# Patient Record
Sex: MALE | Race: WHITE | NOT HISPANIC OR LATINO | Employment: OTHER | URBAN - NONMETROPOLITAN AREA
[De-identification: names, ages, dates, MRNs, and addresses within clinical notes are randomized per-mention and may not be internally consistent; named-entity substitution may affect disease eponyms.]

---

## 2017-01-20 ENCOUNTER — COMMUNICATION - GICH (OUTPATIENT)
Dept: FAMILY MEDICINE | Facility: OTHER | Age: 52
End: 2017-01-20

## 2017-01-20 ENCOUNTER — COMMUNICATION - GICH (OUTPATIENT)
Dept: INTERNAL MEDICINE | Facility: OTHER | Age: 52
End: 2017-01-20

## 2017-01-20 DIAGNOSIS — K21.9 GASTRO-ESOPHAGEAL REFLUX DISEASE WITHOUT ESOPHAGITIS: ICD-10-CM

## 2017-01-20 DIAGNOSIS — R73.9 HYPERGLYCEMIA: ICD-10-CM

## 2017-01-20 DIAGNOSIS — M35.3 POLYMYALGIA RHEUMATICA (H): ICD-10-CM

## 2017-03-20 ENCOUNTER — COMMUNICATION - GICH (OUTPATIENT)
Dept: INTERNAL MEDICINE | Facility: OTHER | Age: 52
End: 2017-03-20

## 2017-03-20 DIAGNOSIS — M35.3 POLYMYALGIA RHEUMATICA (H): ICD-10-CM

## 2017-04-12 ENCOUNTER — COMMUNICATION - GICH (OUTPATIENT)
Dept: INTERNAL MEDICINE | Facility: OTHER | Age: 52
End: 2017-04-12

## 2017-04-12 DIAGNOSIS — M25.50 PAIN IN JOINT: ICD-10-CM

## 2017-04-12 DIAGNOSIS — M35.3 POLYMYALGIA RHEUMATICA (H): ICD-10-CM

## 2017-04-19 ENCOUNTER — OFFICE VISIT - GICH (OUTPATIENT)
Dept: INTERNAL MEDICINE | Facility: OTHER | Age: 52
End: 2017-04-19

## 2017-04-19 ENCOUNTER — HISTORY (OUTPATIENT)
Dept: INTERNAL MEDICINE | Facility: OTHER | Age: 52
End: 2017-04-19

## 2017-04-19 DIAGNOSIS — M35.3 POLYMYALGIA RHEUMATICA (H): ICD-10-CM

## 2017-04-19 DIAGNOSIS — R73.9 HYPERGLYCEMIA: ICD-10-CM

## 2017-04-19 DIAGNOSIS — M25.50 PAIN IN JOINT: ICD-10-CM

## 2017-04-19 DIAGNOSIS — F17.200 NICOTINE DEPENDENCE, UNCOMPLICATED: ICD-10-CM

## 2017-04-19 LAB
C-REACTIVE PROTEIN - HISTORICAL: <1 MG/DL
ERYTHROCYTE [SEDIMENTATION RATE] IN BLOOD: 3 MM/HR
ESTIMATED AVERAGE GLUCOSE: 100 MG/DL
HEMOGLOBIN A1C MONITORING (POCT) - HISTORICAL: 5.1 % (ref 4–6.2)

## 2017-04-19 ASSESSMENT — PATIENT HEALTH QUESTIONNAIRE - PHQ9: SUM OF ALL RESPONSES TO PHQ QUESTIONS 1-9: 0

## 2017-05-23 ENCOUNTER — AMBULATORY - GICH (OUTPATIENT)
Dept: SCHEDULING | Facility: OTHER | Age: 52
End: 2017-05-23

## 2017-06-03 ENCOUNTER — AMBULATORY - GICH (OUTPATIENT)
Dept: SCHEDULING | Facility: OTHER | Age: 52
End: 2017-06-03

## 2017-06-14 ENCOUNTER — AMBULATORY - GICH (OUTPATIENT)
Dept: SCHEDULING | Facility: OTHER | Age: 52
End: 2017-06-14

## 2017-06-22 ENCOUNTER — AMBULATORY - GICH (OUTPATIENT)
Dept: SCHEDULING | Facility: OTHER | Age: 52
End: 2017-06-22

## 2017-06-29 ENCOUNTER — HISTORY (OUTPATIENT)
Dept: EMERGENCY MEDICINE | Facility: OTHER | Age: 52
End: 2017-06-29

## 2017-07-05 ENCOUNTER — OFFICE VISIT - GICH (OUTPATIENT)
Dept: INTERNAL MEDICINE | Facility: OTHER | Age: 52
End: 2017-07-05

## 2017-07-05 ENCOUNTER — HISTORY (OUTPATIENT)
Dept: INTERNAL MEDICINE | Facility: OTHER | Age: 52
End: 2017-07-05

## 2017-07-05 DIAGNOSIS — M25.50 PAIN IN JOINT: ICD-10-CM

## 2017-07-05 DIAGNOSIS — R60.0 LOCALIZED EDEMA: ICD-10-CM

## 2017-07-05 DIAGNOSIS — F17.200 NICOTINE DEPENDENCE, UNCOMPLICATED: ICD-10-CM

## 2017-07-05 DIAGNOSIS — M35.3 POLYMYALGIA RHEUMATICA (H): ICD-10-CM

## 2017-07-05 DIAGNOSIS — G56.03 BILATERAL CARPAL TUNNEL SYNDROME: ICD-10-CM

## 2017-08-03 ENCOUNTER — AMBULATORY - GICH (OUTPATIENT)
Dept: FAMILY MEDICINE | Facility: OTHER | Age: 52
End: 2017-08-03

## 2017-08-03 ENCOUNTER — HISTORY (OUTPATIENT)
Dept: FAMILY MEDICINE | Facility: OTHER | Age: 52
End: 2017-08-03

## 2017-08-03 DIAGNOSIS — L91.9: ICD-10-CM

## 2017-08-03 DIAGNOSIS — L90.9 ATROPHIC DISORDER OF SKIN: ICD-10-CM

## 2017-08-22 ENCOUNTER — COMMUNICATION - GICH (OUTPATIENT)
Dept: INTERNAL MEDICINE | Facility: OTHER | Age: 52
End: 2017-08-22

## 2017-08-22 DIAGNOSIS — G56.03 BILATERAL CARPAL TUNNEL SYNDROME: ICD-10-CM

## 2017-09-20 ENCOUNTER — AMBULATORY - GICH (OUTPATIENT)
Dept: ORTHOPEDICS | Facility: OTHER | Age: 52
End: 2017-09-20

## 2017-09-20 DIAGNOSIS — M25.532 PAIN IN LEFT WRIST: ICD-10-CM

## 2017-09-20 DIAGNOSIS — M25.531 PAIN IN RIGHT WRIST: ICD-10-CM

## 2017-09-21 ENCOUNTER — AMBULATORY - GICH (OUTPATIENT)
Dept: ORTHOPEDICS | Facility: OTHER | Age: 52
End: 2017-09-21

## 2017-09-21 ENCOUNTER — HISTORY (OUTPATIENT)
Dept: ORTHOPEDICS | Facility: OTHER | Age: 52
End: 2017-09-21

## 2017-09-25 ENCOUNTER — OFFICE VISIT - GICH (OUTPATIENT)
Dept: ORTHOPEDICS | Facility: OTHER | Age: 52
End: 2017-09-25

## 2017-09-25 ENCOUNTER — HISTORY (OUTPATIENT)
Dept: ORTHOPEDICS | Facility: OTHER | Age: 52
End: 2017-09-25

## 2017-09-25 ENCOUNTER — AMBULATORY - GICH (OUTPATIENT)
Dept: INTERNAL MEDICINE | Facility: OTHER | Age: 52
End: 2017-09-25

## 2017-09-25 ENCOUNTER — HOSPITAL ENCOUNTER (OUTPATIENT)
Dept: RADIOLOGY | Facility: OTHER | Age: 52
End: 2017-09-25
Attending: ORTHOPAEDIC SURGERY

## 2017-09-25 DIAGNOSIS — G56.03 BILATERAL CARPAL TUNNEL SYNDROME: ICD-10-CM

## 2017-09-25 DIAGNOSIS — M25.531 PAIN IN RIGHT WRIST: ICD-10-CM

## 2017-09-25 DIAGNOSIS — M25.532 PAIN IN LEFT WRIST: ICD-10-CM

## 2017-09-25 DIAGNOSIS — G56.22 LESION OF LEFT ULNAR NERVE: ICD-10-CM

## 2017-10-12 ENCOUNTER — HISTORY (OUTPATIENT)
Dept: INTERNAL MEDICINE | Facility: OTHER | Age: 52
End: 2017-10-12

## 2017-10-12 ENCOUNTER — OFFICE VISIT - GICH (OUTPATIENT)
Dept: INTERNAL MEDICINE | Facility: OTHER | Age: 52
End: 2017-10-12

## 2017-10-12 DIAGNOSIS — M25.50 PAIN IN JOINT: ICD-10-CM

## 2017-10-12 DIAGNOSIS — T38.0X5A ADVERSE EFFECT OF GLUCOCORTICOID OR SYNTHETIC ANALOGUE: ICD-10-CM

## 2017-10-12 DIAGNOSIS — R73.9 HYPERGLYCEMIA: ICD-10-CM

## 2017-10-12 DIAGNOSIS — M35.3 POLYMYALGIA RHEUMATICA (H): ICD-10-CM

## 2017-10-12 DIAGNOSIS — G56.22 LESION OF LEFT ULNAR NERVE: ICD-10-CM

## 2017-10-12 DIAGNOSIS — R60.0 LOCALIZED EDEMA: ICD-10-CM

## 2017-10-12 DIAGNOSIS — M33.90 DERMATOPOLYMYOSITIS (H): ICD-10-CM

## 2017-10-12 DIAGNOSIS — G56.03 BILATERAL CARPAL TUNNEL SYNDROME: ICD-10-CM

## 2017-10-12 DIAGNOSIS — K21.9 GASTRO-ESOPHAGEAL REFLUX DISEASE WITHOUT ESOPHAGITIS: ICD-10-CM

## 2017-10-12 LAB
C-REACTIVE PROTEIN - HISTORICAL: <1 MG/DL
ERYTHROCYTE [SEDIMENTATION RATE] IN BLOOD: 3 MM/HR
ESTIMATED AVERAGE GLUCOSE: 105 MG/DL
HEMOGLOBIN A1C MONITORING (POCT) - HISTORICAL: 5.3 % (ref 4–6.2)
MISCELLANEOUS SEND OUT - HISTORICAL: NORMAL
PERFORMING LAB - HISTORICAL: NORMAL
REFERRAL LAB TEST # - HISTORICAL: NORMAL
SOURCE - HISTORICAL: NORMAL
TEST NAME - HISTORICAL: NORMAL

## 2017-10-12 ASSESSMENT — PATIENT HEALTH QUESTIONNAIRE - PHQ9: SUM OF ALL RESPONSES TO PHQ QUESTIONS 1-9: 0

## 2017-11-08 ENCOUNTER — COMMUNICATION - GICH (OUTPATIENT)
Dept: INTERNAL MEDICINE | Facility: OTHER | Age: 52
End: 2017-11-08

## 2017-11-20 ENCOUNTER — HISTORY (OUTPATIENT)
Dept: INTERNAL MEDICINE | Facility: OTHER | Age: 52
End: 2017-11-20

## 2017-11-20 ENCOUNTER — AMBULATORY - GICH (OUTPATIENT)
Dept: INTERNAL MEDICINE | Facility: OTHER | Age: 52
End: 2017-11-20

## 2017-11-20 DIAGNOSIS — Z01.818 ENCOUNTER FOR OTHER PREPROCEDURAL EXAMINATION: ICD-10-CM

## 2017-11-20 DIAGNOSIS — M35.3 POLYMYALGIA RHEUMATICA (H): ICD-10-CM

## 2017-11-20 DIAGNOSIS — Z99.89 DEPENDENCE ON OTHER ENABLING MACHINES AND DEVICES: ICD-10-CM

## 2017-11-20 DIAGNOSIS — G47.33 OBSTRUCTIVE SLEEP APNEA: ICD-10-CM

## 2017-11-20 DIAGNOSIS — G56.03 BILATERAL CARPAL TUNNEL SYNDROME: ICD-10-CM

## 2017-11-20 DIAGNOSIS — F17.200 NICOTINE DEPENDENCE, UNCOMPLICATED: ICD-10-CM

## 2017-11-20 ASSESSMENT — PATIENT HEALTH QUESTIONNAIRE - PHQ9: SUM OF ALL RESPONSES TO PHQ QUESTIONS 1-9: 0

## 2017-11-22 ENCOUNTER — HISTORY (OUTPATIENT)
Dept: SURGERY | Facility: OTHER | Age: 52
End: 2017-11-22

## 2017-11-22 ENCOUNTER — HOSPITAL ENCOUNTER (OUTPATIENT)
Dept: SURGERY | Facility: OTHER | Age: 52
Discharge: HOME | End: 2017-11-22
Attending: ORTHOPAEDIC SURGERY | Admitting: ORTHOPAEDIC SURGERY

## 2017-11-22 ENCOUNTER — SURGERY (OUTPATIENT)
Dept: SURGERY | Facility: OTHER | Age: 52
End: 2017-11-22

## 2017-11-22 DIAGNOSIS — Z98.890 OTHER SPECIFIED POSTPROCEDURAL STATES: ICD-10-CM

## 2017-12-04 ENCOUNTER — OFFICE VISIT - GICH (OUTPATIENT)
Dept: ORTHOPEDICS | Facility: OTHER | Age: 52
End: 2017-12-04

## 2017-12-04 ENCOUNTER — HISTORY (OUTPATIENT)
Dept: ORTHOPEDICS | Facility: OTHER | Age: 52
End: 2017-12-04

## 2017-12-04 DIAGNOSIS — G56.03 BILATERAL CARPAL TUNNEL SYNDROME: ICD-10-CM

## 2017-12-04 DIAGNOSIS — Z98.890 OTHER SPECIFIED POSTPROCEDURAL STATES: ICD-10-CM

## 2017-12-04 DIAGNOSIS — G56.22 LESION OF LEFT ULNAR NERVE: ICD-10-CM

## 2017-12-11 ENCOUNTER — OFFICE VISIT - GICH (OUTPATIENT)
Dept: ORTHOPEDICS | Facility: OTHER | Age: 52
End: 2017-12-11

## 2017-12-11 DIAGNOSIS — Z98.890 OTHER SPECIFIED POSTPROCEDURAL STATES: ICD-10-CM

## 2017-12-14 ENCOUNTER — HISTORY (OUTPATIENT)
Dept: INTERNAL MEDICINE | Facility: OTHER | Age: 52
End: 2017-12-14

## 2017-12-14 ENCOUNTER — AMBULATORY - GICH (OUTPATIENT)
Dept: INTERNAL MEDICINE | Facility: OTHER | Age: 52
End: 2017-12-14

## 2017-12-14 DIAGNOSIS — F17.200 NICOTINE DEPENDENCE, UNCOMPLICATED: ICD-10-CM

## 2017-12-14 DIAGNOSIS — R60.0 LOCALIZED EDEMA: ICD-10-CM

## 2017-12-14 DIAGNOSIS — G56.22 LESION OF LEFT ULNAR NERVE: ICD-10-CM

## 2017-12-14 DIAGNOSIS — M25.50 PAIN IN JOINT: ICD-10-CM

## 2017-12-14 DIAGNOSIS — Z99.89 DEPENDENCE ON OTHER ENABLING MACHINES AND DEVICES: ICD-10-CM

## 2017-12-14 DIAGNOSIS — Z01.818 ENCOUNTER FOR OTHER PREPROCEDURAL EXAMINATION: ICD-10-CM

## 2017-12-14 DIAGNOSIS — K21.9 GASTRO-ESOPHAGEAL REFLUX DISEASE WITHOUT ESOPHAGITIS: ICD-10-CM

## 2017-12-14 DIAGNOSIS — G47.33 OBSTRUCTIVE SLEEP APNEA: ICD-10-CM

## 2017-12-14 DIAGNOSIS — G56.02 CARPAL TUNNEL SYNDROME OF LEFT WRIST: ICD-10-CM

## 2017-12-14 DIAGNOSIS — M35.3 POLYMYALGIA RHEUMATICA (H): ICD-10-CM

## 2017-12-21 ENCOUNTER — HISTORY (OUTPATIENT)
Dept: SURGERY | Facility: OTHER | Age: 52
End: 2017-12-21

## 2017-12-21 ENCOUNTER — HOSPITAL ENCOUNTER (OUTPATIENT)
Dept: SURGERY | Facility: OTHER | Age: 52
Discharge: HOME | End: 2017-12-21
Attending: ORTHOPAEDIC SURGERY | Admitting: ORTHOPAEDIC SURGERY

## 2017-12-21 ENCOUNTER — SURGERY (OUTPATIENT)
Dept: SURGERY | Facility: OTHER | Age: 52
End: 2017-12-21

## 2017-12-21 DIAGNOSIS — Z98.890 OTHER SPECIFIED POSTPROCEDURAL STATES: ICD-10-CM

## 2017-12-22 ENCOUNTER — OFFICE VISIT - GICH (OUTPATIENT)
Dept: ORTHOPEDICS | Facility: OTHER | Age: 52
End: 2017-12-22

## 2017-12-22 DIAGNOSIS — Z98.890 OTHER SPECIFIED POSTPROCEDURAL STATES: ICD-10-CM

## 2017-12-27 NOTE — PROGRESS NOTES
Patient Information     Patient Name MRN Sex Gregg Spencer [id number] Male 1965      Progress Notes by Artur Stewart MD at 8/3/2017  3:15 PM     Author:  Artur Stewart MD Service:  (none) Author Type:  Physician     Filed:  8/3/2017  4:27 PM Encounter Date:  8/3/2017 Status:  Signed     :  Artur Stewart MD (Physician)            SUBJECTIVE:  Gregg Aguayo is a 51 y.o. male who presents requesting removal of multiple skin tags from both armpits. We have removed several of these in the past. Many of these have recently become irritated.    No Known Allergies and   Current Outpatient Prescriptions on File Prior to Visit       Medication  Sig Dispense Refill     acetaminophen SR (TYLENOL ARTHRITIS PAIN) 650 mg Extended-Release tablet Take up to 4 tablet daily for pain as needed 100 tablet 3     furosemide (LASIX) 20 mg tablet Take 1 tablet by mouth once daily if needed. - For Leg swelling - limit use. - Salt restriction 90 tablet 0     methylPREDNISolone (MEDROL) 4 mg tablet Take 0.5-2 tablets by mouth once daily with a meal. - wean dose every 2 to 4 weeks - for  tablet 3     omeprazole (PRILOSEC) 40 mg Delayed-Release capsule TAKE 1 CAPSULE BY MOUTH DAILY. 90 capsule 2     traMADol (ULTRAM) 50 mg tablet Take 1 tablet by mouth 2 times daily if needed for Pain. 60 tablet 2     varenicline (CHANTIX) 1 mg tablet Take 1 mg by mouth 2 times daily with meals. - Quit Smoking 180 tablet 1     No current facility-administered medications on file prior to visit.        OBJECTIVE:  /78  Pulse 72  Wt 126.6 kg (279 lb)  BMI 38.91 kg/m2  EXAM:  General Appearance: Pleasant, alert, appropriate appearance for age. No acute distress  Skin: Multiple simple skin tags noted in both axillae. Regions were prepped. A total of 18 tags were identified. 1% Xylocaine used for local anesthetic. Tags were removed with an iris scissors and the bases electrocauterized.    ASSESSMENT/Plan :       ICD-10-CM    1. SKIN TAGS; IRRITATED/INFLAMMED  All lesions appeared to be typical skin tags and were not sent for pathology. Discussed postsurgical skin care.  L91.9 TX DESTROY BENIGN LESIONS 15 OR MORE     L90.9        Artur Stewart MD

## 2017-12-27 NOTE — PROGRESS NOTES
Patient Information     Patient Name MRN Gregg Vuong [id number] Male 1965      Progress Notes by Reinier Ritchie MD at 2017  8:40 AM     Author:  Reinier Ritchie MD Service:  (none) Author Type:  Physician     Filed:  2017  1:53 PM Encounter Date:  2017 Status:  Signed     :  Reinier Ritchie MD (Physician)            Nursing Notes:   Mandy Prajapati  2017  8:59 AM  Signed  Patient presents to the clinic for ER follow up from 17, DX: Polymyalgia Rheumatica.    Mandy Prajapati LPN        2017 8:33 AM    Gregg Aguayo presents to clinic today for:   Chief Complaint    Patient presents with      Medication Management     HPI: Mr. Aguayo is a 51 y.o. male who presents today for evaluation of above.     (F17.200) TOBACCO ABUSE  (primary encounter diagnosis)  (M35.3) Polymyalgia rheumatica (HC)  (M25.50) Arthralgia of multiple joints  (R60.0) Bilateral leg edema  (G56.03) Carpal tunnel syndrome on both sides     Patient is here with his wife today for follow-up of multiple issues.  Tobacco abuse, ongoing.  Has almost quit smoking and wants to continue Chantix.  He needs refills today.    Polymyalgia rheumatica - reports that he recently saw rheumatology - was down to 2 mg medrol daily. Off x3 weeks -> then could hardly move his hands starting a few days ago.  Ended up going to the emergency room where he was restarted on Medrol 16 mg daily and reports that his hand pain and stiffness has improved markedly.    Recommend that he start a fairly rapid prednisone taper down to 4 mg per day and then titrate downward by 1 or 2 mg every month.  She needs refills today.    Bilateral leg edema, blood pressures are bit high today.  Start low-dose Lasix.  Prescription sent to pharmacy.    Bilateral hand carpal tunnel syndrome, reports waking up at night intermittently with pain.  Numbness and tingling into his for second and third fingers at times.  Intermittently notes or  questions some hand weakness versus pain with squeezing objects.  We talked about some treatment options, further evaluation.  Recommended EMG/nerve conduction studies if his carpal tunnel splints do not significantly help his symptoms over the next couple months.    Due to steroid-induced hyperglycemia, restart metformin.    Mr. Landers Body mass index is 40.19 kg/(m^2). This is out of the normal range for a 51 y.o. Normal range for ages 18+ is between 18.5 and 24.9. To lose weight we reviewed risks and benefits of appropriate options such as diet, exercise, and medications. Patient's strategy will be  self-directed nutrition plan and self-directed exercise program   BP Readings from Last 1 Encounters:07/05/17 : 140/88  Mr. Landers blood pressure is out of the normal range for adults. Per JNC-8 guidelines normal adult blood pressure is < 120/80, pre-hypertensive is between 120/80 and 139/89, and hypertension is 140/90 or greater. Risks of hypertension were discussed. Patient's strategy will be weight loss, increased activity and reduced salt intake    Functional Capacity: > 4 METS.   Reports that he can climb a flight of stairs without any chest pain/heaviness or shortness of breath.   Patient reports no current symptoms of fevers, chills, nausea/vomiting.   No cough. No shortness of breath.   No change in bowel/bladder habits. No melena, hematochezia. No Hematuria.   No rashes. No palpitations.  No orthopnea/paroxysmal nocturnal dyspnea   No vision or hearing issues.   No significant mood issues   No bruising.     TRESSA:  No flowsheet data found.    PHQ9:  PHQ Depression Screening 4/19/2017 7/5/2017   Date of PHQ exam (doc flow) 4/19/2017 7/5/2017   1. Lack of interest/pleasure 0 - Not at all 0 - Not at all   2. Feeling down/depressed 0 - Not at all 0 - Not at all   PHQ-2 TOTAL SCORE 0 0   3. Trouble sleeping 0 - Not at all 0 - Not at all   4. Decreased energy 0 - Not at all 0 - Not at all   5. Appetite change 0 -  Not at all 0 - Not at all   6. Feelings of failure 0 - Not at all 0 - Not at all   7. Trouble concentrating 0 - Not at all 0 - Not at all   8. Activity level 0 - Not at all 0 - Not at all   9. Hurting yourself 0 - Not at all 0 - Not at all   PHQ-9 TOTAL SCORE 0 0   PHQ-9 Severity Level none none   Functional Impairment not difficult at all not difficult at all        I have personally reviewed the past medical history, past surgical history, medications, allergies, family and social history as listed below, on 7/5/2017.    Patient Active Problem List      Diagnosis Date Noted     Bilateral leg edema 07/05/2017     Carpal tunnel syndrome on both sides 07/05/2017     Steroid-induced hyperglycemia 11/11/2016     Polymyalgia rheumatica (HC) 11/11/2016     Arthralgia of multiple joints 10/26/2016     Pain in both lower extremities 10/07/2016     SKIN TAGS; IRRITATED/INFLAMMED 06/07/2012     DERMATITIS 09/20/2011     ERECTILE DYSFUNCTION      GERD      TOBACCO ABUSE      Past Medical History:     Diagnosis  Date     No pertinent past medical history      Past Surgical History:      Procedure  Laterality Date     APPENDECTOMY  2005    Laparoscopic for acute appendicitis       ARTHROSCOPY      knee ? left       COLONOSCOPY SCREENING  1/29/2014    Tubular adenoma of rectum - follow up 5 years       Current Outpatient Prescriptions       Medication  Sig Dispense Refill     acetaminophen SR (TYLENOL ARTHRITIS PAIN) 650 mg Extended-Release tablet Take up to 4 tablet daily for pain as needed 100 tablet 3     furosemide (LASIX) 20 mg tablet Take 1 tablet by mouth once daily if needed. - For Leg swelling - limit use. - Salt restriction 90 tablet 0     methylPREDNISolone (MEDROL) 16 mg tablet Take 1 tablet by mouth once daily with a meal. 10 tablet 0     methylPREDNISolone (MEDROL) 4 mg tablet Take 0.5-2 tablets by mouth once daily with a meal. - wean dose every 2 to 4 weeks - for  tablet 3     omeprazole (PRILOSEC) 40 mg  Delayed-Release capsule TAKE 1 CAPSULE BY MOUTH DAILY. 90 capsule 2     traMADol (ULTRAM) 50 mg tablet Take 1 tablet by mouth 2 times daily if needed for Pain. 60 tablet 2     varenicline (CHANTIX DOSEPAK) 0.5 mg (11)- 1 mg (42) tablet Take one 0.5mg tab daily for 3 days, then one 0.5mg tab twice daily for 4 days, then one 1mg tab Once to twice daily. 1 Packet 0     varenicline (CHANTIX) 1 mg tablet Take 1 mg by mouth 2 times daily with meals. - Quit Smoking 180 tablet 1     No Known Allergies  Family History       Problem   Relation Age of Onset     Genetic  Maternal Grandfather      Polymyalgia Rheumatica       Family Status     Relation  Status     Child Alive    x2      Maternal Grandfather Alive     Social History     Social History        Marital status:       Spouse name: N/A     Number of children:  N/A     Years of education:  N/A     Social History Main Topics          Smoking status:   Current Every Day Smoker      Packs/day:  0.50      Types:  Cigarettes      Smokeless tobacco:   Never Used      Alcohol use   0.0 oz/week     0 Standard drinks or equivalent per week        Comment: 1-2 drinks every other day       Drug use:   No      Sexual activity:   Not on file      Other Topics  Concern     Seat Belt Yes     Social History Narrative     Tobacco-one half pack per day.  Ethanol-occasional.      and remarried.  Has two children from his first marriage.  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                         Pertinent ROS was performed and was negative as noted in HPI above.     EXAM:   Vitals:     07/05/17 0834   BP: 140/88   Pulse: 64   Weight: 130.7 kg (288 lb 2 oz)     BP Readings from Last 3 Encounters:    07/05/17 140/88   06/29/17 148/97   04/19/17 124/70     Wt Readings from Last 3 Encounters:    07/05/17 130.7 kg (288 lb 2 oz)   06/29/17 124.7 kg (275 lb)   04/19/17 125.8 kg (277 lb 4 oz)     Estimated body mass index is 40.19 kg/(m^2) as calculated from  "the following:    Height as of 6/29/17: 1.803 m (5' 11\").    Weight as of this encounter: 130.7 kg (288 lb 2 oz).     EXAM:  Constitutional: Pleasant, alert, appropriate appearance for age. No acute distress  Lymphatic Exam: Non-palpable nodes in neck, clavicular regions  Pulmonary: Lungs are clear to auscultation bilaterally, without wheezes or crackles  Cardiovascular Exam: regular rate and rhythm, 1+ pedal edema present  Gastrointestinal Exam: Obese  Integument: No abnormal rashes, sores, or ulcerations noted  Neurologic Exam: CN 3-12 grossly intact - wrists bilateral shooting electrical shocks sent to fingertips with percussion over the carpal ligament.  Musculoskeletal Exam: Moves upper and lower extremities symmetrically, No focal weakness  Gait and station appear grossly normal  Psychiatric Exam: Awake and Alert, Affect and mood appropriate  Speech is fluent, Thought process is normal    INVESTIGATIONS:  Results for orders placed or performed during the hospital encounter of 06/29/17      SEDIMENTATION RATE      Result  Value Ref Range    SEDIMENTATION RATE        12 <21 mm/hr   C-REACTIVE PROTEIN      Result  Value Ref Range    C-REACTIVE PROTEIN 1.466 (H) <1.000 mg/dL   CBC WITH AUTO DIFFERENTIAL      Result  Value Ref Range    WHITE BLOOD COUNT         7.9 4.5 - 11.0 thou/cu mm    RED BLOOD COUNT           4.80 4.30 - 5.90 mil/cu mm    HEMOGLOBIN                16.1 13.5 - 17.5 g/dL    HEMATOCRIT                44.5 37.0 - 53.0 %    MCV                       93 80 - 100 fL    MCH                       33.5 26.0 - 34.0 pg    MCHC                      36.2 (H) 32.0 - 36.0 g/dL    RDW                       12.2 11.5 - 15.5 %    PLATELET COUNT            215 140 - 440 thou/cu mm    MPV                       10.0 6.5 - 11.0 fL    NEUTROPHILS               63.1 42.0 - 72.0 %    LYMPHOCYTES               22.7 20.0 - 44.0 %    MONOCYTES                 9.8 <12.0 %    EOSINOPHILS               2.9 <8.0 %    " BASOPHILS                 1.1 <3.0 %    IMMATURE GRANULOCYTES(METAS,MYELOS,PROS) 0.4 %    ABSOLUTE NEUTROPHILS      5.0 1.7 - 7.0 thou/cu mm    ABSOLUTE LYMPHOCYTES      1.8 0.9 - 2.9 thou/cu mm    ABSOLUTE MONOCYTES        0.8 <0.9 thou/cu mm    ABSOLUTE EOSINOPHILS      0.2 <0.5 thou/cu mm    ABSOLUTE BASOPHILS        0.1 <0.3 thou/cu mm    ABSOLUTE IMMATURE GRANULOCYTES(METAS,MYELOS,PROS) 0.0 <=0.3 thou/cu mm       ASSESSMENT AND PLAN:  Gregg was seen today for medication management.    Diagnoses and all orders for this visit:    TOBACCO ABUSE  -     varenicline (CHANTIX) 1 mg tablet; Take 1 mg by mouth 2 times daily with meals. - Quit Smoking    Polymyalgia rheumatica (HC)  -     methylPREDNISolone (MEDROL) 4 mg tablet; Take 0.5-2 tablets by mouth once daily with a meal. - wean dose every 2 to 4 weeks - for PMR    Arthralgia of multiple joints  -     methylPREDNISolone (MEDROL) 4 mg tablet; Take 0.5-2 tablets by mouth once daily with a meal. - wean dose every 2 to 4 weeks - for PMR    Bilateral leg edema  -     furosemide (LASIX) 20 mg tablet; Take 1 tablet by mouth once daily if needed. - For Leg swelling - limit use. - Salt restriction    Carpal tunnel syndrome on both sides  -     WRIST SPLINT      lab results and schedule of future lab studies reviewed with patient, reviewed diet, exercise and weight control, recommended sodium restriction, cardiovascular risk and specific lipid/LDL goals reviewed    -- Expected clinical course discussed   -- Medications and their side effects discussed    Gregg is also recommended to eat a heart-healthy diet, do regular aerobic exercises, maintain a desirable body weight, and avoid tobacco products. These recommendations are from the American Heart Association (AHA) which stresses the importance of lifestyle changes to lower cardiovascular disease risk.     Return in about 3 months (around 10/5/2017) for - follow-up chantix, carpal tunnel.    Patient Instructions    Low-salt heart healthy diet:    Eat a diet that is low in sodium (salt). Salt increases fluid retention and should be avoided.  Limit salt in your diet to 1,500 milligrams (mg) per day.      - remove the salt shaker, do not have it on the table when you are eating, or in the kitchen when you are cooking     - do not eat seasoned salts, pickles, olives, salted soups and snacks, regular cold cuts and cheeses, regular TV dinners     - learn to read food labels:          - low sodium is 140 mg or less per serving.          - beware of foods with 400 - 600 mg sodium per serving     - make food choices that are considered heart healthy  - eat more fresh fruits and vegetables:       - aim for two or more servings of fruit each day       - eat three or more servings of vegetables each day  - eat whole grains  - eat more foods you make at home  - eat more chicken, fish, and lean pork  - eat less red meat    - bake, grill, or broil meats    - limit fried foods  - eat two to three servings of low-fat or fat-free dairy foods each day  - use these sparingly, vegetable oil and spray, tub or squeeze margarine, low or non-fat salad dressing    - read labels to avoid trans-fats     Weigh yourself each morning after you urinate but before you eat breakfast.   Keep a record of your weight to show your caregiver.   A sudden weight gain can mean fluid buildup.     Call your Health Care Provider if you have any of the following: (identify yourself as a heart failure patient having the following symptoms):  - trouble breathing or shortness of breath more than usual    - new or worsening shortness of breath or occurring when lying flat  - chest discomfort (not relieved by nitroglycerin)  - swollen feet, ankles and legs more than usual  - fatigue, weakness, loss of energy  - coughing at night or chronic cough  - nausea with abdominal swelling, pain and tenderness      Some patients require extra doses of their water pill / Diuretic if:    - weight gain of  3 pounds in one day (1.4 kilograms)  - weight gain of 5 pounds in one week (2.3 kilograms)      As always, if you have questions or concerns about your health, please call your regular health care provider or your heart failure clinic.    FOR EMERGENCY DIAL 911    1. TOBACCO ABUSE  - varenicline (CHANTIX) 1 mg tablet; Take 1 mg by mouth 2 times daily with meals. - Quit Smoking  Dispense: 180 tablet; Refill: 1    2. Polymyalgia rheumatica (HC)  3. Arthralgia of multiple joints    Restart and slowly wean dose.   - methylPREDNISolone (MEDROL) 4 mg tablet; Take 0.5-2 tablets by mouth once daily with a meal. - wean dose every 2 to 4 weeks - for PMR  Dispense: 180 tablet; Refill: 3    4. Bilateral leg edema  Start:  - furosemide (LASIX) 20 mg tablet; Take 1 tablet by mouth once daily if needed. - For Leg swelling - limit use. - Salt restriction  Dispense: 90 tablet; Refill: 0    Sounds like you have carpal tunnel syndrome.  - Start nighttime cockup wrist splints.  - If the wrist splints make a substantial difference, probably do not need any further workup, if they help but do not make all of your pain and numbness go away, would recommend getting an EMG/nerve conduction study.    Return in approximately 3 month(s), or sooner as needed for follow-up with Dr. Ritchie.  - follow-up chantix, carpal tunnel    Clinic : [phone]  Appointment line: [phone]      Reinier Ritchie MD

## 2017-12-28 NOTE — PROGRESS NOTES
Patient Information     Patient Name MRN Sex Gregg Spencer [id number] Male 1965      Progress Notes by Reinier Ritchie MD at 10/12/2017  3:20 PM     Author:  Reinier Ritchie MD Service:  (none) Author Type:  Physician     Filed:  10/12/2017  7:03 PM Encounter Date:  10/12/2017 Status:  Signed     :  Reinier Ritchie MD (Physician)            Nursing Notes:   Mandy Prajapati  10/12/2017  3:53 PM  Signed  Patient presents to the clinic for discuss medications.    Mandy Prajapati LPN        10/12/2017 3:36 PM    Gregg Aguayo presents to clinic today for:   Chief Complaint    Patient presents with      Medication Management     HPI: Mr. Aguayo is a 51 y.o. male who presents today for evaluation of above.     (M35.3) Polymyalgia rheumatica (HC)  (primary encounter diagnosis)  (M33.90) Dermatomyositis (HC)  (G56.22) Cubital tunnel syndrome on left  (G56.03) Bilateral carpal tunnel syndrome  (R73.9,  T38.0X5A) Steroid-induced hyperglycemia  (R60.0) Bilateral leg edema  (K21.9) Gastroesophageal reflux disease, esophagitis presence not specified  (M25.50) Arthralgia of multiple joints     Polymyalgia rheumatica, recent question about possible diagnosis of dermatomyositis.  One of his myositis labs did come back abnormal from [facility].  He would like this rechecked.  States she's been having more rash on his fingers laterally rather than dorsally.  He does report intermittently having some purple or violaceous color under his eyes.  States that he has been having more aches and pains again.  He recently weaned himself off of methylprednisolone a couple weeks ago.    Due to the increase in aches and pains again he would like labs rechecked.    Recently diagnosed bilateral carpal tunnel syndrome as well as left cubital tunnel syndrome.  He is scheduled for potential surgery in about 1-1/2-2 months for this.    Did have steroid-induced hyperglycemia due to his regular use of Medrol.  He would like  his hemoglobin A1c rechecked.    Leg swelling, intermittently using Lasix.  He would like refills today.    Heartburn and reflux, taking Prilosec.  Needs he uses fairly regularly.  Does help his heartburn issues quite a bit.  He would like refills today.      Tramadol, currently use for multiple joint pain.  Reports he only uses a couple tablets per week on average.  More if there is change in weather or he is more active.  He would like refills today.  Proper medication use and misuse reviewed.  He has been using medications appropriately.    Mr. Landers Body mass index is 41.22 kg/(m^2). This is out of the normal range for a 51 y.o. Normal range for ages 18+ is between 18.5 and 24.9. To lose weight we reviewed risks and benefits of appropriate options such as diet, exercise, and medications. Patient's strategy will be  self-directed nutrition plan and self-directed exercise program   BP Readings from Last 1 Encounters:10/12/17 : 120/82  Mr. Landers blood pressure is out of the normal range for adults. Per JNC-8 guidelines normal adult blood pressure is < 120/80, pre-hypertensive is between 120/80 and 139/89, and hypertension is 140/90 or greater. Risks of hypertension were discussed. Patient's strategy will be weight loss, increased activity and reduced salt intake    Functional Capacity: > 4 METS.   Reports that he can climb a flight of stairs without any chest pain/heaviness or shortness of breath.   Patient reports no current symptoms of fevers, chills, nausea/vomiting.   No cough. No shortness of breath.   No change in bowel/bladder habits. No melena, hematochezia. No Hematuria.   No palpitations.  No orthopnea/paroxysmal nocturnal dyspnea   No vision or hearing issues.   No significant mood issues   No bruising.     TRESSA:  No flowsheet data found.    PHQ9:  PHQ Depression Screening 8/3/2017 10/12/2017   Date of PHQ exam (doc flow) 8/3/2017 10/12/2017   1. Lack of interest/pleasure 0 - Not at all 0 - Not at  all   2. Feeling down/depressed 0 - Not at all 0 - Not at all   PHQ-2 TOTAL SCORE 0 0   3. Trouble sleeping - 0 - Not at all   4. Decreased energy - 0 - Not at all   5. Appetite change - 0 - Not at all   6. Feelings of failure - 0 - Not at all   7. Trouble concentrating - 0 - Not at all   8. Activity level - 0 - Not at all   9. Hurting yourself - 0 - Not at all   PHQ-9 TOTAL SCORE - 0   PHQ-9 Severity Level - none   Functional Impairment - not difficult at all   Some recent data might be hidden          I have personally reviewed the past medical history, past surgical history, medications, allergies, family and social history as listed below, on 10/12/2017.    Patient Active Problem List      Diagnosis Date Noted     Dermatomyositis (HC) 10/12/2017     Cubital tunnel syndrome on left 09/25/2017     Bilateral carpal tunnel syndrome 09/21/2017     Bilateral leg edema 07/05/2017     Steroid-induced hyperglycemia 11/11/2016     Polymyalgia rheumatica (HC) 11/11/2016     Arthralgia of multiple joints 10/26/2016     SKIN TAGS; IRRITATED/INFLAMMED 06/07/2012     DERMATITIS 09/20/2011     ERECTILE DYSFUNCTION      GERD      TOBACCO ABUSE      Past Medical History:     Diagnosis  Date     Bilateral carpal tunnel syndrome 9/21/2017     Cubital tunnel syndrome on left 9/25/2017     No pertinent past medical history      Past Surgical History:      Procedure  Laterality Date     APPENDECTOMY  2005    Laparoscopic for acute appendicitis       ARTHROSCOPY      knee ? left       COLONOSCOPY SCREENING  1/29/2014    Tubular adenoma of rectum - follow up 5 years       Current Outpatient Prescriptions       Medication  Sig Dispense Refill     acetaminophen SR (TYLENOL ARTHRITIS PAIN) 650 mg Extended-Release tablet Take up to 4 tablet daily for pain as needed 100 tablet 3     furosemide (LASIX) 20 mg tablet Take 1 tablet by mouth once daily if needed. - For Leg swelling - limit use. - Salt restriction 90 tablet 3     gabapentin  (NEURONTIN) 100 mg capsule Take 1-2 capsules by mouth 4 times daily if needed (For Burning/Shooting Nerve Pain). 120 capsule 2     methylPREDNISolone (MEDROL) 4 mg tablet Take 0.5-2 tablets by mouth once daily with a meal. - wean dose every 2 to 4 weeks - for  tablet 3     omeprazole (PRILOSEC) 40 mg Delayed-Release capsule Take 1 capsule by mouth once daily. 90 capsule 3     traMADol (ULTRAM) 50 mg tablet Take 1 tablet by mouth 2 times daily if needed for Pain. 60 tablet 2     varenicline (CHANTIX) 1 mg tablet Take 1 mg by mouth 2 times daily with meals. - Quit Smoking 180 tablet 1     No Known Allergies  Family History       Problem   Relation Age of Onset     Genetic  Maternal Grandfather      Polymyalgia Rheumatica       Family Status     Relation  Status     Child Alive    x2      Maternal Grandfather Alive     Social History     Social History        Marital status:       Spouse name: N/A     Number of children:  N/A     Years of education:  N/A     Social History Main Topics          Smoking status:   Current Every Day Smoker      Packs/day:  0.75      Types:  Cigarettes      Smokeless tobacco:   Never Used      Alcohol use   0.0 oz/week     0 Standard drinks or equivalent per week        Comment: 1-2 drinks every other day       Drug use:   No      Sexual activity:   Not on file      Other Topics  Concern     Seat Belt Yes     Social History Narrative     Tobacco-one half pack per day.  Ethanol-occasional.      and remarried.  Has two children from his first marriage.  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                         Pertinent ROS was performed and was negative as noted in HPI above.     EXAM:   Vitals:     10/12/17 1537   BP: 120/82   Pulse: 88   Weight: 132.2 kg (291 lb 6 oz)     BP Readings from Last 3 Encounters:    10/12/17 120/82   09/25/17 130/88   08/03/17 126/78     Wt Readings from Last 3 Encounters:    10/12/17 132.2 kg (291 lb 6 oz)   09/25/17  "126.6 kg (279 lb)   08/03/17 126.6 kg (279 lb)     Estimated body mass index is 41.22 kg/(m^2) as calculated from the following:    Height as of 9/25/17: 1.791 m (5' 10.5\").    Weight as of this encounter: 132.2 kg (291 lb 6 oz).     EXAM:  Constitutional: well groomed / good hygiene, casual dress  Lymphatic Exam: Non-palpable nodes in neck, clavicular regions  Pulmonary: Lungs are clear to auscultation bilaterally, without wheezes or crackles  Cardiovascular Exam: regular rate and rhythm, 1+ pedal edema present  Gastrointestinal Exam: Obese  Soft, non-tender, non-distended, positive bowel sounds  Integument: dry skin with some reddened rash on hands. No violaceous coloration under eyes or knuckles.   Neurologic Exam: CN 3-12 grossly intact   Musculoskeletal Exam: Moves upper and lower extremities symmetrically, No focal weakness, Gait and station appear grossly normal  Psychiatric Exam: Awake and Alert, Affect and mood appropriate  Speech is fluent, Thought process is normal    INVESTIGATIONS:  Results for orders placed or performed in visit on 10/12/17      SEDIMENTATION RATE      Result  Value Ref Range    SEDIMENTATION RATE        3 <21 mm/hr   CRP, inflammatory      Result  Value Ref Range    C-REACTIVE PROTEIN <1.000 <1.000 mg/dL   Hgb A1c      Result  Value Ref Range    HEMOGLOBIN A1C MONITORING (POCT) 5.3 4.0 - 6.2 %    ESTIMATED AVERAGE GLUCOSE  105 mg/dL       ASSESSMENT AND PLAN:  Gregg was seen today for medication management.    Diagnoses and all orders for this visit:    Polymyalgia rheumatica (HC)  -     gabapentin (NEURONTIN) 100 mg capsule; Take 1-2 capsules by mouth 4 times daily if needed (For Burning/Shooting Nerve Pain).  -     MISCELLANEOUS SEND OUT; Future  -     SEDIMENTATION RATE; Future  -     CRP, inflammatory; Future  -     traMADol (ULTRAM) 50 mg tablet; Take 1 tablet by mouth 2 times daily if needed for Pain.  -     MISCELLANEOUS SEND OUT  -     SEDIMENTATION RATE  -     CRP, " inflammatory    Dermatomyositis (HC)  -     MISCELLANEOUS SEND OUT; Future  -     MISCELLANEOUS SEND OUT    Cubital tunnel syndrome on left  -     gabapentin (NEURONTIN) 100 mg capsule; Take 1-2 capsules by mouth 4 times daily if needed (For Burning/Shooting Nerve Pain).    Bilateral carpal tunnel syndrome  -     gabapentin (NEURONTIN) 100 mg capsule; Take 1-2 capsules by mouth 4 times daily if needed (For Burning/Shooting Nerve Pain).    Steroid-induced hyperglycemia  -     Hgb A1c; Future  -     Hgb A1c    Bilateral leg edema  -     furosemide (LASIX) 20 mg tablet; Take 1 tablet by mouth once daily if needed. - For Leg swelling - limit use. - Salt restriction    Gastroesophageal reflux disease, esophagitis presence not specified  -     omeprazole (PRILOSEC) 40 mg Delayed-Release capsule; Take 1 capsule by mouth once daily.    Arthralgia of multiple joints  -     traMADol (ULTRAM) 50 mg tablet; Take 1 tablet by mouth 2 times daily if needed for Pain.    lab results and schedule of future lab studies reviewed with patient, reviewed diet, exercise and weight control, recommended sodium restriction    -- Expected clinical course discussed   -- Medications and their side effects discussed    Gregg is also recommended to eat a heart-healthy diet, do regular aerobic exercises, maintain a desirable body weight, and avoid tobacco products. These recommendations are from the American Heart Association (AHA) which stresses the importance of lifestyle changes to lower cardiovascular disease risk.     Return if symptoms worsen or fail to improve.    Patient Instructions   Labs today.     Neurontin (Gabapentin) 100 mg capsule - take up to 4 times daily as needed for burning/shooting nerve pain.   - No driving after use initially, until you know how these affect you.   - If they cause too much sleepiness during the day - okay to use all 3 or 4 at bedtime instead.      Medications refilled.     Return as needed for follow-up  with Dr. Ritchie.    Clinic : [phone]  Appointment line: [phone]      Reinier Ritchie MD

## 2017-12-28 NOTE — OR ANESTHESIA
Patient Information     Patient Name MRN Sex Gregg Spencer [id number] Male 1965      OR Anesthesia by Karley Flores MD at 2017  9:12 AM     Author:  Karley Flores MD  Service:  (none) Author Type:  PHYS- Anesthesiologist     Filed:  2017  9:17 AM  Date of Service:  2017  9:12 AM Status:  Addendum     :  Karley Flores MD (PHYS- Anesthesiologist)        Related Notes: Original Note by Karley Flores MD (PHYS- Anesthesiologist) filed at 2017  9:12 AM                                                           ANESTHESIA ASSESSMENT    Date: 17 Time: 9:12 AM      Patient:  Gregg Aguayo    Procedure(s) (LRB):  RELEASE CARPAL TUNNEL (Right)    Past Medical History:     Diagnosis  Date     Bilateral carpal tunnel syndrome 2017     Cubital tunnel syndrome on left 2017     No pertinent past medical history      S/P right carpal tunnel release 2017       Past Surgical History:      Procedure  Laterality Date     APPENDECTOMY      Laparoscopic for acute appendicitis       ARTHROSCOPY      knee ? left       COLONOSCOPY SCREENING  2014    Tubular adenoma of rectum - follow up 5 years       S/P RIGHT CARPAL TUNNEL RELEASE Right 2017       Family History       Problem   Relation Age of Onset     Genetic  Maternal Grandfather      Polymyalgia Rheumatica         Patient Active Problem List     Diagnosis  Code     GERD K21.9     TOBACCO ABUSE F17.200     ERECTILE DYSFUNCTION F52.8     DERMATITIS L25.9     SKIN TAGS; IRRITATED/INFLAMMED L91.9, L90.9     Arthralgia of multiple joints M25.50     Polymyalgia rheumatica (HC) M35.3     Bilateral leg edema R60.0     Bilateral carpal tunnel syndrome G56.03     Cubital tunnel syndrome on left G56.22     Dermatomyositis (HC) M33.90     AILEEN on CPAP/BIPAP - uses nightly, finds very helpful G47.33, Z99.89     S/P right carpal tunnel release Z98.890       Prescriptions Prior to Admission        Medication  Sig Dispense Refill     acetaminophen SR (TYLENOL ARTHRITIS PAIN) 650 mg Extended-Release tablet Take up to 4 tablet daily for pain as needed 100 tablet 3     furosemide (LASIX) 20 mg tablet Take 1 tablet by mouth once daily if needed. - For Leg swelling - limit use. - Salt restriction 90 tablet 3     gabapentin (NEURONTIN) 100 mg capsule Take 1-2 capsules by mouth 4 times daily if needed (For Burning/Shooting Nerve Pain). 120 capsule 2     methylPREDNISolone (MEDROL) 4 mg tablet Take 0.5-2 tablets by mouth once daily with a meal. - wean dose every 2 to 4 weeks - for  tablet 3     omeprazole (PRILOSEC) 40 mg Delayed-Release capsule Take 1 capsule by mouth once daily. 90 capsule 3     traMADol (ULTRAM) 50 mg tablet Take 1 tablet by mouth 2 times daily if needed for Pain. 60 tablet 2     varenicline (CHANTIX) 1 mg tablet Take 1 mg by mouth 2 times daily with meals. - Quit Smoking 180 tablet 1       Allergies:No Known Allergies    Review of Systems:  GERD: No (Controlled on omeprazole daily.)  Chest pain: No  Shortness of breath: No (AILEEN/CPAP nightly. Smoker - did not smoke today.)  Recent fever: No  Poor exercise tolerance: No  Bleeding tendency: No  Pregnant: No  Anesthesia Complications: None  Had a flare of polymyalgia rheumatica treated with steroids in October 2017.      History     Smoking Status       Current Every Day Smoker      Packs/day: 0.75     Types: Cigarettes   Smokeless Tobacco       Never Used      Social History     Social History        Marital status:       Spouse name: N/A     Number of children:  N/A     Years of education:  N/A     Social History Main Topics          Smoking status:   Current Every Day Smoker      Packs/day:  0.75      Types:  Cigarettes      Smokeless tobacco:   Never Used      Alcohol use   0.0 oz/week     0 Standard drinks or equivalent per week        Comment: 1-2 drinks every other day       Drug use:   No      Sexual activity:   Not Asked       Other Topics  Concern     Seat Belt Yes     Social History Narrative     Tobacco-one half pack per day.  Ethanol-occasional.      and remarried.  Has two children from his first marriage.  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                           Physical Examination:  /96  Pulse 59  Temp 97.2  F (36.2  C)  Resp 18  SpO2 96% There is no height or weight on file to calculate BMI. There is no height or weight on file to calculate BSA.  Dental Condition: Good     Mallampati Score (Airway): III (Large jowls; large tongue.)  Cardiovascular: Normal  Pulmonary: Normal  Other: N/A    [facility]    No results for input(s): SODIUM, POTASSIUM, CHLORIDE, HZ2LLCRY, ANIONGAP, BUN, CREATININE, BUNCREARATIO, CALCIUM, GLUCOSE, GLUCOSEMETER, KETONES, MAGNESIUM, WBC, HGB, HCT, PLT, ABORH, RHTYPE, PREGURINE, BHCGQL, HCGBETAQUANT, INR in the last 72 hours.          Assessment/Plan:  ASA Class: II  Risk of dental injury discussed: Yes  NPO status confirmed: Yes  Anesthetic Plan:  General (LMA okay.)  Risk/Benefit/Alt discussed: Yes  Questions answered: Yes  Emergency Case?: No  Labs/ECG/Radiology Reviewed?: Yes      H&P Reviewed.  Patient Examined.      Provider Electronic Signature:  MADHAVI STARK MD

## 2017-12-28 NOTE — PROCEDURES
Patient Information     Patient Name MRN Sex Gregg Spencer [id number] Male 1965      Procedures signed by Jairo Huang DO at 2017 11:08 AM      Author:  Jairo Huang DO Service:  (none) Author Type:  Physician     Filed:  2017 11:08 AM Creation Time:  2017 10:43 AM Status:  Signed     :  Jairo Huang DO (Physician)            DATE OF SERVICE:  2017    SURGEON:  Jairo Huang DO.    ASSISTANT:  None.    PREOPERATIVE DIAGNOSES:  1.  Bilateral carpal tunnel syndrome, right greater than left.  2.  Left carpal tunnel syndrome.    POSTOPERATIVE DIAGNOSES:  1.  Bilateral carpal tunnel syndrome, right greater than left.    PROCEDURE:  1. Release of the right transverse carpal ligament (DOS 2017).  2. Left Carpal tunnel syndrome.    IMPLANTS:  None.    ANESTHESIA:  General via LMA.    ESTIMATED BLOOD LOSS:  Less than 5 mL.    FLUIDS:  See chart.    DRAINS:  None.    COMPLICATIONS:  None.    DISPOSITION:  Stable to postop.    INDICATIONS FOR PROCEDURE:  The patient is a 52-year-old male with ongoing complaints of numbness and tingling into both hands.  He had tried conservative measures with limited results.  Also, underwent an EMG which showed carpal tunnel compression.  He elect to go ahead with operative intervention.    PROCEDURE NOTE:  After informed consent was received from the patient, having listed all the risks and benefits as noted on the consent form, but not limited to the consent form, having discussed all conservative, surgical and alternatives to treatment, the patient signed a consent form with a witness present.  The patient understood there are numerous risks, all of them were not written down, but were discussed at length.  No guarantees were given.  All questions were answered prior to signing the consent form.  Patient was given antibiotics one half hour prior to skin incision.  He was taken back to the operating  room supine on his rTerral and transferred to the operating room table, secured, and all bony prominences were padded.  Patient was then given general anesthesia via LMA.  Once proper anesthesia was obtained, tourniquet was placed but not inflated.  The patient's right upper extremity was sterilely prepped and draped.    Timeout was performed per institutional guidelines.  With this done, I then marked my incision site, elevated the arm and inflated the tourniquet.  Skin incision only was made with a #15 scalpel.  Blunt dissection was performed until the transverse carpal ligament was identified.  I then made a small nick incision in the transverse carpal ligament, passed the Custer proximally and then distally, and released the proximal and distal components of the transverse carpal ligament under direct visualization.  The nerve itself when visualized was noted to have an hourglass deformity.  I released the tourniquet and noted a significant hyperemic effect into the nerve.  It was very red and had been previously, especially in the area that it had been compressed.  I irrigated the area, maintained hemostasis, and then irrigated again.  I then closed the superficial layers with #4-0 Stratafix in a running fashion.  The skin was closed with #3-0 nylon in horizontal mattress fashion with some interrupted sutures also placed.  The area was infiltrated with local.    Sterile dressings consisting of Xeroform, 4 x 4, and a well-padded cock-up wrist splint were applied.  Patient was extubated, transferred back to [facility], and taken to recovery room in satisfactory condition.  He will be discharged home per protocol.  He was given prescription for Norco 5/325, #5, no refills, and he will follow up in my office as already arranged.      DO VARINDER ACEVEDO/darby  D:11/22/2017 10:30:19  T:11/22/2017 10:43:40  VJID: [id number]  TJID: [id number]    cc:

## 2017-12-28 NOTE — OR POSTOP
Patient Information     Patient Name MRN Sex Gregg Spencer [id number] Male 1965      OR PostOp by Fina Rodriguez RN at 2017 10:57 AM     Author:  Fina Rodriguez RN Service:  (none) Author Type:  NURS- Registered Nurse     Filed:  2017 10:58 AM Date of Service:  2017 10:57 AM Status:  Signed     :  Fina Rodriguez RN (NURS- Registered Nurse)            PACU Respiratory Event Documentation     1) Episodes of Apnea greater than or equal to 10 seconds: 0      2) Bradypnea - less than 8 breaths per minute: 16    3) Pain score on 0 to 10 scale: 0    4) Pain-sedation mismatch (yes or no): no    5) Repeated 02 desaturation less than 90% (yes or no): no    Anesthesia notified? (yes or no): no    Any of the above events occuring repeatedly in separate 30 minute intervals may be considered recurrent PACU respiratory events.    PACU Transfer Note    Gregg Aguayo transferred to DSU room 731 via cart.  Equipment used for transport:  None.  Accompanied by:  Registered Nurse    Patient stable and meets phase 1 discharge criteria for transport from PACU.  Report given RN upon arrival to room. FINA RODRIGUEZ RN ....................  2017   10:58 AM

## 2017-12-28 NOTE — PROCEDURES
Patient Information     Patient Name MRN Sex Gregg Spencer [id number] Male 1965      Procedures by Jairo Huang DO at 2017 10:28 AM     Author:  Jairo Huang DO Service:  (none) Author Type:  Physician     Filed:  2017 10:30 AM Date of Service:  2017 10:28 AM Status:  Signed     :  Jairo Huang DO (Physician)            POSTOPERATIVE / POSTPROCEDURE NOTE - IMMEDIATE :    Surgeon(s)/Proceduralist(s) and Assistants (if any):  Surgeon(s):  Jairo Huang DO    Procedure(s):  Right CTR    Procedure(s) findings:   See op note    Specimen(s) removed: no    (EBL) Estimated blood loss (ml): 5    Postoperative/Postprocedure Diagnosis:   See op note    Jairo Huang D.O.  Orthopaedic Surgeon    [facility]  [address] [place] [address]  Phone [phone] (KNEE)  Fax [phone]    10:28 AM 2017

## 2017-12-28 NOTE — TELEPHONE ENCOUNTER
Patient Information     Patient Name MRN Gregg Vuong [id number] Male 1965      Telephone Encounter by Brittany Myles at 2017  2:07 PM     Author:  Brittany Myles Service:  (none) Author Type:  (none)     Filed:  2017  2:07 PM Encounter Date:  2017 Status:  Signed     :  Brittany Myles            Patient was notified.  Brittany Myles LPN ....................  2017   2:07 PM

## 2017-12-28 NOTE — OR ANESTHESIA
Patient Information     Patient Name MRN Sex Gregg Spencer [id number] Male 1965      OR Anesthesia by Madhavi Flores MD at 2017 11:52 AM     Author:  Madhavi Flores MD Service:  (none) Author Type:  PHYS- Anesthesiologist     Filed:  2017 11:53 AM Date of Service:  2017 11:52 AM Status:  Signed     :  Madhavi Flores MD (PHYS- Anesthesiologist)            Anesthesia Post Operative Care Note    Name: Gregg Aguayo  MRN:   [id number]  :    1965       Procedure Done:  See Surgeon Note   Case Cancelled for Anesthetic Reason:  No   Post Op Considerations:  AILEEN    Anesthesia Technique    Anesthetic Type:  General     Airway Management:  LMA     Oral Trauma:  No    Intraoperative Course   Hemodynamics:  Stable    Ventilation Normal:  Yes Lung Sounds:  Normal      PACU Course      Nondepolarizer Used: No    Reintubation:  No   Hemodynamics:  Stable      Hydration: Euvolemic   Temperature:  36.1 - 38.3      Mental Status:  Awake, alert, follows commands   Pain Management:  Adequate   Regional Block:  No   Anesthesia Complications:  None      Vital Signs:  Temp: 97.3  F (36.3  C)  Pulse: 66  BP: 91/59  Resp: 18  SpO2: 94 %    O2 Device: Room Air         Level of Nausea: None        Active Lines:  Patient Lines/Drains/Airways Status    Active Line     Name: Placement date: Placement time: Site: Days:    PERIPHERAL VAD Left Forearm 20 17   0900   Forearm   less than 1                Intake & Output:       Labs:  No results for input(s): FP5BZKWXNZM, PKP7BPJBCNGS, PHARTERIAL, OUK6WYHVPPQW, F6RAZXWOGVAT in the last 24 hours.    No results for input(s): MAGNESIUM in the last 24 hours.    No results for input(s): GLUCOSEMETER in the last 720 hours.        MADHAVI FLORES MD ....................  2017   11:52 AM

## 2017-12-28 NOTE — INTERVAL H&P NOTE
Patient Information     Patient Name MRN Sex Gregg Spencer [id number] Male 1965      Interval H&P Note by Jairo Huang DO at 2017  8:40 AM     Author:  Jairo Huang DO Service:  (none) Author Type:  Physician     Filed:  2017  8:40 AM Date of Service:  2017  8:40 AM Status:  Signed     :  Jairo Huang DO (Physician)            History and Physical Update    The history and physical has been reviewed and the patient's right wrist has been examined.  There are no interim changes to the patient's history or physical condition.  He is ready to proceed with planned procedure.  He understands the risks and benefits and once again these where outlined.    Jairo Huang DO  Orthopedic Surgeon        Source Note     Author:  Reinier Ritchie MD Service:  (none) Author Type:  Physician    Filed:  2017  3:20 PM Date of Service:  2017  9:20 AM Status:  Signed    :  Reinier Ritchie MD (Physician)              Nursing Notes:   Mandy Prajapati  2017  9:58 AM  Signed  This patient presents today for a Preoperative exam for this procedure: Right Carpal Tunnel Release  Date of Surgery: 17  Surgeon:  Dr. Huang  Facility:  [facility]  Fax: n/a    Mandy Prajapati LPN        2017 9:39 AM        Gregg Aguayo presents to clinic today for:   Chief Complaint    Patient presents with      Preoperative Exam     Referring Provider: Dr. Huang   HPI: Mr. Aguayo is a 52 y.o. male who presents today for Consultative evaluation requested by Dr. Huang for preoperative cardiopulmonary clearance.     (Z01.818) Pre-op examination - 2017 - right carpel tunnel release - Dr. Huang  (primary encounter diagnosis)  (G56.03) Bilateral carpal tunnel syndrome  (F17.200) TOBACCO ABUSE  (M35.3) Polymyalgia rheumatica (HC)  (G47.33,  Z99.89) AILEEN on CPAP/BIPAP - uses nightly, finds very helpful     patient has bilateral carpal tunnel syndrome,  currently scheduled for right carpal tunnel release.  At some point in the future he is looking at having his left elbow and left carpal tunnel surgery.  Right hand is more symptomatic at this time.    Tobacco abuse, ongoing but he has been cutting down.  He would like to try quitting again.  She still has Chantix at home.  He used Chantix recently in neck she was able to almost get to the point of completely quitting.  He was taking 1 tablet daily next tolerated this quite well.  Higher dosing and caused him some problems.  Advised to quit smoking, restart Chantix.  -- Check EKG.    Polymyalgia, intermittently a problem but has not had to use methylprednisolone for the past month.  Had some severe swelling and pain previously.  He tapered himself off steroids.    Sleep apnea, controlled, using CPAP/BiPAP regularly.  Advised to bring with him to surgery.    Mr. Landers Body mass index is 42.72 kg/(m^2). This is out of the normal range for a 52 y.o. Normal range for ages 18+ is between 18.5 and 24.9. To lose weight we reviewed risks and benefits of appropriate options such as diet, exercise, and medications. Patient's strategy will be  self-directed nutrition plan, formal nutrition program and self-directed exercise program   BP Readings from Last 1 Encounters:11/20/17 : 138/78  Mr. Landers blood pressure is out of the normal range for adults. Per JNC-8 guidelines normal adult blood pressure is < 120/80, pre-hypertensive is between 120/80 and 139/89, and hypertension is 140/90 or greater. Risks of hypertension were discussed. Patient's strategy will be weight loss, increased activity and reduced salt intake    Functional Capacity: > 4 METS.   Reports that he can climb a flight of stairs without any chest pain/heaviness or shortness of breath.   Patient reports no current symptoms of fevers, chills, nausea/vomiting.   No cough. No shortness of breath.   No change in bowel/bladder habits. No melena, hematochezia. No  Hematuria.   No rashes. No palpitations.  No orthopnea/paroxysmal nocturnal dyspnea   No vision or hearing issues.   No significant mood issues   No bruising.     I have personally reviewed the past medical history, past surgical history, medications, allergies, family and social history as listed below, on 11/20/2017.    Patient Active Problem List      Diagnosis Date Noted     AILEEN on CPAP/BIPAP - uses nightly, finds very helpful 11/20/2017     Dermatomyositis (HC) 10/12/2017     Cubital tunnel syndrome on left 09/25/2017     Bilateral carpal tunnel syndrome 09/21/2017     Bilateral leg edema 07/05/2017     Polymyalgia rheumatica (HC) 11/11/2016     Arthralgia of multiple joints 10/26/2016     SKIN TAGS; IRRITATED/INFLAMMED 06/07/2012     DERMATITIS 09/20/2011     ERECTILE DYSFUNCTION      GERD      TOBACCO ABUSE      Past Medical History:     Diagnosis  Date     Bilateral carpal tunnel syndrome 9/21/2017     Cubital tunnel syndrome on left 9/25/2017     No pertinent past medical history      Past Surgical History:      Procedure  Laterality Date     APPENDECTOMY  2005    Laparoscopic for acute appendicitis       ARTHROSCOPY      knee ? left       COLONOSCOPY SCREENING  1/29/2014    Tubular adenoma of rectum - follow up 5 years       Current Outpatient Prescriptions       Medication  Sig Dispense Refill     acetaminophen SR (TYLENOL ARTHRITIS PAIN) 650 mg Extended-Release tablet Take up to 4 tablet daily for pain as needed 100 tablet 3     furosemide (LASIX) 20 mg tablet Take 1 tablet by mouth once daily if needed. - For Leg swelling - limit use. - Salt restriction 90 tablet 3     gabapentin (NEURONTIN) 100 mg capsule Take 1-2 capsules by mouth 4 times daily if needed (For Burning/Shooting Nerve Pain). 120 capsule 2     methylPREDNISolone (MEDROL) 4 mg tablet Take 0.5-2 tablets by mouth once daily with a meal. - wean dose every 2 to 4 weeks - for  tablet 3     omeprazole (PRILOSEC) 40 mg Delayed-Release  capsule Take 1 capsule by mouth once daily. 90 capsule 3     traMADol (ULTRAM) 50 mg tablet Take 1 tablet by mouth 2 times daily if needed for Pain. 60 tablet 2     varenicline (CHANTIX) 1 mg tablet Take 1 mg by mouth 2 times daily with meals. - Quit Smoking 180 tablet 1     Recent use of: no recent use of aspirin (ASA), NSAIDS or steroids     Fever/Chills or other infectious symptoms in past month: no  >10lb weight loss in past two months: no    1. Clinic Code Status:Full Code  2. Date Discussed: 11/20/2017   3. Documentation:Discussed with patient who is competent to make decision    Hx of blood transfusions:   (NO)   Tetanus status:    Immunization History     Administered  Date(s) Administered     Tdap 01/15/2010      History of VRE/MRSA:  (NO) Date: n/a  Latex allergy  no   No Known Allergies   Family History       Problem   Relation Age of Onset     Genetic  Maternal Grandfather      Polymyalgia Rheumatica       Family Status     Relation  Status     Child Alive    x2      Maternal Grandfather Alive     Denies family hx of bleeding tendencies, anesthesia complications, or other problems with surgery.     Social History     Social History        Marital status:       Spouse name: N/A     Number of children:  N/A     Years of education:  N/A     Social History Main Topics          Smoking status:   Current Every Day Smoker      Packs/day:  0.75      Types:  Cigarettes      Smokeless tobacco:   Never Used      Alcohol use   0.0 oz/week     0 Standard drinks or equivalent per week        Comment: 1-2 drinks every other day       Drug use:   No      Sexual activity:   Not on file      Other Topics  Concern     Seat Belt Yes     Social History Narrative     Tobacco-one half pack per day.  Ethanol-occasional.      and remarried.  Has two children from his first marriage.  Works construction for [employer]                           Complete ROS was performed and was negative unless  "otherwise noted in HPI above.    Surgical:  Patient denies previous complications from prior surgeries including but not limited to prolonged bleeding, anesthesia complications, dysrhythmias, surgical wound infections, or prolonged hospital stay.     EXAM:   Vitals:     11/20/17 0940   BP: 138/78   Pulse: 64   SpO2: 98%   Weight: (!) 137 kg (302 lb)     BP Readings from Last 3 Encounters:    11/20/17 138/78   10/12/17 120/82   09/25/17 130/88     Wt Readings from Last 3 Encounters:    11/20/17 (!) 137 kg (302 lb)   10/12/17 132.2 kg (291 lb 6 oz)   09/25/17 126.6 kg (279 lb)     Estimated body mass index is 42.72 kg/(m^2) as calculated from the following:    Height as of 9/25/17: 1.791 m (5' 10.5\").    Weight as of this encounter: 137 kg (302 lb).     Mallampati Airway Classification:  Class 4: Only hard palate visible  EXAM:  Constitutional: Pleasant, alert, appropriate appearance for age. No acute distress, Healthy, alert, cooperative, no apparent distress  ENT: Normocephalic, Atraumatic, Thyroid without nodules or tenderness   Nose/Mouth: Oral pharynx without erythema or exudates, Nose is patent bilaterally, no rhinorrhea and Dental hygeine adequate   Eyes:  Extraocular muscles intact, Sclera non-icteric, Conjunctiva without erythema  Lymphatic Exam: Non-palpable nodes in neck, clavicular regions  Pulmonary: Lungs are clear to auscultation bilaterally, without wheezes or crackles  Cardiovascular Exam: regular rate and rhythm, 1+ pedal edema present  Gastrointestinal Exam: Obese, Soft, non-tender, non-distended, positive bowel sounds  Integument: No abnormal rashes, sores, or ulcerations noted  Neurologic Exam: CN 3-12 grossly intact   Musculoskeletal Exam: Moves upper and lower extremities symmetrically, No focal weakness  Gait and station appear grossly normal  Psychiatric Exam: Awake and Alert, Affect and mood appropriate  Speech is fluent, Thought process is normal    INVESTIGATIONS;  CXR:  not indicated "     EK2017 -Personally ordered/reviewed  EKG interpretation  normal sinus rhythm.  Rate 65.  Normal axis.  Normal TN and QRS indices.  Sinus arrhythmia noted.  Normal ST segments.  Normal EKG.  Compared to previous tracing dated 2012, bradycardia no longer noted  Reinier Ritchie MD     LABS:   2017  WBC: 7.9     2017  RBC: 4.80  2017  HGB: 16.1  2017  HCT: 44.5   2017  MCV: 93    2017  MCH: 33.5   2017 MCHC: 36.2   2017  RDW: 12.2   2017  PLT: 215     CREATININE (mg/dL)    Date Value   10/07/2016 0.83       Results for orders placed or performed in visit on 10/12/17      MISCELLANEOUS SEND OUT      Result  Value Ref Range    TEST NAME Myositis Antibody Panel     SOURCE Serum     PERFORMING LAB RDL Ref Lab via [facility]     REFERRAL LAB TEST # FMYO     MISCELLANEOUS SEND OUT See Separate Report    SEDIMENTATION RATE      Result  Value Ref Range    SEDIMENTATION RATE        3 <21 mm/hr   CRP, inflammatory      Result  Value Ref Range    C-REACTIVE PROTEIN <1.000 <1.000 mg/dL   Hgb A1c      Result  Value Ref Range    HEMOGLOBIN A1C MONITORING (POCT) 5.3 4.0 - 6.2 %    ESTIMATED AVERAGE GLUCOSE  105 mg/dL        ASSESSMENT AND PLAN:    1.  Preoperative history and physical   Gregg was seen today for preoperative exam.    Diagnoses and all orders for this visit:    Pre-op examination - 2017 - right carpel tunnel release - Dr. Huang  -     TN ELECTROCARDIOGRAM TRACING  -     TN PULSE OXIMETRY SINGLE DETERMINATION    Bilateral carpal tunnel syndrome    TOBACCO ABUSE  -     EKG 12 LEAD UNIT PERFORMED (PERFORMED TODAY)    Polymyalgia rheumatica (HC)  -     EKG 12 LEAD UNIT PERFORMED (PERFORMED TODAY)    AILEEN on CPAP/BIPAP - uses nightly, finds very helpful        Revised Cardiac Risk Index   1. History of ischemic heart disease   2. History of Systolic congestive heart failure (EF less than or equal to 40%)    3. History of cerebrovascular  disease (stroke or transient ischemic attack)   4. History of diabetes requiring preoperative insulin use   5. Chronic kidney disease (creatinine > 2 mg/dL)   6. Undergoing suprainguinal vascular, intraperitoneal, or intrathoracic surgery     Risk for cardiac death, nonfatal myocardial infarction, and nonfatal cardiac arrest:   --> 0 predictors = 0.4%   1 predictor = 0.9%  2 predictors = 6.6%  ?3 predictors = >11%       Preoperative asseessment (as of 11/20/2017)      INTERMEDIATE RISK - risk factors including: HTN, AILEEN,      Recommendations as follow:    - Proceed with surgery: As Scheduled.     For above listed surgery and anesthesia:     - Patient is moderate  risk for perioperative complications and IS medically optimized at this time.    ASA Classification:  Class 2 - MILD SYSTEMIC DISEASE, NO ACUTE PROBLEMS, NO FUNCTIONAL LIMITATIONS.    Is BMI over 40 or A1c over 7.5% -- no   HEMOGLOBIN A1C MONITORING (POCT)    Date Value   10/12/2017 5.3 %   11/02/2012 5.4 % NGSP        Other: Proceed without further diagnostic evaluation.    Preoperative Evaluation: Obstructive Sleep Apnea screening (STOP_BANG)    -- Diagnosed with Sleep Apnea - YES    - Bring your machine / CPAP with to your surgery      lab results and schedule of future lab studies reviewed with patient, reviewed diet, exercise and weight control, recommended sodium restriction, cardiovascular risk and specific lipid/LDL goals reviewed    Gregg is also recommended to eat a heart-healthy diet, do regular aerobic exercises, maintain a desirable body weight, and avoid tobacco products. These recommendations are from the American Heart Association (AHA) which stresses the importance of lifestyle changes to lower cardiovascular disease risk.       Thank you for allowing me to participate in the care of your patient.   A copy of this evaluation report is provided to referring provider.     Return if symptoms worsen or fail to improve.  Patient Instructions    Plan for surgery as scheduled with Dr. Huang on 11/22/2017.     EKG today.    Recent labs were collected and stable.    Kidney function and hemoglobin are normal.    Recent hemoglobin A1c/diabetes labs came back normal.    Inflammatory labs CRP/ESR were normal.    --> No prednisone for the past 1 month.      Blood pressures are controlled.     Low-salt heart healthy diet:    Eat a diet that is low in sodium (salt). Salt increases fluid retention and should be avoided.  Limit salt in your diet to 1,500 to 2,000 milligrams (mg) per day.      - remove the salt shaker, do not have it on the table when you are eating, or in the kitchen when you are cooking     - do not eat seasoned salts, pickles, olives, salted soups and snacks, regular cold cuts and cheeses, regular TV dinners     - learn to read food labels:          - low sodium is 140 mg or less per serving.          - beware of foods with 400 - 600 mg sodium per serving     - make food choices that are considered heart healthy  - eat more fresh fruits and vegetables:       - aim for two or more servings of fruit each day       - eat three or more servings of vegetables each day  - eat whole grains  - eat more foods you make at home  - eat more chicken, fish, and lean pork  - eat less red meat    - bake, grill, or broil meats    - limit fried foods  - eat two to three servings of low-fat or fat-free dairy foods each day  - use these sparingly, vegetable oil and spray, tub or squeeze margarine, low or non-fat salad dressing    - read labels to avoid trans-fats     Weigh yourself each morning after you urinate but before you eat breakfast.   Keep a record of your weight to show your caregiver.   A sudden weight gain can mean fluid buildup.     Call your Health Care Provider if you have any of the following: (identify yourself as a heart failure patient having the following symptoms):  - trouble breathing or shortness of breath more than usual    - new or  worsening shortness of breath or occurring when lying flat  - chest discomfort (not relieved by nitroglycerin)  - swollen feet, ankles and legs more than usual  - fatigue, weakness, loss of energy  - coughing at night or chronic cough  - nausea with abdominal swelling, pain and tenderness      Some patients require extra doses of their water pill / Diuretic if:   - weight gain of  3 pounds in one day (1.4 kilograms)  - weight gain of 5 pounds in one week (2.3 kilograms)      As always, if you have questions or concerns about your health, please call your regular health care provider or your heart failure clinic.    FOR EMERGENCY DIAL 911        Patient was reminded of being NPO (without food) after midnight the night before surgery.   -- Hold aspirin, Advil/ibuprofen, Aleve/naproxen, Vitamin E for 7 days before surgery   -- Hold vitamins and herbal remedies for 7 days before surgery     -- Okay to use Tylenol (Acetaminophen)  Okay to take other usual medications with a sip of water on the day of the procedure and resume their medications after the procedure.   Contact the surgery Center for specific instructions prior to procedure.    PRE OP RECOMMENDATIONS:  Patient is on chronic pain medications (NO) and plan is acute pain management per surgery/surgeon.  Patient is on anti-platelet/anticoagulation (NO) and plan is n/a  Other medications that need adjustment perioperatively (NO)     If you have Diabetes or Prediabetes:  -- Take only half of your usual long-acting insulin on the day before surgery (Lantus, Basaglar, Levemir, Toujeo Insulin (concentrated Lantus insulin) or similar.   -- No Metformin on day before surgery or morning of surgery.     Other:    Patient was advised to call our office and the surgical services with any change in condition or new symptoms if they were to develop between today and their surgical date.  Especially any cardiopulmonary symptoms or symptoms concerning for an infection.      --  Diagnosed with Sleep Apnea - YES    - Bring your machine / CPAP with to your surgery        Return as needed for follow-up with Dr. Ritchie.    Clinic : [phone]  Appointment line: [phone]      Reinier Ritchie MD

## 2017-12-28 NOTE — PROGRESS NOTES
Patient Information     Patient Name MRN Sex Gregg Spencer [id number] Male 1965      Progress Notes by Jairo Huang DO at 2017  1:45 PM     Author:  Jairo Huang DO Service:  (none) Author Type:  Physician     Filed:  2017  2:12 PM Encounter Date:  2017 Status:  Signed     :  Jairo Huang DO (Physician)            Gregg Aguayo was seen in consultation for Dr. Ritchie for a chief complaint of numbness into both hands.      CHIEF COMPLAINT: Gregg Aguayo is a 51 y.o.  male  Chief Complaint     Patient presents with       Consult      Bilateral wrist pain CTS       HISTORY OF PRESENTING INJURY   History of presenting injury, patient is a 51-year-old male with ongoing numbness into both hands. The right hand it's in the median nerve distribution the left hand it is median and ulnar nerve distribution.  Ongoing for multiple years. 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 He has been wearing splints with minimal relief. And did undergo an EMG and is here today to discuss options.  Description of pain:  moderate aching, discomfort and numbness   Radiation of pain: yes  Pain course: gradually worsening  Worse with: bending or flexing affected area, extending affected area, turning or rotating affected area, supinating affected area, squeezing affected area/ making a fist, pushing and pulling  Improved by: OTC meds, rest and ice  History of injection: No  Any PT: No    PAST MEDICAL HISTORY:  Past Medical History:     Diagnosis  Date     Bilateral carpal tunnel syndrome 2017     No pertinent past medical history        PAST SURGICAL HISTORY:  Past Surgical History:      Procedure  Laterality Date     APPENDECTOMY      Laparoscopic for acute appendicitis       ARTHROSCOPY      knee ? left       COLONOSCOPY SCREENING  2014    Tubular adenoma of rectum - follow up 5 years         ORTHOPEDIC FRACTURES AND BROKEN  "BONES:  As above.    ALLERGIES:  No Known Allergies    CURRENT MEDICATIONS:  Current Outpatient Prescriptions       Medication  Sig Dispense Refill     acetaminophen SR (TYLENOL ARTHRITIS PAIN) 650 mg Extended-Release tablet Take up to 4 tablet daily for pain as needed 100 tablet 3     furosemide (LASIX) 20 mg tablet Take 1 tablet by mouth once daily if needed. - For Leg swelling - limit use. - Salt restriction 90 tablet 0     methylPREDNISolone (MEDROL) 4 mg tablet Take 0.5-2 tablets by mouth once daily with a meal. - wean dose every 2 to 4 weeks - for  tablet 3     omeprazole (PRILOSEC) 40 mg Delayed-Release capsule TAKE 1 CAPSULE BY MOUTH DAILY. 90 capsule 2     traMADol (ULTRAM) 50 mg tablet Take 1 tablet by mouth 2 times daily if needed for Pain. 60 tablet 2     varenicline (CHANTIX) 1 mg tablet Take 1 mg by mouth 2 times daily with meals. - Quit Smoking 180 tablet 1     No current facility-administered medications for this visit.      Medications have been reviewed by me and are current to the best of my knowledge and ability.      SOCIAL HISTORY:  Marital Status:   Children: Yes  Occupation: Retired.  Alcohol use:  Yes  Tobacco use: Smoker: yes  Are you or have you used illicit drugs:  no    FAMILY HISTORY:  Family History       Problem   Relation Age of Onset     Genetic  Maternal Grandfather      Polymyalgia Rheumatica         REVIEW OF SYSTEMS:  The review of systems as documented in the HPI and on the intake questionnaire, completed by the patient on 9/25/2017 have been reviewed by myself and the pertinent positives and negatives addressed.  The remainder of the complete review of systems was non-contributory.    PHYSICAL EXAM:   /88  Pulse 72  Ht 1.791 m (5' 10.5\")  Wt 126.6 kg (279 lb)  BMI 39.47 kg/m2 Body mass index is 39.47 kg/(m^2).    General Appearance: Pleasant male in good appearance, mood and affect.  Alert and orientated times three ( time, date and " location).    Skin:Normal    Sensation is Abnormal in the median nerve distribution bilaterally and also the ulnar nerve on the left side.    Shoulder:  Motion: full    Elbow:  Flexion: Normal  Extension: Normal    Wrist:  Flexion: Normal  Extension: Normal  Radial/ulnar motion: Normal  Tenderness at the scaphoid: negative    Hand:  Thenar wasting: no  Hypothenar wasting: no  Sensation: Abnormal  Radial and ulnar blood flow:  Normal  Tinel's test positive  Phalen's test positive  Compression test positive    Eyes: Pupils are round.    Ears: Hearing: Intact    Heart: Good capillary refill into his hands radial pulses regular.    Lungs: Coarse breath sounds.     EMG:    EMG results reviewed with the patient showing him to have delayed times in the median nerve distribution right and left hands. Right is worse. He also has mild compression of the ulnar nerve left elbow.    Radiographic images from 9/25 where independently reviewed and discussed with the patient.      Xray:     X-rays demonstrate maintained joint spaces. No fractures noted. Small cyst in the right lunate.    PROCEDURE: XR WRIST 3 VIEWS RIGHT, XR WRIST 3 VIEWS LEFT  HISTORY: Bilateral wrist pain.  COMPARISON: None.  TECHNIQUE: 4 views right wrist, 5 views left wrist.  FINDINGS: Images of each wrist demonstrate no evidence of fracture, dislocation or focal erosion. No significant osteoarthritis is present. The carpal arcs are maintained. There is a minimal ulnar minus variant on the right. The carpal tunnels demonstrate no substantial bony narrowing or abnormal calcification.  IMPRESSION: Essentially negative radiographs of both wrists.  Electronically Signed By: Nash Osorio on 9/25/2017 1:42 PM    IMPRESSION:  Bilateral carpal tunnel syndrome right greater than left.  Left cubital tunnel syndrome.    PLAN:  Risks, benefits, conservative, surgical and alternatives to treatment where discussed and the patient would like to proceed with surgical  intervention.  I utilized the handouts of which I gave him copies of to review cubital tunnel and carpal tunnel anatomy and plan surgical approaches. He verbalized an understanding.  He hopes to get his surgeries done before the end of the year.  He will use his carpal tunnel splints.  Questions or concerns answered.  Follow-up after above.    I have discussed options with Gregg Aguayo the treatment for carpal tunnel, which have included observation, physical therapy, corticosteroid injection versus surgical release. I discussed pros and cons of each approach, and at this point, Gregg Aguayo would like to proceed with carpal tunnel surgery. We discussed that surgery would be an outpatient surgery, you would be able to go home following the surgery. We will plan on open release of the transverse carpal ligament with anything else that needs to be done.  Surgical anesthesia would be general anesthesia versus block and anesthesia will discuss options.  I discussed following surgery Gregg Aguayo would be in a splint until seen in the office and they can work on gentle motion of the elbow and fingers after surgery.  At four weeks following surgery occupational therapy may be needed.   Full recovery from carpal tunnel surgery may take a year. The goal will be pain relief. Complications were discussed including continued pain, stiffness in the wrist, rare chance of neurovascular damage, potential chance of infection. If deep infection were to occur, further surgery may be needed with repeat washout in the operating room with possible need for treatment with antibiotics.    Risks, benefits, conservative, surgical, and alternatives of treatment were thoroughly outlined. No guarantees were given. Risks which do include, but are not limited to:  Scar, infection, decreased motion, damage to blood vessels, nerves and tendons, failure or need for further treatment, reaction to medications and anesthesia, blood clots, and  the possibility of death where discussed.  He did verbalize an understanding. All questions and concerns were addressed.    Patient is set up for open right carpal tunnel surgery    Gregg Aguayo will need pre op clearance for management of pulmonary function due to tobacco use.    Follow up after surgery will be 12-14 days    All questions where answered to the patients satisfaction.    Jiaro Huang D.O.  Orthopaedic Surgeon    [address] [address]  Phone [phone] (KNEE)  Fax [phone]    This document was created using computer generated templates and voice activated software.    2:07 PM 9/25/2017

## 2017-12-28 NOTE — TELEPHONE ENCOUNTER
Patient Information     Patient Name MRN Sex Gregg Spencer [id number] Male 1965      Telephone Encounter by Karla García at 2017 11:11 AM     Author:  Karla García Service:  (none) Author Type:  (none)     Filed:  2017 11:12 AM Encounter Date:  2017 Status:  Signed     :  Karla García            Patient states he is ready to have something done about his carpel tunnel.  Please place referral to be seen by specialist.  Karla García LPN..................2017  11:12 AM

## 2017-12-28 NOTE — TELEPHONE ENCOUNTER
Patient Information     Patient Name MRN Sex Gregg Spencer [id number] Male 1965      Telephone Encounter by Reinier Ritchie MD at 2017  1:46 PM     Author:  Reinier Ritchie MD Service:  (none) Author Type:  Physician     Filed:  2017  1:46 PM Encounter Date:  2017 Status:  Signed     :  Reinier Ritchie MD (Physician)            Lab results just returned 2017 -- Myositis Antibody Panel was normal/negative.    Reinier Ritchie MD

## 2017-12-28 NOTE — TELEPHONE ENCOUNTER
Patient Information     Patient Name MRN Gregg Vuong [id number] Male 1965      Telephone Encounter by Brittany Myles at 2017 10:25 AM     Author:  Brittany Myles Service:  (none) Author Type:  (none)     Filed:  2017 10:25 AM Encounter Date:  2017 Status:  Signed     :  Brittany Myles            Patient would like lab send out results from 10-12-17 that were sent to the [facility]. Please advise.   Brittany Myles LPN...................2017   10:25 AM

## 2017-12-28 NOTE — H&P
Patient Information     Patient Name MRN Sex Gregg Spencer [id number] Male 1965      H&P (View-Only) by Reinier Ritchie MD at 2017  9:20 AM     Author:  Reinier Ritchie MD Service:  (none) Author Type:  Physician     Filed:  2017  3:20 PM Date of Service:  2017  9:20 AM Status:  Signed     :  Reinier Ritchie MD (Physician)            Nursing Notes:   Mandy Prajapati  2017  9:58 AM  Signed  This patient presents today for a Preoperative exam for this procedure: Right Carpal Tunnel Release  Date of Surgery: 17  Surgeon:  Dr. Huang  Facility:  [facility]  Fax: n/a    Mandy Prajapati LPN        2017 9:39 AM        Gregg Aguayo presents to clinic today for:   Chief Complaint    Patient presents with      Preoperative Exam     Referring Provider: Dr. Huang   HPI: Mr. Aguayo is a 52 y.o. male who presents today for Consultative evaluation requested by Dr. Huang for preoperative cardiopulmonary clearance.     (Z01.818) Pre-op examination - 2017 - right carpel tunnel release - Dr. Huang  (primary encounter diagnosis)  (G56.03) Bilateral carpal tunnel syndrome  (F17.200) TOBACCO ABUSE  (M35.3) Polymyalgia rheumatica (HC)  (G47.33,  Z99.89) AILEEN on CPAP/BIPAP - uses nightly, finds very helpful     patient has bilateral carpal tunnel syndrome, currently scheduled for right carpal tunnel release.  At some point in the future he is looking at having his left elbow and left carpal tunnel surgery.  Right hand is more symptomatic at this time.    Tobacco abuse, ongoing but he has been cutting down.  He would like to try quitting again.  She still has Chantix at home.  He used Chantix recently in neck she was able to almost get to the point of completely quitting.  He was taking 1 tablet daily next tolerated this quite well.  Higher dosing and caused him some problems.  Advised to quit smoking, restart Chantix.  -- Check EKG.    Polymyalgia, intermittently a  problem but has not had to use methylprednisolone for the past month.  Had some severe swelling and pain previously.  He tapered himself off steroids.    Sleep apnea, controlled, using CPAP/BiPAP regularly.  Advised to bring with him to surgery.    Mr. Landers Body mass index is 42.72 kg/(m^2). This is out of the normal range for a 52 y.o. Normal range for ages 18+ is between 18.5 and 24.9. To lose weight we reviewed risks and benefits of appropriate options such as diet, exercise, and medications. Patient's strategy will be  self-directed nutrition plan, formal nutrition program and self-directed exercise program   BP Readings from Last 1 Encounters:11/20/17 : 138/78  Mr. Landers blood pressure is out of the normal range for adults. Per JNC-8 guidelines normal adult blood pressure is < 120/80, pre-hypertensive is between 120/80 and 139/89, and hypertension is 140/90 or greater. Risks of hypertension were discussed. Patient's strategy will be weight loss, increased activity and reduced salt intake    Functional Capacity: > 4 METS.   Reports that he can climb a flight of stairs without any chest pain/heaviness or shortness of breath.   Patient reports no current symptoms of fevers, chills, nausea/vomiting.   No cough. No shortness of breath.   No change in bowel/bladder habits. No melena, hematochezia. No Hematuria.   No rashes. No palpitations.  No orthopnea/paroxysmal nocturnal dyspnea   No vision or hearing issues.   No significant mood issues   No bruising.     I have personally reviewed the past medical history, past surgical history, medications, allergies, family and social history as listed below, on 11/20/2017.    Patient Active Problem List      Diagnosis Date Noted     AILEEN on CPAP/BIPAP - uses nightly, finds very helpful 11/20/2017     Dermatomyositis (HC) 10/12/2017     Cubital tunnel syndrome on left 09/25/2017     Bilateral carpal tunnel syndrome 09/21/2017     Bilateral leg edema 07/05/2017      Polymyalgia rheumatica (HC) 11/11/2016     Arthralgia of multiple joints 10/26/2016     SKIN TAGS; IRRITATED/INFLAMMED 06/07/2012     DERMATITIS 09/20/2011     ERECTILE DYSFUNCTION      GERD      TOBACCO ABUSE      Past Medical History:     Diagnosis  Date     Bilateral carpal tunnel syndrome 9/21/2017     Cubital tunnel syndrome on left 9/25/2017     No pertinent past medical history      Past Surgical History:      Procedure  Laterality Date     APPENDECTOMY  2005    Laparoscopic for acute appendicitis       ARTHROSCOPY      knee ? left       COLONOSCOPY SCREENING  1/29/2014    Tubular adenoma of rectum - follow up 5 years       Current Outpatient Prescriptions       Medication  Sig Dispense Refill     acetaminophen SR (TYLENOL ARTHRITIS PAIN) 650 mg Extended-Release tablet Take up to 4 tablet daily for pain as needed 100 tablet 3     furosemide (LASIX) 20 mg tablet Take 1 tablet by mouth once daily if needed. - For Leg swelling - limit use. - Salt restriction 90 tablet 3     gabapentin (NEURONTIN) 100 mg capsule Take 1-2 capsules by mouth 4 times daily if needed (For Burning/Shooting Nerve Pain). 120 capsule 2     methylPREDNISolone (MEDROL) 4 mg tablet Take 0.5-2 tablets by mouth once daily with a meal. - wean dose every 2 to 4 weeks - for  tablet 3     omeprazole (PRILOSEC) 40 mg Delayed-Release capsule Take 1 capsule by mouth once daily. 90 capsule 3     traMADol (ULTRAM) 50 mg tablet Take 1 tablet by mouth 2 times daily if needed for Pain. 60 tablet 2     varenicline (CHANTIX) 1 mg tablet Take 1 mg by mouth 2 times daily with meals. - Quit Smoking 180 tablet 1     Recent use of: no recent use of aspirin (ASA), NSAIDS or steroids     Fever/Chills or other infectious symptoms in past month: no  >10lb weight loss in past two months: no    1. Clinic Code Status:Full Code  2. Date Discussed: 11/20/2017   3. Documentation:Discussed with patient who is competent to make decision    Hx of blood transfusions:    (NO)   Tetanus status:    Immunization History     Administered  Date(s) Administered     Tdap 01/15/2010      History of VRE/MRSA:  (NO) Date: n/a  Latex allergy  no   No Known Allergies   Family History       Problem   Relation Age of Onset     Genetic  Maternal Grandfather      Polymyalgia Rheumatica       Family Status     Relation  Status     Child Alive    x2      Maternal Grandfather Alive     Denies family hx of bleeding tendencies, anesthesia complications, or other problems with surgery.     Social History     Social History        Marital status:       Spouse name: N/A     Number of children:  N/A     Years of education:  N/A     Social History Main Topics          Smoking status:   Current Every Day Smoker      Packs/day:  0.75      Types:  Cigarettes      Smokeless tobacco:   Never Used      Alcohol use   0.0 oz/week     0 Standard drinks or equivalent per week        Comment: 1-2 drinks every other day       Drug use:   No      Sexual activity:   Not on file      Other Topics  Concern     Seat Belt Yes     Social History Narrative     Tobacco-one half pack per day.  Ethanol-occasional.      and remarried.  Has two children from his first marriage.  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                           Complete ROS was performed and was negative unless otherwise noted in HPI above.    Surgical:  Patient denies previous complications from prior surgeries including but not limited to prolonged bleeding, anesthesia complications, dysrhythmias, surgical wound infections, or prolonged hospital stay.     EXAM:   Vitals:     11/20/17 0940   BP: 138/78   Pulse: 64   SpO2: 98%   Weight: (!) 137 kg (302 lb)     BP Readings from Last 3 Encounters:    11/20/17 138/78   10/12/17 120/82   09/25/17 130/88     Wt Readings from Last 3 Encounters:    11/20/17 (!) 137 kg (302 lb)   10/12/17 132.2 kg (291 lb 6 oz)   09/25/17 126.6 kg (279 lb)     Estimated body mass index is 42.72  "kg/(m^2) as calculated from the following:    Height as of 17: 1.791 m (5' 10.5\").    Weight as of this encounter: 137 kg (302 lb).     Mallampati Airway Classification:  Class 4: Only hard palate visible  EXAM:  Constitutional: Pleasant, alert, appropriate appearance for age. No acute distress, Healthy, alert, cooperative, no apparent distress  ENT: Normocephalic, Atraumatic, Thyroid without nodules or tenderness   Nose/Mouth: Oral pharynx without erythema or exudates, Nose is patent bilaterally, no rhinorrhea and Dental hygeine adequate   Eyes:  Extraocular muscles intact, Sclera non-icteric, Conjunctiva without erythema  Lymphatic Exam: Non-palpable nodes in neck, clavicular regions  Pulmonary: Lungs are clear to auscultation bilaterally, without wheezes or crackles  Cardiovascular Exam: regular rate and rhythm, 1+ pedal edema present  Gastrointestinal Exam: Obese, Soft, non-tender, non-distended, positive bowel sounds  Integument: No abnormal rashes, sores, or ulcerations noted  Neurologic Exam: CN 3-12 grossly intact   Musculoskeletal Exam: Moves upper and lower extremities symmetrically, No focal weakness  Gait and station appear grossly normal  Psychiatric Exam: Awake and Alert, Affect and mood appropriate  Speech is fluent, Thought process is normal    INVESTIGATIONS;  CXR:  not indicated     EK2017 -Personally ordered/reviewed  EKG interpretation  normal sinus rhythm.  Rate 65.  Normal axis.  Normal VT and QRS indices.  Sinus arrhythmia noted.  Normal ST segments.  Normal EKG.  Compared to previous tracing dated 2012, bradycardia no longer noted  Reinier Ritchie MD     LABS:   2017  WBC: 7.9     2017  RBC: 4.80  2017  HGB: 16.1  2017  HCT: 44.5   2017  MCV: 93    2017  MCH: 33.5   2017 MCHC: 36.2   2017  RDW: 12.2   2017  PLT: 215     CREATININE (mg/dL)    Date Value   10/07/2016 0.83       Results for orders placed or performed in visit on " 10/12/17      MISCELLANEOUS SEND OUT      Result  Value Ref Range    TEST NAME Myositis Antibody Panel     SOURCE Serum     PERFORMING LAB RDL Ref Lab via [facility]     REFERRAL LAB TEST # FMYO     MISCELLANEOUS SEND OUT See Separate Report    SEDIMENTATION RATE      Result  Value Ref Range    SEDIMENTATION RATE        3 <21 mm/hr   CRP, inflammatory      Result  Value Ref Range    C-REACTIVE PROTEIN <1.000 <1.000 mg/dL   Hgb A1c      Result  Value Ref Range    HEMOGLOBIN A1C MONITORING (POCT) 5.3 4.0 - 6.2 %    ESTIMATED AVERAGE GLUCOSE  105 mg/dL        ASSESSMENT AND PLAN:    1.  Preoperative history and physical   Gregg was seen today for preoperative exam.    Diagnoses and all orders for this visit:    Pre-op examination - 11/22/2017 - right carpel tunnel release - Dr. Huang  -     MA ELECTROCARDIOGRAM TRACING  -     MA PULSE OXIMETRY SINGLE DETERMINATION    Bilateral carpal tunnel syndrome    TOBACCO ABUSE  -     EKG 12 LEAD UNIT PERFORMED (PERFORMED TODAY)    Polymyalgia rheumatica (HC)  -     EKG 12 LEAD UNIT PERFORMED (PERFORMED TODAY)    AILEEN on CPAP/BIPAP - uses nightly, finds very helpful        Revised Cardiac Risk Index   1. History of ischemic heart disease   2. History of Systolic congestive heart failure (EF less than or equal to 40%)    3. History of cerebrovascular disease (stroke or transient ischemic attack)   4. History of diabetes requiring preoperative insulin use   5. Chronic kidney disease (creatinine > 2 mg/dL)   6. Undergoing suprainguinal vascular, intraperitoneal, or intrathoracic surgery     Risk for cardiac death, nonfatal myocardial infarction, and nonfatal cardiac arrest:   --> 0 predictors = 0.4%   1 predictor = 0.9%  2 predictors = 6.6%  ?3 predictors = >11%       Preoperative asseessment (as of 11/20/2017)      INTERMEDIATE RISK - risk factors including: HTN, AILEEN,      Recommendations as follow:    - Proceed with surgery: As Scheduled.     For above listed  surgery and anesthesia:     - Patient is moderate  risk for perioperative complications and IS medically optimized at this time.    ASA Classification:  Class 2 - MILD SYSTEMIC DISEASE, NO ACUTE PROBLEMS, NO FUNCTIONAL LIMITATIONS.    Is BMI over 40 or A1c over 7.5% -- no   HEMOGLOBIN A1C MONITORING (POCT)    Date Value   10/12/2017 5.3 %   11/02/2012 5.4 % NGSP        Other: Proceed without further diagnostic evaluation.    Preoperative Evaluation: Obstructive Sleep Apnea screening (STOP_BANG)    -- Diagnosed with Sleep Apnea - YES    - Bring your machine / CPAP with to your surgery      lab results and schedule of future lab studies reviewed with patient, reviewed diet, exercise and weight control, recommended sodium restriction, cardiovascular risk and specific lipid/LDL goals reviewed    Gregg is also recommended to eat a heart-healthy diet, do regular aerobic exercises, maintain a desirable body weight, and avoid tobacco products. These recommendations are from the American Heart Association (AHA) which stresses the importance of lifestyle changes to lower cardiovascular disease risk.       Thank you for allowing me to participate in the care of your patient.   A copy of this evaluation report is provided to referring provider.     Return if symptoms worsen or fail to improve.  Patient Instructions   Plan for surgery as scheduled with Dr. Huang on 11/22/2017.     EKG today.    Recent labs were collected and stable.    Kidney function and hemoglobin are normal.    Recent hemoglobin A1c/diabetes labs came back normal.    Inflammatory labs CRP/ESR were normal.    --> No prednisone for the past 1 month.      Blood pressures are controlled.     Low-salt heart healthy diet:    Eat a diet that is low in sodium (salt). Salt increases fluid retention and should be avoided.  Limit salt in your diet to 1,500 to 2,000 milligrams (mg) per day.      - remove the salt shaker, do not have it on the table when you are  eating, or in the kitchen when you are cooking     - do not eat seasoned salts, pickles, olives, salted soups and snacks, regular cold cuts and cheeses, regular TV dinners     - learn to read food labels:          - low sodium is 140 mg or less per serving.          - beware of foods with 400 - 600 mg sodium per serving     - make food choices that are considered heart healthy  - eat more fresh fruits and vegetables:       - aim for two or more servings of fruit each day       - eat three or more servings of vegetables each day  - eat whole grains  - eat more foods you make at home  - eat more chicken, fish, and lean pork  - eat less red meat    - bake, grill, or broil meats    - limit fried foods  - eat two to three servings of low-fat or fat-free dairy foods each day  - use these sparingly, vegetable oil and spray, tub or squeeze margarine, low or non-fat salad dressing    - read labels to avoid trans-fats     Weigh yourself each morning after you urinate but before you eat breakfast.   Keep a record of your weight to show your caregiver.   A sudden weight gain can mean fluid buildup.     Call your Health Care Provider if you have any of the following: (identify yourself as a heart failure patient having the following symptoms):  - trouble breathing or shortness of breath more than usual    - new or worsening shortness of breath or occurring when lying flat  - chest discomfort (not relieved by nitroglycerin)  - swollen feet, ankles and legs more than usual  - fatigue, weakness, loss of energy  - coughing at night or chronic cough  - nausea with abdominal swelling, pain and tenderness      Some patients require extra doses of their water pill / Diuretic if:   - weight gain of  3 pounds in one day (1.4 kilograms)  - weight gain of 5 pounds in one week (2.3 kilograms)      As always, if you have questions or concerns about your health, please call your regular health care provider or your heart failure clinic.    FOR  EMERGENCY DIAL 911        Patient was reminded of being NPO (without food) after midnight the night before surgery.   -- Hold aspirin, Advil/ibuprofen, Aleve/naproxen, Vitamin E for 7 days before surgery   -- Hold vitamins and herbal remedies for 7 days before surgery     -- Okay to use Tylenol (Acetaminophen)  Okay to take other usual medications with a sip of water on the day of the procedure and resume their medications after the procedure.   Contact the surgery Center for specific instructions prior to procedure.    PRE OP RECOMMENDATIONS:  Patient is on chronic pain medications (NO) and plan is acute pain management per surgery/surgeon.  Patient is on anti-platelet/anticoagulation (NO) and plan is n/a  Other medications that need adjustment perioperatively (NO)     If you have Diabetes or Prediabetes:  -- Take only half of your usual long-acting insulin on the day before surgery (Lantus, Basaglar, Levemir, Toujeo Insulin (concentrated Lantus insulin) or similar.   -- No Metformin on day before surgery or morning of surgery.     Other:    Patient was advised to call our office and the surgical services with any change in condition or new symptoms if they were to develop between today and their surgical date.  Especially any cardiopulmonary symptoms or symptoms concerning for an infection.      -- Diagnosed with Sleep Apnea - YES    - Bring your machine / CPAP with to your surgery        Return as needed for follow-up with Dr. Ritchie.    Clinic : [phone]  Appointment line: [phone]      Reinier Ritchie MD

## 2017-12-28 NOTE — OR POSTOP
Patient Information     Patient Name MRN Sex Gregg Spencer [id number] Male 1965      OR PostOp by Dinora Mendez RN at 2017 12:19 PM     Author:  Dinora Mendez RN Service:  (none) Author Type:  NURS- Registered Nurse     Filed:  2017 12:20 PM Date of Service:  2017 12:19 PM Status:  Signed     :  Dinora Mendez RN (NURS- Registered Nurse)            Discharge Note    Data:  Gregg Aguayo has been discharged home at 1150 via wheelchair accompanied by Registered Nurse and Family.      Action:  Written discharge/follow-up instructions were provided to patient and Spouse. Prescriptions were written and sent with patient.  Belongings sent with patient. Medications from home sent with patient/family: Not Applicable  Equipment none .     Response:  Patient and Spouse verbalized understanding of discharge instructions, reason for discharge, and necessary follow-up appointments.

## 2017-12-28 NOTE — PROGRESS NOTES
Patient Information     Patient Name MRN Sex Gregg Spencer [id number] Male 1965      Progress Notes by Reinier Ritchie MD at 2017  9:20 AM     Author:  Reinier Ritchie MD Service:  (none) Author Type:  Physician     Filed:  2017  3:20 PM Encounter Date:  2017 Status:  Signed     :  Reinier Ritchie MD (Physician)            Nursing Notes:   Mandy Prajapati  2017  9:58 AM  Signed  This patient presents today for a Preoperative exam for this procedure: Right Carpal Tunnel Release  Date of Surgery: 17  Surgeon:  Dr. Huang  Facility:  [facility]  Fax: n/a    Mandy Prajapati LPN        2017 9:39 AM        Gregg Aguayo presents to clinic today for:   Chief Complaint    Patient presents with      Preoperative Exam     Referring Provider: Dr. Huang   HPI: Mr. Aguayo is a 52 y.o. male who presents today for Consultative evaluation requested by Dr. Huang for preoperative cardiopulmonary clearance.     (Z01.818) Pre-op examination - 2017 - right carpel tunnel release - Dr. Huang  (primary encounter diagnosis)  (G56.03) Bilateral carpal tunnel syndrome  (F17.200) TOBACCO ABUSE  (M35.3) Polymyalgia rheumatica (HC)  (G47.33,  Z99.89) AILEEN on CPAP/BIPAP - uses nightly, finds very helpful     patient has bilateral carpal tunnel syndrome, currently scheduled for right carpal tunnel release.  At some point in the future he is looking at having his left elbow and left carpal tunnel surgery.  Right hand is more symptomatic at this time.    Tobacco abuse, ongoing but he has been cutting down.  He would like to try quitting again.  She still has Chantix at home.  He used Chantix recently in neck she was able to almost get to the point of completely quitting.  He was taking 1 tablet daily next tolerated this quite well.  Higher dosing and caused him some problems.  Advised to quit smoking, restart Chantix.  -- Check EKG.    Polymyalgia, intermittently a problem but  has not had to use methylprednisolone for the past month.  Had some severe swelling and pain previously.  He tapered himself off steroids.    Sleep apnea, controlled, using CPAP/BiPAP regularly.  Advised to bring with him to surgery.    Mr. Landers Body mass index is 42.72 kg/(m^2). This is out of the normal range for a 52 y.o. Normal range for ages 18+ is between 18.5 and 24.9. To lose weight we reviewed risks and benefits of appropriate options such as diet, exercise, and medications. Patient's strategy will be  self-directed nutrition plan, formal nutrition program and self-directed exercise program   BP Readings from Last 1 Encounters:11/20/17 : 138/78  Mr. Landers blood pressure is out of the normal range for adults. Per JNC-8 guidelines normal adult blood pressure is < 120/80, pre-hypertensive is between 120/80 and 139/89, and hypertension is 140/90 or greater. Risks of hypertension were discussed. Patient's strategy will be weight loss, increased activity and reduced salt intake    Functional Capacity: > 4 METS.   Reports that he can climb a flight of stairs without any chest pain/heaviness or shortness of breath.   Patient reports no current symptoms of fevers, chills, nausea/vomiting.   No cough. No shortness of breath.   No change in bowel/bladder habits. No melena, hematochezia. No Hematuria.   No rashes. No palpitations.  No orthopnea/paroxysmal nocturnal dyspnea   No vision or hearing issues.   No significant mood issues   No bruising.     I have personally reviewed the past medical history, past surgical history, medications, allergies, family and social history as listed below, on 11/20/2017.    Patient Active Problem List      Diagnosis Date Noted     AILEEN on CPAP/BIPAP - uses nightly, finds very helpful 11/20/2017     Dermatomyositis (HC) 10/12/2017     Cubital tunnel syndrome on left 09/25/2017     Bilateral carpal tunnel syndrome 09/21/2017     Bilateral leg edema 07/05/2017     Polymyalgia  rheumatica (HC) 11/11/2016     Arthralgia of multiple joints 10/26/2016     SKIN TAGS; IRRITATED/INFLAMMED 06/07/2012     DERMATITIS 09/20/2011     ERECTILE DYSFUNCTION      GERD      TOBACCO ABUSE      Past Medical History:     Diagnosis  Date     Bilateral carpal tunnel syndrome 9/21/2017     Cubital tunnel syndrome on left 9/25/2017     No pertinent past medical history      Past Surgical History:      Procedure  Laterality Date     APPENDECTOMY  2005    Laparoscopic for acute appendicitis       ARTHROSCOPY      knee ? left       COLONOSCOPY SCREENING  1/29/2014    Tubular adenoma of rectum - follow up 5 years       Current Outpatient Prescriptions       Medication  Sig Dispense Refill     acetaminophen SR (TYLENOL ARTHRITIS PAIN) 650 mg Extended-Release tablet Take up to 4 tablet daily for pain as needed 100 tablet 3     furosemide (LASIX) 20 mg tablet Take 1 tablet by mouth once daily if needed. - For Leg swelling - limit use. - Salt restriction 90 tablet 3     gabapentin (NEURONTIN) 100 mg capsule Take 1-2 capsules by mouth 4 times daily if needed (For Burning/Shooting Nerve Pain). 120 capsule 2     methylPREDNISolone (MEDROL) 4 mg tablet Take 0.5-2 tablets by mouth once daily with a meal. - wean dose every 2 to 4 weeks - for  tablet 3     omeprazole (PRILOSEC) 40 mg Delayed-Release capsule Take 1 capsule by mouth once daily. 90 capsule 3     traMADol (ULTRAM) 50 mg tablet Take 1 tablet by mouth 2 times daily if needed for Pain. 60 tablet 2     varenicline (CHANTIX) 1 mg tablet Take 1 mg by mouth 2 times daily with meals. - Quit Smoking 180 tablet 1     Recent use of: no recent use of aspirin (ASA), NSAIDS or steroids     Fever/Chills or other infectious symptoms in past month: no  >10lb weight loss in past two months: no    1. Clinic Code Status:Full Code  2. Date Discussed: 11/20/2017   3. Documentation:Discussed with patient who is competent to make decision    Hx of blood transfusions:   (NO)    Tetanus status:    Immunization History     Administered  Date(s) Administered     Tdap 01/15/2010      History of VRE/MRSA:  (NO) Date: n/a  Latex allergy  no   No Known Allergies   Family History       Problem   Relation Age of Onset     Genetic  Maternal Grandfather      Polymyalgia Rheumatica       Family Status     Relation  Status     Child Alive    x2      Maternal Grandfather Alive     Denies family hx of bleeding tendencies, anesthesia complications, or other problems with surgery.     Social History     Social History        Marital status:       Spouse name: N/A     Number of children:  N/A     Years of education:  N/A     Social History Main Topics          Smoking status:   Current Every Day Smoker      Packs/day:  0.75      Types:  Cigarettes      Smokeless tobacco:   Never Used      Alcohol use   0.0 oz/week     0 Standard drinks or equivalent per week        Comment: 1-2 drinks every other day       Drug use:   No      Sexual activity:   Not on file      Other Topics  Concern     Seat Belt Yes     Social History Narrative     Tobacco-one half pack per day.  Ethanol-occasional.      and remarried.  Has two children from his first marriage.  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                           Complete ROS was performed and was negative unless otherwise noted in HPI above.    Surgical:  Patient denies previous complications from prior surgeries including but not limited to prolonged bleeding, anesthesia complications, dysrhythmias, surgical wound infections, or prolonged hospital stay.     EXAM:   Vitals:     11/20/17 0940   BP: 138/78   Pulse: 64   SpO2: 98%   Weight: (!) 137 kg (302 lb)     BP Readings from Last 3 Encounters:    11/20/17 138/78   10/12/17 120/82   09/25/17 130/88     Wt Readings from Last 3 Encounters:    11/20/17 (!) 137 kg (302 lb)   10/12/17 132.2 kg (291 lb 6 oz)   09/25/17 126.6 kg (279 lb)     Estimated body mass index is 42.72  "kg/(m^2) as calculated from the following:    Height as of 17: 1.791 m (5' 10.5\").    Weight as of this encounter: 137 kg (302 lb).     Mallampati Airway Classification:  Class 4: Only hard palate visible  EXAM:  Constitutional: Pleasant, alert, appropriate appearance for age. No acute distress, Healthy, alert, cooperative, no apparent distress  ENT: Normocephalic, Atraumatic, Thyroid without nodules or tenderness   Nose/Mouth: Oral pharynx without erythema or exudates, Nose is patent bilaterally, no rhinorrhea and Dental hygeine adequate   Eyes:  Extraocular muscles intact, Sclera non-icteric, Conjunctiva without erythema  Lymphatic Exam: Non-palpable nodes in neck, clavicular regions  Pulmonary: Lungs are clear to auscultation bilaterally, without wheezes or crackles  Cardiovascular Exam: regular rate and rhythm, 1+ pedal edema present  Gastrointestinal Exam: Obese, Soft, non-tender, non-distended, positive bowel sounds  Integument: No abnormal rashes, sores, or ulcerations noted  Neurologic Exam: CN 3-12 grossly intact   Musculoskeletal Exam: Moves upper and lower extremities symmetrically, No focal weakness  Gait and station appear grossly normal  Psychiatric Exam: Awake and Alert, Affect and mood appropriate  Speech is fluent, Thought process is normal    INVESTIGATIONS;  CXR:  not indicated     EK2017 -Personally ordered/reviewed  EKG interpretation  normal sinus rhythm.  Rate 65.  Normal axis.  Normal LA and QRS indices.  Sinus arrhythmia noted.  Normal ST segments.  Normal EKG.  Compared to previous tracing dated 2012, bradycardia no longer noted  Reinier Ritchie MD     LABS:   2017  WBC: 7.9     2017  RBC: 4.80  2017  HGB: 16.1  2017  HCT: 44.5   2017  MCV: 93    2017  MCH: 33.5   2017 MCHC: 36.2   2017  RDW: 12.2   2017  PLT: 215     CREATININE (mg/dL)    Date Value   10/07/2016 0.83       Results for orders placed or performed in visit on " 10/12/17      MISCELLANEOUS SEND OUT      Result  Value Ref Range    TEST NAME Myositis Antibody Panel     SOURCE Serum     PERFORMING LAB [facility]DL Ref Lab via [facility]     REFERRAL LAB TEST # FMYO     MISCELLANEOUS SEND OUT See Separate Report    SEDIMENTATION RATE      Result  Value Ref Range    SEDIMENTATION RATE        3 <21 mm/hr   CRP, inflammatory      Result  Value Ref Range    C-REACTIVE PROTEIN <1.000 <1.000 mg/dL   Hgb A1c      Result  Value Ref Range    HEMOGLOBIN A1C MONITORING (POCT) 5.3 4.0 - 6.2 %    ESTIMATED AVERAGE GLUCOSE  105 mg/dL        ASSESSMENT AND PLAN:    1.  Preoperative history and physical   Gregg was seen today for preoperative exam.    Diagnoses and all orders for this visit:    Pre-op examination - 11/22/2017 - right carpel tunnel release - Dr. Huang  -     AR ELECTROCARDIOGRAM TRACING  -     AR PULSE OXIMETRY SINGLE DETERMINATION    Bilateral carpal tunnel syndrome    TOBACCO ABUSE  -     EKG 12 LEAD UNIT PERFORMED (PERFORMED TODAY)    Polymyalgia rheumatica (HC)  -     EKG 12 LEAD UNIT PERFORMED (PERFORMED TODAY)    AILEEN on CPAP/BIPAP - uses nightly, finds very helpful        Revised Cardiac Risk Index   1. History of ischemic heart disease   2. History of Systolic congestive heart failure (EF less than or equal to 40%)    3. History of cerebrovascular disease (stroke or transient ischemic attack)   4. History of diabetes requiring preoperative insulin use   5. Chronic kidney disease (creatinine > 2 mg/dL)   6. Undergoing suprainguinal vascular, intraperitoneal, or intrathoracic surgery     Risk for cardiac death, nonfatal myocardial infarction, and nonfatal cardiac arrest:   --> 0 predictors = 0.4%   1 predictor = 0.9%  2 predictors = 6.6%  ?3 predictors = >11%       Preoperative asseessment (as of 11/20/2017)      INTERMEDIATE RISK - risk factors including: HTN, AILEEN,      Recommendations as follow:    - Proceed with surgery: As Scheduled.     For above listed  surgery and anesthesia:     - Patient is moderate  risk for perioperative complications and IS medically optimized at this time.    ASA Classification:  Class 2 - MILD SYSTEMIC DISEASE, NO ACUTE PROBLEMS, NO FUNCTIONAL LIMITATIONS.    Is BMI over 40 or A1c over 7.5% -- no   HEMOGLOBIN A1C MONITORING (POCT)    Date Value   10/12/2017 5.3 %   11/02/2012 5.4 % NGSP        Other: Proceed without further diagnostic evaluation.    Preoperative Evaluation: Obstructive Sleep Apnea screening (STOP_BANG)    -- Diagnosed with Sleep Apnea - YES    - Bring your machine / CPAP with to your surgery      lab results and schedule of future lab studies reviewed with patient, reviewed diet, exercise and weight control, recommended sodium restriction, cardiovascular risk and specific lipid/LDL goals reviewed    Gregg is also recommended to eat a heart-healthy diet, do regular aerobic exercises, maintain a desirable body weight, and avoid tobacco products. These recommendations are from the American Heart Association (AHA) which stresses the importance of lifestyle changes to lower cardiovascular disease risk.       Thank you for allowing me to participate in the care of your patient.   A copy of this evaluation report is provided to referring provider.     Return if symptoms worsen or fail to improve.  Patient Instructions   Plan for surgery as scheduled with Dr. Huang on 11/22/2017.     EKG today.    Recent labs were collected and stable.    Kidney function and hemoglobin are normal.    Recent hemoglobin A1c/diabetes labs came back normal.    Inflammatory labs CRP/ESR were normal.    --> No prednisone for the past 1 month.      Blood pressures are controlled.     Low-salt heart healthy diet:    Eat a diet that is low in sodium (salt). Salt increases fluid retention and should be avoided.  Limit salt in your diet to 1,500 to 2,000 milligrams (mg) per day.      - remove the salt shaker, do not have it on the table when you are  eating, or in the kitchen when you are cooking     - do not eat seasoned salts, pickles, olives, salted soups and snacks, regular cold cuts and cheeses, regular TV dinners     - learn to read food labels:          - low sodium is 140 mg or less per serving.          - beware of foods with 400 - 600 mg sodium per serving     - make food choices that are considered heart healthy  - eat more fresh fruits and vegetables:       - aim for two or more servings of fruit each day       - eat three or more servings of vegetables each day  - eat whole grains  - eat more foods you make at home  - eat more chicken, fish, and lean pork  - eat less red meat    - bake, grill, or broil meats    - limit fried foods  - eat two to three servings of low-fat or fat-free dairy foods each day  - use these sparingly, vegetable oil and spray, tub or squeeze margarine, low or non-fat salad dressing    - read labels to avoid trans-fats     Weigh yourself each morning after you urinate but before you eat breakfast.   Keep a record of your weight to show your caregiver.   A sudden weight gain can mean fluid buildup.     Call your Health Care Provider if you have any of the following: (identify yourself as a heart failure patient having the following symptoms):  - trouble breathing or shortness of breath more than usual    - new or worsening shortness of breath or occurring when lying flat  - chest discomfort (not relieved by nitroglycerin)  - swollen feet, ankles and legs more than usual  - fatigue, weakness, loss of energy  - coughing at night or chronic cough  - nausea with abdominal swelling, pain and tenderness      Some patients require extra doses of their water pill / Diuretic if:   - weight gain of  3 pounds in one day (1.4 kilograms)  - weight gain of 5 pounds in one week (2.3 kilograms)      As always, if you have questions or concerns about your health, please call your regular health care provider or your heart failure clinic.    FOR  EMERGENCY DIAL 911        Patient was reminded of being NPO (without food) after midnight the night before surgery.   -- Hold aspirin, Advil/ibuprofen, Aleve/naproxen, Vitamin E for 7 days before surgery   -- Hold vitamins and herbal remedies for 7 days before surgery     -- Okay to use Tylenol (Acetaminophen)  Okay to take other usual medications with a sip of water on the day of the procedure and resume their medications after the procedure.   Contact the surgery Center for specific instructions prior to procedure.    PRE OP RECOMMENDATIONS:  Patient is on chronic pain medications (NO) and plan is acute pain management per surgery/surgeon.  Patient is on anti-platelet/anticoagulation (NO) and plan is n/a  Other medications that need adjustment perioperatively (NO)     If you have Diabetes or Prediabetes:  -- Take only half of your usual long-acting insulin on the day before surgery (Lantus, Basaglar, Levemir, Toujeo Insulin (concentrated Lantus insulin) or similar.   -- No Metformin on day before surgery or morning of surgery.     Other:    Patient was advised to call our office and the surgical services with any change in condition or new symptoms if they were to develop between today and their surgical date.  Especially any cardiopulmonary symptoms or symptoms concerning for an infection.      -- Diagnosed with Sleep Apnea - YES    - Bring your machine / CPAP with to your surgery        Return as needed for follow-up with Dr. Ritchie.    Clinic : [phone]  Appointment line: [phone]      Reinier Ritchie MD

## 2017-12-29 NOTE — PATIENT INSTRUCTIONS
Patient Information     Patient Name MRN Sex Gregg Spencer [id number] Male 1965      Patient Instructions by Reinier Ritchie MD at 2017  9:20 AM     Author:  Reniier Ritchie MD  Service:  (none) Author Type:  Physician     Filed:  2017 10:06 AM  Encounter Date:  2017 Status:  Addendum     :  Reinier Ritchie MD (Physician)        Related Notes: Original Note by Reinier Ritchie MD (Physician) filed at 2017 10:05 AM            Plan for surgery as scheduled with Dr. Huang on 2017.     EKG today.    Recent labs were collected and stable.    Kidney function and hemoglobin are normal.    Recent hemoglobin A1c/diabetes labs came back normal.    Inflammatory labs CRP/ESR were normal.    --> No prednisone for the past 1 month.      Blood pressures are controlled.     Low-salt heart healthy diet:    Eat a diet that is low in sodium (salt). Salt increases fluid retention and should be avoided.  Limit salt in your diet to 1,500 to 2,000 milligrams (mg) per day.      - remove the salt shaker, do not have it on the table when you are eating, or in the kitchen when you are cooking     - do not eat seasoned salts, pickles, olives, salted soups and snacks, regular cold cuts and cheeses, regular TV dinners     - learn to read food labels:          - low sodium is 140 mg or less per serving.          - beware of foods with 400 - 600 mg sodium per serving     - make food choices that are considered heart healthy  - eat more fresh fruits and vegetables:       - aim for two or more servings of fruit each day       - eat three or more servings of vegetables each day  - eat whole grains  - eat more foods you make at home  - eat more chicken, fish, and lean pork  - eat less red meat    - bake, grill, or broil meats    - limit fried foods  - eat two to three servings of low-fat or fat-free dairy foods each day  - use these sparingly, vegetable oil and spray, tub or squeeze  margarine, low or non-fat salad dressing    - read labels to avoid trans-fats     Weigh yourself each morning after you urinate but before you eat breakfast.   Keep a record of your weight to show your caregiver.   A sudden weight gain can mean fluid buildup.     Call your Health Care Provider if you have any of the following: (identify yourself as a heart failure patient having the following symptoms):  - trouble breathing or shortness of breath more than usual    - new or worsening shortness of breath or occurring when lying flat  - chest discomfort (not relieved by nitroglycerin)  - swollen feet, ankles and legs more than usual  - fatigue, weakness, loss of energy  - coughing at night or chronic cough  - nausea with abdominal swelling, pain and tenderness      Some patients require extra doses of their water pill / Diuretic if:   - weight gain of  3 pounds in one day (1.4 kilograms)  - weight gain of 5 pounds in one week (2.3 kilograms)      As always, if you have questions or concerns about your health, please call your regular health care provider or your heart failure clinic.    FOR EMERGENCY DIAL 911        Patient was reminded of being NPO (without food) after midnight the night before surgery.   -- Hold aspirin, Advil/ibuprofen, Aleve/naproxen, Vitamin E for 7 days before surgery   -- Hold vitamins and herbal remedies for 7 days before surgery     -- Okay to use Tylenol (Acetaminophen)  Okay to take other usual medications with a sip of water on the day of the procedure and resume their medications after the procedure.   Contact the surgery Center for specific instructions prior to procedure.    PRE OP RECOMMENDATIONS:  Patient is on chronic pain medications (NO) and plan is acute pain management per surgery/surgeon.  Patient is on anti-platelet/anticoagulation (NO) and plan is n/a  Other medications that need adjustment perioperatively (NO)     If you have Diabetes or Prediabetes:  -- Take only half of  your usual long-acting insulin on the day before surgery (Lantus, Basaglar, Levemir, Toujeo Insulin (concentrated Lantus insulin) or similar.   -- No Metformin on day before surgery or morning of surgery.     Other:    Patient was advised to call our office and the surgical services with any change in condition or new symptoms if they were to develop between today and their surgical date.  Especially any cardiopulmonary symptoms or symptoms concerning for an infection.      -- Diagnosed with Sleep Apnea - YES    - Bring your machine / CPAP with to your surgery        Return as needed for follow-up with Dr. Ritchie.    Clinic : [phone]  Appointment line: [phone]

## 2017-12-29 NOTE — PATIENT INSTRUCTIONS
Patient Information     Patient Name MRN Gregg Vuong [id number] Male 1965      Patient Instructions by Reinier Ritchie MD at 2017  8:40 AM     Author:  Reinier Ritchie MD  Service:  (none) Author Type:  Physician     Filed:  2017  9:15 AM  Encounter Date:  2017 Status:  Addendum     :  Reinier Ritchie MD (Physician)        Related Notes: Original Note by Reinier Ritchie MD (Physician) filed at 2017  9:13 AM            Low-salt heart healthy diet:    Eat a diet that is low in sodium (salt). Salt increases fluid retention and should be avoided.  Limit salt in your diet to 1,500 milligrams (mg) per day.      - remove the salt shaker, do not have it on the table when you are eating, or in the kitchen when you are cooking     - do not eat seasoned salts, pickles, olives, salted soups and snacks, regular cold cuts and cheeses, regular TV dinners     - learn to read food labels:          - low sodium is 140 mg or less per serving.          - beware of foods with 400 - 600 mg sodium per serving     - make food choices that are considered heart healthy  - eat more fresh fruits and vegetables:       - aim for two or more servings of fruit each day       - eat three or more servings of vegetables each day  - eat whole grains  - eat more foods you make at home  - eat more chicken, fish, and lean pork  - eat less red meat    - bake, grill, or broil meats    - limit fried foods  - eat two to three servings of low-fat or fat-free dairy foods each day  - use these sparingly, vegetable oil and spray, tub or squeeze margarine, low or non-fat salad dressing    - read labels to avoid trans-fats     Weigh yourself each morning after you urinate but before you eat breakfast.   Keep a record of your weight to show your caregiver.   A sudden weight gain can mean fluid buildup.     Call your Health Care Provider if you have any of the following: (identify yourself as a heart failure  patient having the following symptoms):  - trouble breathing or shortness of breath more than usual    - new or worsening shortness of breath or occurring when lying flat  - chest discomfort (not relieved by nitroglycerin)  - swollen feet, ankles and legs more than usual  - fatigue, weakness, loss of energy  - coughing at night or chronic cough  - nausea with abdominal swelling, pain and tenderness      Some patients require extra doses of their water pill / Diuretic if:   - weight gain of  3 pounds in one day (1.4 kilograms)  - weight gain of 5 pounds in one week (2.3 kilograms)      As always, if you have questions or concerns about your health, please call your regular health care provider or your heart failure clinic.    FOR EMERGENCY DIAL 911    1. TOBACCO ABUSE  - varenicline (CHANTIX) 1 mg tablet; Take 1 mg by mouth 2 times daily with meals. - Quit Smoking  Dispense: 180 tablet; Refill: 1    2. Polymyalgia rheumatica (HC)  3. Arthralgia of multiple joints    Restart and slowly wean dose.   - methylPREDNISolone (MEDROL) 4 mg tablet; Take 0.5-2 tablets by mouth once daily with a meal. - wean dose every 2 to 4 weeks - for PMR  Dispense: 180 tablet; Refill: 3    4. Bilateral leg edema  Start:  - furosemide (LASIX) 20 mg tablet; Take 1 tablet by mouth once daily if needed. - For Leg swelling - limit use. - Salt restriction  Dispense: 90 tablet; Refill: 0    Sounds like you have carpal tunnel syndrome.  - Start nighttime cockup wrist splints.  - If the wrist splints make a substantial difference, probably do not need any further workup, if they help but do not make all of your pain and numbness go away, would recommend getting an EMG/nerve conduction study.    Return in approximately 3 month(s), or sooner as needed for follow-up with Dr. Ritchie.  - follow-up chantix, carpal tunnel    Clinic : [phone]  Appointment line: [phone]

## 2017-12-29 NOTE — PATIENT INSTRUCTIONS
Patient Information     Patient Name MRN Sex Gregg Spencer [id number] Male 1965      Patient Instructions by Reinier Ritchie MD at 10/12/2017  3:20 PM     Author:  Reinier Ritchie MD  Service:  (none) Author Type:  Physician     Filed:  10/12/2017  4:07 PM  Encounter Date:  10/12/2017 Status:  Addendum     :  Reinier Ritchie MD (Physician)        Related Notes: Original Note by Reinier Ritchie MD (Physician) filed at 10/12/2017  4:06 PM            Labs today.     Neurontin (Gabapentin) 100 mg capsule - take up to 4 times daily as needed for burning/shooting nerve pain.   - No driving after use initially, until you know how these affect you.   - If they cause too much sleepiness during the day - okay to use all 3 or 4 at bedtime instead.      Medications refilled.     Return as needed for follow-up with Dr. Ritchie.    Clinic : [phone]  Appointment line: [phone]

## 2017-12-29 NOTE — H&P
Patient Information     Patient Name MRN Sex Gregg Spencer [id number] Male 1965      H&P by Reinier Ritchie MD at 2017  9:20 AM     Author:  Reinier Ritchie MD Service:  (none) Author Type:  Physician     Filed:  2017  3:20 PM Encounter Date:  2017 Status:  Signed     :  Reinier Ritchie MD (Physician)            Nursing Notes:   Mandy Prajapati  2017  9:58 AM  Signed  This patient presents today for a Preoperative exam for this procedure: Right Carpal Tunnel Release  Date of Surgery: 17  Surgeon:  Dr. Huang  Facility:  [facility]  Fax: n/a    Mandy Prajapati LPN        2017 9:39 AM        Gregg Aguayo presents to clinic today for:   Chief Complaint    Patient presents with      Preoperative Exam     Referring Provider: Dr. Huang   HPI: Mr. Aguayo is a 52 y.o. male who presents today for Consultative evaluation requested by Dr. Huang for preoperative cardiopulmonary clearance.     (Z01.818) Pre-op examination - 2017 - right carpel tunnel release - Dr. Huang  (primary encounter diagnosis)  (G56.03) Bilateral carpal tunnel syndrome  (F17.200) TOBACCO ABUSE  (M35.3) Polymyalgia rheumatica (HC)  (G47.33,  Z99.89) AILEEN on CPAP/BIPAP - uses nightly, finds very helpful     patient has bilateral carpal tunnel syndrome, currently scheduled for right carpal tunnel release.  At some point in the future he is looking at having his left elbow and left carpal tunnel surgery.  Right hand is more symptomatic at this time.    Tobacco abuse, ongoing but he has been cutting down.  He would like to try quitting again.  She still has Chantix at home.  He used Chantix recently in neck she was able to almost get to the point of completely quitting.  He was taking 1 tablet daily next tolerated this quite well.  Higher dosing and caused him some problems.  Advised to quit smoking, restart Chantix.  -- Check EKG.    Polymyalgia, intermittently a problem but has not had  to use methylprednisolone for the past month.  Had some severe swelling and pain previously.  He tapered himself off steroids.    Sleep apnea, controlled, using CPAP/BiPAP regularly.  Advised to bring with him to surgery.    Mr. Landers Body mass index is 42.72 kg/(m^2). This is out of the normal range for a 52 y.o. Normal range for ages 18+ is between 18.5 and 24.9. To lose weight we reviewed risks and benefits of appropriate options such as diet, exercise, and medications. Patient's strategy will be  self-directed nutrition plan, formal nutrition program and self-directed exercise program   BP Readings from Last 1 Encounters:11/20/17 : 138/78  Mr. Landers blood pressure is out of the normal range for adults. Per JNC-8 guidelines normal adult blood pressure is < 120/80, pre-hypertensive is between 120/80 and 139/89, and hypertension is 140/90 or greater. Risks of hypertension were discussed. Patient's strategy will be weight loss, increased activity and reduced salt intake    Functional Capacity: > 4 METS.   Reports that he can climb a flight of stairs without any chest pain/heaviness or shortness of breath.   Patient reports no current symptoms of fevers, chills, nausea/vomiting.   No cough. No shortness of breath.   No change in bowel/bladder habits. No melena, hematochezia. No Hematuria.   No rashes. No palpitations.  No orthopnea/paroxysmal nocturnal dyspnea   No vision or hearing issues.   No significant mood issues   No bruising.     I have personally reviewed the past medical history, past surgical history, medications, allergies, family and social history as listed below, on 11/20/2017.    Patient Active Problem List      Diagnosis Date Noted     AILEEN on CPAP/BIPAP - uses nightly, finds very helpful 11/20/2017     Dermatomyositis (HC) 10/12/2017     Cubital tunnel syndrome on left 09/25/2017     Bilateral carpal tunnel syndrome 09/21/2017     Bilateral leg edema 07/05/2017     Polymyalgia rheumatica (HC)  11/11/2016     Arthralgia of multiple joints 10/26/2016     SKIN TAGS; IRRITATED/INFLAMMED 06/07/2012     DERMATITIS 09/20/2011     ERECTILE DYSFUNCTION      GERD      TOBACCO ABUSE      Past Medical History:     Diagnosis  Date     Bilateral carpal tunnel syndrome 9/21/2017     Cubital tunnel syndrome on left 9/25/2017     No pertinent past medical history      Past Surgical History:      Procedure  Laterality Date     APPENDECTOMY  2005    Laparoscopic for acute appendicitis       ARTHROSCOPY      knee ? left       COLONOSCOPY SCREENING  1/29/2014    Tubular adenoma of rectum - follow up 5 years       Current Outpatient Prescriptions       Medication  Sig Dispense Refill     acetaminophen SR (TYLENOL ARTHRITIS PAIN) 650 mg Extended-Release tablet Take up to 4 tablet daily for pain as needed 100 tablet 3     furosemide (LASIX) 20 mg tablet Take 1 tablet by mouth once daily if needed. - For Leg swelling - limit use. - Salt restriction 90 tablet 3     gabapentin (NEURONTIN) 100 mg capsule Take 1-2 capsules by mouth 4 times daily if needed (For Burning/Shooting Nerve Pain). 120 capsule 2     methylPREDNISolone (MEDROL) 4 mg tablet Take 0.5-2 tablets by mouth once daily with a meal. - wean dose every 2 to 4 weeks - for  tablet 3     omeprazole (PRILOSEC) 40 mg Delayed-Release capsule Take 1 capsule by mouth once daily. 90 capsule 3     traMADol (ULTRAM) 50 mg tablet Take 1 tablet by mouth 2 times daily if needed for Pain. 60 tablet 2     varenicline (CHANTIX) 1 mg tablet Take 1 mg by mouth 2 times daily with meals. - Quit Smoking 180 tablet 1     Recent use of: no recent use of aspirin (ASA), NSAIDS or steroids     Fever/Chills or other infectious symptoms in past month: no  >10lb weight loss in past two months: no    1. Clinic Code Status:Full Code  2. Date Discussed: 11/20/2017   3. Documentation:Discussed with patient who is competent to make decision    Hx of blood transfusions:   (NO)   Tetanus status:     Immunization History     Administered  Date(s) Administered     Tdap 01/15/2010      History of VRE/MRSA:  (NO) Date: n/a  Latex allergy  no   No Known Allergies   Family History       Problem   Relation Age of Onset     Genetic  Maternal Grandfather      Polymyalgia Rheumatica       Family Status     Relation  Status     Child Alive    x2      Maternal Grandfather Alive     Denies family hx of bleeding tendencies, anesthesia complications, or other problems with surgery.     Social History     Social History        Marital status:       Spouse name: N/A     Number of children:  N/A     Years of education:  N/A     Social History Main Topics          Smoking status:   Current Every Day Smoker      Packs/day:  0.75      Types:  Cigarettes      Smokeless tobacco:   Never Used      Alcohol use   0.0 oz/week     0 Standard drinks or equivalent per week        Comment: 1-2 drinks every other day       Drug use:   No      Sexual activity:   Not on file      Other Topics  Concern     Seat Belt Yes     Social History Narrative     Tobacco-one half pack per day.  Ethanol-occasional.      and remarried.  Has two children from his first marriage.  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                           Complete ROS was performed and was negative unless otherwise noted in HPI above.    Surgical:  Patient denies previous complications from prior surgeries including but not limited to prolonged bleeding, anesthesia complications, dysrhythmias, surgical wound infections, or prolonged hospital stay.     EXAM:   Vitals:     11/20/17 0940   BP: 138/78   Pulse: 64   SpO2: 98%   Weight: (!) 137 kg (302 lb)     BP Readings from Last 3 Encounters:    11/20/17 138/78   10/12/17 120/82   09/25/17 130/88     Wt Readings from Last 3 Encounters:    11/20/17 (!) 137 kg (302 lb)   10/12/17 132.2 kg (291 lb 6 oz)   09/25/17 126.6 kg (279 lb)     Estimated body mass index is 42.72 kg/(m^2) as calculated  "from the following:    Height as of 17: 1.791 m (5' 10.5\").    Weight as of this encounter: 137 kg (302 lb).     Mallampati Airway Classification:  Class 4: Only hard palate visible  EXAM:  Constitutional: Pleasant, alert, appropriate appearance for age. No acute distress, Healthy, alert, cooperative, no apparent distress  ENT: Normocephalic, Atraumatic, Thyroid without nodules or tenderness   Nose/Mouth: Oral pharynx without erythema or exudates, Nose is patent bilaterally, no rhinorrhea and Dental hygeine adequate   Eyes:  Extraocular muscles intact, Sclera non-icteric, Conjunctiva without erythema  Lymphatic Exam: Non-palpable nodes in neck, clavicular regions  Pulmonary: Lungs are clear to auscultation bilaterally, without wheezes or crackles  Cardiovascular Exam: regular rate and rhythm, 1+ pedal edema present  Gastrointestinal Exam: Obese, Soft, non-tender, non-distended, positive bowel sounds  Integument: No abnormal rashes, sores, or ulcerations noted  Neurologic Exam: CN 3-12 grossly intact   Musculoskeletal Exam: Moves upper and lower extremities symmetrically, No focal weakness  Gait and station appear grossly normal  Psychiatric Exam: Awake and Alert, Affect and mood appropriate  Speech is fluent, Thought process is normal    INVESTIGATIONS;  CXR:  not indicated     EK2017 -Personally ordered/reviewed  EKG interpretation  normal sinus rhythm.  Rate 65.  Normal axis.  Normal NJ and QRS indices.  Sinus arrhythmia noted.  Normal ST segments.  Normal EKG.  Compared to previous tracing dated 2012, bradycardia no longer noted  Reinier Ritchie MD     LABS:   2017  WBC: 7.9     2017  RBC: 4.80  2017  HGB: 16.1  2017  HCT: 44.5   2017  MCV: 93    2017  MCH: 33.5   2017 MCHC: 36.2   2017  RDW: 12.2   2017  PLT: 215     CREATININE (mg/dL)    Date Value   10/07/2016 0.83       Results for orders placed or performed in visit on 10/12/17    "   MISCELLANEOUS SEND OUT      Result  Value Ref Range    TEST NAME Myositis Antibody Panel     SOURCE Serum     PERFORMING LAB RDL Ref Lab via [facility]     REFERRAL LAB TEST # FMYO     MISCELLANEOUS SEND OUT See Separate Report    SEDIMENTATION RATE      Result  Value Ref Range    SEDIMENTATION RATE        3 <21 mm/hr   CRP, inflammatory      Result  Value Ref Range    C-REACTIVE PROTEIN <1.000 <1.000 mg/dL   Hgb A1c      Result  Value Ref Range    HEMOGLOBIN A1C MONITORING (POCT) 5.3 4.0 - 6.2 %    ESTIMATED AVERAGE GLUCOSE  105 mg/dL        ASSESSMENT AND PLAN:    1.  Preoperative history and physical   Gregg was seen today for preoperative exam.    Diagnoses and all orders for this visit:    Pre-op examination - 11/22/2017 - right carpel tunnel release - Dr. Huang  -     AK ELECTROCARDIOGRAM TRACING  -     AK PULSE OXIMETRY SINGLE DETERMINATION    Bilateral carpal tunnel syndrome    TOBACCO ABUSE  -     EKG 12 LEAD UNIT PERFORMED (PERFORMED TODAY)    Polymyalgia rheumatica (HC)  -     EKG 12 LEAD UNIT PERFORMED (PERFORMED TODAY)    AILEEN on CPAP/BIPAP - uses nightly, finds very helpful        Revised Cardiac Risk Index   1. History of ischemic heart disease   2. History of Systolic congestive heart failure (EF less than or equal to 40%)    3. History of cerebrovascular disease (stroke or transient ischemic attack)   4. History of diabetes requiring preoperative insulin use   5. Chronic kidney disease (creatinine > 2 mg/dL)   6. Undergoing suprainguinal vascular, intraperitoneal, or intrathoracic surgery     Risk for cardiac death, nonfatal myocardial infarction, and nonfatal cardiac arrest:   --> 0 predictors = 0.4%   1 predictor = 0.9%  2 predictors = 6.6%  ?3 predictors = >11%       Preoperative asseessment (as of 11/20/2017)      INTERMEDIATE RISK - risk factors including: HTN, AILEEN,      Recommendations as follow:    - Proceed with surgery: As Scheduled.     For above listed surgery and  anesthesia:     - Patient is moderate  risk for perioperative complications and IS medically optimized at this time.    ASA Classification:  Class 2 - MILD SYSTEMIC DISEASE, NO ACUTE PROBLEMS, NO FUNCTIONAL LIMITATIONS.    Is BMI over 40 or A1c over 7.5% -- no   HEMOGLOBIN A1C MONITORING (POCT)    Date Value   10/12/2017 5.3 %   11/02/2012 5.4 % NGSP        Other: Proceed without further diagnostic evaluation.    Preoperative Evaluation: Obstructive Sleep Apnea screening (STOP_BANG)    -- Diagnosed with Sleep Apnea - YES    - Bring your machine / CPAP with to your surgery      lab results and schedule of future lab studies reviewed with patient, reviewed diet, exercise and weight control, recommended sodium restriction, cardiovascular risk and specific lipid/LDL goals reviewed    Gregg is also recommended to eat a heart-healthy diet, do regular aerobic exercises, maintain a desirable body weight, and avoid tobacco products. These recommendations are from the American Heart Association (AHA) which stresses the importance of lifestyle changes to lower cardiovascular disease risk.       Thank you for allowing me to participate in the care of your patient.   A copy of this evaluation report is provided to referring provider.     Return if symptoms worsen or fail to improve.  Patient Instructions   Plan for surgery as scheduled with Dr. Huang on 11/22/2017.     EKG today.    Recent labs were collected and stable.    Kidney function and hemoglobin are normal.    Recent hemoglobin A1c/diabetes labs came back normal.    Inflammatory labs CRP/ESR were normal.    --> No prednisone for the past 1 month.      Blood pressures are controlled.     Low-salt heart healthy diet:    Eat a diet that is low in sodium (salt). Salt increases fluid retention and should be avoided.  Limit salt in your diet to 1,500 to 2,000 milligrams (mg) per day.      - remove the salt shaker, do not have it on the table when you are eating, or in the  kitchen when you are cooking     - do not eat seasoned salts, pickles, olives, salted soups and snacks, regular cold cuts and cheeses, regular TV dinners     - learn to read food labels:          - low sodium is 140 mg or less per serving.          - beware of foods with 400 - 600 mg sodium per serving     - make food choices that are considered heart healthy  - eat more fresh fruits and vegetables:       - aim for two or more servings of fruit each day       - eat three or more servings of vegetables each day  - eat whole grains  - eat more foods you make at home  - eat more chicken, fish, and lean pork  - eat less red meat    - bake, grill, or broil meats    - limit fried foods  - eat two to three servings of low-fat or fat-free dairy foods each day  - use these sparingly, vegetable oil and spray, tub or squeeze margarine, low or non-fat salad dressing    - read labels to avoid trans-fats     Weigh yourself each morning after you urinate but before you eat breakfast.   Keep a record of your weight to show your caregiver.   A sudden weight gain can mean fluid buildup.     Call your Health Care Provider if you have any of the following: (identify yourself as a heart failure patient having the following symptoms):  - trouble breathing or shortness of breath more than usual    - new or worsening shortness of breath or occurring when lying flat  - chest discomfort (not relieved by nitroglycerin)  - swollen feet, ankles and legs more than usual  - fatigue, weakness, loss of energy  - coughing at night or chronic cough  - nausea with abdominal swelling, pain and tenderness      Some patients require extra doses of their water pill / Diuretic if:   - weight gain of  3 pounds in one day (1.4 kilograms)  - weight gain of 5 pounds in one week (2.3 kilograms)      As always, if you have questions or concerns about your health, please call your regular health care provider or your heart failure clinic.    FOR EMERGENCY DIAL  217        Patient was reminded of being NPO (without food) after midnight the night before surgery.   -- Hold aspirin, Advil/ibuprofen, Aleve/naproxen, Vitamin E for 7 days before surgery   -- Hold vitamins and herbal remedies for 7 days before surgery     -- Okay to use Tylenol (Acetaminophen)  Okay to take other usual medications with a sip of water on the day of the procedure and resume their medications after the procedure.   Contact the surgery Center for specific instructions prior to procedure.    PRE OP RECOMMENDATIONS:  Patient is on chronic pain medications (NO) and plan is acute pain management per surgery/surgeon.  Patient is on anti-platelet/anticoagulation (NO) and plan is n/a  Other medications that need adjustment perioperatively (NO)     If you have Diabetes or Prediabetes:  -- Take only half of your usual long-acting insulin on the day before surgery (Lantus, Basaglar, Levemir, Toujeo Insulin (concentrated Lantus insulin) or similar.   -- No Metformin on day before surgery or morning of surgery.     Other:    Patient was advised to call our office and the surgical services with any change in condition or new symptoms if they were to develop between today and their surgical date.  Especially any cardiopulmonary symptoms or symptoms concerning for an infection.      -- Diagnosed with Sleep Apnea - YES    - Bring your machine / CPAP with to your surgery        Return as needed for follow-up with Dr. Ritchie.    Clinic : [phone]  Appointment line: [phone]      Reinier Ritchie MD

## 2017-12-30 NOTE — NURSING NOTE
Patient Information     Patient Name MRN Gregg Vuong [id number] Male 1965      Nursing Note by Mandy Prajapati at 10/12/2017  3:20 PM     Author:  Mandy Prajapati Service:  (none) Author Type:  (none)     Filed:  10/12/2017  3:53 PM Encounter Date:  10/12/2017 Status:  Signed     :  Mandy Prajapati            Patient presents to the clinic for discuss medications.    Mandy Prajapati LPN        10/12/2017 3:36 PM

## 2017-12-30 NOTE — NURSING NOTE
Patient Information     Patient Name MRN Gregg Vuong [id number] Male 1965      Nursing Note by Ne Jaime at 8/3/2017  3:15 PM     Author:  Ne Jaime Service:  (none) Author Type:  (none)     Filed:  8/3/2017  3:39 PM Encounter Date:  8/3/2017 Status:  Signed     :  Ne Jaime            Patient presents today as he would like his skin tags under his arms removed. He states that the left armpit is worse than the right.  Ne Jaime LPN  8/3/2017  3:28 PM

## 2017-12-30 NOTE — NURSING NOTE
Patient Information     Patient Name MRN Sex Gregg Spencer [id number] Male 1965      Nursing Note by Ne Jaime at 8/3/2017  3:15 PM     Author:  Ne Jaime Service:  (none) Author Type:  (none)     Filed:  8/3/2017  4:17 PM Encounter Date:  8/3/2017 Status:  Signed     :  Ne Jaime            [facility] Protocol    A. Pre-procedure verification complete yes  1-relevant information / documentation available, reviewed and properly matched to the patient; 2-consent accurate and complete, 3-equipment and supplies available    B. Site marking complete Yes  Site marked if not in continuous attendance with patient    C. TIME OUT completed yes  Time Out was conducted just prior to starting procedure to verify the eight required elements: 1-patient identity, 2-consent accurate and complete, 3-position, 4-correct side/site marked (if applicable), 5-procedure, 6-relevant images / results properly labeled and displayed (if applicable), 7-antibiotics / irrigation fluids (if applicable), 8-safety precautions.

## 2017-12-30 NOTE — NURSING NOTE
Patient Information     Patient Name MRN Sex Gregg Spencer [id number] Male 1965      Nursing Note by Mandy Prajapati at 2017  9:20 AM     Author:  Mandy Prajapati Service:  (none) Author Type:  (none)     Filed:  2017  9:58 AM Encounter Date:  2017 Status:  Signed     :  Mandy Prajapati            This patient presents today for a Preoperative exam for this procedure: Right Carpal Tunnel Release  Date of Surgery: 17  Surgeon:  Dr. Huang  Facility:  [facility]  Fax: n/a    Mandy Prajapati LPN        2017 9:39 AM

## 2017-12-30 NOTE — NURSING NOTE
Patient Information     Patient Name MRN Sex Gregg Spencer [id number] Male 1965      Nursing Note by Gosselin, Norma J at 2017  1:45 PM     Author:  Gosselin, Norma J Service:  (none) Author Type:  (none)     Filed:  2017  1:27 PM Encounter Date:  2017 Status:  Signed     :  Gosselin, Norma J            Consult bilateral wrist CTS.  Norma J Gosselin LPN....................  2017   1:26 PM

## 2017-12-30 NOTE — NURSING NOTE
Patient Information     Patient Name MRN Gregg Vuong [id number] Male 1965      Nursing Note by Mandy Prajapati at 2017  8:40 AM     Author:  Mandy Prajapati Service:  (none) Author Type:  (none)     Filed:  2017  8:59 AM Encounter Date:  2017 Status:  Signed     :  Mandy Prajapati            Patient presents to the clinic for ER follow up from 17, DX: Polymyalgia Rheumatica.    Mandy Prajapati LPN        2017 8:33 AM

## 2018-01-03 NOTE — TELEPHONE ENCOUNTER
Patient Information     Patient Name MRN Gregg Vuong [id number] Male 1965      Telephone Encounter by Medhat Amador RN at 3/20/2017  1:53 PM     Author:  Medhat Amador RN Service:  (none) Author Type:  NURS- Registered Nurse     Filed:  3/20/2017  1:53 PM Encounter Date:  3/20/2017 Status:  Signed     :  Medhat Amador RN (NURS- Registered Nurse)            Refilled today.  Unable to complete prescription refill per RN Medication Refill Policy.................... MEDHAT AMADOR RN ....................  3/20/2017   1:53 PM

## 2018-01-03 NOTE — TELEPHONE ENCOUNTER
Patient Information     Patient Name MRN Gregg Vuong [id number] Male 1965      Telephone Encounter by Mirna Huang RN at 2017 11:03 AM     Author:  Mirna Huang RN Service:  (none) Author Type:  NURS- Registered Nurse     Filed:  2017 11:11 AM Encounter Date:  2017 Status:  Signed     :  Mirna Huang RN (NURS- Registered Nurse)            Redundant Refill Requests refused;  Medication:metFORMIN (GLUCOPHAGE) 500 mg tablet    Qty:180 tablet  Ref:1  Start:2016    Medication:methylPREDNISolone (MEDROL) 4 mg tablet    Qty:200 tablet  Ref:1  Start:12/15/2016    Unable to complete prescription refill per RN Medication Refill Policy.................... Mirna Huang RN ....................  2017   11:10 AM

## 2018-01-03 NOTE — TELEPHONE ENCOUNTER
Patient Information     Patient Name MRN Sex Gregg Spencer [id number] Male 1965      Telephone Encounter by Cynthia White RN at 2017 11:01 AM     Author:  Cynthia White RN Service:  (none) Author Type:  NURS- Registered Nurse     Filed:  2017 11:02 AM Encounter Date:  2017 Status:  Signed     :  Cynthia White RN (NURS- Registered Nurse)            Proton Pump Inhibitors  Office visit in the past 12 months or per provider note.  Last visit with ISELA GRACE was on: 10/19/2016 in [facility]  Next visit with ISELA GRACE is on: No future appointment listed with this provider  Next visit with Family Practice is on: No future appointment listed in this department  Max refill for 12 months from last office visit or per provider note.  Prescription refilled per RN Medication Refill Policy.................... Cynthia White RN ....................  2017   11:02 AM

## 2018-01-04 ENCOUNTER — HISTORY (OUTPATIENT)
Dept: ORTHOPEDICS | Facility: OTHER | Age: 53
End: 2018-01-04

## 2018-01-04 ENCOUNTER — OFFICE VISIT - GICH (OUTPATIENT)
Dept: ORTHOPEDICS | Facility: OTHER | Age: 53
End: 2018-01-04

## 2018-01-04 DIAGNOSIS — Z98.890 OTHER SPECIFIED POSTPROCEDURAL STATES: ICD-10-CM

## 2018-01-04 NOTE — PATIENT INSTRUCTIONS
"Patient Information     Patient Name MRN Gregg Vuong [id number] Male 1965      Patient Instructions by Reinier Ritchie MD at 2017  7:50 AM     Author:  Reinier Ritchie MD  Service:  (none) Author Type:  Physician     Filed:  2017  8:24 AM  Encounter Date:  2017 Status:  Addendum     :  Reinier Ritchie MD (Physician)        Related Notes: Original Note by Reinier Ritchie MD (Physician) filed at 2017  8:23 AM            QUIT SMOKING!!    Call it quits information provided. Call them in the next 1-2 weeks to help you quit smoking.   Additional resources -- http://www.Settle, http://smokefrInvitedHome.gov/  QUITPLAN Helpline, call 6-337-997-PLAN (0646)     Consider trial of Nicotine lozenge. Use as directed.    Start Chantix.     Labs today.     Regarding your polymyalgia -- okay to see rheumatology, referral sent.     Use tramadol at bedtime - to help with arthritis pain.       Your Body mass index (BMI) is:  Estimated body mass index is 39.22 kg/(m^2) as calculated from the following:    Height as of this encounter: 1.791 m (5' 10.5\").    Weight as of this encounter: 125.8 kg (277 lb 4 oz).   (BMI ranges: Normal 18.5 - 25, Overweight 25 - 30, Obesity greater than 30)     Facts about losing weight:   -- Overweight and Obesity increase your risk for developing diabetes, high blood pressure and stroke, and shorten your life.   -- 90% of weight loss comes from dietary changes, only 10% from exercise   -- For every 1 pound of of weight loss -- this is equal to about 8 to 10 pounds of weight off of your knees.   -- there are about 3500 calories in 1 pound of body weight.     -- Goal Caloric intake -- 1200 to 1500 daily.     Get out and walk for 10 to 15 minutes after each meal -- this significantly lowers the spike in blood sugar after eating.     What have successful people done to lose large amounts of weight, and keep it off?   -- Used both diet and exercise to lose " weight   -- Ate a healthy breakfast every day   -- Exercised an hour per day   -- Watched less than 10 hours of TV per week   -- Weighed themselves weekly   -- Drink a large glass of water 10-15 minutes before your meals.   -- Use smaller dinner plates and don't go back for seconds.     What should I do?   -- Work on 5-10% weight loss   -- Focus on a few healthy dietary changes   -- Eat more fresh fruits and vegetables, and fewer carbohydrates (http://myplate.gov/)   -- Exercise every day   -- Weigh yourself once a week    Weight Management   Weight Watchers     Meets Mondays 9:30, 11:45, or 5:30    Benigno Lucia, [address], [place], [place]     Contact Cynthia [phone] [email or web address]  Weight FileTrekers [email or web address]    -- Consider My Fitness Pal on your smart phone  [email or web address]      I would recommend a focus on weight loss and increased exercise with a goal of 30 minutes of exercise at least 5 times per week in order to help prevent worsening of your blood sugar control.     Keep working to try obtain/maintain healthy weight, weight loss, healthy diet and regular exercise.       Return in approximately 6 month(s), or sooner as needed for follow-up with Dr. Ritchie.  - re-evaluate arthritis pain / refill tramadol    Clinic : [phone]  Appointment line: [phone]

## 2018-01-04 NOTE — NURSING NOTE
Patient Information     Patient Name MRN Gregg Vuong [id number] Male 1965      Nursing Note by Mandy Prajapati at 2017  7:50 AM     Author:  Mandy Prajapati Service:  (none) Author Type:  (none)     Filed:  2017  8:10 AM Encounter Date:  2017 Status:  Signed     :  Mandy Prajapati            Patient presents to the clinic for follow up with pain.    Mandy Prajapati LPN        2017 7:58 AM

## 2018-01-04 NOTE — ADDENDUM NOTE
Patient Information     Patient Name MRN Gregg Vuong [id number] Male 1965      Addendum Note by Juan C Pritchard MD at 2017  9:33 AM     Author:  Juan C Pritchard MD Service:  (none) Author Type:  Physician     Filed:  2017  9:33 AM Encounter Date:  2017 Status:  Signed     :  Juan C Pritchard MD (Physician)       Addended by: JUAN C PRITCHARD on: 2017 09:33 AM        Modules accepted: Orders

## 2018-01-04 NOTE — PROGRESS NOTES
Patient Information     Patient Name MRN Gregg Vuong [id number] Male 1965      Progress Notes by Reinier Ritchie MD at 2017  7:50 AM     Author:  Reinier Ritchie MD Service:  (none) Author Type:  Physician     Filed:  2017  8:32 AM Encounter Date:  2017 Status:  Signed     :  Reinier Ritchie MD (Physician)            Nursing Notes:   Mandy Prajapati  2017  8:10 AM  Signed  Patient presents to the clinic for follow up with pain.    Mandy Prajapati LPN        2017 7:58 AM    Gregg Aguayo presents to clinic today for:   Chief Complaint     Patient presents with       Follow Up      pain      HPI: Mr. Aguayo is a 51 y.o. male who presents today for evaluation of above.     (M35.3) Polymyalgia rheumatica (HC)  (primary encounter diagnosis)  (M25.50) Arthralgia of multiple joints  (F17.200) TOBACCO ABUSE  (R73.9) Steroid-induced hyperglycemia     Patient was diagnosed with polymyalgia rheumatica.  His sedimentation rate as high as 49.  He started on Medrol.  His polymyalgia pain the following day was substantially improved.  He was hardly able to walk into his appointment when he was diagnosed.  He was initially on 8 to 12 mg of Medrol daily, he is weaning himself down to 2 mg of Medrol per day.  He's been doing this now for about the past month.  He has good and bad days.  He is wondering about other med options for polymyalgia.  We talked about the good and bad side effects of chronic steroid use.  He states that he was started to get weight gain and other issues.  He does continue on metformin to help with the steroid-induced hyperglycemia.  Recheck A1c today.    He continues on 2 mg of Medrol per day.  He would like a rheumatology referral, mostly because his wife and family are wondering about other potential diagnoses as well as possible change in medications for treatment.  Referral sent.    Multiple joint arthritis, was using tramadol 50 mg at bedtime.   States that he has not been able to sleep for the last couple of months since he ran out of tramadol.  He would never really used it during the day.  He is retired now.  His medication use is very appropriate for his symptoms.  Medications refilled today.    Tobacco abuse, was on Chantix at one point in the past.  He wants to quit smoking.  He is quite motivated.  We talked about use of Chantix.  Advised trial of just 1 mg per day after he titrates up the dose to try to help with prevention of his goofy dreams that he had previously.  Prescription sent to pharmacy.  He has some tobacco/nicotine addiction as well as habitual use.  Start nicotine lozenges as well.  Prescription sent to pharmacy.    Mr. Nguyens Body mass index is 39.22 kg/(m^2). This is out of the normal range for a 51 y.o. Normal range for ages 18-64 is between 18.5 and 24.9; normal range for ages 65+ is 23-30. To lose weight we reviewed risks and benefits of appropriate options such as diet, exercise, and medications. Patient's strategy will be  self-directed nutrition plan and self-directed exercise program   BP Readings from Last 1 Encounters:04/19/17 : 124/70  Mr. Landers blood pressure is out of the normal range for adults. Per JNC-8 guidelines normal adult blood pressure is < 120/80, pre-hypertensive is between 120/80 and 139/89, and hypertension is 140/90 or greater. Risks of hypertension were discussed. Patient's strategy will be weight loss, increased activity and reduced salt intake    Functional Capacity: > 4 METS.   Reports that he can climb a flight of stairs without any chest pain/heaviness or shortness of breath.   Patient reports no current symptoms of fevers, chills, nausea/vomiting.   No cough. No shortness of breath.   No change in bowel/bladder habits. No melena, hematochezia. No Hematuria.   No rashes. No palpitations.  No orthopnea/paroxysmal nocturnal dyspnea   No vision or hearing issues.   No significant mood issues   No  bruising.     TRESSA:  No flowsheet data found.    PHQ9:  PHQ Depression Screening 12/15/2016 4/19/2017   Date of PHQ exam (doc flow) 12/15/2016 4/19/2017   1. Lack of interest/pleasure 0 - Not at all 0 - Not at all   2. Feeling down/depressed 0 - Not at all 0 - Not at all   PHQ-2 TOTAL SCORE 0 0   3. Trouble sleeping 0 - Not at all 0 - Not at all   4. Decreased energy 0 - Not at all 0 - Not at all   5. Appetite change 0 - Not at all 0 - Not at all   6. Feelings of failure 0 - Not at all 0 - Not at all   7. Trouble concentrating 0 - Not at all 0 - Not at all   8. Activity level 1 - Several days 0 - Not at all   9. Hurting yourself 0 - Not at all 0 - Not at all   PHQ-9 TOTAL SCORE 1 0   PHQ-9 Severity Level none none   Functional Impairment very difficult not difficult at all        I have personally reviewed the past medical history, past surgical history, medications, allergies, family and social history as listed below, on 4/19/2017.    Patient Active Problem List      Diagnosis Date Noted     Steroid-induced hyperglycemia 11/11/2016     Polymyalgia rheumatica (HC) 11/11/2016     Arthralgia of multiple joints 10/26/2016     Pain in both lower extremities 10/07/2016     SKIN TAGS; IRRITATED/INFLAMMED 06/07/2012     DERMATITIS 09/20/2011     ERECTILE DYSFUNCTION      GERD      TOBACCO ABUSE      Past Medical History:     Diagnosis  Date     No pertinent past medical history      Past Surgical History:      Procedure  Laterality Date     APPENDECTOMY  2005    Laparoscopic for acute appendicitis       ARTHROSCOPY      knee ? left       COLONOSCOPY SCREENING  1/29/2014    Tubular adenoma of rectum - follow up 5 years       Current Outpatient Prescriptions       Medication  Sig Dispense Refill     acetaminophen SR (TYLENOL ARTHRITIS PAIN) 650 mg Extended-Release tablet Take up to 4 tablet daily for pain as needed 100 tablet 3     fluticasone (50 mcg per actuation) nasal solution (FLONASE) Inhale 2 Sprays into both nostrils  once daily. 1 Bottle 3     metFORMIN (GLUCOPHAGE) 500 mg tablet Take 1-2 tablets by mouth once daily with a meal. For steroid induced high blood sugar 180 tablet 1     methylPREDNISolone (MEDROL) 4 mg tablet Take 8 mg daily, taper by 1 mg every month or slower as needed/directed for polymyalgia pain - Start tapering Mid Jan 2017 200 tablet 1     nicotine (COMMIT) 2 mg lozenge Place 1 Lozenge in mouth, between cheek & gum every hour if needed for Nicotine Craving. 108 Lozenge 1     omeprazole (PRILOSEC) 40 mg Delayed-Release capsule TAKE 1 CAPSULE BY MOUTH DAILY. 90 capsule 2     sildenafil citrate (VIAGRA) 100 mg tablet Take 1/2 to 1 tab 30min to 4 hours before sexual activity as needed. Max 100mg/24hr. 8 tablet 2     traMADol (ULTRAM) 50 mg tablet Take 1 tablet by mouth 2 times daily if needed for Pain. 60 tablet 2     varenicline (CHANTIX DOSEPAK) 0.5 mg (11)- 1 mg (42) tablet Take one 0.5mg tab daily for 3 days, then one 0.5mg tab twice daily for 4 days, then one 1mg tab Once to twice daily. 1 Packet 0     No Known Allergies  Family History       Problem   Relation Age of Onset     Genetic  Maternal Grandfather      Polymyalgia Rheumatica       Family Status     Relation  Status     Child Alive    x2      Maternal Grandfather Alive     Social History     Social History        Marital status:       Spouse name: N/A     Number of children:  N/A     Years of education:  N/A     Social History Main Topics          Smoking status:   Current Every Day Smoker      Packs/day:  0.50      Types:  Cigarettes      Smokeless tobacco:   Never Used      Alcohol use   0.0 oz/week     0 Standard drinks or equivalent per week        Comment: 1-2 drinks every other day       Drug use:   No      Sexual activity:   Not on file      Other Topics  Concern     Seat Belt Yes     Social History Narrative     Tobacco-one half pack per day.  Ethanol-occasional.      and remarried.  Has two children from his first marriage.   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                           Pertinent ROS was performed and was negative as noted in HPI above.     EXAM:   Vitals:     04/19/17 0759   BP: 124/70   Pulse: 68   Temp: 96.3  F (35.7  C)   TempSrc: Tympanic   Weight: 125.8 kg (277 lb 4 oz)   Height: 1.791 m (5' 10.5\")     BP Readings from Last 3 Encounters:    04/19/17 124/70   12/15/16 130/78   11/11/16 122/78     Wt Readings from Last 3 Encounters:    04/19/17 125.8 kg (277 lb 4 oz)   12/15/16 122.6 kg (270 lb 6 oz)   11/11/16 124 kg (273 lb 6.4 oz)     Estimated body mass index is 39.22 kg/(m^2) as calculated from the following:    Height as of this encounter: 1.791 m (5' 10.5\").    Weight as of this encounter: 125.8 kg (277 lb 4 oz).     EXAM:  Constitutional: Pleasant, alert, appropriate appearance for age. No acute distress  Lymphatic Exam: Non-palpable nodes in neck, clavicular regions  Pulmonary: Lungs are clear to auscultation bilaterally, without wheezes or crackles  Cardiovascular Exam: regular rate and rhythm, no pedal edema, no murmur, click, rub or gallop appreciated  Gastrointestinal Exam: Obese, Soft, non-tender, non-distended, positive bowel sounds  Integument: No abnormal rashes, sores, or ulcerations noted  Neurologic Exam: CN 3-12 grossly intact   Musculoskeletal Exam: Moves upper and lower extremities symmetrically, No focal weakness  Gait and station appear grossly normal  Psychiatric Exam: Awake and Alert, Affect and mood appropriate  Speech is fluent, Thought process is normal    INVESTIGATIONS:  Results for orders placed or performed in visit on 12/15/16      SEDIMENTATION RATE      Result  Value Ref Range    SEDIMENTATION RATE        8 <21 mm/hr   CRP, inflammatory      Result  Value Ref Range    C-REACTIVE PROTEIN <1.000 <1.000 mg/dL   ANGELIQUE TEST      Result  Value Ref Range    ANTINUCLEAR ANTIBODY (ANGELIQUE),SCREEN Negative Negative   Hgb A1c      Result  Value Ref Range    HEMOGLOBIN A1C " MONITORING (POCT) 5.6 4.0 - 6.2 %    ESTIMATED AVERAGE GLUCOSE  114 mg/dL       ASSESSMENT AND PLAN:  Gregg was seen today for follow up.    Diagnoses and all orders for this visit:    Polymyalgia rheumatica (HC)  -     traMADol (ULTRAM) 50 mg tablet; Take 1 tablet by mouth 2 times daily if needed for Pain.  -     AMB CONSULT TO RHEUMATOLOGY; Future    Arthralgia of multiple joints  -     traMADol (ULTRAM) 50 mg tablet; Take 1 tablet by mouth 2 times daily if needed for Pain.    TOBACCO ABUSE  -     varenicline (CHANTIX DOSEPAK) 0.5 mg (11)- 1 mg (42) tablet; Take one 0.5mg tab daily for 3 days, then one 0.5mg tab twice daily for 4 days, then one 1mg tab Once to twice daily.  -     nicotine (COMMIT) 2 mg lozenge; Place 1 Lozenge in mouth, between cheek & gum every hour if needed for Nicotine Craving.    Steroid-induced hyperglycemia  -     HEMOGLOBIN A1C MONITORING (POCT); Standing  -     HEMOGLOBIN A1C MONITORING (POCT)  -     metFORMIN (GLUCOPHAGE) 500 mg tablet; Take 1-2 tablets by mouth once daily with a meal. For steroid induced high blood sugar    lab results and schedule of future lab studies reviewed with patient, reviewed diet, exercise and weight control, recommended sodium restriction, cardiovascular risk and specific lipid/LDL goals reviewed    -- Expected clinical course discussed   -- Medications and their side effects discussed    The ASCVD Risk score (Bertha SHAWN Jr, et al., 2013) failed to calculate for the following reasons:    Cannot find a previous HDL lab    Cannot find a previous total cholesterol lab    Gregg is also recommended to eat a heart-healthy diet, do regular aerobic exercises, maintain a desirable body weight, and avoid tobacco products. These recommendations are from the American Heart Association (AHA) which stresses the importance of lifestyle changes to lower cardiovascular disease risk.    Return in about 6 months (around 10/19/2017) for - re-evaluate arthritis pain / refill  "tramadol.    Patient Instructions   QUIT SMOKING!!    Call it quits information provided. Call them in the next 1-2 weeks to help you quit smoking.   Additional resources -- http://www.quitStopTheHacker.[employer], http://smokefree.gov/  QUITPLAN Helpline, call 0-115-509-PLAN (4278)     Consider trial of Nicotine lozenge. Use as directed.    Start Chantix.     Labs today.     Regarding your polymyalgia -- okay to see rheumatology, referral sent.     Use tramadol at bedtime - to help with arthritis pain.       Your Body mass index (BMI) is:  Estimated body mass index is 39.22 kg/(m^2) as calculated from the following:    Height as of this encounter: 1.791 m (5' 10.5\").    Weight as of this encounter: 125.8 kg (277 lb 4 oz).   (BMI ranges: Normal 18.5 - 25, Overweight 25 - 30, Obesity greater than 30)     Facts about losing weight:   -- Overweight and Obesity increase your risk for developing diabetes, high blood pressure and stroke, and shorten your life.   -- 90% of weight loss comes from dietary changes, only 10% from exercise   -- For every 1 pound of of weight loss -- this is equal to about 8 to 10 pounds of weight off of your knees.   -- there are about 3500 calories in 1 pound of body weight.     -- Goal Caloric intake -- 1200 to 1500 daily.     Get out and walk for 10 to 15 minutes after each meal -- this significantly lowers the spike in blood sugar after eating.     What have successful people done to lose large amounts of weight, and keep it off?   -- Used both diet and exercise to lose weight   -- Ate a healthy breakfast every day   -- Exercised an hour per day   -- Watched less than 10 hours of TV per week   -- Weighed themselves weekly   -- Drink a large glass of water 10-15 minutes before your meals.   -- Use smaller dinner plates and don't go back for seconds.     What should I do?   -- Work on 5-10% weight loss   -- Focus on a few healthy dietary changes   -- Eat more fresh fruits and vegetables, and fewer " carbohydrates (http://myplate.gov/)   -- Exercise every day   -- Weigh yourself once a week    Weight Management   Weight Watchers     Meets Mondays 9:30, 11:45, or 5:30    Benigno Lucia, [address], [place], [place]     Contact Cynthia [phone] [email or web address]  Weight Watchers [email or web address]    -- Consider My Fitness Pal on your smart phone  [email or web address]      I would recommend a focus on weight loss and increased exercise with a goal of 30 minutes of exercise at least 5 times per week in order to help prevent worsening of your blood sugar control.     Keep working to try obtain/maintain healthy weight, weight loss, healthy diet and regular exercise.       Return in approximately 6 month(s), or sooner as needed for follow-up with Dr. Ritchie.  - re-evaluate arthritis pain / refill tramadol    Clinic : [phone]  Appointment line: [phone]      Reinier Ritchie MD

## 2018-01-11 ENCOUNTER — HOSPITAL ENCOUNTER (OUTPATIENT)
Dept: RADIOLOGY | Facility: OTHER | Age: 53
End: 2018-01-11
Attending: INTERNAL MEDICINE

## 2018-01-11 ENCOUNTER — OFFICE VISIT - GICH (OUTPATIENT)
Dept: INTERNAL MEDICINE | Facility: OTHER | Age: 53
End: 2018-01-11

## 2018-01-11 ENCOUNTER — HISTORY (OUTPATIENT)
Dept: INTERNAL MEDICINE | Facility: OTHER | Age: 53
End: 2018-01-11

## 2018-01-11 DIAGNOSIS — Z98.890 OTHER SPECIFIED POSTPROCEDURAL STATES: ICD-10-CM
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
## 2018-01-11 DIAGNOSIS — M25.462 EFFUSION OF LEFT KNEE: ICD-10-CM

## 2018-01-11 DIAGNOSIS — M25.562 PAIN IN LEFT KNEE: ICD-10-CM
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
## 2018-01-11 ASSESSMENT — PATIENT HEALTH QUESTIONNAIRE - PHQ9: SUM OF ALL RESPONSES TO PHQ QUESTIONS 1-9: 0

## 2018-01-12 ENCOUNTER — COMMUNICATION - GICH (OUTPATIENT)
Dept: INTERNAL MEDICINE | Facility: OTHER | Age: 53
End: 2018-01-12

## 2018-01-12 DIAGNOSIS — F40.240 CLAUSTROPHOBIA: ICD-10-CM

## 2018-01-19 ENCOUNTER — HOSPITAL ENCOUNTER (OUTPATIENT)
Dept: RADIOLOGY | Facility: OTHER | Age: 53
End: 2018-01-19
Attending: INTERNAL MEDICINE

## 2018-01-19 DIAGNOSIS — M25.462 EFFUSION OF LEFT KNEE: ICD-10-CM

## 2018-01-19 DIAGNOSIS — M25.562 PAIN IN LEFT KNEE: ICD-10-CM
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
## 2018-01-19 DIAGNOSIS — Z98.890 OTHER SPECIFIED POSTPROCEDURAL STATES: ICD-10-CM
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
## 2018-01-23 ENCOUNTER — OFFICE VISIT - GICH (OUTPATIENT)
Dept: ORTHOPEDICS | Facility: OTHER | Age: 53
End: 2018-01-23

## 2018-01-23 ENCOUNTER — HISTORY (OUTPATIENT)
Dept: ORTHOPEDICS | Facility: OTHER | Age: 53
End: 2018-01-23

## 2018-01-23 DIAGNOSIS — M25.562 PAIN IN LEFT KNEE: ICD-10-CM
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
## 2018-01-23 DIAGNOSIS — M23.007 CYST OF MENISCUS OF LEFT KNEE: ICD-10-CM

## 2018-01-23 DIAGNOSIS — Z98.890 OTHER SPECIFIED POSTPROCEDURAL STATES: ICD-10-CM

## 2018-01-23 DIAGNOSIS — S83.282A OTHER TEAR OF LATERAL MENISCUS, CURRENT INJURY, LEFT KNEE, INITIAL ENCOUNTER: ICD-10-CM

## 2018-01-23 DIAGNOSIS — E66.01 MORBID (SEVERE) OBESITY DUE TO EXCESS CALORIES (H): ICD-10-CM

## 2018-01-23 DIAGNOSIS — M25.462 EFFUSION OF LEFT KNEE: ICD-10-CM

## 2018-01-23 DIAGNOSIS — T14.8XXA OTHER INJURY OF UNSPECIFIED BODY REGION, INITIAL ENCOUNTER (CODE): ICD-10-CM
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
## 2018-01-24 ENCOUNTER — HOSPITAL ENCOUNTER (OUTPATIENT)
Dept: PHYSICAL THERAPY | Facility: OTHER | Age: 53
Setting detail: THERAPIES SERIES
End: 2018-01-24
Attending: ORTHOPAEDIC SURGERY

## 2018-01-24 DIAGNOSIS — M25.462 EFFUSION OF LEFT KNEE: ICD-10-CM

## 2018-01-24 DIAGNOSIS — Z98.890 OTHER SPECIFIED POSTPROCEDURAL STATES: ICD-10-CM

## 2018-01-24 DIAGNOSIS — M25.562 PAIN IN LEFT KNEE: ICD-10-CM
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
## 2018-01-24 DIAGNOSIS — T14.8XXA OTHER INJURY OF UNSPECIFIED BODY REGION, INITIAL ENCOUNTER (CODE): ICD-10-CM

## 2018-01-27 VITALS
OXYGEN SATURATION: 98 % | SYSTOLIC BLOOD PRESSURE: 140 MMHG | SYSTOLIC BLOOD PRESSURE: 138 MMHG | SYSTOLIC BLOOD PRESSURE: 124 MMHG | DIASTOLIC BLOOD PRESSURE: 70 MMHG | HEART RATE: 64 BPM | BODY MASS INDEX: 40.76 KG/M2 | HEIGHT: 71 IN | DIASTOLIC BLOOD PRESSURE: 88 MMHG | HEART RATE: 64 BPM | BODY MASS INDEX: 38.81 KG/M2 | HEART RATE: 68 BPM | WEIGHT: 277.25 LBS | TEMPERATURE: 96.3 F | DIASTOLIC BLOOD PRESSURE: 78 MMHG | WEIGHT: 302 LBS | WEIGHT: 288.13 LBS | BODY MASS INDEX: 42.72 KG/M2

## 2018-01-27 VITALS
SYSTOLIC BLOOD PRESSURE: 126 MMHG | BODY MASS INDEX: 39.47 KG/M2 | WEIGHT: 279 LBS | HEART RATE: 72 BPM | DIASTOLIC BLOOD PRESSURE: 78 MMHG

## 2018-01-27 VITALS
HEART RATE: 88 BPM | WEIGHT: 291.38 LBS | BODY MASS INDEX: 41.22 KG/M2 | SYSTOLIC BLOOD PRESSURE: 120 MMHG | DIASTOLIC BLOOD PRESSURE: 82 MMHG

## 2018-01-27 VITALS
HEART RATE: 72 BPM | BODY MASS INDEX: 39.06 KG/M2 | SYSTOLIC BLOOD PRESSURE: 130 MMHG | DIASTOLIC BLOOD PRESSURE: 88 MMHG | WEIGHT: 279 LBS | HEIGHT: 71 IN

## 2018-01-29 ENCOUNTER — COMMUNICATION - GICH (OUTPATIENT)
Dept: INTERNAL MEDICINE | Facility: OTHER | Age: 53
End: 2018-01-29

## 2018-01-29 ENCOUNTER — DOCUMENTATION ONLY (OUTPATIENT)
Dept: FAMILY MEDICINE | Facility: OTHER | Age: 53
End: 2018-01-29

## 2018-01-29 PROBLEM — Z98.890 HISTORY OF ARTHROSCOPIC KNEE SURGERY: Status: ACTIVE | Noted: 2018-01-11

## 2018-01-29 PROBLEM — K21.9 ESOPHAGEAL REFLUX: Status: ACTIVE | Noted: 2018-01-29

## 2018-01-29 PROBLEM — M25.462 KNEE EFFUSION, LEFT: Status: ACTIVE | Noted: 2018-01-11

## 2018-01-29 PROBLEM — Z98.890 S/P CUBITAL TUNNEL RELEASE: Status: ACTIVE | Noted: 2017-12-21

## 2018-01-29 PROBLEM — F52.8 PSYCHOSEXUAL DYSFUNCTION WITH INHIBITED SEXUAL EXCITEMENT: Status: ACTIVE | Noted: 2018-01-29

## 2018-01-29 PROBLEM — G56.02 LEFT CARPAL TUNNEL SYNDROME: Status: ACTIVE | Noted: 2017-12-21

## 2018-01-29 PROBLEM — G47.33 OSA ON CPAP: Status: ACTIVE | Noted: 2017-11-20

## 2018-01-29 PROBLEM — Z98.890 HISTORY OF CARPAL TUNNEL SURGERY OF LEFT WRIST: Status: ACTIVE | Noted: 2017-12-21

## 2018-01-29 PROBLEM — M25.562 ACUTE PAIN OF LEFT KNEE: Status: ACTIVE | Noted: 2018-01-11

## 2018-01-29 PROBLEM — M25.562 LEFT MEDIAL KNEE PAIN: Status: ACTIVE | Noted: 2018-01-11

## 2018-01-29 PROBLEM — Z72.0 TOBACCO ABUSE: Status: ACTIVE | Noted: 2018-01-29

## 2018-01-29 PROBLEM — M33.13 DERMATOMYOSITIS (H): Status: ACTIVE | Noted: 2017-10-12
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
## 2018-01-29 PROBLEM — G56.22 CUBITAL TUNNEL SYNDROME ON LEFT: Status: ACTIVE | Noted: 2017-09-25

## 2018-01-29 PROBLEM — R60.0 BILATERAL LEG EDEMA: Status: ACTIVE | Noted: 2017-07-05

## 2018-01-29 PROBLEM — Z98.890 HISTORY OF CARPAL TUNNEL SURGERY OF RIGHT WRIST: Status: ACTIVE | Noted: 2017-11-22

## 2018-01-29 RX ORDER — HYDROCODONE BITARTRATE AND ACETAMINOPHEN 5; 325 MG/1; MG/1
1 TABLET ORAL EVERY 4 HOURS PRN
COMMUNITY
Start: 2017-12-21 | End: 2018-03-06

## 2018-01-29 RX ORDER — SENNOSIDES 8.6 MG
CAPSULE ORAL
COMMUNITY
Start: 2016-11-01 | End: 2023-12-29

## 2018-01-29 RX ORDER — GABAPENTIN 100 MG/1
100-200 CAPSULE ORAL 4 TIMES DAILY PRN
COMMUNITY
Start: 2017-10-12 | End: 2018-04-09

## 2018-01-29 RX ORDER — HYDROCODONE BITARTRATE AND ACETAMINOPHEN 5; 325 MG/1; MG/1
1 TABLET ORAL EVERY 4 HOURS PRN
COMMUNITY
Start: 2017-11-22 | End: 2018-03-06

## 2018-01-29 RX ORDER — METHYLPREDNISOLONE 4 MG/1
2-8 TABLET ORAL DAILY
COMMUNITY
Start: 2017-07-05 | End: 2019-04-15

## 2018-01-29 RX ORDER — VARENICLINE TARTRATE 1 MG/1
1 TABLET, FILM COATED ORAL 2 TIMES DAILY WITH MEALS
COMMUNITY
Start: 2017-07-05 | End: 2018-11-06

## 2018-01-29 RX ORDER — TRAMADOL HYDROCHLORIDE 50 MG/1
50 TABLET ORAL 2 TIMES DAILY PRN
COMMUNITY
Start: 2017-12-14 | End: 2018-07-25

## 2018-01-29 RX ORDER — FUROSEMIDE 20 MG
20 TABLET ORAL DAILY PRN
COMMUNITY
Start: 2017-12-14 | End: 2018-07-25

## 2018-01-29 RX ORDER — OMEPRAZOLE 40 MG/1
40 CAPSULE, DELAYED RELEASE ORAL DAILY
COMMUNITY
Start: 2017-10-12 | End: 2018-07-25

## 2018-02-01 ASSESSMENT — PATIENT HEALTH QUESTIONNAIRE - PHQ9
SUM OF ALL RESPONSES TO PHQ QUESTIONS 1-9: 0

## 2018-02-09 VITALS
BODY MASS INDEX: 41.72 KG/M2 | WEIGHT: 298 LBS | DIASTOLIC BLOOD PRESSURE: 88 MMHG | HEIGHT: 71 IN | HEART RATE: 76 BPM | SYSTOLIC BLOOD PRESSURE: 138 MMHG

## 2018-02-09 VITALS
TEMPERATURE: 97.3 F | SYSTOLIC BLOOD PRESSURE: 136 MMHG | HEART RATE: 76 BPM | WEIGHT: 302 LBS | HEIGHT: 71 IN | BODY MASS INDEX: 42.28 KG/M2 | DIASTOLIC BLOOD PRESSURE: 86 MMHG

## 2018-02-09 VITALS
BODY MASS INDEX: 42.17 KG/M2 | HEART RATE: 64 BPM | WEIGHT: 298.13 LBS | SYSTOLIC BLOOD PRESSURE: 130 MMHG | DIASTOLIC BLOOD PRESSURE: 72 MMHG

## 2018-02-09 VITALS — TEMPERATURE: 97.6 F

## 2018-02-09 VITALS
TEMPERATURE: 98.8 F | WEIGHT: 295 LBS | DIASTOLIC BLOOD PRESSURE: 82 MMHG | BODY MASS INDEX: 41.73 KG/M2 | SYSTOLIC BLOOD PRESSURE: 142 MMHG

## 2018-02-09 VITALS
DIASTOLIC BLOOD PRESSURE: 88 MMHG | HEART RATE: 72 BPM | SYSTOLIC BLOOD PRESSURE: 130 MMHG | WEIGHT: 295 LBS | TEMPERATURE: 97.1 F | BODY MASS INDEX: 41.73 KG/M2

## 2018-02-09 VITALS — TEMPERATURE: 98.2 F

## 2018-02-11 ASSESSMENT — PATIENT HEALTH QUESTIONNAIRE - PHQ9: SUM OF ALL RESPONSES TO PHQ QUESTIONS 1-9: 0

## 2018-02-12 NOTE — OR ANESTHESIA
Patient Information     Patient Name MRN Sex Gregg Spencer [id number] Male 1965      OR Anesthesia by Karley Flores MD at 2017  6:59 AM     Author:  Karley Flores MD Service:  (none) Author Type:  PHYS- Anesthesiologist     Filed:  2017  7:26 AM Date of Service:  2017  6:59 AM Status:  Signed     :  Karley Flores MD (PHYS- Anesthesiologist)                                                           ANESTHESIA ASSESSMENT    Date: 17 Time: 6:59 AM      Patient:  Gregg Aguayo    Procedure(s) (LRB):  Left Open Carpal Tunnel Release, Left Ulnar Nerve Release at Elbow , Possible Transpostion (Left)  TRANSPOSITION DECOMPRESSION ULNAR NERVE (Left)    Past Medical History:     Diagnosis  Date     Bilateral carpal tunnel syndrome 2017     Cubital tunnel syndrome on left 2017     Left carpal tunnel syndrome 2017     No pertinent past medical history      S/P left carpal tunnel release 2017     S/P left cubital tunnel release 2017     S/P right carpal tunnel release 2017       Past Surgical History:      Procedure  Laterality Date     APPENDECTOMY      Laparoscopic for acute appendicitis       ARTHROSCOPY      knee ? left       COLONOSCOPY SCREENING  2014    Tubular adenoma of rectum - follow up 5 years       S/P LEFT CARPAL TUNNEL RELEASE Left 2017     S/P LEFT CUBITAL TUNNEL RELEASE Left 2017     S/P RIGHT CARPAL TUNNEL RELEASE Right 2017       Family History       Problem   Relation Age of Onset     Genetic  Maternal Grandfather      Polymyalgia Rheumatica         Patient Active Problem List     Diagnosis  Code     GERD K21.9     TOBACCO ABUSE F17.200     ERECTILE DYSFUNCTION F52.8     DERMATITIS L25.9     SKIN TAGS; IRRITATED/INFLAMMED L91.9, L90.9     Arthralgia of multiple joints M25.50     Polymyalgia rheumatica (HC) M35.3     Bilateral leg edema R60.0     Cubital tunnel syndrome on left G56.22      Dermatomyositis (HC) M33.90     AILEEN on CPAP/BIPAP - uses nightly, finds very helpful G47.33, Z99.89     S/P right carpal tunnel release Z98.890     Left carpal tunnel syndrome G56.02     S/P left cubital tunnel release Z98.890     S/P left carpal tunnel release Z98.890       Prescriptions Prior to Admission       Medication  Sig Dispense Refill     acetaminophen SR (TYLENOL ARTHRITIS PAIN) 650 mg Extended-Release tablet Take up to 4 tablet daily for pain as needed 100 tablet 3     furosemide (LASIX) 20 mg tablet Take 1 tablet by mouth once daily if needed. - For Leg swelling - limit use. - Salt restriction 90 tablet 3     gabapentin (NEURONTIN) 100 mg capsule Take 1-2 capsules by mouth 4 times daily if needed (For Burning/Shooting Nerve Pain). 120 capsule 2     HYDROcodone-acetaminophen, 5-325 mg, (NORCO) per tablet Take 1 tablet by mouth every 4 hours if needed  for Pain Earliest Fill Date: 11/22/17 Max acetaminophen dose: 4000 mg in 24 hrs. 5 tablet 0     methylPREDNISolone (MEDROL) 4 mg tablet Take 0.5-2 tablets by mouth once daily with a meal. - wean dose every 2 to 4 weeks - for  tablet 3     omeprazole (PRILOSEC) 40 mg Delayed-Release capsule Take 1 capsule by mouth once daily. 90 capsule 3     traMADol (ULTRAM) 50 mg tablet Take 1 tablet by mouth 2 times daily if needed for Pain. 60 tablet 2     varenicline (CHANTIX) 1 mg tablet Take 1 mg by mouth 2 times daily with meals. - Quit Smoking 180 tablet 1       Allergies:No Known Allergies    Review of Systems:  GERD: No (Controlled on medication.)  Chest pain: No  Shortness of breath: No (AILEEN/CPAP nightly. Smoker - cessation discussed.)  Recent fever: No  Poor exercise tolerance: No  Bleeding tendency: No  Pregnant: No  Anesthesia Complications: None  Flare of polymyalgia rheumatica in October 2017 treated with steroids.      History     Smoking Status       Current Every Day Smoker      Packs/day: 0.75     Types: Cigarettes   Smokeless Tobacco        Never Used      Social History     Social History        Marital status:       Spouse name: N/A     Number of children:  N/A     Years of education:  N/A     Social History Main Topics          Smoking status:   Current Every Day Smoker      Packs/day:  0.75      Types:  Cigarettes      Smokeless tobacco:   Never Used      Alcohol use   0.0 oz/week     0 Standard drinks or equivalent per week        Comment: 1-2 drinks every other day       Drug use:   No      Sexual activity:   Not Asked      Other Topics  Concern     Seat Belt Yes     Social History Narrative     Tobacco-one half pack per day.  Ethanol-occasional.      and remarried.  Has two children from his first marriage.  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                           Physical Examination:  There were no vitals taken for this visit. There is no height or weight on file to calculate BMI. There is no height or weight on file to calculate BSA.  Dental Condition: Good     Mallampati Score (Airway): III (Large jowls and large tongue.)  Cardiovascular: Normal  Pulmonary: Normal  Other: N/A    Recent Labs in [facility]:    No results for input(s): SODIUM, POTASSIUM, CHLORIDE, WJ5DNVUK, ANIONGAP, BUN, CREATININE, BUNCREARATIO, CALCIUM, GLUCOSE, GLUCOSEMETER, KETONES, MAGNESIUM, WBC, HGB, HCT, PLT, ABORH, RHTYPE, PREGURINE, BHCGQL, HCGBETAQUANT, INR in the last 72 hours.          Assessment/Plan:  ASA Class: III  Risk of dental injury discussed: Yes  NPO status confirmed: Yes  Anesthetic Plan:  General (LMA okay.)  Risk/Benefit/Alt discussed: Yes  Questions answered: Yes  Emergency Case?: No  Labs/ECG/Radiology Reviewed?: Yes      H&P Reviewed.  Patient Examined.      Provider Electronic Signature:  MADHAVI STARK MD

## 2018-02-12 NOTE — PROGRESS NOTES
Patient Information     Patient Name MRN Sex Gregg Spencer [id number] Male 1965      Progress Notes by Jairo Huang DO at 2018  3:15 PM     Author:  Jairo Huang DO Service:  (none) Author Type:  Physician     Filed:  2018  3:34 PM Encounter Date:  2018 Status:  Signed     :  Jairo Huang DO (Physician)            PROGRESS NOTE    SUBJECTIVE:  Gregg Aguayo is here for evaluation in regards to left carpal tunnel and left ulnar nerve release. He is doing well with very little discomfort.     OBJECTIVE:  /82 (Cuff Site: Right Arm, Cuff Size: Adult Large)  Temp 98.8  F (37.1  C) (Oral)   Wt 133.8 kg (295 lb)  BMI 41.73 kg/m2 Body mass index is 41.73 kg/(m^2).    General Appearance: Pleasant male in good appearance, mood and affect.  Alert and orientated times three ( time, date and location).    Incision:  Clean and dry, closed, no infection    Wrist:  negative palpable pain  Motion: full    Shoulder:  Motion: full    Elbow:  Flexion: Normal  Extension: Normal  Deep tendon reflexes: Normal    Hand:  Sensation: Abnormal, slight improvement into the median and ulnar nerve distribution.  Radial and ulnar blood flow:  Normal    Eyes: Pupils are round.    Ears: Hearing: Intact    Heart: Normal capillary refill into his hands pulses are regular.    Lungs: Distant breath sounds.    ASSESSMENT:    POSTOPERATIVE DIAGNOSES:  1.  Left carpal tunnel syndrome.  2.  Left cubital tunnel syndrome.  3.  Status post right carpal tunnel release.     PROCEDURE:  1.  Release of the left transverse carpal ligament (DOS 2017).  2.  Release of the left ulnar nerve at the elbow in situ (DOS 2017)  3.  Status post right carpal tunnel release (DOS 2017).    PLAN:    Steri strips were reapplied and wound care performed and explained.  He will work on scar remodeling but still has a 2 pound weight restriction for 2 more weeks.  He will utilize his  splints as instructed here today.  Follow up if there is any problems.  I recommend that he avoids vibratory tools for at least 4 weeks.  Questions and concerns answered.    Jairo Huang D.O.  Orthopaedic Surgeon    [facility]  [address] [facility] [place] [address]  Phone [phone] (KNEE)  Fax [phone]    This document was created using computer generated templates and voice activated software.    3:32 PM 1/4/2018

## 2018-02-12 NOTE — PATIENT INSTRUCTIONS
Patient Information     Patient Name MRN Gregg Vuong [id number] Male 1965      Patient Instructions by Reinier Ritchie MD at 2017 10:00 AM     Author:  Reinier Ritchie MD  Service:  (none) Author Type:  Physician     Filed:  2017 10:53 AM  Encounter Date:  2017 Status:  Addendum     :  Reinier Ritchie MD (Physician)        Related Notes: Original Note by Reinier Ritchie MD (Physician) filed at 2017 10:51 AM            Plan surgery as scheduled on 2017 with Dr. Huang.     Glad your right hand surgery went so well.     Medications refilled.     QUIT SMOKING!!     -- Choose a quit date (within 1 month). Quitting smoking abruptly is more successful than gradually cutting back.   -- Tell everyone about it (friends, family, coworkers)   -- Think about when you smoke the most, and what you'll do during those times (eg when in the car, work breaks, etc)     Consider:   -- Start varenicline (Chantix) 1 week before your quit date   -- Start bupropion (Wellbutrin/Zyban) 1 week before your quit date -- Welbutrin 1 pill daily for 1 week then 1 pill twice daily      -- Stop smoking on quit date   -- Starting with quit date, use nicotine lozenges/gum as needed for cravings     -- Quit Plan - Call them in the next 1-2 weeks to help you quit smoking.                        [phone] [phone])                        http://www.quitplan.com   -- http://smokefree.gov/       -- Try to avoid Carbohydrates as much as possible -- breads, pasta, baked goods, cakes, oatmeal, cold cereal, potatoes.   These are turned to sugar in one metabolic conversion, cause insulin secretion and increased fat deposition / weight gain.      -- Eat more lean meats, proteins, eggs, nuts.     Patient was reminded of being NPO (without food) after midnight the night before surgery.   -- Hold aspirin, Advil/ibuprofen, Aleve/naproxen, Vitamin E for 7 days before surgery   -- Hold vitamins and  herbal remedies for 7 days before surgery     -- Okay to use Tylenol (Acetaminophen)  Okay to take other usual medications with a sip of water on the day of the procedure and resume their medications after the procedure.   Contact the surgery Center for specific instructions prior to procedure.    PRE OP RECOMMENDATIONS:  Patient is on chronic pain medications (YES) and plan is acute management per surgeon.   Patient is on anti-platelet/anticoagulation (NO) and plan is n/a  Other medications that need adjustment perioperatively (NO)     If you have Diabetes or Prediabetes:  -- Take only half of your usual long-acting insulin on the day before surgery (Lantus, Basaglar, Levemir, Toujeo Insulin (concentrated Lantus insulin) or similar.   -- No Metformin on day before surgery or morning of surgery.     Other:    Patient was advised to call our office and the surgical services with any change in condition or new symptoms if they were to develop between today and their surgical date.  Especially any cardiopulmonary symptoms or symptoms concerning for an infection.      -- Diagnosed with Sleep Apnea - YES    - Bring your machine / CPAP with to your surgery      Return as needed for follow-up with Dr. Ritchie.    Clinic : [phone]  Appointment line: [phone]

## 2018-02-12 NOTE — PROCEDURES
Patient Information     Patient Name MRN Sex Gregg Spencer [id number] Male 1965      Procedures signed by Jairo Huang DO at 2017  9:45 AM      Author:  Jairo Huang DO Service:  (none) Author Type:  Physician     Filed:  2017  9:45 AM Creation Time:  2017  9:25 AM Status:  Signed     :  Jairo Huang DO (Physician)            DATE OF SERVICE:  2017    SURGEON:   Jairo Huang DO    ASSISTANT:  None    PREOPERATIVE DIAGNOSES:  1.  Left carpal tunnel syndrome.  2.  Left cubital tunnel syndrome.  3.  Status post right carpal tunnel release.    POSTOPERATIVE DIAGNOSES:  1.  Left carpal tunnel syndrome.  2.  Left cubital tunnel syndrome.  3.  Status post right carpal tunnel release.    PROCEDURE:  1.  Release of the left transverse carpal ligament (DOS 2017).  2.  Release of the left ulnar nerve at the elbow in situ (DOS 2017)  3.  Status post right carpal tunnel release (DOS 2017).    IMPLANTS:  None.    ANESTHESIA:    LMA.    ESTIMATED BLOOD LOSS:  Less than 5.    FLUIDS:  See chart.    DRAINS:  None.    COMPLICATIONS:  None.    DISPOSITION:  Stable to postop.    INDICATIONS FOR PROCEDURE:  Patient is a 52-year-old male who has been having ongoing complaints of numbness and tingling in both hands.  He had underwent right carpal tunnel release in November and is here today to have the left side addressed.    PROCEDURE NOTE:  After informed consent was received from the patient, having listed all the risks and benefits as noted on the consent form, but not limited to the consent form and having discussed all conservative surgical and alternatives to treatment, the patient signed consent form with a witness present.  The patient understood there are numerous risks, all of them were not written down but were discussed  at length.  No guarantees were given.  All questions were answered prior to signing consent form.  Patient  was given antibiotics 1/2 hour prior to skin incision.  He was taken back to the operating room supine on the gurney, transferred to the operating room table, secured, and all bony prominences were padded.  He was then given general anesthesia via LMA.  Once proper anesthesia was obtained, tourniquet was placed but not inflated, and patient's left upper extremity was sterilely prepped and draped.    Timeout was performed according to institutional guidelines.  With this done, I then marked my incision site, elevated the arm and inflated the tourniquet.  Skin incision only was made beginning medial aspect of the left elbow with a #15 scalpel.  Blunt dissection was performed until I identify the ulnar nerve in the proximal portion of the incision.  I then carefully released from proximal to distal moving down around the medial epicondyle to the muscular fascia.  I split the fascia.  I did not cut through the muscle itself.  With the nerve exposed, I could visualize right at the medial epicondyle the portion that had compression on it.  I then flexed and extended his elbow.  The nerve did not snap out of the groove, it stayed in the appropriate position.  I elected to perform an in situ release.  I irrigated the area copiously, maintained hemostasis.  I then closed the deep tissue with 0 Monocryl interrupted.  The deeper subcutaneous was then closed with 0 Monocryl, also interrupted.  I placed some interrupted 3-0 Monocryl sutures and the skin was closed with a plastic's technique with 2-0 Stratafix.  Steri-Strips were applied and the area was infiltrated with local.    I now turned my attention to the carpal canal.  A second timeout was performed according to institutional guidelines.  I then made my skin incision with a #15 scalpel.  I bluntly dissected until I identified the transverse carpal ligament.  I then made a small nick incision in the transverse carpal ligament with a new #15 scalpel.  I passed a [place]  proximally and distally and released the proximal and distal components of the transverse carpal ligament under direct visualization.  The nerve itself when I examined was noted to have an hourglass deformity as well as volar dorsal compression.  I released the, tourniquet maintained hemostasis and noted the hyperemic effect into the nerve.  I irrigated more.  I then closed the superficial subcutaneous tissue with 3-0 Monocryl in interrupted fashion.  The skin was closed with 3-0 nylon in horizontal mattress fashion as well as some interrupted sutures being placed.  The area was infiltrated with local.    Sterile dressings including Xeroform, 4 x 4, ABD were placed at both incision sites and a well-padded posterior splint was applied.  He will be in a sling.  He can take the sling on and off as he would like and he will follow up in my office as already arranged.  He is given a prescription for Norco 5/325, #5, no refills, and he will follow up in the office as arranged.      DO Davi ACEVEDO  D:12/21/2017 08:43:28  T:12/21/2017 09:24:43  VIVIENNEJIMONICA: [id number]  TJID: [id number]    cc:

## 2018-02-12 NOTE — NURSING NOTE
Patient Information     Patient Name MRN Sex Gregg Spencer [id number] Male 1965      Nursing Note by Sanaz Daly at 2017 10:00 AM     Author:  Sanaz Daly Service:  (none) Author Type:  (none)     Filed:  2017 10:41 AM Encounter Date:  2017 Status:  Signed     :  Sanaz Daly            This patient presents today for a Preoperative exam for this procedure: Left Carpal Tunnel and Ulnar nerve surgery   Date of Surgery: 17   Surgeon:  Dr. Huang  Facility:  [facility]  Fax:    Sanaz Daly LPN..............2017 10:25 AM

## 2018-02-12 NOTE — OR POSTOP
Patient Information     Patient Name MRN Sex Gregg Spencer [id number] Male 1965      OR PostOp by Grace Tony RN at 2017  9:26 AM     Author:  Grace Tony RN Service:  (none) Author Type:  NURS- Registered Nurse     Filed:  2017  9:29 AM Date of Service:  2017  9:26 AM Status:  Signed     :  Grace Tony RN (NURS- Registered Nurse)            PACU Respiratory Event Documentation     1) Episodes of Apnea greater than or equal to 10 seconds: 0    2) Bradypnea - less than 8 breaths per minute: 0    3) Pain score on 0 to 10 scale: 4    4) Pain-sedation mismatch (yes or no): no    5) Repeated 02 desaturation less than 90% (yes or no): yes    Anesthesia notified? (yes or no): no    Any of the above events occuring repeatedly in separate 30 minute intervals may be considered recurrent PACU respiratory events.  On O2 at 2 per NC with SaO2 above 92%    PACU Transfer Note    Gregg Aguayo transferred to [facility] via cart.  Equipment used for transport:  Oxygen, Nasal Cannula.  Accompanied by:  Registered Nurse    Patient stable and meets phase 1 discharge criteria for transport from PACU.  Hand off report given to Fina Rodriguez RN

## 2018-02-12 NOTE — PROGRESS NOTES
Patient Information     Patient Name MRN Sex Gregg Spencer [id number] Male 1965      Progress Notes by Mandy Arreola at 2017  9:00 AM     Author:  Mandy Arreola Service:  (none) Author Type:  (none)     Filed:  2017 12:06 PM Encounter Date:  2017 Status:  Signed     :  Mandy Arreola            Patient came in today to have his incision site on his right wrist checked.  The incision had opened up after sutures were removed at the last visit.  Patient denied pain.  The skin around incision was yellowish in color.  No redness noted.  Dr. Huang did check the area.  Will have patient do Epson salt washes three times daily per Dr. Huang.  Mandy Arreola LPN .......2017 12:03 PM

## 2018-02-12 NOTE — OR ANESTHESIA
Patient Information     Patient Name MRN Sex Gregg Spencer [id number] Male 1965      OR Anesthesia by Karley Flores MD at 2017 12:04 PM     Author:  Karley Flores MD Service:  (none) Author Type:  PHYS- Anesthesiologist     Filed:  2017 12:05 PM Date of Service:  2017 12:04 PM Status:  Signed     :  Karley Flores MD (PHYS- Anesthesiologist)            Anesthesia Post Operative Care Note    Name: Gregg Aguayo  MRN:   [id number]  :    1965       Procedure Done:  See Surgeon Note   Case Cancelled for Anesthetic Reason:  No   Post Op Considerations:  AILEEN    Anesthesia Technique    Anesthetic Type:  General     Airway Management:  LMA     Oral Trauma:  No    Intraoperative Course   Hemodynamics:  Stable    Ventilation Normal:  Yes Lung Sounds:  Normal      PACU Course      Nondepolarizer Used: No    Reintubation:  No   Hemodynamics:  Stable      Hydration: Euvolemic   Temperature:  36.1 - 38.3      Mental Status:  Awake, alert, follows commands   Pain Management:  Adequate   Regional Block:  No   Anesthesia Complications:  None      Vital Signs:  Temp: 96.9  F (36.1  C) (pt states normal is 96.7-96.9)  Pulse: 63  BP: 123/80  Resp: 18  SpO2: 95 %    O2 Device: Room Air         Level of Nausea: None        Active Lines:  Patient Lines/Drains/Airways Status    Active Line     None                Intake & Output:  Date  17 07 - 17 0659(Not Admitted)    17 07 - 17 0659(Discharged)      Shift  8324-8799 4814-7792 9571-1827 24 Hour Total 2740-8014 0784-9903 3936-8688 24 Hour Total   I  N  T  A  K  E   Oral/Enteral     350   350       +I/O+    Oral     350   350    Intravenous     900   900       +I/O+  Maint IV (lactated Ringers infusion)     900   900    Shift Total     1250   1250   O  U  T  P  U  T   Shift Total           NET      1250   1250   Weight (kg)                  Labs:  No results for input(s): CL2HPFOQWBJ,  ORJ7AHNDALWZ, PHARTERIAL, QWF8DUMOOMGF, D9PHMXOQZXOM in the last 24 hours.    No results for input(s): MAGNESIUM in the last 24 hours.    No results for input(s): GLUCOSEMETER in the last 720 hours.        MADHAVI STARK MD ....................  12/21/2017   12:04 PM

## 2018-02-12 NOTE — NURSING NOTE
Patient Information     Patient Name MRN Gregg Vuong [id number] Male 1965      Nursing Note by Mandy Prajapati at 2018 11:20 AM     Author:  Mandy Prajapati Service:  (none) Author Type:  (none)     Filed:  2018 11:54 AM Encounter Date:  2018 Status:  Signed     :  Mandy Prajapati            Patient presents to the clinic for left knee pain since a fall occurred on 18.      Mandy Prajapati LPN        2018 11:31 AM

## 2018-02-12 NOTE — PROGRESS NOTES
Patient Information     Patient Name MRN Sex Gregg Spencer [id number] Male 1965      Progress Notes by Reinier Ritchie MD at 2017 10:00 AM     Author:  Reinier Ritchie MD Service:  (none) Author Type:  Physician     Filed:  2017  1:42 PM Encounter Date:  2017 Status:  Signed     :  Reinier Ritchie MD (Physician)            Nursing Notes:   Sanaz Daly  2017 10:41 AM  Signed  This patient presents today for a Preoperative exam for this procedure: Left Carpal Tunnel and Ulnar nerve surgery   Date of Surgery: 17   Surgeon:  Dr. Huang  Facility:  [facility]  Fax:    Sanaz Daly N..............2017 10:25 AM    Gregg Aguayo presents to clinic today for:   Chief Complaint     Patient presents with       Pre-Op Exam      L carpal tunnel and ulnar nerve      Referring Provider: Dr. Huang   HPI: Mr. Aguayo is a 52 y.o. male who presents today for Consultative evaluation requested by Dr. Huang for preoperative cardiopulmonary clearance.     (Z01.818) Preop examination - cubital and carpal tunnel release left arm - 2017 - Dr. Huang  (primary encounter diagnosis)  (G56.22) Cubital tunnel syndrome on left  (G56.02) Carpal tunnel syndrome on left  (G47.33,  Z99.89) AILEEN on CPAP/BIPAP - uses nightly, finds very helpful  (M35.3) Polymyalgia rheumatica (HC)  (K21.9) Gastroesophageal reflux disease, esophagitis presence not specified  (F17.200) TOBACCO ABUSE  (R60.0) Bilateral leg edema  (M25.50) Arthralgia of multiple joints     Patient presents for preoperative evaluation prior to left arm surgery.  States that the hand and elbow been giving him a lot more pain.  His hard time sleeping because of the discomfort.  Has both ulnar and carpal tunnel issues.  Currently reports pain at 9 out of 10 up to 10 out of 10 at times.  Wakes him up due to pain.  Worst symptoms are shooting into his left fourth and fifth fingers.  Night splints help a little bit  with the carpal tunnel symptoms but not at the elbow.  Has gabapentin, uses occasionally, these are helpful when used.  He does not take them all the time.    Sleep apnea, uses CPAP, finds helpful.  Uses nightly.  Advised to bring with him to his surgery.    Polymyalgia rheumatica, states he has not been on prednisone for a few months now typically just using tramadol at bedtime, whenever his pain starts to flareup.  Reports he is quite a bit better by the next day.  He would like refills today.  He's only been using one or 2 tablets per day, only takes them at night.    Heartburn and reflux, currently controlled with Prilosec.  Gets a lot of heartburn if he does not take them.    Tobacco abuse, ongoing.  He would like to quit smoking but is not quite at the point of stopping it.  He understands, continued smoking can reduce his healing and increased likelihood of wound infection.  He hopes to quit smoking sometime in the near future.    Leg edema, stable.  Taking Lasix.  Needs refills.    Multiple joint arthralgia, see above.  Uses tramadol at bedtime.  Finds very helpful.  He would like refills today.    Mr. Landers Body mass index is 41.73 kg/(m^2). This is out of the normal range for a 52 y.o. Normal range for ages 18+ is between 18.5 and 24.9. To lose weight we reviewed risks and benefits of appropriate options such as diet, exercise, and medications. Patient's strategy will be  self-directed nutrition plan and self-directed exercise program   BP Readings from Last 1 Encounters:12/14/17 : 130/88  Mr. Landers blood pressure is out of the normal range for adults. Per JNC-8 guidelines normal adult blood pressure is < 120/80, pre-hypertensive is between 120/80 and 139/89, and hypertension is 140/90 or greater. Risks of hypertension were discussed. Patient's strategy will be weight loss, increased activity and reduced salt intake    Functional Capacity: > 4 METS.   Reports that he can climb a flight of stairs  without any chest pain/heaviness or shortness of breath.   Patient reports no current symptoms of fevers, chills, nausea/vomiting.   No cough. No shortness of breath.   No change in bowel/bladder habits. No melena, hematochezia. No Hematuria.   No rashes. No palpitations.  No orthopnea/paroxysmal nocturnal dyspnea   No vision or hearing issues.   No significant mood issues   No bruising.     I have personally reviewed the past medical history, past surgical history, medications, allergies, family and social history as listed below, on 12/14/2017.    Patient Active Problem List      Diagnosis Date Noted     S/P right carpal tunnel release 11/22/2017     AILEEN on CPAP/BIPAP - uses nightly, finds very helpful 11/20/2017     Dermatomyositis (HC) 10/12/2017     Cubital tunnel syndrome on left 09/25/2017     Bilateral leg edema 07/05/2017     Polymyalgia rheumatica (HC) 11/11/2016     Arthralgia of multiple joints 10/26/2016     SKIN TAGS; IRRITATED/INFLAMMED 06/07/2012     DERMATITIS 09/20/2011     ERECTILE DYSFUNCTION      GERD      TOBACCO ABUSE      Past Medical History:     Diagnosis  Date     Bilateral carpal tunnel syndrome 9/21/2017     Cubital tunnel syndrome on left 9/25/2017     No pertinent past medical history      S/P right carpal tunnel release 11/22/2017     Past Surgical History:      Procedure  Laterality Date     APPENDECTOMY  2005    Laparoscopic for acute appendicitis       ARTHROSCOPY      knee ? left       COLONOSCOPY SCREENING  1/29/2014    Tubular adenoma of rectum - follow up 5 years       S/P RIGHT CARPAL TUNNEL RELEASE Right 11/22/2017     Current Outpatient Prescriptions       Medication  Sig Dispense Refill     acetaminophen SR (TYLENOL ARTHRITIS PAIN) 650 mg Extended-Release tablet Take up to 4 tablet daily for pain as needed 100 tablet 3     furosemide (LASIX) 20 mg tablet Take 1 tablet by mouth once daily if needed. - For Leg swelling - limit use. - Salt restriction 90 tablet 3      gabapentin (NEURONTIN) 100 mg capsule Take 1-2 capsules by mouth 4 times daily if needed (For Burning/Shooting Nerve Pain). 120 capsule 2     HYDROcodone-acetaminophen, 5-325 mg, (NORCO) per tablet Take 1 tablet by mouth every 4 hours if needed  for Pain Earliest Fill Date: 11/22/17 Max acetaminophen dose: 4000 mg in 24 hrs. 5 tablet 0     methylPREDNISolone (MEDROL) 4 mg tablet Take 0.5-2 tablets by mouth once daily with a meal. - wean dose every 2 to 4 weeks - for  tablet 3     omeprazole (PRILOSEC) 40 mg Delayed-Release capsule Take 1 capsule by mouth once daily. 90 capsule 3     traMADol (ULTRAM) 50 mg tablet Take 1 tablet by mouth 2 times daily if needed for Pain. 60 tablet 2     varenicline (CHANTIX) 1 mg tablet Take 1 mg by mouth 2 times daily with meals. - Quit Smoking 180 tablet 1     Recent use of: no recent use of aspirin (ASA), NSAIDS or steroids     Fever/Chills or other infectious symptoms in past month: no  >10lb weight loss in past two months: no    1. Clinic Code Status:Full Code  2. Date Discussed: 12/14/2017   3. Documentation:Discussed with patient who is competent to make decision    Hx of blood transfusions:   (NO)   Tetanus status:    Immunization History     Administered  Date(s) Administered     Tdap 01/15/2010      History of VRE/MRSA:  (NO) Date: n/a  Latex allergy  no   No Known Allergies   Family History       Problem   Relation Age of Onset     Genetic  Maternal Grandfather      Polymyalgia Rheumatica       Family Status     Relation  Status     Child Alive    x2      Maternal Grandfather Alive       Denies family hx of bleeding tendencies, anesthesia complications, or other problems with surgery.     Social History     Social History        Marital status:       Spouse name: N/A     Number of children:  N/A     Years of education:  N/A     Social History Main Topics          Smoking status:   Current Every Day Smoker      Packs/day:  0.75      Types:  Cigarettes       "Smokeless tobacco:   Never Used      Alcohol use   0.0 oz/week     0 Standard drinks or equivalent per week        Comment: 1-2 drinks every other day       Drug use:   No      Sexual activity:   Not on file      Other Topics  Concern     Seat Belt Yes     Social History Narrative     Tobacco-one half pack per day.  Ethanol-occasional.      and remarried.  Has two children from his first marriage.  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                           Complete ROS was performed and was negative unless otherwise noted in HPI above.    Surgical:  Patient denies previous complications from prior surgeries including but not limited to prolonged bleeding, anesthesia complications, dysrhythmias, surgical wound infections, or prolonged hospital stay.     EXAM:   Vitals:     12/14/17 1027   BP: 130/88   Cuff Site: Right Arm   Position: Sitting   Cuff Size: Adult Large   Pulse: 72   Temp: 97.1  F (36.2  C)   TempSrc: Tympanic   Weight: 133.8 kg (295 lb)     BP Readings from Last 3 Encounters:    12/14/17 130/88   12/04/17 136/86   11/22/17 113/66     Wt Readings from Last 3 Encounters:    12/14/17 133.8 kg (295 lb)   12/04/17 (!) 137 kg (302 lb)   11/20/17 (!) 137 kg (302 lb)     Estimated body mass index is 41.73 kg/(m^2) as calculated from the following:    Height as of 12/4/17: 1.791 m (5' 10.5\").    Weight as of this encounter: 133.8 kg (295 lb).     Mallampati Airway Classification:  Class 3: Soft and hard palate and base of the uvula are visible  EXAM:  Constitutional: Pleasant, alert, appropriate appearance for age. No acute distress  ENT: Normocephalic, Atraumatic, Thyroid without nodules or tenderness   Nose/Mouth: Oral pharynx without erythema or exudates, Nose is patent bilaterally, no rhinorrhea and Dental hygeine adequate   Eyes:  Extraocular muscles intact, Sclera non-icteric, Conjunctiva without erythema  Lymphatic Exam: Non-palpable nodes in neck, clavicular " regions  Pulmonary: Lungs are clear to auscultation bilaterally, without wheezes or crackles  Cardiovascular Exam: regular rate and rhythm, no pedal edema  Gastrointestinal Exam: Obese  Integument: No abnormal rashes, sores, or ulcerations noted  Neurologic Exam: CN 3-12 grossly intact   Musculoskeletal Exam: right wrist brace noted.   Moves upper and lower extremities symmetrically, No focal weakness  Gait and station appear grossly normal  Psychiatric Exam: Awake and Alert, Affect and mood appropriate  Speech is fluent, Thought process is normal    INVESTIGATIONS;  CXR:  not indicated     EK2017 -Normal EKG with a rate of 65.    LABS:   Results for orders placed or performed in visit on 10/12/17      MISCELLANEOUS SEND OUT      Result  Value Ref Range    TEST NAME Myositis Antibody Panel     SOURCE Serum     PERFORMING LAB RDL Ref Lab via [facility] Informative     REFERRAL LAB TEST # FMYO     MISCELLANEOUS SEND OUT See Separate Report    SEDIMENTATION RATE      Result  Value Ref Range    SEDIMENTATION RATE        3 <21 mm/hr   CRP, inflammatory      Result  Value Ref Range    C-REACTIVE PROTEIN <1.000 <1.000 mg/dL   Hgb A1c      Result  Value Ref Range    HEMOGLOBIN A1C MONITORING (POCT) 5.3 4.0 - 6.2 %    ESTIMATED AVERAGE GLUCOSE  105 mg/dL        ASSESSMENT AND PLAN:    1.  Preoperative history and physical   Gregg was seen today for pre-op exam.    Diagnoses and all orders for this visit:    Preop examination - cubital and carpal tunnel release left arm - 2017 - Dr. Huang    Cubital tunnel syndrome on left    Carpal tunnel syndrome on left    AILEEN on CPAP/BIPAP - uses nightly, finds very helpful    Polymyalgia rheumatica (HC)  -     traMADol (ULTRAM) 50 mg tablet; Take 1 tablet by mouth 2 times daily if needed for Pain.    Gastroesophageal reflux disease, esophagitis presence not specified    TOBACCO ABUSE    Bilateral leg edema  -     furosemide (LASIX) 20 mg tablet; Take 1 tablet by  mouth once daily if needed. - For Leg swelling - limit use. - Salt restriction    Arthralgia of multiple joints  -     traMADol (ULTRAM) 50 mg tablet; Take 1 tablet by mouth 2 times daily if needed for Pain.        Revised Cardiac Risk Index   1. History of ischemic heart disease   2. History of Systolic congestive heart failure (EF less than or equal to 40%)    3. History of cerebrovascular disease (stroke or transient ischemic attack)   4. History of diabetes requiring preoperative insulin use   5. Chronic kidney disease (creatinine > 2 mg/dL)   6. Undergoing suprainguinal vascular, intraperitoneal, or intrathoracic surgery     Risk for cardiac death, nonfatal myocardial infarction, and nonfatal cardiac arrest:   --> 0 predictors = 0.4%   1 predictor = 0.9%  2 predictors = 6.6%  ?3 predictors = >11%       Preoperative asseessment (as of 12/14/2017)      INTERMEDIATE RISK - risk factors including: HTN, obesity, AILEEN - uses CPAP.      Recommendations as follow:    - Proceed with surgery: As Scheduled.     For above listed surgery and anesthesia:     - Patient is moderate  risk for perioperative complications and IS medically optimized at this time.    ASA Classification:  Class 2 - MILD SYSTEMIC DISEASE, NO ACUTE PROBLEMS, NO FUNCTIONAL LIMITATIONS.    Is BMI over 40 or A1c over 7.5% -- no   HEMOGLOBIN A1C MONITORING (POCT)    Date Value   10/12/2017 5.3 %   11/02/2012 5.4 % NGSP        Other:  Proceed without further diagnostic evaluation.    Preoperative Evaluation: Obstructive Sleep Apnea screening (STOP_BANG)    -- Diagnosed with Sleep Apnea - YES    - Bring your machine / CPAP with to your surgery      lab results and schedule of future lab studies reviewed with patient, reviewed diet, exercise and weight control, recommended sodium restriction, cardiovascular risk and specific lipid/LDL goals reviewed    Gregg is also recommended to eat a heart-healthy diet, do regular aerobic exercises, maintain a desirable  body weight, and avoid tobacco products. These recommendations are from the American Heart Association (AHA) which stresses the importance of lifestyle changes to lower cardiovascular disease risk.       Thank you for allowing me to participate in the care of your patient.   A copy of this evaluation report is provided to referring provider.     Return if symptoms worsen or fail to improve.  Patient Instructions   Plan surgery as scheduled on 12/21/2017 with Dr. Huang.     Glad your right hand surgery went so well.     Medications refilled.     QUIT SMOKING!!     -- Choose a quit date (within 1 month). Quitting smoking abruptly is more successful than gradually cutting back.   -- Tell everyone about it (friends, family, coworkers)   -- Think about when you smoke the most, and what you'll do during those times (eg when in the car, work breaks, etc)     Consider:   -- Start varenicline (Chantix) 1 week before your quit date   -- Start bupropion (Wellbutrin/Zyban) 1 week before your quit date -- Welbutrin 1 pill daily for 1 week then 1 pill twice daily      -- Stop smoking on quit date   -- Starting with quit date, use nicotine lozenges/gum as needed for cravings     -- Quit Plan - Call them in the next 1-2 weeks to help you quit smoking.                        5-887-347-PLAN ([phone])                        http://www.quitplan.com   -- http://smokefree.gov/       -- Try to avoid Carbohydrates as much as possible -- breads, pasta, baked goods, cakes, oatmeal, cold cereal, potatoes.   These are turned to sugar in one metabolic conversion, cause insulin secretion and increased fat deposition / weight gain.      -- Eat more lean meats, proteins, eggs, nuts.     Patient was reminded of being NPO (without food) after midnight the night before surgery.   -- Hold aspirin, Advil/ibuprofen, Aleve/naproxen, Vitamin E for 7 days before surgery   -- Hold vitamins and herbal remedies for 7 days before surgery     -- Okay to use  Tylenol (Acetaminophen)  Okay to take other usual medications with a sip of water on the day of the procedure and resume their medications after the procedure.   Contact the surgery Center for specific instructions prior to procedure.    PRE OP RECOMMENDATIONS:  Patient is on chronic pain medications (YES) and plan is acute management per surgeon.   Patient is on anti-platelet/anticoagulation (NO) and plan is n/a  Other medications that need adjustment perioperatively (NO)     If you have Diabetes or Prediabetes:  -- Take only half of your usual long-acting insulin on the day before surgery (Lantus, Basaglar, Levemir, Toujeo Insulin (concentrated Lantus insulin) or similar.   -- No Metformin on day before surgery or morning of surgery.     Other:    Patient was advised to call our office and the surgical services with any change in condition or new symptoms if they were to develop between today and their surgical date.  Especially any cardiopulmonary symptoms or symptoms concerning for an infection.      -- Diagnosed with Sleep Apnea - YES    - Bring your machine / CPAP with to your surgery      Return as needed for follow-up with Dr. Ritchie.    Clinic : [phone]  Appointment line: [phone]      Reinier Ritchie MD

## 2018-02-12 NOTE — PROCEDURES
Patient Information     Patient Name MRN Sex Gregg Spencer [id number] Male 1965      Procedures by Jairo Huang DO at 2017  8:37 AM     Author:  Jairo Huang DO Service:  (none) Author Type:  Physician     Filed:  2017  8:38 AM Date of Service:  2017  8:37 AM Status:  Signed     :  Jairo Huang DO (Physician)            POSTOPERATIVE / POSTPROCEDURE NOTE - IMMEDIATE :    Surgeon(s)/Proceduralist(s) and Assistants (if any):  Surgeon(s):  Jairo Huang DO    Procedure(s):  Left CTR/Cubital tunnel release in situ    Procedure(s) findings:   See op note    Specimen(s) removed: no    (EBL) Estimated blood loss (ml): 5    Postoperative/Postprocedure Diagnosis:   See op note    Jairo Huang D.O.  Orthopaedic Surgeon    [address] [address]  Phone [phone] (KNEE)  Fax [phone]    8:37 AM 2017

## 2018-02-12 NOTE — NURSING NOTE
Patient Information     Patient Name MRN Sex Gregg Spencer [id number] Male 1965      Nursing Note by Annabella Collins at 2018  3:15 PM     Author:  Annabella Collins Service:  (none) Author Type:  (none)     Filed:  2018  3:09 PM Encounter Date:  2018 Status:  Signed     :  Annabella Collins            Patient is here today for post op visit   Left CTR  DOS 17  Annabella Collins LPN 2018 3:07 PM

## 2018-02-12 NOTE — NURSING NOTE
Patient Information     Patient Name MRN Sex Gregg Spencer [id number] Male 1965      Nursing Note by Gosselin, Norma J at 2017 10:30 AM     Author:  Gosselin, Norma J Service:  (none) Author Type:  (none)     Filed:  2017 10:25 AM Encounter Date:  2017 Status:  Signed     :  Gosselin, Norma J            Post-op Right CTR. DOS-17  Norma J Gosselin LPN....................  2017   10:24 AM

## 2018-02-12 NOTE — H&P
Patient Information     Patient Name MRN Sex Gregg Spencer [id number] Male 1965      H&P by Reinier Ritchie MD at 2017 10:00 AM     Author:  Reinier Ritchie MD Service:  (none) Author Type:  Physician     Filed:  2017  1:42 PM Encounter Date:  2017 Status:  Signed     :  Reinier Ritchie MD (Physician)            Nursing Notes:   Sanaz Daly  2017 10:41 AM  Signed  This patient presents today for a Preoperative exam for this procedure: Left Carpal Tunnel and Ulnar nerve surgery   Date of Surgery: 17   Surgeon:  Dr. Huang  Facility:  [facility]  Fax:    Sanaz Daly N..............2017 10:25 AM    Gregg Aguayo presents to clinic today for:   Chief Complaint     Patient presents with       Pre-Op Exam      L carpal tunnel and ulnar nerve      Referring Provider: Dr. Huang   HPI: Mr. Aguayo is a 52 y.o. male who presents today for Consultative evaluation requested by Dr. Huang for preoperative cardiopulmonary clearance.     (Z01.818) Preop examination - cubital and carpal tunnel release left arm - 2017 - Dr. Huang  (primary encounter diagnosis)  (G56.22) Cubital tunnel syndrome on left  (G56.02) Carpal tunnel syndrome on left  (G47.33,  Z99.89) AILEEN on CPAP/BIPAP - uses nightly, finds very helpful  (M35.3) Polymyalgia rheumatica (HC)  (K21.9) Gastroesophageal reflux disease, esophagitis presence not specified  (F17.200) TOBACCO ABUSE  (R60.0) Bilateral leg edema  (M25.50) Arthralgia of multiple joints     Patient presents for preoperative evaluation prior to left arm surgery.  States that the hand and elbow been giving him a lot more pain.  His hard time sleeping because of the discomfort.  Has both ulnar and carpal tunnel issues.  Currently reports pain at 9 out of 10 up to 10 out of 10 at times.  Wakes him up due to pain.  Worst symptoms are shooting into his left fourth and fifth fingers.  Night splints help a little bit with the  carpal tunnel symptoms but not at the elbow.  Has gabapentin, uses occasionally, these are helpful when used.  He does not take them all the time.    Sleep apnea, uses CPAP, finds helpful.  Uses nightly.  Advised to bring with him to his surgery.    Polymyalgia rheumatica, states he has not been on prednisone for a few months now typically just using tramadol at bedtime, whenever his pain starts to flareup.  Reports he is quite a bit better by the next day.  He would like refills today.  He's only been using one or 2 tablets per day, only takes them at night.    Heartburn and reflux, currently controlled with Prilosec.  Gets a lot of heartburn if he does not take them.    Tobacco abuse, ongoing.  He would like to quit smoking but is not quite at the point of stopping it.  He understands, continued smoking can reduce his healing and increased likelihood of wound infection.  He hopes to quit smoking sometime in the near future.    Leg edema, stable.  Taking Lasix.  Needs refills.    Multiple joint arthralgia, see above.  Uses tramadol at bedtime.  Finds very helpful.  He would like refills today.    Mr. Landers Body mass index is 41.73 kg/(m^2). This is out of the normal range for a 52 y.o. Normal range for ages 18+ is between 18.5 and 24.9. To lose weight we reviewed risks and benefits of appropriate options such as diet, exercise, and medications. Patient's strategy will be  self-directed nutrition plan and self-directed exercise program   BP Readings from Last 1 Encounters:12/14/17 : 130/88  Mr. Landers blood pressure is out of the normal range for adults. Per JNC-8 guidelines normal adult blood pressure is < 120/80, pre-hypertensive is between 120/80 and 139/89, and hypertension is 140/90 or greater. Risks of hypertension were discussed. Patient's strategy will be weight loss, increased activity and reduced salt intake    Functional Capacity: > 4 METS.   Reports that he can climb a flight of stairs without any  chest pain/heaviness or shortness of breath.   Patient reports no current symptoms of fevers, chills, nausea/vomiting.   No cough. No shortness of breath.   No change in bowel/bladder habits. No melena, hematochezia. No Hematuria.   No rashes. No palpitations.  No orthopnea/paroxysmal nocturnal dyspnea   No vision or hearing issues.   No significant mood issues   No bruising.     I have personally reviewed the past medical history, past surgical history, medications, allergies, family and social history as listed below, on 12/14/2017.    Patient Active Problem List      Diagnosis Date Noted     S/P right carpal tunnel release 11/22/2017     AILEEN on CPAP/BIPAP - uses nightly, finds very helpful 11/20/2017     Dermatomyositis (HC) 10/12/2017     Cubital tunnel syndrome on left 09/25/2017     Bilateral leg edema 07/05/2017     Polymyalgia rheumatica (HC) 11/11/2016     Arthralgia of multiple joints 10/26/2016     SKIN TAGS; IRRITATED/INFLAMMED 06/07/2012     DERMATITIS 09/20/2011     ERECTILE DYSFUNCTION      GERD      TOBACCO ABUSE      Past Medical History:     Diagnosis  Date     Bilateral carpal tunnel syndrome 9/21/2017     Cubital tunnel syndrome on left 9/25/2017     No pertinent past medical history      S/P right carpal tunnel release 11/22/2017     Past Surgical History:      Procedure  Laterality Date     APPENDECTOMY  2005    Laparoscopic for acute appendicitis       ARTHROSCOPY      knee ? left       COLONOSCOPY SCREENING  1/29/2014    Tubular adenoma of rectum - follow up 5 years       S/P RIGHT CARPAL TUNNEL RELEASE Right 11/22/2017     Current Outpatient Prescriptions       Medication  Sig Dispense Refill     acetaminophen SR (TYLENOL ARTHRITIS PAIN) 650 mg Extended-Release tablet Take up to 4 tablet daily for pain as needed 100 tablet 3     furosemide (LASIX) 20 mg tablet Take 1 tablet by mouth once daily if needed. - For Leg swelling - limit use. - Salt restriction 90 tablet 3     gabapentin  (NEURONTIN) 100 mg capsule Take 1-2 capsules by mouth 4 times daily if needed (For Burning/Shooting Nerve Pain). 120 capsule 2     HYDROcodone-acetaminophen, 5-325 mg, (NORCO) per tablet Take 1 tablet by mouth every 4 hours if needed  for Pain Earliest Fill Date: 11/22/17 Max acetaminophen dose: 4000 mg in 24 hrs. 5 tablet 0     methylPREDNISolone (MEDROL) 4 mg tablet Take 0.5-2 tablets by mouth once daily with a meal. - wean dose every 2 to 4 weeks - for  tablet 3     omeprazole (PRILOSEC) 40 mg Delayed-Release capsule Take 1 capsule by mouth once daily. 90 capsule 3     traMADol (ULTRAM) 50 mg tablet Take 1 tablet by mouth 2 times daily if needed for Pain. 60 tablet 2     varenicline (CHANTIX) 1 mg tablet Take 1 mg by mouth 2 times daily with meals. - Quit Smoking 180 tablet 1     Recent use of: no recent use of aspirin (ASA), NSAIDS or steroids     Fever/Chills or other infectious symptoms in past month: no  >10lb weight loss in past two months: no    1. Clinic Code Status:Full Code  2. Date Discussed: 12/14/2017   3. Documentation:Discussed with patient who is competent to make decision    Hx of blood transfusions:   (NO)   Tetanus status:    Immunization History     Administered  Date(s) Administered     Tdap 01/15/2010      History of VRE/MRSA:  (NO) Date: n/a  Latex allergy  no   No Known Allergies   Family History       Problem   Relation Age of Onset     Genetic  Maternal Grandfather      Polymyalgia Rheumatica       Family Status     Relation  Status     Child Alive    x2      Maternal Grandfather Alive       Denies family hx of bleeding tendencies, anesthesia complications, or other problems with surgery.     Social History     Social History        Marital status:       Spouse name: N/A     Number of children:  N/A     Years of education:  N/A     Social History Main Topics          Smoking status:   Current Every Day Smoker      Packs/day:  0.75      Types:  Cigarettes      Smokeless  "tobacco:   Never Used      Alcohol use   0.0 oz/week     0 Standard drinks or equivalent per week        Comment: 1-2 drinks every other day       Drug use:   No      Sexual activity:   Not on file      Other Topics  Concern     Seat Belt Yes     Social History Narrative     Tobacco-one half pack per day.  Ethanol-occasional.      and remarried.  Has two children from his first marriage.  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                           Complete ROS was performed and was negative unless otherwise noted in HPI above.    Surgical:  Patient denies previous complications from prior surgeries including but not limited to prolonged bleeding, anesthesia complications, dysrhythmias, surgical wound infections, or prolonged hospital stay.     EXAM:   Vitals:     12/14/17 1027   BP: 130/88   Cuff Site: Right Arm   Position: Sitting   Cuff Size: Adult Large   Pulse: 72   Temp: 97.1  F (36.2  C)   TempSrc: Tympanic   Weight: 133.8 kg (295 lb)     BP Readings from Last 3 Encounters:    12/14/17 130/88   12/04/17 136/86   11/22/17 113/66     Wt Readings from Last 3 Encounters:    12/14/17 133.8 kg (295 lb)   12/04/17 (!) 137 kg (302 lb)   11/20/17 (!) 137 kg (302 lb)     Estimated body mass index is 41.73 kg/(m^2) as calculated from the following:    Height as of 12/4/17: 1.791 m (5' 10.5\").    Weight as of this encounter: 133.8 kg (295 lb).     Mallampati Airway Classification:  Class 3: Soft and hard palate and base of the uvula are visible  EXAM:  Constitutional: Pleasant, alert, appropriate appearance for age. No acute distress  ENT: Normocephalic, Atraumatic, Thyroid without nodules or tenderness   Nose/Mouth: Oral pharynx without erythema or exudates, Nose is patent bilaterally, no rhinorrhea and Dental hygeine adequate   Eyes:  Extraocular muscles intact, Sclera non-icteric, Conjunctiva without erythema  Lymphatic Exam: Non-palpable nodes in neck, clavicular regions  Pulmonary: Lungs " are clear to auscultation bilaterally, without wheezes or crackles  Cardiovascular Exam: regular rate and rhythm, no pedal edema  Gastrointestinal Exam: Obese  Integument: No abnormal rashes, sores, or ulcerations noted  Neurologic Exam: CN 3-12 grossly intact   Musculoskeletal Exam: right wrist brace noted.   Moves upper and lower extremities symmetrically, No focal weakness  Gait and station appear grossly normal  Psychiatric Exam: Awake and Alert, Affect and mood appropriate  Speech is fluent, Thought process is normal    INVESTIGATIONS;  CXR:  not indicated     EK2017 -Normal EKG with a rate of 65.    LABS:   Results for orders placed or performed in visit on 10/12/17      MISCELLANEOUS SEND OUT      Result  Value Ref Range    TEST NAME Myositis Antibody Panel     SOURCE Serum     PERFORMING LAB [facility] Ref Lab via [facility]     REFERRAL LAB TEST # [facility]O     MISCELLANEOUS SEND OUT See Separate Report    SEDIMENTATION RATE      Result  Value Ref Range    SEDIMENTATION RATE        3 <21 mm/hr   CRP, inflammatory      Result  Value Ref Range    C-REACTIVE PROTEIN <1.000 <1.000 mg/dL   Hgb A1c      Result  Value Ref Range    HEMOGLOBIN A1C MONITORING (POCT) 5.3 4.0 - 6.2 %    ESTIMATED AVERAGE GLUCOSE  105 mg/dL        ASSESSMENT AND PLAN:    1.  Preoperative history and physical   Gregg was seen today for pre-op exam.    Diagnoses and all orders for this visit:    Preop examination - cubital and carpal tunnel release left arm - 2017 - Dr. Huang    Cubital tunnel syndrome on left    Carpal tunnel syndrome on left    AILEEN on CPAP/BIPAP - uses nightly, finds very helpful    Polymyalgia rheumatica (HC)  -     traMADol (ULTRAM) 50 mg tablet; Take 1 tablet by mouth 2 times daily if needed for Pain.    Gastroesophageal reflux disease, esophagitis presence not specified    TOBACCO ABUSE    Bilateral leg edema  -     furosemide (LASIX) 20 mg tablet; Take 1 tablet by mouth once daily if needed. -  For Leg swelling - limit use. - Salt restriction    Arthralgia of multiple joints  -     traMADol (ULTRAM) 50 mg tablet; Take 1 tablet by mouth 2 times daily if needed for Pain.        Revised Cardiac Risk Index   1. History of ischemic heart disease   2. History of Systolic congestive heart failure (EF less than or equal to 40%)    3. History of cerebrovascular disease (stroke or transient ischemic attack)   4. History of diabetes requiring preoperative insulin use   5. Chronic kidney disease (creatinine > 2 mg/dL)   6. Undergoing suprainguinal vascular, intraperitoneal, or intrathoracic surgery     Risk for cardiac death, nonfatal myocardial infarction, and nonfatal cardiac arrest:   --> 0 predictors = 0.4%   1 predictor = 0.9%  2 predictors = 6.6%  ?3 predictors = >11%       Preoperative asseessment (as of 12/14/2017)      INTERMEDIATE RISK - risk factors including: HTN, obesity, AILEEN - uses CPAP.      Recommendations as follow:    - Proceed with surgery: As Scheduled.     For above listed surgery and anesthesia:     - Patient is moderate  risk for perioperative complications and IS medically optimized at this time.    ASA Classification:  Class 2 - MILD SYSTEMIC DISEASE, NO ACUTE PROBLEMS, NO FUNCTIONAL LIMITATIONS.    Is BMI over 40 or A1c over 7.5% -- no   HEMOGLOBIN A1C MONITORING (POCT)    Date Value   10/12/2017 5.3 %   11/02/2012 5.4 % NGSP        Other:  Proceed without further diagnostic evaluation.    Preoperative Evaluation: Obstructive Sleep Apnea screening (STOP_BANG)    -- Diagnosed with Sleep Apnea - YES    - Bring your machine / CPAP with to your surgery      lab results and schedule of future lab studies reviewed with patient, reviewed diet, exercise and weight control, recommended sodium restriction, cardiovascular risk and specific lipid/LDL goals reviewed    Gregg is also recommended to eat a heart-healthy diet, do regular aerobic exercises, maintain a desirable body weight, and avoid tobacco  products. These recommendations are from the American Heart Association (AHA) which stresses the importance of lifestyle changes to lower cardiovascular disease risk.       Thank you for allowing me to participate in the care of your patient.   A copy of this evaluation report is provided to referring provider.     Return if symptoms worsen or fail to improve.  Patient Instructions   Plan surgery as scheduled on 12/21/2017 with Dr. Huang.     Glad your right hand surgery went so well.     Medications refilled.     QUIT SMOKING!!     -- Choose a quit date (within 1 month). Quitting smoking abruptly is more successful than gradually cutting back.   -- Tell everyone about it (friends, family, coworkers)   -- Think about when you smoke the most, and what you'll do during those times (eg when in the car, work breaks, etc)     Consider:   -- Start varenicline (Chantix) 1 week before your quit date   -- Start bupropion (Wellbutrin/Zyban) 1 week before your quit date -- Welbutrin 1 pill daily for 1 week then 1 pill twice daily      -- Stop smoking on quit date   -- Starting with quit date, use nicotine lozenges/gum as needed for cravings     -- Quit Plan - Call them in the next 1-2 weeks to help you quit smoking.                        0-508-545-PLAN (2016)                        http://www.quitplan.com   -- http://smokefree.gov/       -- Try to avoid Carbohydrates as much as possible -- breads, pasta, baked goods, cakes, oatmeal, cold cereal, potatoes.   These are turned to sugar in one metabolic conversion, cause insulin secretion and increased fat deposition / weight gain.      -- Eat more lean meats, proteins, eggs, nuts.     Patient was reminded of being NPO (without food) after midnight the night before surgery.   -- Hold aspirin, Advil/ibuprofen, Aleve/naproxen, Vitamin E for 7 days before surgery   -- Hold vitamins and herbal remedies for 7 days before surgery     -- Okay to use Tylenol (Acetaminophen)  Okay to  take other usual medications with a sip of water on the day of the procedure and resume their medications after the procedure.   Contact the surgery Center for specific instructions prior to procedure.    PRE OP RECOMMENDATIONS:  Patient is on chronic pain medications (YES) and plan is acute management per surgeon.   Patient is on anti-platelet/anticoagulation (NO) and plan is n/a  Other medications that need adjustment perioperatively (NO)     If you have Diabetes or Prediabetes:  -- Take only half of your usual long-acting insulin on the day before surgery (Lantus, Basaglar, Levemir, Toujeo Insulin (concentrated Lantus insulin) or similar.   -- No Metformin on day before surgery or morning of surgery.     Other:    Patient was advised to call our office and the surgical services with any change in condition or new symptoms if they were to develop between today and their surgical date.  Especially any cardiopulmonary symptoms or symptoms concerning for an infection.      -- Diagnosed with Sleep Apnea - YES    - Bring your machine / CPAP with to your surgery      Return as needed for follow-up with Dr. Ritchie.    Clinic : [phone]  Appointment line: [phone]      Reinier Ritchie MD

## 2018-02-12 NOTE — H&P
Patient Information     Patient Name MRN Sex Gregg Spencer [id number] Male 1965      H&P (View-Only) by Reinier Ritchie MD at 2017 10:00 AM     Author:  Reinier Ritchie MD Service:  (none) Author Type:  Physician     Filed:  2017  1:42 PM Date of Service:  2017 10:00 AM Status:  Signed     :  Reinier Ritchie MD (Physician)            Nursing Notes:   Sanaz Daly  2017 10:41 AM  Signed  This patient presents today for a Preoperative exam for this procedure: Left Carpal Tunnel and Ulnar nerve surgery   Date of Surgery: 17   Surgeon:  Dr. Huang  Facility:  [facility]  Fax:    Sanaz Daly N..............2017 10:25 AM    Gregg Aguayo presents to clinic today for:   Chief Complaint     Patient presents with       Pre-Op Exam      L carpal tunnel and ulnar nerve      Referring Provider: Dr. Huang   HPI: Mr. Aguayo is a 52 y.o. male who presents today for Consultative evaluation requested by Dr. Huang for preoperative cardiopulmonary clearance.     (Z01.818) Preop examination - cubital and carpal tunnel release left arm - 2017 - Dr. Huang  (primary encounter diagnosis)  (G56.22) Cubital tunnel syndrome on left  (G56.02) Carpal tunnel syndrome on left  (G47.33,  Z99.89) AILEEN on CPAP/BIPAP - uses nightly, finds very helpful  (M35.3) Polymyalgia rheumatica (HC)  (K21.9) Gastroesophageal reflux disease, esophagitis presence not specified  (F17.200) TOBACCO ABUSE  (R60.0) Bilateral leg edema  (M25.50) Arthralgia of multiple joints     Patient presents for preoperative evaluation prior to left arm surgery.  States that the hand and elbow been giving him a lot more pain.  His hard time sleeping because of the discomfort.  Has both ulnar and carpal tunnel issues.  Currently reports pain at 9 out of 10 up to 10 out of 10 at times.  Wakes him up due to pain.  Worst symptoms are shooting into his left fourth and fifth fingers.  Night splints help a  little bit with the carpal tunnel symptoms but not at the elbow.  Has gabapentin, uses occasionally, these are helpful when used.  He does not take them all the time.    Sleep apnea, uses CPAP, finds helpful.  Uses nightly.  Advised to bring with him to his surgery.    Polymyalgia rheumatica, states he has not been on prednisone for a few months now typically just using tramadol at bedtime, whenever his pain starts to flareup.  Reports he is quite a bit better by the next day.  He would like refills today.  He's only been using one or 2 tablets per day, only takes them at night.    Heartburn and reflux, currently controlled with Prilosec.  Gets a lot of heartburn if he does not take them.    Tobacco abuse, ongoing.  He would like to quit smoking but is not quite at the point of stopping it.  He understands, continued smoking can reduce his healing and increased likelihood of wound infection.  He hopes to quit smoking sometime in the near future.    Leg edema, stable.  Taking Lasix.  Needs refills.    Multiple joint arthralgia, see above.  Uses tramadol at bedtime.  Finds very helpful.  He would like refills today.    Mr. Landers Body mass index is 41.73 kg/(m^2). This is out of the normal range for a 52 y.o. Normal range for ages 18+ is between 18.5 and 24.9. To lose weight we reviewed risks and benefits of appropriate options such as diet, exercise, and medications. Patient's strategy will be  self-directed nutrition plan and self-directed exercise program   BP Readings from Last 1 Encounters:12/14/17 : 130/88  Mr. Landers blood pressure is out of the normal range for adults. Per JNC-8 guidelines normal adult blood pressure is < 120/80, pre-hypertensive is between 120/80 and 139/89, and hypertension is 140/90 or greater. Risks of hypertension were discussed. Patient's strategy will be weight loss, increased activity and reduced salt intake    Functional Capacity: > 4 METS.   Reports that he can climb a flight of  stairs without any chest pain/heaviness or shortness of breath.   Patient reports no current symptoms of fevers, chills, nausea/vomiting.   No cough. No shortness of breath.   No change in bowel/bladder habits. No melena, hematochezia. No Hematuria.   No rashes. No palpitations.  No orthopnea/paroxysmal nocturnal dyspnea   No vision or hearing issues.   No significant mood issues   No bruising.     I have personally reviewed the past medical history, past surgical history, medications, allergies, family and social history as listed below, on 12/14/2017.    Patient Active Problem List      Diagnosis Date Noted     S/P right carpal tunnel release 11/22/2017     AILEEN on CPAP/BIPAP - uses nightly, finds very helpful 11/20/2017     Dermatomyositis (HC) 10/12/2017     Cubital tunnel syndrome on left 09/25/2017     Bilateral leg edema 07/05/2017     Polymyalgia rheumatica (HC) 11/11/2016     Arthralgia of multiple joints 10/26/2016     SKIN TAGS; IRRITATED/INFLAMMED 06/07/2012     DERMATITIS 09/20/2011     ERECTILE DYSFUNCTION      GERD      TOBACCO ABUSE      Past Medical History:     Diagnosis  Date     Bilateral carpal tunnel syndrome 9/21/2017     Cubital tunnel syndrome on left 9/25/2017     No pertinent past medical history      S/P right carpal tunnel release 11/22/2017     Past Surgical History:      Procedure  Laterality Date     APPENDECTOMY  2005    Laparoscopic for acute appendicitis       ARTHROSCOPY      knee ? left       COLONOSCOPY SCREENING  1/29/2014    Tubular adenoma of rectum - follow up 5 years       S/P RIGHT CARPAL TUNNEL RELEASE Right 11/22/2017     Current Outpatient Prescriptions       Medication  Sig Dispense Refill     acetaminophen SR (TYLENOL ARTHRITIS PAIN) 650 mg Extended-Release tablet Take up to 4 tablet daily for pain as needed 100 tablet 3     furosemide (LASIX) 20 mg tablet Take 1 tablet by mouth once daily if needed. - For Leg swelling - limit use. - Salt restriction 90 tablet 3      gabapentin (NEURONTIN) 100 mg capsule Take 1-2 capsules by mouth 4 times daily if needed (For Burning/Shooting Nerve Pain). 120 capsule 2     HYDROcodone-acetaminophen, 5-325 mg, (NORCO) per tablet Take 1 tablet by mouth every 4 hours if needed  for Pain Earliest Fill Date: 11/22/17 Max acetaminophen dose: 4000 mg in 24 hrs. 5 tablet 0     methylPREDNISolone (MEDROL) 4 mg tablet Take 0.5-2 tablets by mouth once daily with a meal. - wean dose every 2 to 4 weeks - for  tablet 3     omeprazole (PRILOSEC) 40 mg Delayed-Release capsule Take 1 capsule by mouth once daily. 90 capsule 3     traMADol (ULTRAM) 50 mg tablet Take 1 tablet by mouth 2 times daily if needed for Pain. 60 tablet 2     varenicline (CHANTIX) 1 mg tablet Take 1 mg by mouth 2 times daily with meals. - Quit Smoking 180 tablet 1     Recent use of: no recent use of aspirin (ASA), NSAIDS or steroids     Fever/Chills or other infectious symptoms in past month: no  >10lb weight loss in past two months: no    1. Clinic Code Status:Full Code  2. Date Discussed: 12/14/2017   3. Documentation:Discussed with patient who is competent to make decision    Hx of blood transfusions:   (NO)   Tetanus status:    Immunization History     Administered  Date(s) Administered     Tdap 01/15/2010      History of VRE/MRSA:  (NO) Date: n/a  Latex allergy  no   No Known Allergies   Family History       Problem   Relation Age of Onset     Genetic  Maternal Grandfather      Polymyalgia Rheumatica       Family Status     Relation  Status     Child Alive    x2      Maternal Grandfather Alive       Denies family hx of bleeding tendencies, anesthesia complications, or other problems with surgery.     Social History     Social History        Marital status:       Spouse name: N/A     Number of children:  N/A     Years of education:  N/A     Social History Main Topics          Smoking status:   Current Every Day Smoker      Packs/day:  0.75      Types:  Cigarettes       "Smokeless tobacco:   Never Used      Alcohol use   0.0 oz/week     0 Standard drinks or equivalent per week        Comment: 1-2 drinks every other day       Drug use:   No      Sexual activity:   Not on file      Other Topics  Concern     Seat Belt Yes     Social History Narrative     Tobacco-one half pack per day.  Ethanol-occasional.      and remarried.  Has two children from his first marriage.  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                           Complete ROS was performed and was negative unless otherwise noted in HPI above.    Surgical:  Patient denies previous complications from prior surgeries including but not limited to prolonged bleeding, anesthesia complications, dysrhythmias, surgical wound infections, or prolonged hospital stay.     EXAM:   Vitals:     12/14/17 1027   BP: 130/88   Cuff Site: Right Arm   Position: Sitting   Cuff Size: Adult Large   Pulse: 72   Temp: 97.1  F (36.2  C)   TempSrc: Tympanic   Weight: 133.8 kg (295 lb)     BP Readings from Last 3 Encounters:    12/14/17 130/88   12/04/17 136/86   11/22/17 113/66     Wt Readings from Last 3 Encounters:    12/14/17 133.8 kg (295 lb)   12/04/17 (!) 137 kg (302 lb)   11/20/17 (!) 137 kg (302 lb)     Estimated body mass index is 41.73 kg/(m^2) as calculated from the following:    Height as of 12/4/17: 1.791 m (5' 10.5\").    Weight as of this encounter: 133.8 kg (295 lb).     Mallampati Airway Classification:  Class 3: Soft and hard palate and base of the uvula are visible  EXAM:  Constitutional: Pleasant, alert, appropriate appearance for age. No acute distress  ENT: Normocephalic, Atraumatic, Thyroid without nodules or tenderness   Nose/Mouth: Oral pharynx without erythema or exudates, Nose is patent bilaterally, no rhinorrhea and Dental hygeine adequate   Eyes:  Extraocular muscles intact, Sclera non-icteric, Conjunctiva without erythema  Lymphatic Exam: Non-palpable nodes in neck, clavicular " regions  Pulmonary: Lungs are clear to auscultation bilaterally, without wheezes or crackles  Cardiovascular Exam: regular rate and rhythm, no pedal edema  Gastrointestinal Exam: Obese  Integument: No abnormal rashes, sores, or ulcerations noted  Neurologic Exam: CN 3-12 grossly intact   Musculoskeletal Exam: right wrist brace noted.   Moves upper and lower extremities symmetrically, No focal weakness  Gait and station appear grossly normal  Psychiatric Exam: Awake and Alert, Affect and mood appropriate  Speech is fluent, Thought process is normal    INVESTIGATIONS;  CXR:  not indicated     EK2017 -Normal EKG with a rate of 65.    LABS:   Results for orders placed or performed in visit on 10/12/17      MISCELLANEOUS SEND OUT      Result  Value Ref Range    TEST NAME Myositis Antibody Panel     SOURCE Serum     PERFORMING LAB RDL Ref Lab via [facility]     REFERRAL LAB TEST # FMYO     MISCELLANEOUS SEND OUT See Separate Report    SEDIMENTATION RATE      Result  Value Ref Range    SEDIMENTATION RATE        3 <21 mm/hr   CRP, inflammatory      Result  Value Ref Range    C-REACTIVE PROTEIN <1.000 <1.000 mg/dL   Hgb A1c      Result  Value Ref Range    HEMOGLOBIN A1C MONITORING (POCT) 5.3 4.0 - 6.2 %    ESTIMATED AVERAGE GLUCOSE  105 mg/dL        ASSESSMENT AND PLAN:    1.  Preoperative history and physical   Gregg was seen today for pre-op exam.    Diagnoses and all orders for this visit:    Preop examination - cubital and carpal tunnel release left arm - 2017 - Dr. Huang    Cubital tunnel syndrome on left    Carpal tunnel syndrome on left    AILEEN on CPAP/BIPAP - uses nightly, finds very helpful    Polymyalgia rheumatica (HC)  -     traMADol (ULTRAM) 50 mg tablet; Take 1 tablet by mouth 2 times daily if needed for Pain.    Gastroesophageal reflux disease, esophagitis presence not specified    TOBACCO ABUSE    Bilateral leg edema  -     furosemide (LASIX) 20 mg tablet; Take 1 tablet by  mouth once daily if needed. - For Leg swelling - limit use. - Salt restriction    Arthralgia of multiple joints  -     traMADol (ULTRAM) 50 mg tablet; Take 1 tablet by mouth 2 times daily if needed for Pain.        Revised Cardiac Risk Index   1. History of ischemic heart disease   2. History of Systolic congestive heart failure (EF less than or equal to 40%)    3. History of cerebrovascular disease (stroke or transient ischemic attack)   4. History of diabetes requiring preoperative insulin use   5. Chronic kidney disease (creatinine > 2 mg/dL)   6. Undergoing suprainguinal vascular, intraperitoneal, or intrathoracic surgery     Risk for cardiac death, nonfatal myocardial infarction, and nonfatal cardiac arrest:   --> 0 predictors = 0.4%   1 predictor = 0.9%  2 predictors = 6.6%  ?3 predictors = >11%       Preoperative asseessment (as of 12/14/2017)      INTERMEDIATE RISK - risk factors including: HTN, obesity, AILEEN - uses CPAP.      Recommendations as follow:    - Proceed with surgery: As Scheduled.     For above listed surgery and anesthesia:     - Patient is moderate  risk for perioperative complications and IS medically optimized at this time.    ASA Classification:  Class 2 - MILD SYSTEMIC DISEASE, NO ACUTE PROBLEMS, NO FUNCTIONAL LIMITATIONS.    Is BMI over 40 or A1c over 7.5% -- no   HEMOGLOBIN A1C MONITORING (POCT)    Date Value   10/12/2017 5.3 %   11/02/2012 5.4 % NGSP        Other:  Proceed without further diagnostic evaluation.    Preoperative Evaluation: Obstructive Sleep Apnea screening (STOP_BANG)    -- Diagnosed with Sleep Apnea - YES    - Bring your machine / CPAP with to your surgery      lab results and schedule of future lab studies reviewed with patient, reviewed diet, exercise and weight control, recommended sodium restriction, cardiovascular risk and specific lipid/LDL goals reviewed    Gregg is also recommended to eat a heart-healthy diet, do regular aerobic exercises, maintain a desirable  body weight, and avoid tobacco products. These recommendations are from the American Heart Association (AHA) which stresses the importance of lifestyle changes to lower cardiovascular disease risk.       Thank you for allowing me to participate in the care of your patient.   A copy of this evaluation report is provided to referring provider.     Return if symptoms worsen or fail to improve.  Patient Instructions   Plan surgery as scheduled on 12/21/2017 with Dr. Huang.     Glad your right hand surgery went so well.     Medications refilled.     QUIT SMOKING!!     -- Choose a quit date (within 1 month). Quitting smoking abruptly is more successful than gradually cutting back.   -- Tell everyone about it (friends, family, coworkers)   -- Think about when you smoke the most, and what you'll do during those times (eg when in the car, work breaks, etc)     Consider:   -- Start varenicline (Chantix) 1 week before your quit date   -- Start bupropion (Wellbutrin/Zyban) 1 week before your quit date -- Welbutrin 1 pill daily for 1 week then 1 pill twice daily      -- Stop smoking on quit date   -- Starting with quit date, use nicotine lozenges/gum as needed for cravings     -- Quit Plan - Call them in the next 1-2 weeks to help you quit smoking.                        0-141-555-PLAN ([phone])                        http://www.quitplan.com   -- http://smokefree.gov/       -- Try to avoid Carbohydrates as much as possible -- breads, pasta, baked goods, cakes, oatmeal, cold cereal, potatoes.   These are turned to sugar in one metabolic conversion, cause insulin secretion and increased fat deposition / weight gain.      -- Eat more lean meats, proteins, eggs, nuts.     Patient was reminded of being NPO (without food) after midnight the night before surgery.   -- Hold aspirin, Advil/ibuprofen, Aleve/naproxen, Vitamin E for 7 days before surgery   -- Hold vitamins and herbal remedies for 7 days before surgery     -- Okay to use  Tylenol (Acetaminophen)  Okay to take other usual medications with a sip of water on the day of the procedure and resume their medications after the procedure.   Contact the surgery Center for specific instructions prior to procedure.    PRE OP RECOMMENDATIONS:  Patient is on chronic pain medications (YES) and plan is acute management per surgeon.   Patient is on anti-platelet/anticoagulation (NO) and plan is n/a  Other medications that need adjustment perioperatively (NO)     If you have Diabetes or Prediabetes:  -- Take only half of your usual long-acting insulin on the day before surgery (Lantus, Basaglar, Levemir, Toujeo Insulin (concentrated Lantus insulin) or similar.   -- No Metformin on day before surgery or morning of surgery.     Other:    Patient was advised to call our office and the surgical services with any change in condition or new symptoms if they were to develop between today and their surgical date.  Especially any cardiopulmonary symptoms or symptoms concerning for an infection.      -- Diagnosed with Sleep Apnea - YES    - Bring your machine / CPAP with to your surgery      Return as needed for follow-up with Dr. Ritchie.    Clinic : [phone]  Appointment line: [phone]      Reinier Ritchie MD

## 2018-02-12 NOTE — PROGRESS NOTES
Patient Information     Patient Name MRN Sex Gregg Spencer [id number] Male 1965      Progress Notes by Gosselin, Norma J at 2017 11:00 AM     Author:  Gosselin, Norma J Service:  (none) Author Type:  (none)     Filed:  2017 11:45 AM Encounter Date:  2017 Status:  Signed     :  Gosselin, Norma J            Patient had left CTR and right cubital surgery yesterday with Dr Huang.  He was having burning sensation around the elbow area.  He spoke with Dr Huang today and he suggested that patient come in and have the splint rewrapped.  A new splint placed in the same position then rewrapped. Patient stated it was more comfortable. Patient will follow up with Dr Huang as already scheduled.  Norma J Gosselin LPN....................  2017   11:42 AM

## 2018-02-12 NOTE — OR POSTOP
Patient Information     Patient Name MRN Sex Gregg Spencer [id number] Male 1965      OR PostOp by Fina Rodriguez RN at 2017 10:40 AM     Author:  Fina Rodriguez RN Service:  (none) Author Type:  NURS- Registered Nurse     Filed:  2017 10:44 AM Date of Service:  2017 10:40 AM Status:  Signed     :  Fina Rodriguez RN (NURS- Registered Nurse)            Discharge Note    Data:  Gregg Aguayo has been discharged home at 1040 via wheelchair accompanied by Registered Nurse.      Action:  Written discharge/follow-up instructions were provided to patient. Prescriptions were written and sent with patient.  Belongings sent with patient. Medications from home sent with patient/family: Not Applicable  Equipment none .     Response:  Patient verbalized understanding of discharge instructions, reason for discharge, and necessary follow-up appointments.  FINA RODRIGUEZ RN ....................  2017   10:44 AM

## 2018-02-12 NOTE — INTERVAL H&P NOTE
Patient Information     Patient Name MRN Sex Gregg Spencer [id number] Male 1965      Interval H&P Note by Jairo Huang DO at 2017  7:11 AM     Author:  Jairo Huang DO Service:  (none) Author Type:  Physician     Filed:  2017  7:12 AM Date of Service:  2017  7:11 AM Status:  Signed     :  Jairo Huang DO (Physician)            History and Physical Update    The history and physical has been reviewed and the patient's left wrist and elbow has been examined.  There are no interim changes to the patient's history or physical condition.  He is ready to proceed with planned procedure.  He understands the risks and benefits and once again these where outlined.    Jairo Huang DO  Orthopedic Surgeon         Source Note     Author:  Reinier Ritchie MD Service:  (none) Author Type:  Physician    Filed:  2017  1:42 PM Date of Service:  2017 10:00 AM Status:  Signed    :  Reinier Ritchie MD (Physician)              Nursing Notes:   Sanaz Daly  2017 10:41 AM  Signed  This patient presents today for a Preoperative exam for this procedure: Left Carpal Tunnel and Ulnar nerve surgery   Date of Surgery: 17   Surgeon:  Dr. Huang  Facility:  [facility]  Fax:    Sanaz Daly LPN..............2017 10:25 AM    Gregg Aguayo presents to clinic today for:   Chief Complaint     Patient presents with       Pre-Op Exam      L carpal tunnel and ulnar nerve      Referring Provider: Dr. Huang   HPI: Mr. Aguayo is a 52 y.o. male who presents today for Consultative evaluation requested by Dr. Huang for preoperative cardiopulmonary clearance.     (Z01.818) Preop examination - cubital and carpal tunnel release left arm - 2017 - Dr. Huang  (primary encounter diagnosis)  (G56.22) Cubital tunnel syndrome on left  (G56.02) Carpal tunnel syndrome on left  (G47.33,  Z99.89) AILEEN on CPAP/BIPAP - uses nightly, finds very  helpful  (M35.3) Polymyalgia rheumatica (HC)  (K21.9) Gastroesophageal reflux disease, esophagitis presence not specified  (F17.200) TOBACCO ABUSE  (R60.0) Bilateral leg edema  (M25.50) Arthralgia of multiple joints     Patient presents for preoperative evaluation prior to left arm surgery.  States that the hand and elbow been giving him a lot more pain.  His hard time sleeping because of the discomfort.  Has both ulnar and carpal tunnel issues.  Currently reports pain at 9 out of 10 up to 10 out of 10 at times.  Wakes him up due to pain.  Worst symptoms are shooting into his left fourth and fifth fingers.  Night splints help a little bit with the carpal tunnel symptoms but not at the elbow.  Has gabapentin, uses occasionally, these are helpful when used.  He does not take them all the time.    Sleep apnea, uses CPAP, finds helpful.  Uses nightly.  Advised to bring with him to his surgery.    Polymyalgia rheumatica, states he has not been on prednisone for a few months now typically just using tramadol at bedtime, whenever his pain starts to flareup.  Reports he is quite a bit better by the next day.  He would like refills today.  He's only been using one or 2 tablets per day, only takes them at night.    Heartburn and reflux, currently controlled with Prilosec.  Gets a lot of heartburn if he does not take them.    Tobacco abuse, ongoing.  He would like to quit smoking but is not quite at the point of stopping it.  He understands, continued smoking can reduce his healing and increased likelihood of wound infection.  He hopes to quit smoking sometime in the near future.    Leg edema, stable.  Taking Lasix.  Needs refills.    Multiple joint arthralgia, see above.  Uses tramadol at bedtime.  Finds very helpful.  He would like refills today.    Mr. Aguayo's Body mass index is 41.73 kg/(m^2). This is out of the normal range for a 52 y.o. Normal range for ages 18+ is between 18.5 and 24.9. To lose weight we reviewed risks  and benefits of appropriate options such as diet, exercise, and medications. Patient's strategy will be  self-directed nutrition plan and self-directed exercise program   BP Readings from Last 1 Encounters:12/14/17 : 130/88  Mr. Landers blood pressure is out of the normal range for adults. Per JNC-8 guidelines normal adult blood pressure is < 120/80, pre-hypertensive is between 120/80 and 139/89, and hypertension is 140/90 or greater. Risks of hypertension were discussed. Patient's strategy will be weight loss, increased activity and reduced salt intake    Functional Capacity: > 4 METS.   Reports that he can climb a flight of stairs without any chest pain/heaviness or shortness of breath.   Patient reports no current symptoms of fevers, chills, nausea/vomiting.   No cough. No shortness of breath.   No change in bowel/bladder habits. No melena, hematochezia. No Hematuria.   No rashes. No palpitations.  No orthopnea/paroxysmal nocturnal dyspnea   No vision or hearing issues.   No significant mood issues   No bruising.     I have personally reviewed the past medical history, past surgical history, medications, allergies, family and social history as listed below, on 12/14/2017.    Patient Active Problem List      Diagnosis Date Noted     S/P right carpal tunnel release 11/22/2017     AILEEN on CPAP/BIPAP - uses nightly, finds very helpful 11/20/2017     Dermatomyositis (HC) 10/12/2017     Cubital tunnel syndrome on left 09/25/2017     Bilateral leg edema 07/05/2017     Polymyalgia rheumatica (HC) 11/11/2016     Arthralgia of multiple joints 10/26/2016     SKIN TAGS; IRRITATED/INFLAMMED 06/07/2012     DERMATITIS 09/20/2011     ERECTILE DYSFUNCTION      GERD      TOBACCO ABUSE      Past Medical History:     Diagnosis  Date     Bilateral carpal tunnel syndrome 9/21/2017     Cubital tunnel syndrome on left 9/25/2017     No pertinent past medical history      S/P right carpal tunnel release 11/22/2017     Past Surgical  History:      Procedure  Laterality Date     APPENDECTOMY  2005    Laparoscopic for acute appendicitis       ARTHROSCOPY      knee ? left       COLONOSCOPY SCREENING  1/29/2014    Tubular adenoma of rectum - follow up 5 years       S/P RIGHT CARPAL TUNNEL RELEASE Right 11/22/2017     Current Outpatient Prescriptions       Medication  Sig Dispense Refill     acetaminophen SR (TYLENOL ARTHRITIS PAIN) 650 mg Extended-Release tablet Take up to 4 tablet daily for pain as needed 100 tablet 3     furosemide (LASIX) 20 mg tablet Take 1 tablet by mouth once daily if needed. - For Leg swelling - limit use. - Salt restriction 90 tablet 3     gabapentin (NEURONTIN) 100 mg capsule Take 1-2 capsules by mouth 4 times daily if needed (For Burning/Shooting Nerve Pain). 120 capsule 2     HYDROcodone-acetaminophen, 5-325 mg, (NORCO) per tablet Take 1 tablet by mouth every 4 hours if needed  for Pain Earliest Fill Date: 11/22/17 Max acetaminophen dose: 4000 mg in 24 hrs. 5 tablet 0     methylPREDNISolone (MEDROL) 4 mg tablet Take 0.5-2 tablets by mouth once daily with a meal. - wean dose every 2 to 4 weeks - for  tablet 3     omeprazole (PRILOSEC) 40 mg Delayed-Release capsule Take 1 capsule by mouth once daily. 90 capsule 3     traMADol (ULTRAM) 50 mg tablet Take 1 tablet by mouth 2 times daily if needed for Pain. 60 tablet 2     varenicline (CHANTIX) 1 mg tablet Take 1 mg by mouth 2 times daily with meals. - Quit Smoking 180 tablet 1     Recent use of: no recent use of aspirin (ASA), NSAIDS or steroids     Fever/Chills or other infectious symptoms in past month: no  >10lb weight loss in past two months: no    1. Clinic Code Status:Full Code  2. Date Discussed: 12/14/2017   3. Documentation:Discussed with patient who is competent to make decision    Hx of blood transfusions:   (NO)   Tetanus status:    Immunization History     Administered  Date(s) Administered     Tdap 01/15/2010      History of VRE/MRSA:  (NO) Date:  n/a  Latex allergy  no   No Known Allergies   Family History       Problem   Relation Age of Onset     Genetic  Maternal Grandfather      Polymyalgia Rheumatica       Family Status     Relation  Status     Child Alive    x2      Maternal Grandfather Alive       Denies family hx of bleeding tendencies, anesthesia complications, or other problems with surgery.     Social History     Social History        Marital status:       Spouse name: N/A     Number of children:  N/A     Years of education:  N/A     Social History Main Topics          Smoking status:   Current Every Day Smoker      Packs/day:  0.75      Types:  Cigarettes      Smokeless tobacco:   Never Used      Alcohol use   0.0 oz/week     0 Standard drinks or equivalent per week        Comment: 1-2 drinks every other day       Drug use:   No      Sexual activity:   Not on file      Other Topics  Concern     Seat Belt Yes     Social History Narrative     Tobacco-one half pack per day.  Ethanol-occasional.      and remarried.  Has two children from his first marriage.  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                           Complete ROS was performed and was negative unless otherwise noted in HPI above.    Surgical:  Patient denies previous complications from prior surgeries including but not limited to prolonged bleeding, anesthesia complications, dysrhythmias, surgical wound infections, or prolonged hospital stay.     EXAM:   Vitals:     12/14/17 1027   BP: 130/88   Cuff Site: Right Arm   Position: Sitting   Cuff Size: Adult Large   Pulse: 72   Temp: 97.1  F (36.2  C)   TempSrc: Tympanic   Weight: 133.8 kg (295 lb)     BP Readings from Last 3 Encounters:    12/14/17 130/88   12/04/17 136/86   11/22/17 113/66     Wt Readings from Last 3 Encounters:    12/14/17 133.8 kg (295 lb)   12/04/17 (!) 137 kg (302 lb)   11/20/17 (!) 137 kg (302 lb)     Estimated body mass index is 41.73 kg/(m^2) as calculated from the following:    " Height as of 17: 1.791 m (5' 10.5\").    Weight as of this encounter: 133.8 kg (295 lb).     Mallampati Airway Classification:  Class 3: Soft and hard palate and base of the uvula are visible  EXAM:  Constitutional: Pleasant, alert, appropriate appearance for age. No acute distress  ENT: Normocephalic, Atraumatic, Thyroid without nodules or tenderness   Nose/Mouth: Oral pharynx without erythema or exudates, Nose is patent bilaterally, no rhinorrhea and Dental hygeine adequate   Eyes:  Extraocular muscles intact, Sclera non-icteric, Conjunctiva without erythema  Lymphatic Exam: Non-palpable nodes in neck, clavicular regions  Pulmonary: Lungs are clear to auscultation bilaterally, without wheezes or crackles  Cardiovascular Exam: regular rate and rhythm, no pedal edema  Gastrointestinal Exam: Obese  Integument: No abnormal rashes, sores, or ulcerations noted  Neurologic Exam: CN 3-12 grossly intact   Musculoskeletal Exam: right wrist brace noted.   Moves upper and lower extremities symmetrically, No focal weakness  Gait and station appear grossly normal  Psychiatric Exam: Awake and Alert, Affect and mood appropriate  Speech is fluent, Thought process is normal    INVESTIGATIONS;  CXR:  not indicated     EK2017 -Normal EKG with a rate of 65.    LABS:   Results for orders placed or performed in visit on 10/12/17      MISCELLANEOUS SEND OUT      Result  Value Ref Range    TEST NAME Myositis Antibody Panel     SOURCE Serum     PERFORMING LAB RDL Ref Lab via [facility]     REFERRAL LAB TEST # FMYO     MISCELLANEOUS SEND OUT See Separate Report    SEDIMENTATION RATE      Result  Value Ref Range    SEDIMENTATION RATE        3 <21 mm/hr   CRP, inflammatory      Result  Value Ref Range    C-REACTIVE PROTEIN <1.000 <1.000 mg/dL   Hgb A1c      Result  Value Ref Range    HEMOGLOBIN A1C MONITORING (POCT) 5.3 4.0 - 6.2 %    ESTIMATED AVERAGE GLUCOSE  105 mg/dL        ASSESSMENT AND PLAN:    1.  " Preoperative history and physical   Gregg was seen today for pre-op exam.    Diagnoses and all orders for this visit:    Preop examination - cubital and carpal tunnel release left arm - 12/21/2017 - Dr. Huang    Cubital tunnel syndrome on left    Carpal tunnel syndrome on left    AILEEN on CPAP/BIPAP - uses nightly, finds very helpful    Polymyalgia rheumatica (HC)  -     traMADol (ULTRAM) 50 mg tablet; Take 1 tablet by mouth 2 times daily if needed for Pain.    Gastroesophageal reflux disease, esophagitis presence not specified    TOBACCO ABUSE    Bilateral leg edema  -     furosemide (LASIX) 20 mg tablet; Take 1 tablet by mouth once daily if needed. - For Leg swelling - limit use. - Salt restriction    Arthralgia of multiple joints  -     traMADol (ULTRAM) 50 mg tablet; Take 1 tablet by mouth 2 times daily if needed for Pain.        Revised Cardiac Risk Index   1. History of ischemic heart disease   2. History of Systolic congestive heart failure (EF less than or equal to 40%)    3. History of cerebrovascular disease (stroke or transient ischemic attack)   4. History of diabetes requiring preoperative insulin use   5. Chronic kidney disease (creatinine > 2 mg/dL)   6. Undergoing suprainguinal vascular, intraperitoneal, or intrathoracic surgery     Risk for cardiac death, nonfatal myocardial infarction, and nonfatal cardiac arrest:   --> 0 predictors = 0.4%   1 predictor = 0.9%  2 predictors = 6.6%  ?3 predictors = >11%       Preoperative asseessment (as of 12/14/2017)      INTERMEDIATE RISK - risk factors including: HTN, obesity, AILEEN - uses CPAP.      Recommendations as follow:    - Proceed with surgery: As Scheduled.     For above listed surgery and anesthesia:     - Patient is moderate  risk for perioperative complications and IS medically optimized at this time.    ASA Classification:  Class 2 - MILD SYSTEMIC DISEASE, NO ACUTE PROBLEMS, NO FUNCTIONAL LIMITATIONS.    Is BMI over 40 or A1c over 7.5% -- no    HEMOGLOBIN A1C MONITORING (POCT)    Date Value   10/12/2017 5.3 %   11/02/2012 5.4 % NGSP        Other:  Proceed without further diagnostic evaluation.    Preoperative Evaluation: Obstructive Sleep Apnea screening (STOP_BANG)    -- Diagnosed with Sleep Apnea - YES    - Bring your machine / CPAP with to your surgery      lab results and schedule of future lab studies reviewed with patient, reviewed diet, exercise and weight control, recommended sodium restriction, cardiovascular risk and specific lipid/LDL goals reviewed    Gregg is also recommended to eat a heart-healthy diet, do regular aerobic exercises, maintain a desirable body weight, and avoid tobacco products. These recommendations are from the American Heart Association (AHA) which stresses the importance of lifestyle changes to lower cardiovascular disease risk.       Thank you for allowing me to participate in the care of your patient.   A copy of this evaluation report is provided to referring provider.     Return if symptoms worsen or fail to improve.  Patient Instructions   Plan surgery as scheduled on 12/21/2017 with Dr. Huang.     Glad your right hand surgery went so well.     Medications refilled.     QUIT SMOKING!!     -- Choose a quit date (within 1 month). Quitting smoking abruptly is more successful than gradually cutting back.   -- Tell everyone about it (friends, family, coworkers)   -- Think about when you smoke the most, and what you'll do during those times (eg when in the car, work breaks, etc)     Consider:   -- Start varenicline (Chantix) 1 week before your quit date   -- Start bupropion (Wellbutrin/Zyban) 1 week before your quit date -- Welbutrin 1 pill daily for 1 week then 1 pill twice daily      -- Stop smoking on quit date   -- Starting with quit date, use nicotine lozenges/gum as needed for cravings     -- Quit Plan - Call them in the next 1-2 weeks to help you quit smoking.                        2-836-084-PLAN ([phone])                         http://www.quitplan.com   -- http://smokefree.gov/       -- Try to avoid Carbohydrates as much as possible -- breads, pasta, baked goods, cakes, oatmeal, cold cereal, potatoes.   These are turned to sugar in one metabolic conversion, cause insulin secretion and increased fat deposition / weight gain.      -- Eat more lean meats, proteins, eggs, nuts.     Patient was reminded of being NPO (without food)after midnight the night before surgery.   -- Hold aspirin, Advil/ibuprofen, Aleve/naproxen, Vitamin E for 7 days before surgery   -- Hold vitamins and herbal remedies for 7 days before surgery     -- Okay to use Tylenol (Acetaminophen)  Okay to take other usual medications with a sip of water on the day of the procedure and resume their medications after the procedure.   Contact the surgery Center for specific instructions prior to procedure.    PRE OP RECOMMENDATIONS:  Patient is on chronic pain medications (YES) and plan is acute management per surgeon.   Patient is on anti-platelet/anticoagulation (NO) and plan is n/a  Other medications that need adjustment perioperatively (NO)     If you have Diabetes or Prediabetes:  -- Take only half of your usual long-acting insulin on the day before surgery (Lantus, Basaglar, Levemir, Toujeo Insulin (concentrated Lantus insulin) or similar.   -- No Metformin on day before surgery or morning of surgery.     Other:    Patient was advised to call our office and the surgical services with any change in condition or new symptoms if they were to develop between today and their surgical date.  Especially any cardiopulmonary symptoms or symptoms concerning for an infection.      -- Diagnosed with Sleep Apnea - YES    - Bring your machine / CPAP with to your surgery      Return as needed for follow-up with Dr. Ritchie.    Clinic : [phone]  Appointment line: [phone]      Reinier Ritchie MD

## 2018-02-13 NOTE — PROGRESS NOTES
Patient Information     Patient Name MRN Sex Gregg Spencer [id number] Male 1965      Progress Notes by Noemi Hays at 2018  3:44 PM     Author:  Noemi Hays Service:  (none) Author Type:  Other Clinical Staff     Filed:  2018  3:44 PM Date of Service:  2018  3:44 PM Status:  Signed     :  Noemi Hays (Other Clinical Staff)            Falls Risk Criteria:    Age 65 and older or under age 4        Sensory deficits    Poor vision    Use of ambulatory aides    Impaired judgment    Unable to walk independently    Meets High Risk criteria for falls:  no

## 2018-02-13 NOTE — PROGRESS NOTES
Patient Information     Patient Name MRN Sex Gregg Spencer [id number] Male 1965      Progress Notes by Kwabena Patricia DO at 2018  9:45 AM     Author:  Kwabena Patricia DO Service:  (none) Author Type:  PHYS- Osteopathic     Filed:  2018 10:11 AM Encounter Date:  2018 Status:  Signed     :  Kwabena Patricia DO (PHYS- Osteopathic)            Gregg Aguayo was seen in consultation for Reinier Ritchie MD for a chief complaint of left knee pain and swelling.    CHIEF COMPLAINT: Gregg Aguayo is a 52 y.o.  male  Chief Complaint     Patient presents with       Consult      Left knee doi-18       HISTORY OF PRESENTING INJURY:  52-year-old morbidly obese female relates he was climbing on top of a washing machine at home on . In the process of getting off he slipped and fell down injuring the left knee.  Since then he noticed continued swelling and associated pain. Increased pain with walking. Pain is generalized to the knee. Pain around the medial side and lateral aspect as well. Worse with walking or bending activities. Tightness of the knee.  Initial treatment included ice. No regular medication use.  No use of crutches or cane or walker. No physical therapy.  X-ray of the knee on  and recent knee MRI.  History of 3 previous left knee arthroscopies. One [place] and 2 in [place]. The most recent about 20 years ago.  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    REVIEW OF SYSTEMS:  Constitutional:  Denies constitutional problems  Cardiovascular: normal  Respiratory: normal    The review of systems as documented in the HPI and on the intake questionnaire, completed by the patient 2018, have been reviewed by myself and the pertinent positives and negatives addressed.  The remainder of the complete review of systems was non-contributory.    (PFSH) PAST, FAMILY, and/or SOCIAL HISTORY:    PAST MEDICAL HISTORY:  Past Medical History:      Diagnosis  Date     Bilateral carpal tunnel syndrome 9/21/2017     Cubital tunnel syndrome on left 9/25/2017     Left carpal tunnel syndrome 12/21/2017     No pertinent past medical history      S/P left carpal tunnel release 12/21/2017     S/P left cubital tunnel release 12/21/2017     S/P right carpal tunnel release 11/22/2017       PAST SURGICAL HISTORY:  Past Surgical History:      Procedure  Laterality Date     APPENDECTOMY  2005    Laparoscopic for acute appendicitis       ARTHROSCOPY      knee ? left       COLONOSCOPY SCREENING  1/29/2014    Tubular adenoma of rectum - follow up 5 years       S/P LEFT CARPAL TUNNEL RELEASE Left 12/21/2017     S/P LEFT CUBITAL TUNNEL RELEASE Left 12/21/2017     S/P RIGHT CARPAL TUNNEL RELEASE Right 11/22/2017       ALLERGIES:  No Known Allergies    CURRENT MEDICATIONS:  Current Outpatient Prescriptions       Medication  Sig Dispense Refill     acetaminophen SR (TYLENOL ARTHRITIS PAIN) 650 mg Extended-Release tablet Take up to 4 tablet daily for pain as needed 100 tablet 3     furosemide (LASIX) 20 mg tablet Take 1 tablet by mouth once daily if needed. - For Leg swelling - limit use. - Salt restriction 90 tablet 3     gabapentin (NEURONTIN) 100 mg capsule Take 1-2 capsules by mouth 4 times daily if needed (For Burning/Shooting Nerve Pain). 120 capsule 2     HYDROcodone-acetaminophen, 5-325 mg, (NORCO) per tablet Take 1 tablet by mouth every 4 hours if needed  for Pain Earliest Fill Date: 12/21/17 Max acetaminophen dose: 4000 mg in 24 hrs. 10 tablet 0     HYDROcodone-acetaminophen, 5-325 mg, (NORCO) per tablet Take 1 tablet by mouth every 4 hours if needed  for Pain Earliest Fill Date: 11/22/17 Max acetaminophen dose: 4000 mg in 24 hrs. 5 tablet 0     methylPREDNISolone (MEDROL) 4 mg tablet Take 0.5-2 tablets by mouth once daily with a meal. - wean dose every 2 to 4 weeks - for  tablet 3     omeprazole (PRILOSEC) 40 mg Delayed-Release capsule Take 1 capsule by mouth  "once daily. 90 capsule 3     traMADol (ULTRAM) 50 mg tablet Take 1 tablet by mouth 2 times daily if needed for Pain. 60 tablet 2     varenicline (CHANTIX) 1 mg tablet Take 1 mg by mouth 2 times daily with meals. - Quit Smoking 180 tablet 1     No current facility-administered medications for this visit.      Medications have been reviewed by me and are current to the best of my knowledge and ability.      FAMILY HISTORY:  Family History       Problem   Relation Age of Onset     Genetic  Maternal Grandfather      Polymyalgia Rheumatica         Additional PFSH information documented on the intake form completed by the patient 1/23/2018 was reviewed by myself.    PHYSICAL EXAM:   /88 (Cuff Site: Right Arm, Position: Sitting, Cuff Size: Adult Large)  Pulse 76  Ht 1.791 m (5' 10.5\")  Wt 135.2 kg (298 lb)  BMI 42.15 kg/m2 Body mass index is 42.15 kg/(m^2).    General Appearance: Pleasant male in good appearance, mood and affect.  Alert and orientated times three ( time, date and location).    Examination of Left knee:  Patient gets on and off the examination table by himself. Ambulates independently.    Skin: Normal.    Sensation is Normal  Alignment: Varum  Effusion: moderate  Passive extension: Full extension   Passive flexion: Flexion to about 120  with increased discomfort  Patella tracks:  without an inverted J sign  Varus stress testing: is stable  Valgus stress testing: is stable  Anterior drawer test: is stable  Posterior drawer test: is stable  Lachman's test: is stable  valgus stress of the left knee elicits discomfort along the medial side of the knee joint. Palpation elicits pain along the medial collateral ligament and femoral condyle. Pain extends down towards the joint line with palpation. Mild to moderate discomfort along the proximal lateral tibia with palpation.  The patient's calf is supple.  Moderate bilateral lower extremity pitting edema.  Palpable distal pulses.  Hips have comfortable " motion.    Xray/MRI/MRA:  Radiographic images where independently reviewed and discussed with the patient.   Attending Doctor: JUAN C PRITCHARD ([id number])  :       LASHONDA FUNEZ ([id number])  Report Date:       01/11/2018 13:20:31  Report Status:       Final  ======================= Begin of Report Content ======================    Study:XR KNEE 3 VIEWS LEFT  History:52 years Male Acute pain of left knee  Comparisons:None  Technique: 3 views  IMPRESSION: Mild soft tissue swelling anterior knee. Left knee otherwise normal.  Electronically Signed By: Lashonda Funez M.D. on 1/11/2018 1:16 PM    Attending Doctor: JUAN C PRITCHARD ([id number])  :       JOSELIN COLVIN ([id number])  Report Date:       01/19/2018 19:58:12  Report Status:       Final  ======================= Begin of Report Content ======================    Exam: MR KNEE LEFT WO  History: Acute pain of left knee  Technique: Sagittal, coronal and axial images were obtained with combination of proton density, T2 and T1 weighted sequences.  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 There is probably a tiny bone infarct in the medial tibial plateau as well.  Anterior and posterior cruciate ligaments are intact as are medial and lateral collateral ligaments and the extensor mechanism. There is no medial meniscal tear.  The lateral meniscus is abnormal. There is globular increased signal intensity within the anterior horn and anterior body of the lateral meniscus. This communicates with the superior articular surface. There is lateral extrusion of the meniscus.  There is full-thickness articular cartilage loss over the lateral tibial plateau especially posteriorly with prominent associated marrow edema. No linear fracture line is seen. Cartilage over the lateral femoral condyle is better preserved.  There is mild narrowing articular cartilage in the medial joint space without full-thickness loss. There is mild  thinning of cartilage over the posterior patella without full-thickness cartilage loss. Small to moderate-sized joint effusion is seen. There is no popliteal cyst.  There is nonspecific subcutaneous edema over the anterior knee with small bursal fluid collection anterior to the intact patellar tendon.    Impression:  1. Tear through the anterior horn and body of the lateral meniscus with some lateral extrusion of the meniscus.  2. Prominent articular cartilage loss over the posterior lateral tibial plateau with prominent marrow edema. This may be reactive marrow edema associated with the articular cartilage loss. It could also represent a bone bruise. No linear fracture line is seen.  3. Fluid collection anterior to the patellar tendon, probably within a bursa. There is some surrounding soft tissue edema.  Electronically Signed By: Roseline Grissom M.D. on 1/19/2018 7:53 PM    IMPRESSION:  History of fall and injury to the left knee on January 1, 2018.  Suspect a bone bruise of the proximal tibia, lateral tibial plateau  left knee effusion  probable medial collateral ligament sprain with continued symptoms along the medial side of the knee  left knee meniscal cyst associated with probable tear of the anterior horn lateral meniscus  articular cartilage loss left knee  history of 3 prior left knee arthroscopies  morbid obesity: BMI 42    PLAN:  Discussion included review of x-ray and knee MRI. Discussed suspected bone bruising to the proximal tibia on the lateral half. The patient's medial symptoms seem to be more medial collateral ligament sprain and soft tissue pain. Also discussed the cyst along the lateral meniscus and probable disruption of the anterior body but suspect this is more of a long-standing finding. I don't think this would be causing all of his current symptoms. Also discussed the knee effusion.  Recommendations to offload the left knee. Use crutches or a walker and take the weight off.  Discussed  the use of ice to the area.  Recommend physical therapy for the left knee especially the medial collateral ligament and medial soft tissue.  Discussed over-the-counter nonnarcotic type medication.  Weight loss recommended.  Recheck appointment in 6 weeks.  Questions answered.    Kwabena Patricia D.O., [email or web address].  Orthopedic Surgeon    [address] [address]  Phone [phone]  Fax [phone]    9:58 AM 1/23/2018

## 2018-02-13 NOTE — TELEPHONE ENCOUNTER
Patient Information     Patient Name MRN Sex Gregg Spencer [id number] Male 1965      Telephone Encounter by Mandy Prajapati at 2018 11:24 AM     Author:  Mandy Prajapati Service:  (none) Author Type:  (none)     Filed:  2018 11:25 AM Encounter Date:  2018 Status:  Signed     :  Mandy Prajapati            Patient is calling because he doesn't understand the radiologist read of recent MRI.      Please advise.      Mandy Prajapati LPN        2018 11:25 AM

## 2018-02-13 NOTE — TELEPHONE ENCOUNTER
Patient Information     Patient Name MRN Sex Gregg Spencer [id number] Male 1965      Telephone Encounter by Reinier Ritchie MD at 2018  1:02 PM     Author:  Reinier Ritchie MD Service:  (none) Author Type:  Physician     Filed:  2018  1:03 PM Encounter Date:  2018 Status:  Signed     :  Reinier Ritchie MD (Physician)            MRI showed that he got a bone bruise and soft tissue damage / injury to the inner workings of his knee.     Needs to keep follow-up with Orthopedics for treatment recommendations.     Reinier Ritchie MD

## 2018-02-13 NOTE — PROGRESS NOTES
Patient Information     Patient Name MRN Gregg Vuong [id number] Male 1965      Progress Notes by Reinier Ritchie MD at 2018 11:20 AM     Author:  Reinier Ritchie MD Service:  (none) Author Type:  Physician     Filed:  2018  9:17 PM Encounter Date:  2018 Status:  Signed     :  Reinier Ritchie MD (Physician)            Nursing Notes:   Mandy Prajapati  2018 11:54 AM  Signed  Patient presents to the clinic for left knee pain since a fall occurred on 18.      Mandy Prajapati LPN        2018 11:31 AM    Gregg Aguayo presents to clinic today for:   Chief Complaint    Patient presents with      Knee Pain/problem     HPI: Mr. Aguayo is a 52 y.o. male who presents today for evaluation of above.     (M25.562) Acute pain of left knee  (primary encounter diagnosis)  (M25.562) Left medial knee pain  (M25.462) Knee effusion, left  (W19.XXXA,  Y92.099) Fall at home, initial encounter  (Z98.890) History of arthroscopic knee surgery     Patient fell at home on 2018.  States he's been having mildly improving left knee pain since that time.  Had significant swelling almost 3 times his right knee, this is not improved but still has some swelling noted.  Most of the swelling and pain is over the medial aspect of the left knee.  Has some tenderness over the distal lateral anterior thigh, but most of which is over the medial joint line.  Currently reports pain is moderate, constant, worse with certain movements or positions.  Even just the pressure of his knees pushing together when laying on his side hurts.  Reports pain is currently 4-6 out of 10.    Has been doing ice packs, this helps some.  Also using tramadol intermittently.    Has not use any kind of knee brace.  He declines knee brace today.  He just would prefer to take it easy, and limit any painful activities.    Mr. Aguayo's Body mass index is 42.17 kg/(m^2). This is out of the normal range for a 52 y.o. Normal  range for ages 18+ is between 18.5 and 24.9. To lose weight we reviewed risks and benefits of appropriate options such as diet, exercise, and medications. Patient's strategy will be  self-directed nutrition plan and self-directed exercise program   BP Readings from Last 1 Encounters:01/11/18 : 130/72  Mr. Landers blood pressure is out of the normal range for adults. Per JNC-8 guidelines normal adult blood pressure is < 120/80, pre-hypertensive is between 120/80 and 139/89, and hypertension is 140/90 or greater. Risks of hypertension were discussed. Patient's strategy will be reduced salt intake    Functional Capacity: was > 4 METS. + Rather limited now after left knee injury.  Patient reports no current symptoms of fevers, chills, nausea/vomiting.   No cough. No shortness of breath.   No change in bowel/bladder habits. No melena, hematochezia. No Hematuria.   No rashes. No palpitations.  No orthopnea/paroxysmal nocturnal dyspnea   No vision or hearing issues.   No significant mood issues   No bruising.     TRESSA:  No flowsheet data found.    PHQ9:  PHQ Depression Screening 12/14/2017 1/11/2018   Date of PHQ exam (doc flow) 12/14/2017 1/11/2018   1. Lack of interest/pleasure 0 - Not at all 0 - Not at all   2. Feeling down/depressed 0 - Not at all 0 - Not at all   PHQ-2 TOTAL SCORE 0 0   3. Trouble sleeping - 0 - Not at all   4. Decreased energy - 0 - Not at all   5. Appetite change - 0 - Not at all   6. Feelings of failure - 0 - Not at all   7. Trouble concentrating - 0 - Not at all   8. Activity level - 0 - Not at all   9. Hurting yourself - 0 - Not at all   PHQ-9 TOTAL SCORE - 0   PHQ-9 Severity Level - none   Functional Impairment - not difficult at all   Some recent data might be hidden          I have personally reviewed the past medical history, past surgical history, medications, allergies, family and social history as listed below, on 1/11/2018.    Patient Active Problem List      Diagnosis Date Noted      History of arthroscopic knee surgery 01/11/2018     Fall at home, initial encounter 01/11/2018     Knee effusion, left 01/11/2018     Left medial knee pain 01/11/2018     Acute pain of left knee 01/11/2018     Left carpal tunnel syndrome 12/21/2017     S/P left cubital tunnel release 12/21/2017     S/P left carpal tunnel release 12/21/2017     S/P right carpal tunnel release 11/22/2017     AILEEN on CPAP/BIPAP - uses nightly, finds very helpful 11/20/2017     Dermatomyositis (HC) 10/12/2017     Cubital tunnel syndrome on left 09/25/2017     Bilateral leg edema 07/05/2017     Polymyalgia rheumatica (HC) 11/11/2016     Arthralgia of multiple joints 10/26/2016     SKIN TAGS; IRRITATED/INFLAMMED 06/07/2012     DERMATITIS 09/20/2011     ERECTILE DYSFUNCTION      GERD      TOBACCO ABUSE      Past Medical History:     Diagnosis  Date     Bilateral carpal tunnel syndrome 9/21/2017     Cubital tunnel syndrome on left 9/25/2017     Left carpal tunnel syndrome 12/21/2017     No pertinent past medical history      S/P left carpal tunnel release 12/21/2017     S/P left cubital tunnel release 12/21/2017     S/P right carpal tunnel release 11/22/2017     Past Surgical History:      Procedure  Laterality Date     APPENDECTOMY  2005    Laparoscopic for acute appendicitis       ARTHROSCOPY      knee ? left       COLONOSCOPY SCREENING  1/29/2014    Tubular adenoma of rectum - follow up 5 years       S/P LEFT CARPAL TUNNEL RELEASE Left 12/21/2017     S/P LEFT CUBITAL TUNNEL RELEASE Left 12/21/2017     S/P RIGHT CARPAL TUNNEL RELEASE Right 11/22/2017     Current Outpatient Prescriptions       Medication  Sig Dispense Refill     acetaminophen SR (TYLENOL ARTHRITIS PAIN) 650 mg Extended-Release tablet Take up to 4 tablet daily for pain as needed 100 tablet 3     ALPRAZolam (XANAX) 1 mg tablet Take 1 tablet by mouth one time for 1 dose. 30 to 60 minutes prior to MRI - Needs  To and From Clinic for MRI 1 tablet 0     furosemide (LASIX)  20 mg tablet Take 1 tablet by mouth once daily if needed. - For Leg swelling - limit use. - Salt restriction 90 tablet 3     gabapentin (NEURONTIN) 100 mg capsule Take 1-2 capsules by mouth 4 times daily if needed (For Burning/Shooting Nerve Pain). 120 capsule 2     HYDROcodone-acetaminophen, 5-325 mg, (NORCO) per tablet Take 1 tablet by mouth every 4 hours if needed  for Pain Earliest Fill Date: 12/21/17 Max acetaminophen dose: 4000 mg in 24 hrs. 10 tablet 0     HYDROcodone-acetaminophen, 5-325 mg, (NORCO) per tablet Take 1 tablet by mouth every 4 hours if needed  for Pain Earliest Fill Date: 11/22/17 Max acetaminophen dose: 4000 mg in 24 hrs. 5 tablet 0     methylPREDNISolone (MEDROL) 4 mg tablet Take 0.5-2 tablets by mouth once daily with a meal. - wean dose every 2 to 4 weeks - for  tablet 3     omeprazole (PRILOSEC) 40 mg Delayed-Release capsule Take 1 capsule by mouth once daily. 90 capsule 3     traMADol (ULTRAM) 50 mg tablet Take 1 tablet by mouth 2 times daily if needed for Pain. 60 tablet 2     varenicline (CHANTIX) 1 mg tablet Take 1 mg by mouth 2 times daily with meals. - Quit Smoking 180 tablet 1     No Known Allergies  Family History       Problem   Relation Age of Onset     Genetic  Maternal Grandfather      Polymyalgia Rheumatica       Family Status     Relation  Status     Child Alive    x2      Maternal Grandfather Alive     Social History     Social History        Marital status:       Spouse name: N/A     Number of children:  N/A     Years of education:  N/A     Social History Main Topics          Smoking status:   Current Every Day Smoker      Packs/day:  0.75      Types:  Cigarettes      Smokeless tobacco:   Never Used      Alcohol use   0.0 oz/week     0 Standard drinks or equivalent per week        Comment: 1-2 drinks every other day       Drug use:   No      Sexual activity:   Not on file      Other Topics  Concern     Seat Belt Yes     Social History Narrative     Tobacco-one  "half pack per day.  Ethanol-occasional.      and remarried.  Has two children from his first marriage.  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                         Pertinent ROS was performed and was negative as noted in HPI above.     EXAM:   Vitals:     01/11/18 1133   BP: 130/72   Cuff Site: Right Arm   Position: Sitting   Cuff Size: Adult Regular   Pulse: 64   Weight: 135.2 kg (298 lb 2 oz)     BP Readings from Last 3 Encounters:    01/11/18 130/72   01/04/18 142/82   12/21/17 123/80     Wt Readings from Last 3 Encounters:    01/11/18 135.2 kg (298 lb 2 oz)   01/04/18 133.8 kg (295 lb)   12/14/17 133.8 kg (295 lb)     Estimated body mass index is 42.17 kg/(m^2) as calculated from the following:    Height as of 12/4/17: 1.791 m (5' 10.5\").    Weight as of this encounter: 135.2 kg (298 lb 2 oz).     EXAM:  Constitutional: well groomed / good hygiene, casual dress  Lymphatic Exam: Non-palpable nodes in neck, clavicular regions  Pulmonary: Lungs are clear to auscultation bilaterally, without wheezes or crackles  Cardiovascular Exam: regular rate and rhythm, no pedal edema  Gastrointestinal Exam: Obese   Integument: No abnormal rashes, sores, or ulcerations noted  Neurologic Exam: CN 3-12 grossly intact   Musculoskeletal Exam: walks with slight limp. Left knee - has effusion and tenderness medially.  Also has tenderness to palpation over lateral knee just superior to the patella.  Psychiatric Exam: Awake and Alert, Affect and mood appropriate  Speech is fluent, Thought process is normal    INVESTIGATIONS:    1/11/2018 - Study:XR KNEE 3 VIEWS LEFT    History:52 years Male Acute pain of left knee    Comparisons:None    Technique: 3 views    IMPRESSION: Mild soft tissue swelling anterior knee. Left knee otherwise normal.    Electronically Signed By: Lashonda Funez M.D. on 1/11/2018 1:16 PM    Results for orders placed or performed in visit on 10/12/17      MISCELLANEOUS SEND OUT      Result "  Value Ref Range    TEST NAME Myositis Antibody Panel     SOURCE Serum     PERFORMING LAB [facility] Ref Lab via [facility]     REFERRAL LAB TEST # [facility]     MISCELLANEOUS SEND OUT See Separate Report    SEDIMENTATION RATE      Result  Value Ref Range    SEDIMENTATION RATE        3 <21 mm/hr   CRP, inflammatory      Result  Value Ref Range    C-REACTIVE PROTEIN <1.000 <1.000 mg/dL   Hgb A1c      Result  Value Ref Range    HEMOGLOBIN A1C MONITORING (POCT) 5.3 4.0 - 6.2 %    ESTIMATED AVERAGE GLUCOSE  105 mg/dL       ASSESSMENT AND PLAN:  Gregg was seen today for knee pain/problem.    Diagnoses and all orders for this visit:    Acute pain of left knee  -     XR KNEE 3 VIEWS LEFT; Future  -     MR KNEE LEFT WO; Future  -     AMB CONSULT TO ORTHOPEDICS - AFFILIATE ONLY; Future    Left medial knee pain  -     XR KNEE 3 VIEWS LEFT; Future  -     MR KNEE LEFT WO; Future  -     AMB CONSULT TO ORTHOPEDICS - AFFILIATE ONLY; Future    Knee effusion, left  -     XR KNEE 3 VIEWS LEFT; Future  -     MR KNEE LEFT WO; Future  -     AMB CONSULT TO ORTHOPEDICS - AFFILIATE ONLY; Future    Fall at home, initial encounter  -     XR KNEE 3 VIEWS LEFT; Future  -     MR KNEE LEFT WO; Future  -     AMB CONSULT TO ORTHOPEDICS - AFFILIATE ONLY; Future    History of arthroscopic knee surgery  -     XR KNEE 3 VIEWS LEFT; Future  -     MR KNEE LEFT WO; Future  -     AMB CONSULT TO ORTHOPEDICS - AFFILIATE ONLY; Future      recommended sodium restriction    -- Expected clinical course discussed   -- Medications and their side effects discussed    Gregg is also recommended to eat a heart-healthy diet, do regular aerobic exercises, maintain a desirable body weight, and avoid tobacco products. These recommendations are from the American Heart Association (AHA) which stresses the importance of lifestyle changes to lower cardiovascular disease risk.     Return if symptoms worsen or fail to improve.    Patient Instructions   Worried about  internal knee injury.     Xray today. MRI ordered.   [facility] consult next week  - they will call with date/time of appointment.      Return as needed for follow-up with Dr. Ritchie.    Clinic : [phone]  Appointment line: [phone]      Reinier Ritchie MD

## 2018-02-13 NOTE — NURSING NOTE
Patient Information     Patient Name MRN Sex Gregg Spencer [id number] Male 1965      Nursing Note by Gosselin, Norma J at 2018  9:45 AM     Author:  Gosselin, Norma J Service:  (none) Author Type:  (none)     Filed:  2018  9:27 AM Encounter Date:  2018 Status:  Signed     :  Gosselin, Norma J            Consult left knee injury 18. Referred by Darin J Skaudis, MD Norma J Gosselin LPN....................  2018   9:26 AM

## 2018-02-13 NOTE — TELEPHONE ENCOUNTER
Patient Information     Patient Name MRN Gregg Vuong [id number] Male 1965      Telephone Encounter by Sanaz Daly at 2018  9:21 AM     Author:  Sanaz Daly Service:  (none) Author Type:  (none)     Filed:  2018  9:24 AM Encounter Date:  2018 Status:  Signed     :  Sanaz Daly            Spoke with patient he is wanting a medication to take before his MRI on the . He would like it sent to [facility]. Sanaz Daly LPN......................2018 9:24 AM

## 2018-02-13 NOTE — TELEPHONE ENCOUNTER
Patient Information     Patient Name MRN Gregg Vuong [id number] Male 1965      Telephone Encounter by Sanaz Daly at 2018 10:17 AM     Author:  Sanaz Daly Service:  (none) Author Type:  (none)     Filed:  2018 10:17 AM Encounter Date:  2018 Status:  Signed     :  Sanaz Daly            Spoke with patient and notifed that a RX for Xanax will be faxed to Jamar Daly LPN......................2018 10:17 AM

## 2018-02-13 NOTE — PATIENT INSTRUCTIONS
Patient Information     Patient Name MRN Sex Gregg Spencer [id number] Male 1965      Patient Instructions by Reinier Ritchie MD at 2018 11:20 AM     Author:  Reinier Ritchie MD Service:  (none) Author Type:  Physician     Filed:  2018 12:05 PM Encounter Date:  2018 Status:  Signed     :  Reinier Ritchie MD (Physician)            Worried about internal knee injury.     Xray today. MRI ordered.   Huang consult next week  - they will call with date/time of appointment.      Return as needed for follow-up with Dr. Ritchie.    Clinic : [phone]  Appointment line: [phone]

## 2018-02-13 NOTE — TELEPHONE ENCOUNTER
Patient Information     Patient Name MRN Sex Gregg Spencer [id number] Male 1965      Telephone Encounter by Sanaz Daly at 2018  2:47 PM     Author:  Sanaz Daly Service:  (none) Author Type:  (none)     Filed:  2018  2:49 PM Encounter Date:  2018 Status:  Signed     :  Sanaz Daly            Spoke with patient, and notified per Reinier Ritchie MD  Patient would like to see Dr. Huang for F/U regarding this. Told patient scheduling or nurse will be calling to set this up. Sanaz Daly LPN......................2018 2:48 PM

## 2018-02-13 NOTE — INITIAL ASSESSMENTS
"Patient Information     Patient Name MRN Sex Gregg Spencer [id number] Male 1965      Initial Assessments by Felicita Nath PT at 2018 12:52 PM     Author:  Felicita Nath PT  Service:  (none) Author Type:  PT- Physical Therapist     Filed:  2018  5:19 PM  Date of Service:  2018 12:52 PM Status:  Addendum     :  Felicita Nath PT (PT- Physical Therapist)        Related Notes: Original Note by Felicita Nath PT (PT- Physical Therapist) filed at 2018  5:19 PM            [facility]  Outpatient PT - Initial Evaluation  Lower Extremity Eval/ Discharge    Date of Service: 2018   Visit #1    PSFS Visit:  1/10  PSFS Completed:  2018     Patient Name: Gregg Aguayo \"Juve\"  YOB: 1965   Referring MD/Provider: Kwabena Patricia DO  Medical and Treatment Diagnosis: left knee pain, swelling  PT Treatment Diagnosis: MCL sprain, left knee swelling  Insurance: BC  Start of Service: 18  Certification Dates: Start of Service: 18  Medicare/MA Re-Cert Due: 18     Precautions:  None   Cognition:  Oriented to Person, Place, and Time.     Were cultural / age or other special adaptations needed? No      Patient is a vulnerable adult: No      Patient is aware of diagnosis: Yes      Risks and benefits explained: Yes    Subjective        Date of onset:  was climbing on top of the washer and slipped off and landed on his right side, unsure of how he landed, but banged the left knee. Had been a lot more swollen and warm but denies redness and was localized to mostly the lateral knee.  Follow up with physician:  In 6 weeks  Details: swelling seems to be improving. Can't offload the leg with crutch or assistive device as recommended due to recent carpal tunnel surgery bilaterally. Sleeping is difficult due to the knee feeling like a steady pain.     Rates pain: 0/10, 10/10 at worst with swelling  Describes pain: steady dull pain, " stiff  Locates pain: medial knee  Aggravating: sitting or walking too long, sleeping, kneeling  Easing: ice, tramadol, tylenol arthritis    Completed 1/24/2018:  Patient Specific Functional and Pain Scales (PSFS)  Clinician Instructions: Complete after the history and before the exam.   Initial Assessment:   We want to know what 3 activities in your life you are unable to perform, or are having the most difficulty performing, as a result of your chief problem. Please list and score at least 3 activities that you are unable to perform, or having the most difficulty performing, because of your chief problem.   Patient Specific Activity Scoring Scheme (score one number for each activity):   Activity Score (0-10)  0= Unable to perform activity  10= Able to perform activity at same level as before injury or problem   1. Down> up stairs 6/10   2. sleeping 3.5/10   3.  /10   4.  /10   5.  /10   Totals:  9.5/20= 47.5% ability, 52.5% impairment   Patient verbally states that they understand that the information they have provided above is current and complete to the best of their knowledge.   Patient Specific Functional Scale Modifier Scale Conversion: (patient's modifier that correlates with pt's score on PSFS): 5-CK (50% Impaired).    Prior Level:  Minimal to no difficulty completing functional activities.      Past Medical History:     Diagnosis  Date     Bilateral carpal tunnel syndrome 9/21/2017     Cubital tunnel syndrome on left 9/25/2017     Left carpal tunnel syndrome 12/21/2017     No pertinent past medical history      S/P left carpal tunnel release 12/21/2017     S/P left cubital tunnel release 12/21/2017     S/P right carpal tunnel release 11/22/2017     Past Surgical History:      Procedure  Laterality Date     APPENDECTOMY  2005    Laparoscopic for acute appendicitis       ARTHROSCOPY      knee ? left       COLONOSCOPY SCREENING  1/29/2014    Tubular adenoma of rectum - follow up 5 years       S/P LEFT CARPAL  TUNNEL RELEASE Left 12/21/2017     S/P LEFT CUBITAL TUNNEL RELEASE Left 12/21/2017     S/P RIGHT CARPAL TUNNEL RELEASE Right 11/22/2017     Driving:  yes  Home Environment:  Patient lives in a 2 story with 3 steps to enter and 1 flights up/down stairs.  Assistive Devices: none    Occupation:  Retired, former     Previous Treatment:    Pain Meds / Anti-inflammatory Meds  - Yes   Physical Therapy - years ago, 20+ years  Injections - yes, years ago  Surgery - 3 knee arthroscopies  Pain Clinic - No    Diagnostics:  Reviewed (see chart)   Current Medications:  Reviewed (see chart)    Drug Allergies:  Reviewed (see chart)  ?   Latex Allergy:  No       Objective    Items left blank indicate that the test was inappropriate or not meaningful at the time of evaluation and therefore not performed.    Fall Risk Screening:  No risk factors identified.     ROM / Strength:          Generally 5/5 strength in left lower extremity and hip      Norms Left  Right Strength Left  Right   Knee Extension 0 0  Knee Extension     Knee Flexion 135 125  Knee Flexion       Observation:  Discomfort in medial knee with resisted knee flexion    Palpation:  Posterior lateral popliteal fossa tender, medial knee at MCL and joint line    Gait:  Mild antalgia    Special Tests:  Valgus stress test +      Today's Intervention:    - Evaluation  - Patient education for results of evaluation, goals, and treatment plan.  Patient voices understanding and agreement.  Patient education regarding expectations for healing and self management including icing, elevation, stretching to manage stiffness, and knee sleeve or wrap for compression.    Home Exercise Program:    Dynamic hamstring stretch  Seated hip adductor stretch  Forward T     Assessment    Therapist Assessment / Clinical Impression:  Signs and symptoms consistent with MCL sprain, knee stiffness and swelling. Patient demonstrated good self management techniques today and no further  skilled physical therapy sessions necessary at this time. Recommend follow up with Dr. Patricia in 6 weeks and return to therapy if needed at that time.      Functional Impairment(s): See subjective on initial evaluation and Functional Assessment / Summary Report from PSFS.    Physical Impairment(s):  As above      Goals:  Patient goal:  Decrease knee pain and swelling in 8 weeks.    Functional Short Term Goals (4 weeks):   Patient will be independent in home exercise program and self management. Met 1/24/2018         Plan    Continue independent self management and home exercise program and return to orthopedic doctor in 6 weeks. Continue icing and elevating, wrap knee for swelling control.    Thank you for your referral to [facility].  Please call with any questions, concerns or comments.  [phone]  Felicita Nath PT, DPT    The signature, of the referring medical provider, on this document indicates certification of the above prescribed plan of care and is medically necessary.

## 2018-02-21 ENCOUNTER — TELEPHONE (OUTPATIENT)
Dept: INTERNAL MEDICINE | Facility: OTHER | Age: 53
End: 2018-02-21

## 2018-02-21 NOTE — TELEPHONE ENCOUNTER
Gregg is calling because he received a letter regarding his MRI he had on his knee in January. He does not know what the letter means. What is the next step? Etc.. Please advise.  Yasmin Duran............................... 2/21/2018 2:38 PM

## 2018-02-22 NOTE — TELEPHONE ENCOUNTER
Spoke with patient and notified per Reinier Ritchie MD. Sanaz Daly LPN......................2/22/2018 9:02 AM

## 2018-03-06 ENCOUNTER — OFFICE VISIT (OUTPATIENT)
Dept: ORTHOPEDICS | Facility: OTHER | Age: 53
End: 2018-03-06
Attending: ORTHOPAEDIC SURGERY
Payer: COMMERCIAL

## 2018-03-06 DIAGNOSIS — M25.562 CHRONIC PAIN OF LEFT KNEE: ICD-10-CM

## 2018-03-06 DIAGNOSIS — T14.8XXA BONE BRUISE: ICD-10-CM

## 2018-03-06 DIAGNOSIS — M23.201 OTHER OLD TEAR OF LATERAL MENISCUS OF LEFT KNEE: Primary | ICD-10-CM

## 2018-03-06 DIAGNOSIS — M17.12 PRIMARY OSTEOARTHRITIS OF LEFT KNEE: ICD-10-CM

## 2018-03-06 DIAGNOSIS — G89.29 CHRONIC PAIN OF LEFT KNEE: ICD-10-CM

## 2018-03-06 PROCEDURE — 99214 OFFICE O/P EST MOD 30 MIN: CPT | Mod: 57 | Performed by: ORTHOPAEDIC SURGERY

## 2018-03-06 RX ORDER — TRAMADOL HYDROCHLORIDE 50 MG/1
50 TABLET ORAL
COMMUNITY
End: 2018-03-06

## 2018-03-06 ASSESSMENT — PAIN SCALES - GENERAL: PAINLEVEL: MODERATE PAIN (4)

## 2018-03-06 NOTE — PROGRESS NOTES
PROGRESS NOTE    SUBJECTIVE:  Gregg Aguayo is here for recheck of a left knee.     HPI: Patient seen previously for left knee concerns.  Since the last visit he is still painful with knee activity.  Worse with weightbearing or twisting.  Feels discomfort into the knee joint area.  Occasional popping or snapping sensation along with noises.  Feeling of  pain and pressure under the kneecap region..    Review of Systems:  Constitutional: Denies constitutional problems    PFSH:  No change in information. See earlier PFSH questionnaire completed by the patient on initial visit.    OBJECTIVE:  BP (!) (P) 140/92 (BP Location: Right arm, Patient Position: Sitting, Cuff Size: Adult Large)  Pulse (P) 64  Temp (P) 97.7  F (36.5  C)  Wt (P) 134.3 kg (296 lb)  BMI (P) 41.87 kg/m2Body mass index is 41.87 kg/(m^2) (pended).  Patient is alert and orientated x3 and answers questions appropriately.    Left knee exam:   See prior exam.  Ligaments stable on exam.    Relatively comfortable range of motion.  No significant effusion.  Patella tracks well.    Radiographic images were independently reviewed and discussed with the patient.   X RAY:  Final Report   Exam: MR KNEE LEFT WO    History: Acute pain of left knee    Technique: Sagittal, coronal and axial images were obtained with combination of proton density, T2 and T1 weighted sequences.    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 There is probably a tiny bone infarct in the medial tibial plateau as well.    Anterior and posterior cruciate ligaments are intact as are medial and lateral collateral ligaments and the extensor mechanism. There is no medial meniscal tear.    The lateral meniscus is abnormal. There is globular increased signal intensity within the anterior horn and anterior body of the lateral meniscus. This communicates with the superior articular surface. There is lateral extrusion of the meniscus.    There is  full-thickness articular cartilage loss over the lateral tibial plateau especially posteriorly with prominent associated marrow edema. No linear fracture line is seen. Cartilage over the lateral femoral condyle is better preserved.    There is mild narrowing articular cartilage in the medial joint space without full-thickness loss. There is mild thinning of cartilage over the posterior patella without full-thickness cartilage loss. Small to moderate-sized joint effusion is seen. There  is no popliteal cyst.    There is nonspecific subcutaneous edema over the anterior knee with small bursal fluid collection anterior to the intact patellar tendon.    Impression:  1. Tear through the anterior horn and body of the lateral meniscus with some lateral extrusion of the meniscus.  2. Prominent articular cartilage loss over the posterior lateral tibial plateau with prominent marrow edema. This may be reactive marrow edema associated with the articular cartilage loss. It could also represent a bone bruise. No linear fracture line is  seen.  3. Fluid collection anterior to the patellar tendon, probably within a bursa. There is some surrounding soft tissue edema.    Electronically Signed By: Roseline Grissom M.D. 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  Given continued symptoms consider knee arthroscopy with probable partial meniscectomy and chondroplasty as needed.  Discussed with the patient he has underlying areas of articular cartilage loss.  He has a torn cartilage on MRI but he also has other findings around the knee including bone bruising and bone changes of the distal femur in keeping with a bone infarct.  Also had some anterior soft tissue  bursa changes on his MRI.    Nonsurgical and surgical options were discussed.  Patient would like to plan for knee arthroscopy.  Discussed probable partial meniscectomy and chondroplasty as needed.  Discussed with the patient he may have continued symptoms even after the surgery.    Today, we discussed surgery in detail. The goal is to improve your knee pain.  Surgery would be an outpatient surgery, you would be able to go home the same day following surgery.  We would plan on incisions over the anterior knee, through these incisions we will evaluate the knee and perform a partial meniscectomy.  (Remove the torn meniscus fragment) and address any other pathology. Possible meniscal repair.  You will be instructed on weight bearing after the surgery. About two weeks after the surgery the stitches will be removed and we will decide whether you may need physical therapy.  Full recovery may take up to six weeks or more. Complications from surgery can occur.      If tolerated use of of an EC-ASA 325mg is recommended for 30 days after surgery.    Risks, benefits, conservative, surgical, and alternatives of treatment were thoroughly outlined. No guarantees were given. Risks which do include, but are not limited to: scar, bleeding, infection, decreased motion, damage to blood vessels, nerves and tendons, failure or need for further treatment, reaction to medications and anesthesia, blood clots, and the possibility of death where discussed.  He did verbalize an understanding. All questions and concerns were addressed.    Patient is set up for Left knee arthroscopy with partial meniscectomy and chondroplasty as needed.    Gregg Aguayo will need pre-op clearance for management of tobacco abuse, polymyalgia rheumatica  Follow up after surgery will be 10-14days    All questions were answered to the patient's satisfaction.    Kwabena Patricia D.O.  Orthopedic Surgeon    [facility]  [address] [place] [address]  Phone [phone]  Fax [phone]    3/6/2018

## 2018-03-06 NOTE — MR AVS SNAPSHOT
"              After Visit Summary   3/6/2018    Gregg Aguayo    MRN: [id number]           Patient Information     Date Of Birth          1965        Visit Information        Provider Department      3/6/2018 9:15 AM Kwabena Patricia DO [facility]        Today's Diagnoses     Other old tear of lateral meniscus of left knee    -  1    Primary osteoarthritis of left knee        Chronic pain of left knee        Bone bruise           Follow-ups after your visit        Your next 10 appointments already scheduled     Mar 08, 2018  8:00 AM CST   CONSULT with Reinier Ritchie MD   [facility]    [address]  [place] [id number] [phone]              Who to contact     If you have questions or need follow up information about today's clinic visit or your schedule please contact [facility] directly at [phone].  Normal or non-critical lab and imaging results will be communicated to you by [email or web address], letter or phone within 4 business days after the clinic has received the results. If you do not hear from us within 7 days, please contact the clinic through [email or web address] or phone. If you have a critical or abnormal lab result, we will notify you by phone as soon as possible.  Submit refill requests through [employer] or call your pharmacy and they will forward the refill request to us. Please allow 3 business days for your refill to be completed.          Additional Information About Your Visit        MyChart Information     [employer] lets you send messages to your doctor, view your test results, renew your prescriptions, schedule appointments and more. To sign up, go to [email or web address] . Click on \"Log in\" on the left side of the screen, which will take you to the Welcome page. Then click on \"Sign up Now\" on the right side of the page.     You will be asked to enter the access code listed below, as well as " some personal information. Please follow the directions to create your username and password.     Your access code is: [email or web address]  Expires: 2018  4:49 PM     Your access code will  in 90 days. If you need help or a new code, please call your [facility] clinic or [phone].        Care EveryWhere ID     This is your Care EveryWhere ID. This could be used by other organizations to access your [facility] medical records  [id number]         Blood Pressure from Last 3 Encounters:   18 (!) (P) 140/92   18 138/88   18 130/72    Weight from Last 3 Encounters:   18 (P) 134.3 kg (296 lb)   18 135.2 kg (298 lb)   18 135.2 kg (298 lb 2 oz)              Today, you had the following     No orders found for display       Primary Care Provider Office Phone # Fax #    Reinier Ritchie [phone] [address] [facility] [address]        Equal Access to Services     [facility]: Hadii ck Valle, alisson yousif, qamonique renner, stephen barkley . So [facility] [phone].    ATENCIÓN: Si habla español, tiene a norris disposición servicios gratuitos de asistencia lingüística. [facility] [phone].    We comply with applicable federal civil rights laws and [place] laws. We do not discriminate on the basis of race, color, national origin, age, disability, sex, sexual orientation, or gender identity.            Thank you!     Thank you for choosing [facility] AND [facility]  for your care. Our goal is always to provide you with excellent care. Hearing back from our patients is one way we can continue to improve our services. Please take a few minutes to complete the written survey that you may receive in the mail after your visit with us. Thank you!             Your Updated Medication List - Protect others around you: Learn how to safely use, store and throw away your medicines at www.disposemymeds.org.           This list is accurate as of 3/6/18  4:49 PM.  Always use your most recent med list.                   Brand Name Dispense Instructions for use Diagnosis    acetaminophen 650 MG CR tablet    TYLENOL     Take up to 4 tablet daily for pain as needed        furosemide 20 MG tablet    LASIX     Take 20 mg by mouth daily as needed For leg swelling-limit use. Salt restriction.        gabapentin 100 MG capsule    NEURONTIN     Take 100-200 mg by mouth 4 times daily as needed For burning/shooting nerve pain        metFORMIN 500 MG tablet    GLUCOPHAGE     Take 500 mg by mouth        methylPREDNISolone 4 MG tablet    MEDROL     Take 2-8 mg by mouth daily With a meal - wean dose every 2 to 4 weeks - for PMR        omeprazole 40 MG capsule    priLOSEC     Take 40 mg by mouth daily        traMADol 50 MG tablet    ULTRAM     Take 50 mg by mouth 2 times daily as needed for pain        varenicline 1 MG tablet    CHANTIX     Take 1 mg by mouth 2 times daily (with meals) Quit smoking

## 2018-03-08 ENCOUNTER — OFFICE VISIT (OUTPATIENT)
Dept: INTERNAL MEDICINE | Facility: OTHER | Age: 53
End: 2018-03-08
Attending: INTERNAL MEDICINE
Payer: COMMERCIAL

## 2018-03-08 VITALS
OXYGEN SATURATION: 98 % | DIASTOLIC BLOOD PRESSURE: 86 MMHG | SYSTOLIC BLOOD PRESSURE: 138 MMHG | HEART RATE: 60 BPM | WEIGHT: 292 LBS | TEMPERATURE: 97.9 F | BODY MASS INDEX: 41.31 KG/M2

## 2018-03-08 DIAGNOSIS — Z72.0 TOBACCO ABUSE: ICD-10-CM

## 2018-03-08 DIAGNOSIS — M35.3 POLYMYALGIA RHEUMATICA (H): ICD-10-CM

## 2018-03-08 DIAGNOSIS — M25.562 ACUTE PAIN OF LEFT KNEE: ICD-10-CM

## 2018-03-08 DIAGNOSIS — G47.33 OSA ON CPAP: ICD-10-CM

## 2018-03-08 DIAGNOSIS — Z98.890 HISTORY OF ARTHROSCOPIC KNEE SURGERY: ICD-10-CM

## 2018-03-08 DIAGNOSIS — Z01.818 PREOP GENERAL PHYSICAL EXAM: Primary | ICD-10-CM

## 2018-03-08 PROCEDURE — 99213 OFFICE O/P EST LOW 20 MIN: CPT | Performed by: INTERNAL MEDICINE

## 2018-03-08 ASSESSMENT — ANXIETY QUESTIONNAIRES
2. NOT BEING ABLE TO STOP OR CONTROL WORRYING: NOT AT ALL
IF YOU CHECKED OFF ANY PROBLEMS ON THIS QUESTIONNAIRE, HOW DIFFICULT HAVE THESE PROBLEMS MADE IT FOR YOU TO DO YOUR WORK, TAKE CARE OF THINGS AT HOME, OR GET ALONG WITH OTHER PEOPLE: NOT DIFFICULT AT ALL
1. FEELING NERVOUS, ANXIOUS, OR ON EDGE: NOT AT ALL
3. WORRYING TOO MUCH ABOUT DIFFERENT THINGS: NOT AT ALL
6. BECOMING EASILY ANNOYED OR IRRITABLE: NOT AT ALL
GAD7 TOTAL SCORE: 0
7. FEELING AFRAID AS IF SOMETHING AWFUL MIGHT HAPPEN: NOT AT ALL
5. BEING SO RESTLESS THAT IT IS HARD TO SIT STILL: NOT AT ALL

## 2018-03-08 ASSESSMENT — PATIENT HEALTH QUESTIONNAIRE - PHQ9: 5. POOR APPETITE OR OVEREATING: NOT AT ALL

## 2018-03-08 ASSESSMENT — PAIN SCALES - GENERAL: PAINLEVEL: MODERATE PAIN (4)

## 2018-03-08 NOTE — MR AVS SNAPSHOT
After Visit Summary   3/8/2018    Gregg Aguayo    MRN: [id number]           Patient Information     Date Of Birth          1965        Visit Information        Provider Department      3/8/2018 8:00 AM Reinier Ritchie MD [facility]        Today's Diagnoses     Preop general physical exam    -  1    Acute pain of left knee        History of arthroscopic knee surgery        AILEEN on CPAP        Polymyalgia rheumatica (H)        Tobacco abuse          Care Instructions      Before Your Surgery      Call your surgeon if there is any change in your health. This includes signs of a cold or flu (such as a sore throat, runny nose, cough, rash or fever).    Do not smoke, drink alcohol or take over the counter medicine (unless your surgeon or primary care doctor tells you to) for the 24 hours before and after surgery.    If you take prescribed drugs: Follow your doctor s orders about which medicines to take and which to stop until after surgery.    Eating and drinking prior to surgery: follow the instructions from your surgeon    Take a shower or bath the night before surgery. Use the soap your surgeon gave you to gently clean your skin. If you do not have soap from your surgeon, use your regular soap. Do not shave or scrub the surgery site.  Wear clean pajamas and have clean sheets on your bed.     QUIT SMOKING!!     -- Choose a quit date (within 1 month). Quitting smoking abruptly is more successful than gradually cutting back.   -- Tell everyone about it (friends, family, coworkers)   -- Think about when yousmoke the most, and what you'll do during those times (eg when in the car, work breaks, etc)     Consider:   -- Start varenicline (Chantix) 1 week before your quit date   -- Start bupropion (Wellbutrin/Zyban) 1 week before your quit date -- Welbutrin 1 pill daily for 1 week then 1 pill twice daily      -- Stop smoking onquit date   -- Starting with quit date, use nicotine  "lozenges/gum as needed for cravings     -- Quit Plan - Call them in the next 1-2 weeks to help you quitsmoking.                        [phone] ([phone])                        http://www.quitplan.com   -- http://smokefree.gov/             Follow-ups after your visit        Follow-up notes from your care team     Return if symptoms worsen or fail to improve.      Your next 10 appointments already scheduled     Mar 19, 2018   Pro[facility] with Kwabena Patricia,    [facility]    [address]  [place] [address] [phone]              Who to contact     If you have questions or need follow up information about today's clinic visit or your schedule please contact [facility] directly at [phone].  Normal or non-critical lab and imaging results will be communicated to you by Svbtlehart, letter or phone within 4 business days after the clinic has received the results. If you do not hear from us within 7 days, please contact the clinic through Svbtlehart or phone. If you have a critical or abnormal lab result, we will notify you by phone as soon as possible.  Submit refill requests through [employer] or call your pharmacy and they will forward the refill request to us. Please allow 3 business days for your refill to be completed.          Additional Information About Your Visit        MyChart Information     [employer] lets you send messages to your doctor, view your test results, renew your prescriptions, schedule appointments and more. To sign up, go to [email or web address] . Click on \"Log in\" on the left side of the screen, which will take you to the Welcome page. Then click on \"Sign up Now\" on the right side of the page.     You will be asked to enter the access code listed below, as well as some personal information. Please follow the directions to create your username and password.     Your access code is: [email or web address]  Expires: " 2018  4:49 PM     Your access code will  in 90 days. If you need help or a new code, please call your [facility] or [phone].        Care EveryWhere ID     This is your Care EveryWhere ID. This could be used by other organizations to access your [place] medical records  [id number]        Your Vitals Were     Pulse Temperature Pulse Oximetry BMI (Body Mass Index)          60 97.9  F (36.6  C) (Oral) 98% 41.31 kg/m2         Blood Pressure from Last 3 Encounters:   18 138/86   18 (!) (P) 140/92   18 138/88    Weight from Last 3 Encounters:   18 292 lb (132.5 kg)   18 (P) 296 lb (134.3 kg)   18 298 lb (135.2 kg)              Today, you had the following     No orders found for display       Primary Care Provider Office Phone # Fax #    Reinier Ritchie [phone] [address] [facility] [address]        Equal Access to Services     [facility]: Hadii ck Valle, alisson yousif, chuy renner, stephen barkley . So [facility] [phone].    ATENCIÓN: Si habla español, tiene a norris disposición servicios gratuitos de asistencia lingüística. [facility] [phone].    We comply with applicable federal civil rights laws and [place] laws. We do not discriminate on the basis of race, color, national origin, age, disability, sex, sexual orientation, or gender identity.            Thank you!     Thank you for choosing [facility] AND [facility]  for your care. Our goal is always to provide you with excellent care. Hearing back from our patients is one way we can continue to improve our services. Please take a few minutes to complete the written survey that you may receive in the mail after your visit with us. Thank you!             Your Updated Medication List - Protect others around you: Learn how to safely use, store and throw away your medicines at www.disposemymeds.org.          This  list is accurate as of 3/8/18  8:18 AM.  Always use your most recent med list.                   Brand Name Dispense Instructions for use Diagnosis    acetaminophen 650 MG CR tablet    TYLENOL     Take up to 4 tablet daily for pain as needed        furosemide 20 MG tablet    LASIX     Take 20 mg by mouth daily as needed For leg swelling-limit use. Salt restriction.        gabapentin 100 MG capsule    NEURONTIN     Take 100-200 mg by mouth 4 times daily as needed For burning/shooting nerve pain        metFORMIN 500 MG tablet    GLUCOPHAGE     Take 500 mg by mouth        methylPREDNISolone 4 MG tablet    MEDROL     Take 2-8 mg by mouth daily With a meal - wean dose every 2 to 4 weeks - for PMR        omeprazole 40 MG capsule    priLOSEC     Take 40 mg by mouth daily        traMADol 50 MG tablet    ULTRAM     Take 50 mg by mouth 2 times daily as needed for pain        varenicline 1 MG tablet    CHANTIX     Take 1 mg by mouth 2 times daily (with meals) Quit smoking

## 2018-03-08 NOTE — PROGRESS NOTES
[facility]  [address]  [place] [address] [phone]    PRE-OP EVALUATION:  Today's date: 3/8/2018    Gregg Aguayo (: 1965) presents for pre-operative evaluation assessment as requested by Dr. Patricia.  He requires evaluation and anesthesia risk assessment prior to undergoing surgery/procedure for treatment of Left Knee Arthroscopy .    Proposed Surgery/ Procedure: Left knee arthroscopy  Date of Surgery/ Procedure: 3/19/2018  Hospital[facility]Surgical [facility]   Fax number for surgical facility: [phone]  Primary Physician: Reinier Ritchie  Type of Anesthesia Anticipated: General    Patient has a Health Care Directive or Living Will:  NO    1. NO - Do you have a history of heart attack, stroke, stent, bypass or surgery on an artery in the head, neck, heart or legs?  2. NO - Do you ever have any pain or discomfort in your chest?  3. NO - Do you have a history of  Heart Failure?  4. NO - Are you troubled by shortness of breath when: walking on the level, up a slight hill or at night?  5. NO - Do you currently have a cold, bronchitis or other respiratory infection?  6. NO - Do you have a cough, shortness of breath or wheezing?  7. NO - Do you sometimes get pains in the calves of your legs when you walk?  8. NO - Do you or anyone in your family have previous history of blood clots?  9. NO - Do you or does anyone in your family have a serious bleeding problem such as prolonged bleeding following surgeries or cuts?  10. NO - Have you ever had problems with anemia or been told to take iron pills?  11. NO - Have you had any abnormal blood loss such as black, tarry or bloody stools, or abnormal vaginal bleeding?  12. NO - Have you ever had a blood transfusion?  13. NO - Have you or any of your relatives ever had problems with anesthesia?    14. YES - Do you have sleep apnea, excessive snoring or daytime drowsiness? -- Uses CPAP regularly.      15. NO - Do you have any prosthetic heart valves?  16. NO - Do you have prosthetic joints?  17. NO - Is there any chance that you may be pregnant?      HPI:       ICD-10-CM    1. Preop general physical exam Z01.818    2. Acute pain of left knee M25.562    3. History of arthroscopic knee surgery Z98.890    4. AILEEN on CPAP G47.33     Z99.89    5. Polymyalgia rheumatica (H) M35.3    6. Tobacco abuse Z72.0      HPI related to upcoming procedure: Patient has been getting more significant pain in his left knee.  Waking up at night due to pain.  States it will lock and pop on him.  Has a lot of problems going downstairs some problems going up stairs.  Has had arthroscopic knee surgery on his left knee in the past.  Very rarely using ibuprofen.  Rarely, at night using tramadol.    Sleep apnea, uses CPAP nightly.  Finds very helpful.  He will bring it to his surgery.    Polymyalgia rheumatica, doing pretty well at this time.  He has not been needing to use methylprednisolone, therefore has not been using metformin.  States that he has some aches and pains but overall he is able to work through them.    Tobacco abuse, ongoing.  He is working to quit smoking.  Has been cutting down.  Now down to about half pack per day.  Cessation options discussed.  He will continue to cut down on his own.    MEDICAL HISTORY:     Patient Active Problem List    Diagnosis Date Noted     Psychosexual dysfunction with inhibited sexual excitement 01/29/2018     Priority: Medium     Esophageal reflux 01/29/2018     Priority: Medium     Tobacco abuse 01/29/2018     Priority: Medium     Overview:   IMO Update 10/11       Acute pain of left knee 01/11/2018     Priority: Medium     Fall at home, initial encounter 01/11/2018     Priority: Medium     History of arthroscopic knee surgery 01/11/2018     Priority: Medium     Knee effusion, left 01/11/2018     Priority: Medium     Left medial knee pain 01/11/2018     Priority: Medium     Left carpal  tunnel syndrome 12/21/2017     Priority: Medium     History of carpal tunnel surgery of left wrist 12/21/2017     Priority: Medium     S/P cubital tunnel release 12/21/2017     Priority: Medium     History of carpal tunnel surgery of right wrist 11/22/2017     Priority: Medium     AILEEN on CPAP 11/20/2017     Priority: Medium     Dermatomyositis (H) 10/12/2017     Priority: Medium     Cubital tunnel syndrome on left 09/25/2017     Priority: Medium     Bilateral leg edema 07/05/2017     Priority: Medium     Polymyalgia rheumatica (H) 11/11/2016     Priority: Medium     Arthralgia of multiple joints 10/26/2016     Priority: Medium     Hypertrophic and atrophic condition of skin 06/07/2012     Priority: Medium     Contact dermatitis and eczema 09/20/2011     Priority: Medium      Past Medical History:   Diagnosis Date     Bilateral carpal tunnel syndrome     9/21/2017     Carpal tunnel syndrome of left wrist     12/21/2017     Condition influencing health status     No Comments Provided     Lesion of left ulnar nerve     9/25/2017     Other specified postprocedural states     11/22/2017     Other specified postprocedural states     12/21/2017     Other specified postprocedural states     12/21/2017     Past Surgical History:   Procedure Laterality Date     APPENDECTOMY OPEN      2005,Laparoscopic for acute appendicitis     ATTEMPTED ARTHROSCOPY      knee ? left     COLONOSCOPY      1/29/2014,Tubular adenoma of rectum - follow up 5 years     OTHER SURGICAL HISTORY      11/22/2017,119439,OTHER,Right     OTHER SURGICAL HISTORY      12/21/2017,811346,OTHER,Left     OTHER SURGICAL HISTORY      12/21/2017,165182,OTHER,Left     Current Outpatient Prescriptions   Medication Sig Dispense Refill     metFORMIN (GLUCOPHAGE) 500 MG tablet Take 500 mg by mouth       acetaminophen (TYLENOL) 650 MG CR tablet Take up to 4 tablet daily for pain as needed       furosemide (LASIX) 20 MG tablet Take 20 mg by mouth daily as needed For leg  swelling-limit use. Salt restriction.       gabapentin (NEURONTIN) 100 MG capsule Take 100-200 mg by mouth 4 times daily as needed For burning/shooting nerve pain       methylPREDNISolone (MEDROL) 4 MG tablet Take 2-8 mg by mouth daily With a meal - wean dose every 2 to 4 weeks - for PMR       omeprazole (PRILOSEC) 40 MG capsule Take 40 mg by mouth daily       traMADol (ULTRAM) 50 MG tablet Take 50 mg by mouth 2 times daily as needed for pain       varenicline (CHANTIX) 1 MG tablet Take 1 mg by mouth 2 times daily (with meals) Quit smoking       OTC products: no recent use of OTC ASA, NSAIDS or Steroids    No Known Allergies   Latex Allergy: NO    Social History   Substance Use Topics     Smoking status: Current Every Day Smoker     Packs/day: 0.50     Types: Cigarettes     Smokeless tobacco: Never Used     Alcohol use 0.0 oz/week      Comment: Alcoholic Drinks/day: 1-2 drinks every other day     History   Drug Use No       REVIEW OF SYSTEMS:   Constitutional, neuro, ENT, endocrine, pulmonary, cardiac, gastrointestinal, genitourinary, musculoskeletal, integument and psychiatric systems are negative, except as otherwise noted.    EXAM:   /86 (BP Location: Right arm, Patient Position: Sitting, Cuff Size: Adult Regular)  Pulse 60  Temp 97.9  F (36.6  C) (Oral)  Wt 292 lb (132.5 kg)  SpO2 98%  BMI 41.31 kg/m2    GENERAL APPEARANCE: healthy, alert and no distress     EYES: EOMI,  PERRL     HENT: ear canals and TM's normal and nose and mouth without ulcers or lesions     NECK: no adenopathy, no asymmetry, masses, or scars and thyroid normal to palpation     RESP: lungs clear to auscultation - no rales, rhonchi or wheezes     CV: regular rates and rhythm, normal S1 S2, no S3 or S4 and no murmur, click or rub     ABDOMEN:  soft, nontender, no HSM or masses and bowel sounds normal     MS: extremities normal- no gross deformities noted, no evidence of inflammation in joints, FROM in all extremities.     SKIN: no  suspicious lesions or rashes     NEURO: Normal strength and tone, sensory exam grossly normal, mentation intact and speech normal     PSYCH: mentation appears normal. and affect normal/bright     LYMPHATICS: No cervical adenopathy    DIAGNOSTICS:   EKG 11/20/2017 - EKG interpretation  normal sinus rhythm.  Rate 65.  Normal axis.  Normal NJ and QRS indices.  Sinus arrhythmia noted.  Normal ST segments.  Normal EKG.  Compared to previous tracing dated 11/2/2012, bradycardia no longer noted  Reinier Ritchie MD    Recent Labs   Lab Test  06/29/17   0851  10/07/16   1059  09/12/16   1656  05/04/16   1009   HGB  16.1   --   15.8   --    PLT  215   --   213   --    NA   --   139   --   138   POTASSIUM   --   4.1   --   4.2   CR   --   0.83   --   0.84     1/19/2018 - Exam: MR KNEE LEFT WO     History: Acute pain of left knee     Technique: Sagittal, coronal and axial images were obtained with combination of proton density, T2 and T1 weighted sequences.     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 There is probably a tiny bone infarct in the medial tibial plateau as well.     Anterior and posterior cruciate ligaments are intact as are medial and lateral collateral ligaments and the extensor mechanism. There is no medial meniscal tear.     The lateral meniscus is abnormal. There is globular increased signal intensity within the anterior horn and anterior body of the lateral meniscus. This communicates with the superior articular surface. There is lateral extrusion of the meniscus.     There is full-thickness articular cartilage loss over the lateral tibial plateau especially posteriorly with prominent associated marrow edema. No linear fracture line is seen. Cartilage over the lateral femoral condyle is better preserved.     There is mild narrowing articular cartilage in the medial joint space without full-thickness loss. There is mild thinning of cartilage over the posterior  patella without full-thickness cartilage loss. Small to moderate-sized joint effusion is seen. There   is no popliteal cyst.     There is nonspecific subcutaneous edema over the anterior knee with small bursal fluid collection anterior to the intact patellar tendon.           Impression:  1. Tear through the anterior horn and body of the lateral meniscus with some lateral extrusion of the meniscus.  2. Prominent articular cartilage loss over the posterior lateral tibial plateau with prominent marrow edema. This may be reactive marrow edema associated with the articular cartilage loss. It could also represent a bone bruise. No linear fracture line is   seen.  3. Fluid collection anterior to the patellar tendon, probably within a bursa. There is some surrounding soft tissue edema.     Electronically Signed By: Roseline Grissom M.D. on 1/19/2018 7:53 PM    IMPRESSION:   Reason for surgery/procedure: left knee pain  Diagnosis/reason for consult: pre-operative cardiopulmonary risk stratification    The proposed surgical procedure is considered INTERMEDIATE risk.    REVISED CARDIAC RISK INDEX  The patient has the following serious cardiovascular risks for perioperative complications such as (MI, PE, VFib and 3  AV Block):  No serious cardiac risks  INTERPRETATION: 0 risks: Class I (very low risk - 0.4% complication rate)    The patient has the following additional risks for perioperative complications:  No identified additional risks      ICD-10-CM    1. Preop general physical exam Z01.818    2. Acute pain of left knee M25.562    3. History of arthroscopic knee surgery Z98.890    4. AILEEN on CPAP G47.33     Z99.89    5. Polymyalgia rheumatica (H) M35.3    6. Tobacco abuse Z72.0        RECOMMENDATIONS:       Obstructive Sleep Apnea (or suspected sleep apnea)  Patient is to bring their home CPAP with them on the day of surgery      --Patient is to take all scheduled medications on the day of surgery EXCEPT for  modifications listed below.    APPROVAL GIVEN to proceed with proposed procedure, without further diagnostic evaluation       Signed Electronically by: Reinier Ritchie MD    Copy of this evaluation report is provided to requesting physician.    [place] Preop Guidelines

## 2018-03-08 NOTE — PATIENT INSTRUCTIONS
Before Your Surgery      Call your surgeon if there is any change in your health. This includes signs of a cold or flu (such as a sore throat, runny nose, cough, rash or fever).    Do not smoke, drink alcohol or take over the counter medicine (unless your surgeon or primary care doctor tells you to) for the 24 hours before and after surgery.    If you take prescribed drugs: Follow your doctor s orders about which medicines to take and which to stop until after surgery.    Eating and drinking prior to surgery: follow the instructions from your surgeon    Take a shower or bath the night before surgery. Use the soap your surgeon gave you to gently clean your skin. If you do not have soap from your surgeon, use your regular soap. Do not shave or scrub the surgery site.  Wear clean pajamas and have clean sheets on your bed.     QUIT SMOKING!!     -- Choose a quit date (within 1 month). Quitting smoking abruptly is more successful than gradually cutting back.   -- Tell everyone about it (friends, family, coworkers)   -- Think about when yousmoke the most, and what you'll do during those times (eg when in the car, work breaks, etc)     Consider:   -- Start varenicline (Chantix) 1 week before your quit date   -- Start bupropion (Wellbutrin/Zyban) 1 week before your quit date -- Welbutrin 1 pill daily for 1 week then 1 pill twice daily      -- Stop smoking onquit date   -- Starting with quit date, use nicotine lozenges/gum as needed for cravings     -- Quit Plan - Call them in the next 1-2 weeks to help you quitsmoking.                        [phone] ([phone]                        http://www.quitplan.com   -- http://smokefree.gov/

## 2018-03-09 ASSESSMENT — PATIENT HEALTH QUESTIONNAIRE - PHQ9: SUM OF ALL RESPONSES TO PHQ QUESTIONS 1-9: 0

## 2018-03-09 ASSESSMENT — ANXIETY QUESTIONNAIRES: GAD7 TOTAL SCORE: 0

## 2018-03-19 ENCOUNTER — SURGERY (OUTPATIENT)
Age: 53
End: 2018-03-19

## 2018-03-19 ENCOUNTER — ANESTHESIA EVENT (OUTPATIENT)
Dept: SURGERY | Facility: OTHER | Age: 53
End: 2018-03-19
Payer: COMMERCIAL

## 2018-03-19 ENCOUNTER — ANESTHESIA (OUTPATIENT)
Dept: SURGERY | Facility: OTHER | Age: 53
End: 2018-03-19
Payer: COMMERCIAL

## 2018-03-19 ENCOUNTER — HOSPITAL ENCOUNTER (OUTPATIENT)
Facility: OTHER | Age: 53
Discharge: HOME | End: 2018-03-19
Attending: ORTHOPAEDIC SURGERY | Admitting: ORTHOPAEDIC SURGERY
Payer: COMMERCIAL

## 2018-03-19 VITALS
SYSTOLIC BLOOD PRESSURE: 130 MMHG | DIASTOLIC BLOOD PRESSURE: 86 MMHG | TEMPERATURE: 97 F | OXYGEN SATURATION: 95 % | RESPIRATION RATE: 12 BRPM

## 2018-03-19 DIAGNOSIS — M25.562 ACUTE PAIN OF LEFT KNEE: Primary | ICD-10-CM

## 2018-03-19 DIAGNOSIS — Z98.890 S/P LEFT KNEE ARTHROSCOPY: ICD-10-CM

## 2018-03-19 PROCEDURE — 25000128 H RX IP 250 OP 636: Performed by: ORTHOPAEDIC SURGERY

## 2018-03-19 PROCEDURE — 27210794 ZZH OR GENERAL SUPPLY STERILE: Performed by: ORTHOPAEDIC SURGERY

## 2018-03-19 PROCEDURE — 25000128 H RX IP 250 OP 636: Performed by: NURSE ANESTHETIST, CERTIFIED REGISTERED

## 2018-03-19 PROCEDURE — 37000008 ZZH ANESTHESIA TECHNICAL FEE, 1ST 30 MIN: Performed by: ORTHOPAEDIC SURGERY

## 2018-03-19 PROCEDURE — 36000058 ZZH SURGERY LEVEL 3 EA 15 ADDTL MIN: Performed by: ORTHOPAEDIC SURGERY

## 2018-03-19 PROCEDURE — 25000132 ZZH RX MED GY IP 250 OP 250 PS 637: Performed by: ORTHOPAEDIC SURGERY

## 2018-03-19 PROCEDURE — 40000306 ZZH STATISTIC PRE PROC ASSESS II: Performed by: ORTHOPAEDIC SURGERY

## 2018-03-19 PROCEDURE — 71000027 ZZH RECOVERY PHASE 2 EACH 15 MINS: Performed by: ORTHOPAEDIC SURGERY

## 2018-03-19 PROCEDURE — G0289 ARTHRO, LOOSE BODY + CHONDRO: HCPCS | Mod: XU | Performed by: ORTHOPAEDIC SURGERY

## 2018-03-19 PROCEDURE — 71000014 ZZH RECOVERY PHASE 1 LEVEL 2 FIRST HR: Performed by: ORTHOPAEDIC SURGERY

## 2018-03-19 PROCEDURE — 25000125 ZZHC RX 250: Performed by: NURSE ANESTHETIST, CERTIFIED REGISTERED

## 2018-03-19 PROCEDURE — 29881 ARTHRS KNE SRG MNISECTMY M/L: CPT

## 2018-03-19 PROCEDURE — 29881 ARTHRS KNE SRG MNISECTMY M/L: CPT | Performed by: ORTHOPAEDIC SURGERY

## 2018-03-19 PROCEDURE — 25000125 ZZHC RX 250: Performed by: ORTHOPAEDIC SURGERY

## 2018-03-19 PROCEDURE — 25800025 ZZH RX 258: Performed by: ORTHOPAEDIC SURGERY

## 2018-03-19 PROCEDURE — 36000056 ZZH SURGERY LEVEL 3 1ST 30 MIN: Performed by: ORTHOPAEDIC SURGERY

## 2018-03-19 PROCEDURE — 37000009 ZZH ANESTHESIA TECHNICAL FEE, EACH ADDTL 15 MIN: Performed by: ORTHOPAEDIC SURGERY

## 2018-03-19 RX ORDER — PROPOFOL 10 MG/ML
INJECTION, EMULSION INTRAVENOUS PRN
Status: DISCONTINUED | OUTPATIENT
Start: 2018-03-19 | End: 2018-03-19

## 2018-03-19 RX ORDER — ONDANSETRON 2 MG/ML
INJECTION INTRAMUSCULAR; INTRAVENOUS PRN
Status: DISCONTINUED | OUTPATIENT
Start: 2018-03-19 | End: 2018-03-19

## 2018-03-19 RX ORDER — MEPERIDINE HYDROCHLORIDE 50 MG/ML
12.5 INJECTION INTRAMUSCULAR; INTRAVENOUS; SUBCUTANEOUS
Status: DISCONTINUED | OUTPATIENT
Start: 2018-03-19 | End: 2018-03-19 | Stop reason: HOSPADM

## 2018-03-19 RX ORDER — NALOXONE HYDROCHLORIDE 0.4 MG/ML
.1-.4 INJECTION, SOLUTION INTRAMUSCULAR; INTRAVENOUS; SUBCUTANEOUS
Status: DISCONTINUED | OUTPATIENT
Start: 2018-03-19 | End: 2018-03-19 | Stop reason: HOSPADM

## 2018-03-19 RX ORDER — HYDROCODONE BITARTRATE AND ACETAMINOPHEN 5; 325 MG/1; MG/1
1 TABLET ORAL
Status: COMPLETED | OUTPATIENT
Start: 2018-03-19 | End: 2018-03-19

## 2018-03-19 RX ORDER — SODIUM CHLORIDE, SODIUM LACTATE, POTASSIUM CHLORIDE, CALCIUM CHLORIDE 600; 310; 30; 20 MG/100ML; MG/100ML; MG/100ML; MG/100ML
INJECTION, SOLUTION INTRAVENOUS CONTINUOUS
Status: CANCELLED | OUTPATIENT
Start: 2018-03-19

## 2018-03-19 RX ORDER — FENTANYL CITRATE 50 UG/ML
25-50 INJECTION, SOLUTION INTRAMUSCULAR; INTRAVENOUS
Status: DISCONTINUED | OUTPATIENT
Start: 2018-03-19 | End: 2018-03-19 | Stop reason: HOSPADM

## 2018-03-19 RX ORDER — IBUPROFEN 200 MG
600 TABLET ORAL
Status: DISCONTINUED | OUTPATIENT
Start: 2018-03-19 | End: 2018-03-19 | Stop reason: HOSPADM

## 2018-03-19 RX ORDER — OXYCODONE HYDROCHLORIDE 5 MG/1
5 TABLET ORAL EVERY 4 HOURS PRN
Status: DISCONTINUED | OUTPATIENT
Start: 2018-03-19 | End: 2018-03-19 | Stop reason: HOSPADM

## 2018-03-19 RX ORDER — CEFAZOLIN SODIUM 1 G/50ML
3 SOLUTION INTRAVENOUS
Status: DISCONTINUED | OUTPATIENT
Start: 2018-03-19 | End: 2018-03-19 | Stop reason: HOSPADM

## 2018-03-19 RX ORDER — KETOROLAC TROMETHAMINE 30 MG/ML
INJECTION, SOLUTION INTRAMUSCULAR; INTRAVENOUS PRN
Status: DISCONTINUED | OUTPATIENT
Start: 2018-03-19 | End: 2018-03-19

## 2018-03-19 RX ORDER — ACETAMINOPHEN 10 MG/ML
INJECTION, SOLUTION INTRAVENOUS PRN
Status: DISCONTINUED | OUTPATIENT
Start: 2018-03-19 | End: 2018-03-19

## 2018-03-19 RX ORDER — BUPIVACAINE HYDROCHLORIDE 2.5 MG/ML
INJECTION, SOLUTION EPIDURAL; INFILTRATION; INTRACAUDAL PRN
Status: DISCONTINUED | OUTPATIENT
Start: 2018-03-19 | End: 2018-03-19 | Stop reason: HOSPADM

## 2018-03-19 RX ORDER — CEFAZOLIN SODIUM 1 G/3ML
INJECTION, POWDER, FOR SOLUTION INTRAMUSCULAR; INTRAVENOUS PRN
Status: DISCONTINUED | OUTPATIENT
Start: 2018-03-19 | End: 2018-03-19

## 2018-03-19 RX ORDER — SODIUM CHLORIDE, SODIUM LACTATE, POTASSIUM CHLORIDE, CALCIUM CHLORIDE 600; 310; 30; 20 MG/100ML; MG/100ML; MG/100ML; MG/100ML
INJECTION, SOLUTION INTRAVENOUS CONTINUOUS
Status: DISCONTINUED | OUTPATIENT
Start: 2018-03-19 | End: 2018-03-19 | Stop reason: HOSPADM

## 2018-03-19 RX ORDER — KETAMINE HYDROCHLORIDE 10 MG/ML
INJECTION INTRAMUSCULAR; INTRAVENOUS PRN
Status: DISCONTINUED | OUTPATIENT
Start: 2018-03-19 | End: 2018-03-19

## 2018-03-19 RX ORDER — SODIUM CHLORIDE, SODIUM LACTATE, POTASSIUM CHLORIDE, CALCIUM CHLORIDE 600; 310; 30; 20 MG/100ML; MG/100ML; MG/100ML; MG/100ML
INJECTION, SOLUTION INTRAVENOUS CONTINUOUS PRN
Status: DISCONTINUED | OUTPATIENT
Start: 2018-03-19 | End: 2018-03-19

## 2018-03-19 RX ORDER — LIDOCAINE 40 MG/G
CREAM TOPICAL
Status: CANCELLED | OUTPATIENT
Start: 2018-03-19

## 2018-03-19 RX ORDER — ONDANSETRON 4 MG/1
4 TABLET, ORALLY DISINTEGRATING ORAL EVERY 30 MIN PRN
Status: DISCONTINUED | OUTPATIENT
Start: 2018-03-19 | End: 2018-03-19 | Stop reason: HOSPADM

## 2018-03-19 RX ORDER — PROPOFOL 10 MG/ML
INJECTION, EMULSION INTRAVENOUS CONTINUOUS PRN
Status: DISCONTINUED | OUTPATIENT
Start: 2018-03-19 | End: 2018-03-19

## 2018-03-19 RX ORDER — EPINEPHRINE 1 MG/ML
VIAL (ML) INJECTION PRN
Status: DISCONTINUED | OUTPATIENT
Start: 2018-03-19 | End: 2018-03-19 | Stop reason: HOSPADM

## 2018-03-19 RX ORDER — FENTANYL CITRATE 50 UG/ML
INJECTION, SOLUTION INTRAMUSCULAR; INTRAVENOUS PRN
Status: DISCONTINUED | OUTPATIENT
Start: 2018-03-19 | End: 2018-03-19

## 2018-03-19 RX ORDER — DEXAMETHASONE SODIUM PHOSPHATE 4 MG/ML
INJECTION, SOLUTION INTRA-ARTICULAR; INTRALESIONAL; INTRAMUSCULAR; INTRAVENOUS; SOFT TISSUE PRN
Status: DISCONTINUED | OUTPATIENT
Start: 2018-03-19 | End: 2018-03-19

## 2018-03-19 RX ORDER — LIDOCAINE HYDROCHLORIDE 20 MG/ML
INJECTION, SOLUTION INFILTRATION; PERINEURAL PRN
Status: DISCONTINUED | OUTPATIENT
Start: 2018-03-19 | End: 2018-03-19

## 2018-03-19 RX ORDER — ONDANSETRON 2 MG/ML
4 INJECTION INTRAMUSCULAR; INTRAVENOUS EVERY 30 MIN PRN
Status: DISCONTINUED | OUTPATIENT
Start: 2018-03-19 | End: 2018-03-19 | Stop reason: HOSPADM

## 2018-03-19 RX ORDER — HYDROMORPHONE HYDROCHLORIDE 1 MG/ML
.3-.5 INJECTION, SOLUTION INTRAMUSCULAR; INTRAVENOUS; SUBCUTANEOUS EVERY 10 MIN PRN
Status: DISCONTINUED | OUTPATIENT
Start: 2018-03-19 | End: 2018-03-19 | Stop reason: HOSPADM

## 2018-03-19 RX ORDER — HYDROCODONE BITARTRATE AND ACETAMINOPHEN 5; 325 MG/1; MG/1
1-2 TABLET ORAL EVERY 6 HOURS PRN
Qty: 30 TABLET | Refills: 0 | Status: SHIPPED | OUTPATIENT
Start: 2018-03-19 | End: 2018-04-03

## 2018-03-19 RX ORDER — DEXAMETHASONE SODIUM PHOSPHATE 4 MG/ML
4 INJECTION, SOLUTION INTRA-ARTICULAR; INTRALESIONAL; INTRAMUSCULAR; INTRAVENOUS; SOFT TISSUE EVERY 10 MIN PRN
Status: DISCONTINUED | OUTPATIENT
Start: 2018-03-19 | End: 2018-03-19 | Stop reason: HOSPADM

## 2018-03-19 RX ADMIN — FENTANYL CITRATE 50 MCG: 50 INJECTION, SOLUTION INTRAMUSCULAR; INTRAVENOUS at 07:53

## 2018-03-19 RX ADMIN — SODIUM CHLORIDE 7000 ML: 900 IRRIGANT IRRIGATION at 09:07

## 2018-03-19 RX ADMIN — ACETAMINOPHEN 1000 MG: 10 INJECTION, SOLUTION INTRAVENOUS at 07:54

## 2018-03-19 RX ADMIN — FENTANYL CITRATE 50 MCG: 50 INJECTION INTRAMUSCULAR; INTRAVENOUS at 09:36

## 2018-03-19 RX ADMIN — HYDROCODONE BITARTRATE AND ACETAMINOPHEN 1 TABLET: 5; 325 TABLET ORAL at 10:14

## 2018-03-19 RX ADMIN — ONDANSETRON HYDROCHLORIDE 4 MG: 2 SOLUTION INTRAMUSCULAR; INTRAVENOUS at 07:40

## 2018-03-19 RX ADMIN — PROPOFOL 200 MCG/KG/MIN: 10 INJECTION, EMULSION INTRAVENOUS at 07:38

## 2018-03-19 RX ADMIN — DEXAMETHASONE SODIUM PHOSPHATE 4 MG: 4 INJECTION, SOLUTION INTRAMUSCULAR; INTRAVENOUS at 07:43

## 2018-03-19 RX ADMIN — CEFAZOLIN SODIUM 3 G: 1 INJECTION, POWDER, FOR SOLUTION INTRAMUSCULAR; INTRAVENOUS at 07:34

## 2018-03-19 RX ADMIN — MIDAZOLAM HYDROCHLORIDE 2 MG: 1 INJECTION, SOLUTION INTRAMUSCULAR; INTRAVENOUS at 07:33

## 2018-03-19 RX ADMIN — KETOROLAC TROMETHAMINE 30 MG: 30 INJECTION, SOLUTION INTRAMUSCULAR at 07:43

## 2018-03-19 RX ADMIN — FENTANYL CITRATE 100 MCG: 50 INJECTION INTRAMUSCULAR; INTRAVENOUS at 09:44

## 2018-03-19 RX ADMIN — FENTANYL CITRATE 50 MCG: 50 INJECTION, SOLUTION INTRAMUSCULAR; INTRAVENOUS at 10:14

## 2018-03-19 RX ADMIN — PROPOFOL 200 MG: 10 INJECTION, EMULSION INTRAVENOUS at 07:35

## 2018-03-19 RX ADMIN — BUPIVACAINE HYDROCHLORIDE 23 ML: 2.5 INJECTION, SOLUTION EPIDURAL; INFILTRATION; INTRACAUDAL; PERINEURAL at 09:06

## 2018-03-19 RX ADMIN — LIDOCAINE HYDROCHLORIDE 80 MG: 20 INJECTION, SOLUTION INFILTRATION; PERINEURAL at 07:35

## 2018-03-19 RX ADMIN — SODIUM CHLORIDE, SODIUM LACTATE, POTASSIUM CHLORIDE, AND CALCIUM CHLORIDE: 600; 310; 30; 20 INJECTION, SOLUTION INTRAVENOUS at 09:05

## 2018-03-19 RX ADMIN — SODIUM CHLORIDE, SODIUM LACTATE, POTASSIUM CHLORIDE, AND CALCIUM CHLORIDE: 600; 310; 30; 20 INJECTION, SOLUTION INTRAVENOUS at 07:25

## 2018-03-19 RX ADMIN — EPINEPHRINE 9 ML: 1 INJECTION, SOLUTION INTRAMUSCULAR; SUBCUTANEOUS at 09:07

## 2018-03-19 RX ADMIN — FENTANYL CITRATE 50 MCG: 50 INJECTION, SOLUTION INTRAMUSCULAR; INTRAVENOUS at 10:29

## 2018-03-19 RX ADMIN — FENTANYL CITRATE 50 MCG: 50 INJECTION, SOLUTION INTRAMUSCULAR; INTRAVENOUS at 07:33

## 2018-03-19 ASSESSMENT — LIFESTYLE VARIABLES: TOBACCO_USE: 1

## 2018-03-19 NOTE — PROGRESS NOTES
PACU Transfer Note    Gregg Aguayo was transferred to DSU via cart.  Equipment used for transport:  none.  Accompanied by:  Hiral GROVER RN    PACU Respiratory Event Documentation     1) Episodes of Apnea greater than or equal to 10 seconds: no    2) Bradypnea - less than 8 breaths per minute: no    3) Pain score on 0 to 10 scale: 4    4) Pain-sedation mismatch (yes or no): no    5) Repeated 02 desaturation less than 90% (yes or no): yes, placed on 2 L O2 NC    Anesthesia notified? (yes or no): no    Any of the above events occuring repeatedly in separate 30 minute intervals may be considered recurrent PACU respiratory events.    Patient stable and meets phase 1 discharge criteria for transport from PACU.

## 2018-03-19 NOTE — OR NURSING
Pt completed crutch fitting and training with PT prior to discharge.  No additional questions at this time. Fina Rodriguez RN on 3/19/2018 at 12:14 PM

## 2018-03-19 NOTE — INTERVAL H&P NOTE
History and Physical Update    The history and physical has been reviewed and the patient's left knee has been examined.  There are no interim changes to thepatient's history or physical condition.  He is ready to proceed with planned procedure.    Kwabena Patricia D.O.

## 2018-03-19 NOTE — H&P (VIEW-ONLY)
[facility]  [address]  [place] [id number] [phone]    PRE-OP EVALUATION:  Today's date: 3/8/2018    Gregg Aguayo (: 1965) presents for pre-operative evaluation assessment as requested by Dr. Patricia.  He requires evaluation and anesthesia risk assessment prior to undergoing surgery/procedure for treatment of Left Knee Arthroscopy .    Proposed Surgery/ Procedure: Left knee arthroscopy  Date of Surgery/ Procedure: 3/19/2018  Hospital/Surgical Facility: [facility]   Fax number for surgical facility: [phone]  Primary Physician: Reinier Ritchie  Type of Anesthesia Anticipated: General    Patient has a Health Care Directive or Living Will:  NO    1. NO - Do you have a history of heart attack, stroke, stent, bypass or surgery on an artery in the head, neck, heart or legs?  2. NO - Do you ever have any pain or discomfort in your chest?  3. NO - Do you have a history of  Heart Failure?  4. NO - Are you troubled by shortness of breath when: walking on the level, up a slight hill or at night?  5. NO - Do you currently have a cold, bronchitis or other respiratory infection?  6. NO - Do you have a cough, shortness of breath or wheezing?  7. NO - Do you sometimes get pains in the calves of your legs when you walk?  8. NO - Do you or anyone in your family have previous history of blood clots?  9. NO - Do you or does anyone in your family have a serious bleeding problem such as prolonged bleeding following surgeries or cuts?  10. NO - Have you ever had problems with anemia or been told to take iron pills?  11. NO - Have you had any abnormal blood loss such as black, tarry or bloody stools, or abnormal vaginal bleeding?  12. NO - Have you ever had a blood transfusion?  13. NO - Have you or any of your relatives ever had problems with anesthesia?    14. YES - Do you have sleep apnea, excessive snoring or daytime drowsiness? -- Uses CPAP regularly.      15. NO - Do you have any prosthetic heart valves?  16. NO - Do you have prosthetic joints?  17. NO - Is there any chance that you may be pregnant?      HPI:       ICD-10-CM    1. Preop general physical exam Z01.818    2. Acute pain of left knee M25.562    3. History of arthroscopic knee surgery Z98.890    4. AILEEN on CPAP G47.33     Z99.89    5. Polymyalgia rheumatica (H) M35.3    6. Tobacco abuse Z72.0      HPI related to upcoming procedure: Patient has been getting more significant pain in his left knee.  Waking up at night due to pain.  States it will lock and pop on him.  Has a lot of problems going downstairs some problems going up stairs.  Has had arthroscopic knee surgery on his left knee in the past.  Very rarely using ibuprofen.  Rarely, at night using tramadol.    Sleep apnea, uses CPAP nightly.  Finds very helpful.  He will bring it to his surgery.    Polymyalgia rheumatica, doing pretty well at this time.  He has not been needing to use methylprednisolone, therefore has not been using metformin.  States that he has some aches and pains but overall he is able to work through them.    Tobacco abuse, ongoing.  He is working to quit smoking.  Has been cutting down.  Now down to about half pack per day.  Cessation options discussed.  He will continue to cut down on his own.    MEDICAL HISTORY:     Patient Active Problem List    Diagnosis Date Noted     Psychosexual dysfunction with inhibited sexual excitement 01/29/2018     Priority: Medium     Esophageal reflux 01/29/2018     Priority: Medium     Tobacco abuse 01/29/2018     Priority: Medium     Overview:   IMO Update 10/11       Acute pain of left knee 01/11/2018     Priority: Medium     Fall at home, initial encounter 01/11/2018     Priority: Medium     History of arthroscopic knee surgery 01/11/2018     Priority: Medium     Knee effusion, left 01/11/2018     Priority: Medium     Left medial knee pain 01/11/2018     Priority: Medium     Left carpal  tunnel syndrome 12/21/2017     Priority: Medium     History of carpal tunnel surgery of left wrist 12/21/2017     Priority: Medium     S/P cubital tunnel release 12/21/2017     Priority: Medium     History of carpal tunnel surgery of right wrist 11/22/2017     Priority: Medium     AILEEN on CPAP 11/20/2017     Priority: Medium     Dermatomyositis (H) 10/12/2017     Priority: Medium     Cubital tunnel syndrome on left 09/25/2017     Priority: Medium     Bilateral leg edema 07/05/2017     Priority: Medium     Polymyalgia rheumatica (H) 11/11/2016     Priority: Medium     Arthralgia of multiple joints 10/26/2016     Priority: Medium     Hypertrophic and atrophic condition of skin 06/07/2012     Priority: Medium     Contact dermatitis and eczema 09/20/2011     Priority: Medium      Past Medical History:   Diagnosis Date     Bilateral carpal tunnel syndrome     9/21/2017     Carpal tunnel syndrome of left wrist     12/21/2017     Condition influencing health status     No Comments Provided     Lesion of left ulnar nerve     9/25/2017     Other specified postprocedural states     11/22/2017     Other specified postprocedural states     12/21/2017     Other specified postprocedural states     12/21/2017     Past Surgical History:   Procedure Laterality Date     APPENDECTOMY OPEN      2005,Laparoscopic for acute appendicitis     ATTEMPTED ARTHROSCOPY      knee ? left     COLONOSCOPY      1/29/2014,Tubular adenoma of rectum - follow up 5 years     OTHER SURGICAL HISTORY      11/22/2017,146745,OTHER,Right     OTHER SURGICAL HISTORY      12/21/2017,[id number],OTHER,Left     OTHER SURGICAL HISTORY      12/21/2017,[id number],OTHER,Left     Current Outpatient Prescriptions   Medication Sig Dispense Refill     metFORMIN (GLUCOPHAGE) 500 MG tablet Take 500 mg by mouth       acetaminophen (TYLENOL) 650 MG CR tablet Take up to 4 tablet daily for pain as needed       furosemide (LASIX) 20 MG tablet Take 20 mg by mouth daily as needed For leg  swelling-limit use. Salt restriction.       gabapentin (NEURONTIN) 100 MG capsule Take 100-200 mg by mouth 4 times daily as needed For burning/shooting nerve pain       methylPREDNISolone (MEDROL) 4 MG tablet Take 2-8 mg by mouth daily With a meal - wean dose every 2 to 4 weeks - for PMR       omeprazole (PRILOSEC) 40 MG capsule Take 40 mg by mouth daily       traMADol (ULTRAM) 50 MG tablet Take 50 mg by mouth 2 times daily as needed for pain       varenicline (CHANTIX) 1 MG tablet Take 1 mg by mouth 2 times daily (with meals) Quit smoking       OTC products: no recent use of OTC ASA, NSAIDS or Steroids    No Known Allergies   Latex Allergy: NO    Social History   Substance Use Topics     Smoking status: Current Every Day Smoker     Packs/day: 0.50     Types: Cigarettes     Smokeless tobacco: Never Used     Alcohol use 0.0 oz/week      Comment: Alcoholic Drinks/day: 1-2 drinks every other day     History   Drug Use No       REVIEW OF SYSTEMS:   Constitutional, neuro, ENT, endocrine, pulmonary, cardiac, gastrointestinal, genitourinary, musculoskeletal, integument and psychiatric systems are negative, except as otherwise noted.    EXAM:   /86 (BP Location: Right arm, Patient Position: Sitting, Cuff Size: Adult Regular)  Pulse 60  Temp 97.9  F (36.6  C) (Oral)  Wt 292 lb (132.5 kg)  SpO2 98%  BMI 41.31 kg/m2    GENERAL APPEARANCE: healthy, alert and no distress     EYES: EOMI,  PERRL     HENT: ear canals and TM's normal and nose and mouth without ulcers or lesions     NECK: no adenopathy, no asymmetry, masses, or scars and thyroid normal to palpation     RESP: lungs clear to auscultation - no rales, rhonchi or wheezes     CV: regular rates and rhythm, normal S1 S2, no S3 or S4 and no murmur, click or rub     ABDOMEN:  soft, nontender, no HSM or masses and bowel sounds normal     MS: extremities normal- no gross deformities noted, no evidence of inflammation in joints, FROM in all extremities.     SKIN: no  suspicious lesions or rashes     NEURO: Normal strength and tone, sensory exam grossly normal, mentation intact and speech normal     PSYCH: mentation appears normal. and affect normal/bright     LYMPHATICS: No cervical adenopathy    DIAGNOSTICS:   EKG 11/20/2017 - EKG interpretation  normal sinus rhythm.  Rate 65.  Normal axis.  Normal [place] and QRS indices.  Sinus arrhythmia noted.  Normal ST segments.  Normal EKG.  Compared to previous tracing dated 11/2/2012, bradycardia no longer noted  Reinier Ritchie MD    Recent Labs   Lab Test  06/29/17   0851  10/07/16   1059  09/12/16   1656  05/04/16   1009   HGB  16.1   --   15.8   --    PLT  215   --   213   --    NA   --   139   --   138   POTASSIUM   --   4.1   --   4.2   CR   --   0.83   --   0.84     1/19/2018 - Exam: MR KNEE LEFT WO     History: Acute pain of left knee     Technique: Sagittal, coronal and axial images were obtained with combination of proton density, T2 and T1 weighted sequences.     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 There is probably a tiny bone infarct in the medial tibial plateau as well.     Anterior and posterior cruciate ligaments are intact as are medial and lateral collateral ligaments and the extensor mechanism. There is no medial meniscal tear.     The lateral meniscus is abnormal. There is globular increased signal intensity within the anterior horn and anterior body of the lateral meniscus. This communicates with the superior articular surface. There is lateral extrusion of the meniscus.     There is full-thickness articular cartilage loss over the lateral tibial plateau especially posteriorly with prominent associated marrow edema. No linear fracture line is seen. Cartilage over the lateral femoral condyle is better preserved.     There is mild narrowing articular cartilage in the medial joint space without full-thickness loss. There is mild thinning of cartilage over the posterior  patella without full-thickness cartilage loss. Small to moderate-sized joint effusion is seen. There   is no popliteal cyst.     There is nonspecific subcutaneous edema over the anterior knee with small bursal fluid collection anterior to the intact patellar tendon.           Impression:  1. Tear through the anterior horn and body of the lateral meniscus with some lateral extrusion of the meniscus.  2. Prominent articular cartilage loss over the posterior lateral tibial plateau with prominent marrow edema. This may be reactive marrow edema associated with the articular cartilage loss. It could also represent a bone bruise. No linear fracture line is   seen.  3. Fluid collection anterior to the patellar tendon, probably within a bursa. There is some surrounding soft tissue edema.     Electronically Signed By: Roseline Grissom M.D. on 1/19/2018 7:53 PM    IMPRESSION:   Reason for surgery/procedure: left knee pain  Diagnosis/reason for consult: pre-operative cardiopulmonary risk stratification    The proposed surgical procedure is considered INTERMEDIATE risk.    REVISED CARDIAC RISK INDEX  The patient has the following serious cardiovascular risks for perioperative complications such as (MI, PE, VFib and 3  AV Block):  No serious cardiac risks  INTERPRETATION: 0 risks: Class I (very low risk - 0.4% complication rate)    The patient has the following additional risks for perioperative complications:  No identified additional risks      ICD-10-CM    1. Preop general physical exam Z01.818    2. Acute pain of left knee M25.562    3. History of arthroscopic knee surgery Z98.890    4. AILEEN on CPAP G47.33     Z99.89    5. Polymyalgia rheumatica (H) M35.3    6. Tobacco abuse Z72.0        RECOMMENDATIONS:       Obstructive Sleep Apnea (or suspected sleep apnea)  Patient is to bring their home CPAP with them on the day of surgery      --Patient is to take all scheduled medications on the day of surgery EXCEPT for  modifications listed below.    APPROVAL GIVEN to proceed with proposed procedure, without further diagnostic evaluation       Signed Electronically by: Reinier Ritchie MD    Copy of this evaluation report is provided to requesting physician.    [facility] Preop Guidelines

## 2018-03-19 NOTE — IP AVS SNAPSHOT
MRN:[id number]                      After Visit Summary   3/19/2018    Gregg Aguayo    MRN: [id number]           Thank you!     Thank you for choosing [facility] for your care. Our goal is always to provide you with excellent care. Hearing back from our patients is one way we can continue to improve our services. Please take a few minutes to complete the written survey that you may receive in the mail after you visit with us. Thank you!        Patient Information     Date Of Birth          1965        About your hospital stay     You were admitted on:  March 19, 2018 You last received care in the:  [facility]    You were discharged on:  March 19, 2018       Who to Call     For medical emergencies, please call 911.  For non-urgent questions about your medical care, please call your primary care provider or clinic, [phone]  For questions related to your surgery, please call your surgery clinic        Attending Provider     Provider Specialty    Kwabena Patricia, DO Orthopaedic Surgery       Primary Care Provider Office Phone # Fax #    Reinier Ritchie [phone]      After Care Instructions      Diet as Tolerated       Return to diet before surgery, unless instructed otherwise.            Discharge Instructions       Review outpatient procedure discharge instructions with patient as directed by Provider            Dressing Change        Daily and as needed            Ice to affected area       Ice pack to surgical site every 15 minutes per hour for 24 hours            No Alcohol       For 24 hours post procedure            No driving or operating machinery        until the day after procedure            Return to clinic       Return to clinic in 2 weeks            Weight bearing - Partial                 Pending Results     No orders found from 3/17/2018 to 3/20/2018.            Admission Information     Date & Time Provider Department Dept. Phone     "3/19/2018 Dontadavid [facility]abenaDO [facility] [phone]      Your Vitals Were     Blood Pressure Temperature Respirations Pulse Oximetry          126/77 97  F (36.1  C) 12 98%        swiftQueuehart Information     edelight lets you send messages to your doctor, view your test results, renew your prescriptions, schedule appointments and more. To sign up, go to [email or web address] . Click on \"Log in\" on the left side of the screen, which will take you to the Welcome page. Then click on \"Sign up Now\" on the right side of the page.     You will be asked to enter the access code listed below, as well as some personal information. Please follow the directions to create your username and password.     Your access code is: [email or web address]  Expires: 2018  5:49 PM     Your access code will  in 90 days. If you need help or a new code, please call your [place] clinic or [phone].        Care EveryWhere ID     This is your Care EveryWhere ID. This could be used by other organizations to access your [place] medical records  [id number]        Equal Access to Services     KEITH BIRD AH: Hadbob Valle, wamarlon yousif, qamonique kaalsun renner, stephen farnsworth. So [facility] [phone].    ATENCIÓN: Si habla español, tiene a norris disposición servicios gratuitos de asistencia lingüística. Fay al [phone].    We comply with applicable federal civil rights laws and [place] laws. We do not discriminate on the basis of race, color, national origin, age, disability, sex, sexual orientation, or gender identity.               Review of your medicines      START taking        Dose / Directions    HYDROcodone-acetaminophen 5-325 MG per tablet   Commonly known as:  NORCO   Used for:  S/P left knee arthroscopy        Dose:  1-2 tablet   Take 1-2 tablets by mouth every 6 hours as needed for other (Moderate to Severe Pain)   Quantity:  30 tablet   Refills:  0       "   CONTINUE these medicines which have NOT CHANGED        Dose / Directions    acetaminophen 650 MG CR tablet   Commonly known as:  TYLENOL        Take up to 4 tablet daily for pain as needed   Refills:  0       furosemide 20 MG tablet   Commonly known as:  LASIX        Dose:  20 mg   Take 20 mg by mouth daily as needed For leg swelling-limit use. Salt restriction.   Refills:  0       gabapentin 100 MG capsule   Commonly known as:  NEURONTIN        Dose:  100-200 mg   Take 100-200 mg by mouth 4 times daily as needed For burning/shooting nerve pain   Refills:  0       metFORMIN 500 MG tablet   Commonly known as:  GLUCOPHAGE        Dose:  500 mg   Take 500 mg by mouth   Refills:  0       methylPREDNISolone 4 MG tablet   Commonly known as:  MEDROL        Dose:  2-8 mg   Take 2-8 mg by mouth daily With a meal - wean dose every 2 to 4 weeks - for PMR   Refills:  0       omeprazole 40 MG capsule   Commonly known as:  priLOSEC        Dose:  40 mg   Take 40 mg by mouth daily   Refills:  0       traMADol 50 MG tablet   Commonly known as:  ULTRAM        Dose:  50 mg   Take 50 mg by mouth 2 times daily as needed for pain   Refills:  0       varenicline 1 MG tablet   Commonly known as:  CHANTIX        Dose:  1 mg   Take 1 mg by mouth 2 times daily (with meals) Quit smoking   Refills:  0            Where to get your medicines      Some of these will need a paper prescription and others can be bought over the counter. Ask your nurse if you have questions.     Bring a paper prescription for each of these medications     HYDROcodone-acetaminophen 5-325 MG per tablet                Protect others around you: Learn how to safely use, store and throw away your medicines at www.disposemymeds.org.        Information about OPIOIDS     PRESCRIPTION OPIOIDS: WHAT YOU NEED TO KNOW    Prescription opioids can be used to help relieve moderate to severe pain and are often prescribed following a surgery or injury, or for certain health  conditions. These medications can be an important part of treatment but also come with serious risks. It is important to work with your health care provider to make sure you are getting the safest, most effective care.    WHAT ARE THE RISKS AND SIDE EFFECTS OF OPIOID USE?  Prescription opioids carry serious risks of addiction and overdose, especially with prolonged use. An opioid overdose, often marked by slowed breathing can cause sudden death. The use of prescription opioids can have a number of side effects as well, even when taken as directed:      Tolerance - meaning you might need to take more of a medication for the same pain relief    Physical dependence - meaning you have symptoms of withdrawal when a medication is stopped    Increased sensitivity to pain    Constipation    Nausea, vomiting, and dry mouth    Sleepiness and dizziness    Confusion    Depression    Low levels of testosterone that can result in lower sex drive, energy, and strength    Itching and sweating    RISKS ARE GREATER WITH:    History of drug misuse, substance use disorder, or overdose    Mental health conditions (such as depression or anxiety)    Sleep apnea    Older age (65 years or older)    Pregnancy    Avoid alcohol while taking prescription opioids.   Also, unless specifically advised by your health care provider, medications to avoid include:    Benzodiazepines (such as Xanax or Valium)    Muscle relaxants (such as Soma or Flexeril)    Hypnotics (such as Ambien or Lunesta)    Other prescription opioids    KNOW YOUR OPTIONS:  Talk to your health care provider about ways to manage your pain that do not involve prescription opioids. Some of these options may actually work better and have fewer risks and side effects:    Pain relievers such as acetaminophen, ibuprofen, and naproxen    Some medications that are also used for depression or seizures    Physical therapy and exercise    Cognitive behavioral therapy, a psychological,  goal-directed approach, in which patients learn how to modify physical, behavioral, and emotional triggers of pain and stress    IF YOU ARE PRESCRIBED OPIOIDS FOR PAIN:    Never take opioids in greater amounts or more often than prescribed    Follow up with your primary health care provider and work together to create a plan on how to manage your pain.    Talk about ways to help manage your pain that do not involve prescription opioids    Talk about all concerns and side effects    Help prevent misuse and abuse    Never sell or share prescription opioids    Never use another person's prescription opioids    Store prescription opioids in a secure place and out of reach of others (this may include visitors, children, friends, and family)    Visit www.cdc.gov/drugoverdose to learn about risks of opioid abuse and overdose    If you believe you may be struggling with addiction, tell your health care provider and ask for guidance or call [facility]'s National Helpline at 9-540-884-HELP    LEARN MORE / www.cdc.gov/drugoverdose/prescribing/guideline.html    Safely dispose of unused prescription opioids: Find your local drug take-back programs and more information about the importance of safe disposal at www.doseofreality.[place].gov             Medication List: This is a list of all your medications and when to take them. Check marks below indicate your daily home schedule. Keep this list as a reference.      Medications           Morning Afternoon Evening Bedtime As Needed    acetaminophen 650 MG CR tablet   Commonly known as:  TYLENOL   Take up to 4 tablet daily for pain as needed                                furosemide 20 MG tablet   Commonly known as:  LASIX   Take 20 mg by mouth daily as needed For leg swelling-limit use. Salt restriction.                                gabapentin 100 MG capsule   Commonly known as:  NEURONTIN   Take 100-200 mg by mouth 4 times daily as needed For burning/shooting nerve pain                                 HYDROcodone-acetaminophen 5-325 MG per tablet   Commonly known as:  NORCO   Take 1-2 tablets by mouth every 6 hours as needed for other (Moderate to Severe Pain)   Last time this was given:  1 tablet on 3/19/2018 10:14 AM                                metFORMIN 500 MG tablet   Commonly known as:  GLUCOPHAGE   Take 500 mg by mouth                                methylPREDNISolone 4 MG tablet   Commonly known as:  MEDROL   Take 2-8 mg by mouth daily With a meal - wean dose every 2 to 4 weeks - for PMR                                omeprazole 40 MG capsule   Commonly known as:  priLOSEC   Take 40 mg by mouth daily                                traMADol 50 MG tablet   Commonly known as:  ULTRAM   Take 50 mg by mouth 2 times daily as needed for pain                                varenicline 1 MG tablet   Commonly known as:  CHANTIX   Take 1 mg by mouth 2 times daily (with meals) Quit smoking

## 2018-03-19 NOTE — IP AVS SNAPSHOT
[facility]    [address]    [place] [address]    Phone:  [phone]    Fax:  [phone]                                       After Visit Summary   3/19/2018    Gregg Aguayo    MRN: [id number]           After Visit Summary Signature Page     I have received my discharge instructions, and my questions have been answered. I have discussed any challenges I see with this plan with the nurse or doctor.    ..........................................................................................................................................  Patient/Patient Representative Signature      ..........................................................................................................................................  Patient Representative Print Name and Relationship to Patient    ..................................................               ................................................  Date                                            Time    ..........................................................................................................................................  Reviewed by Signature/Title    ...................................................              ..............................................  Date                                                            Time

## 2018-03-19 NOTE — OP NOTE
Pre-op diagnosis: 1. Left knee torn lateral meniscus, anterior horn                               2. Left knee osteoarthritis, chondromalacia    Postop diagnosis: 1. Left knee full thickness tear anterior horn lateral meniscus                                2. Chondromalacia grade 2 MFC, grade 1 MTP, grade 2 LFC, grade 1-2 LTP, grade 2-3 patella apex with                                             Fissuring          3. Left knee synovitis    Procedure: 1. Left knee arthroscopy                        Partial lateral meniscectomy, anterior horn, @ 10% removed                        Chondroplasty medial, lateral and patella femoral surfaces                                                        Surgeon: Kwabena Patricia D.O.    Assist: Staff    Anesthesia: GENERAL    IV fluids:  1000 ml    Estimated blood loss:5 ml    Urine output: 0 ml    Tourniquet time: 0 min    Complications: none    Indications for surgery:      This 52 year old male was seen in the past for left knee problems. Noted to have areas of surface cartilage wear and tear of the lateral meniscus anterior horn region. The patient was seen in the holding area. The site was marked. Plan for probable partial lateral meniscectomy and chondroplasty as needed.    Operative procedure:     The patient was taken to the operating room.  Preoperative antibiotics administered.  A general anesthesia was performed.  A well-padded tourniquet was placed high on the operative leg and set for 300 mm Hg.  The leg poole was positioned.  The knee and leg was prepped and draped in as sterile fashion.  Landmarks were identified.  Timeout was performed.  The knee was distended with saline.  The arthroscope was inserted through a anterior lateral portal. The joint was further distended.    Initial inspection demonstrated irregular undersurface of the patella with fissuring. The patella contacted the trochlea at @ 30 deg of knee flexion  The medial gutter was clear.    Medial  joint space inspection:   The medial joint space was entered and a probe established anterior medially above the meniscus under direct visualization.  The joint surfaces showed delamination and fissuring of the MFC with mostly grade 2 changes and fissuring of the weight bearing surface.  The medial meniscus demonstrated intact with no tear or detachment. A chondroplasty was performed.    Notch space inspection:  The anterior cruciate ligament was intact    Lateral joint space inspection:  Inspection of joint surfaces showed irregularity of the LFC.  The lateral meniscus was torn along the anterior horn.  A horizontal tear extending to the superior surface. The remaining portion was attached.   A partial meniscectomy was performed removing the torn anterior horn portion and contouring it with the remainder of the meniscus.  Contoured to @ the 3 o'clock position.  The posterior horn was intact. The popliteal hiatus was visualized.  A chondroplasty of the surfaces was done removing loose edges.  Approximately 10 % of the meniscus had been removed.   The lateral gutter was clear.    The patella-femoral articulation was inspected.  The surfaces showed fissuring at the apex and a chondroplasty was done.     A thorough irrigation of the joint was performed and excess fluid was removed.  Instruments were taken out.  Portal sites were closed with interrupted nylon.  The knee was aspirated and injected with a combination of Sensorcaine and Duramorph.  Portal sites were injected.  Sterile bandage was applied and the patient went to recovery room stable.  Final sponge, needle and instrument counts were correct.    Plan to discharge the patient home.  Discharge instructions provided.  Resume medications as listed on the discharge instruction sheet.  Follow up in the clinic as directed.    Kwabena Patricia D.O.  Orthopedic Surgeon

## 2018-03-19 NOTE — ANESTHESIA PREPROCEDURE EVALUATION
Anesthesia Evaluation     .             ROS/MED HX    ENT/Pulmonary:     (+)sleep apnea, tobacco use, Current use uses CPAP , . .    Neurologic:  - neg neurologic ROS     Cardiovascular: Comment: Venous insufficiency     (+) -Peripheral Vascular Disease-- Non Symptomatic and Other, --. : . . . :. .       METS/Exercise Tolerance:     Hematologic:  - neg hematologic  ROS       Musculoskeletal:   (+) arthritis, , , other musculoskeletal- Polymyalgia rheumatica       GI/Hepatic:     (+) GERD Asymptomatic on medication,       Renal/Genitourinary:  - ROS Renal section negative       Endo:     (+) Obesity, .      Psychiatric:  - neg psychiatric ROS       Infectious Disease:  - neg infectious disease ROS       Malignancy:      - no malignancy   Other:    - neg other ROS                 Physical Exam  Normal systems: pulmonary    Airway   Mallampati: III  TM distance: >3 FB  Neck ROM: full    Dental   (+) other  Comment: Temporary molar crown lower right, good fit    Cardiovascular   Rhythm and rate: regular and normal      Pulmonary                     Anesthesia Plan      History & Physical Review      ASA Status:  3 .    NPO Status:  > 8 hours    Plan for General and LMA (proceed to ETT prn) with Intravenous and Propofol induction. Maintenance will be Inhalation and Balanced.    PONV prophylaxis:  Ondansetron (or other 5HT-3) and Dexamethasone or Solumedrol       Postoperative Care  Postoperative pain management:  IV analgesics.      Consents  Anesthetic plan, risks, benefits and alternatives discussed with:  Patient..                          .

## 2018-03-19 NOTE — ANESTHESIA POSTPROCEDURE EVALUATION
Patient: Gregg Aguayo    Procedure(s):  Left Knee Arthroscopy, Partial Meniscectomy, Chondroplasty - Wound Class: I-Clean    Diagnosis:left knee torn meniscus, chondromalacia  Diagnosis Additional Information: No value filed.    Anesthesia Type:  General, LMA    Note:  Anesthesia Post Evaluation    Patient location during evaluation: PACU  Patient participation: Able to fully participate in evaluation  Level of consciousness: awake and alert  Pain management: adequate  Airway patency: patent  Cardiovascular status: acceptable  Respiratory status: acceptable  Hydration status: acceptable  PONV: none     Anesthetic complications: None          Last vitals:  Vitals:    03/19/18 0950 03/19/18 1000 03/19/18 1015   BP:  119/87 126/77   Resp: 12     Temp: 97.3  F (36.3  C) 97  F (36.1  C)    SpO2:  98% 98%         Electronically Signed By: TREV Lafleur CRNA  March 19, 2018  10:32 AM

## 2018-04-03 ENCOUNTER — OFFICE VISIT (OUTPATIENT)
Dept: ORTHOPEDICS | Facility: OTHER | Age: 53
End: 2018-04-03
Attending: ORTHOPAEDIC SURGERY
Payer: COMMERCIAL

## 2018-04-03 VITALS
WEIGHT: 292 LBS | DIASTOLIC BLOOD PRESSURE: 88 MMHG | SYSTOLIC BLOOD PRESSURE: 130 MMHG | BODY MASS INDEX: 41.31 KG/M2 | TEMPERATURE: 97.4 F

## 2018-04-03 DIAGNOSIS — Z98.890 S/P ARTHROSCOPIC SURGERY OF LEFT KNEE: Primary | ICD-10-CM

## 2018-04-03 PROCEDURE — 99024 POSTOP FOLLOW-UP VISIT: CPT | Performed by: ORTHOPAEDIC SURGERY

## 2018-04-03 ASSESSMENT — PAIN SCALES - GENERAL: PAINLEVEL: NO PAIN (0)

## 2018-04-03 NOTE — MR AVS SNAPSHOT
"              After Visit Summary   4/3/2018    Gregg Aguayo    MRN: [id number]           Patient Information     Date Of Birth          1965        Visit Information        Provider Department      4/3/2018 2:30 PM Kwabena Patricia DO [facility]        Today's Diagnoses     S/P arthroscopic surgery of left knee    -  1       Follow-ups after your visit        Follow-up notes from your care team     Return in about 4 weeks (around 5/1/2018).      Your next 10 appointments already scheduled     May 01, 2018 11:15 AM CDT   Return Visit with Kwabena Patricia DO   [facility] ([facility])    [address]  [place] [address] [phone]              Who to contact     If you have questions or need follow up information about today's clinic visit or your schedule please contact [facility] directly at [phone].  Normal or non-critical lab and imaging results will be communicated to you by G[employer], letter or phone within 4 business days after the clinic has received the results. If you do not hear from us within 7 days, please contact the clinic through [employer] or phone. If you have a critical or abnormal lab result, we will notify you by phone as soon as possible.  Submit refill requests through [employer] or call your pharmacy and they will forward the refill request to us. Please allow 3 business days for your refill to be completed.          Additional Information About Your Visit        [employer] Information     [employer] lets you send messages to your doctor, view your test results, renew your prescriptions, schedule appointments and more. To sign up, go to [email or web address] . Click on \"Log in\" on the left side of the screen, which will take you to the Welcome page. Then click on \"Sign up Now\" on the right side of the page.     You will be asked to enter the access code listed below, as well as some personal " information. Please follow the directions to create your username and password.     Your access code is: [email or web address]  Expires: 2018  5:49 PM     Your access code will  in 90 days. If you need help or a new code, please call your [facility] or [phone].        Care EveryWhere ID     This is your Care EveryWhere ID. This could be used by other organizations to access your [place] medical records  [id number]        Your Vitals Were     Temperature BMI (Body Mass Index)                97.4  F (36.3  C) (Tympanic) 41.31 kg/m2           Blood Pressure from Last 3 Encounters:   18 130/88   18 130/86   18 138/86    Weight from Last 3 Encounters:   18 132.5 kg (292 lb)   18 132.5 kg (292 lb)   18 (P) 134.3 kg (296 lb)              Today, you had the following     No orders found for display       Primary Care Provider Office Phone # Fax #    Reinier Ritchie [phone]       [address] [facility] [address]        Equal Access to Services     [facility]: Hadii ck Valle, waurida benja, qarobertta kaalmada jud, stephen farnsworth. So [facility] [phone].    ATENCIÓN: Si habla español, tiene a norris disposición servicios gratuitos de asistencia lingüística. Llame al [phone].    We comply with applicable federal civil rights laws and [place] laws. We do not discriminate on the basis of race, color, national origin, age, disability, sex, sexual orientation, or gender identity.            Thank you!     Thank you for choosing [facility] AND [facility]  for your care. Our goal is always to provide you with excellent care. Hearing back from our patients is one way we can continue to improve our services. Please take a few minutes to complete the written survey that you may receive in the mail after your visit with us. Thank you!             Your Updated Medication List - Protect others around  you: Learn how to safely use, store and throw away your medicines at www.disposemymeds.org.          This list is accurate as of 4/3/18  3:12 PM.  Always use your most recent med list.                   Brand Name Dispense Instructions for use Diagnosis    acetaminophen 650 MG CR tablet    TYLENOL     Take up to 4 tablet daily for pain as needed        furosemide 20 MG tablet    LASIX     Take 20 mg by mouth daily as needed For leg swelling-limit use. Salt restriction.        gabapentin 100 MG capsule    NEURONTIN     Take 100-200 mg by mouth 4 times daily as needed For burning/shooting nerve pain        metFORMIN 500 MG tablet    GLUCOPHAGE     Take 500 mg by mouth        methylPREDNISolone 4 MG tablet    MEDROL     Take 2-8 mg by mouth daily With a meal - wean dose every 2 to 4 weeks - for PMR        omeprazole 40 MG capsule    priLOSEC     Take 40 mg by mouth daily        traMADol 50 MG tablet    ULTRAM     Take 50 mg by mouth 2 times daily as needed for pain        varenicline 1 MG tablet    CHANTIX     Take 1 mg by mouth 2 times daily (with meals) Quit smoking

## 2018-04-03 NOTE — NURSING NOTE
Patient is here for a post op on his left knee scope.  DOS: 3/19/18  Mandy Arreola LPN .......4/3/2018 2:32 PM

## 2018-04-03 NOTE — PROGRESS NOTES
PROGRESS NOTE    SUBJECTIVE:  Gregg Aguayo is here for recheck of a left knee.     HPI: 2 weeks after left knee arthroscopy with partial lateral meniscectomy and chondroplasty.  Patient relates he discontinued crutches about a week ago.  The knee is feeling better.  Occasional use of ice.  He walked about 3 miles today.  Tries to walk daily..    Review of Systems:  Constitutional: Denies constitutional problems    PFSH:  No change in information. See earlier PFSH questionnaire completed by the patient on initial visit.    OBJECTIVE:  /88 (BP Location: Right arm, Patient Position: Sitting, Cuff Size: Adult Large)  Temp 97.4  F (36.3  C) (Tympanic)  Wt 132.5 kg (292 lb)  BMI 41.31 kg/m2Body mass index is 41.31 kg/(m^2).  Patient is alert and orientated x3 and answers questions appropriately.    Left knee exam:   Portal sites are clean.  The medial suture is not present.  Mild soft tissue swelling around the knee.  Comfortable range of motion today.  The calf is supple.    ASSESSMENT:  2+ weeks after left knee arthroscopy with partial lateral meniscectomy and chondroplasty.  Surgical sites clean.  Appears to be improving after surgery.    PLAN:  Discussed surgery and reviewed arthroscopy films with the patient.  Continue ice to the affected area.  Suture removed today.  Gradually progress activities but avoid excessive squatting or bending over the next several weeks.  Continue weight loss.  Plan to recheck progress in a month.  Questions answered.    Kwabena Patricia D.O.  Orthopedic Surgeon    [facility]  [address] [place] [address]  Phone [phone]  Fax [phone]    4/3/2018

## 2018-04-05 DIAGNOSIS — M25.522 ELBOW PAIN, LEFT: Primary | ICD-10-CM

## 2018-04-09 DIAGNOSIS — T38.0X5A STEROID-INDUCED HYPERGLYCEMIA: Primary | ICD-10-CM

## 2018-04-09 DIAGNOSIS — R73.9 STEROID-INDUCED HYPERGLYCEMIA: Primary | ICD-10-CM

## 2018-04-09 DIAGNOSIS — M35.3 POLYMYALGIA RHEUMATICA (H): ICD-10-CM

## 2018-04-10 ENCOUNTER — OFFICE VISIT (OUTPATIENT)
Dept: ORTHOPEDICS | Facility: OTHER | Age: 53
End: 2018-04-10
Attending: ORTHOPAEDIC SURGERY
Payer: COMMERCIAL

## 2018-04-10 ENCOUNTER — HOSPITAL ENCOUNTER (OUTPATIENT)
Dept: GENERAL RADIOLOGY | Facility: OTHER | Age: 53
Discharge: HOME | End: 2018-04-10
Attending: ORTHOPAEDIC SURGERY | Admitting: ORTHOPAEDIC SURGERY
Payer: COMMERCIAL

## 2018-04-10 VITALS
BODY MASS INDEX: 40.88 KG/M2 | SYSTOLIC BLOOD PRESSURE: 130 MMHG | HEIGHT: 71 IN | WEIGHT: 292 LBS | HEART RATE: 64 BPM | DIASTOLIC BLOOD PRESSURE: 88 MMHG

## 2018-04-10 DIAGNOSIS — M25.522 ELBOW PAIN, LEFT: ICD-10-CM

## 2018-04-10 DIAGNOSIS — M70.22 OLECRANON BURSITIS, LEFT ELBOW: Primary | ICD-10-CM

## 2018-04-10 DIAGNOSIS — Z98.890 S/P CUBITAL TUNNEL RELEASE: ICD-10-CM

## 2018-04-10 PROBLEM — T38.0X5A STEROID-INDUCED HYPERGLYCEMIA: Status: ACTIVE | Noted: 2018-04-10

## 2018-04-10 PROBLEM — R73.9 STEROID-INDUCED HYPERGLYCEMIA: Status: ACTIVE | Noted: 2018-04-10

## 2018-04-10 PROCEDURE — 20605 DRAIN/INJ JOINT/BURSA W/O US: CPT | Mod: LT | Performed by: ORTHOPAEDIC SURGERY

## 2018-04-10 PROCEDURE — 25000125 ZZHC RX 250: Performed by: ORTHOPAEDIC SURGERY

## 2018-04-10 PROCEDURE — 99213 OFFICE O/P EST LOW 20 MIN: CPT | Mod: 25 | Performed by: ORTHOPAEDIC SURGERY

## 2018-04-10 PROCEDURE — 73080 X-RAY EXAM OF ELBOW: CPT | Mod: LT

## 2018-04-10 PROCEDURE — 25000128 H RX IP 250 OP 636: Performed by: ORTHOPAEDIC SURGERY

## 2018-04-10 RX ORDER — BETAMETHASONE SODIUM PHOSPHATE AND BETAMETHASONE ACETATE 3; 3 MG/ML; MG/ML
12 INJECTION, SUSPENSION INTRA-ARTICULAR; INTRALESIONAL; INTRAMUSCULAR; SOFT TISSUE ONCE
Status: COMPLETED | OUTPATIENT
Start: 2018-04-10 | End: 2018-04-10

## 2018-04-10 RX ORDER — GABAPENTIN 100 MG/1
100-200 CAPSULE ORAL 4 TIMES DAILY PRN
Qty: 360 CAPSULE | Refills: 3 | Status: SHIPPED | OUTPATIENT
Start: 2018-04-10 | End: 2019-09-10

## 2018-04-10 RX ADMIN — BETAMETHASONE SODIUM PHOSPHATE AND BETAMETHASONE ACETATE 12 MG: 3; 3 INJECTION, SUSPENSION INTRA-ARTICULAR; INTRALESIONAL; INTRAMUSCULAR at 10:33

## 2018-04-10 RX ADMIN — LIDOCAINE HYDROCHLORIDE 2 ML: 10 INJECTION, SOLUTION EPIDURAL; INFILTRATION; INTRACAUDAL; PERINEURAL at 10:33

## 2018-04-10 ASSESSMENT — PAIN SCALES - GENERAL: PAINLEVEL: NO PAIN (0)

## 2018-04-10 NOTE — MR AVS SNAPSHOT
"              After Visit Summary   4/10/2018    Gregg Aguayo    MRN: [id number]           Patient Information     Date Of Birth          1965        Visit Information        Provider Department      4/10/2018 9:45 AM Kwabena Patricia DO [facility]        Today's Diagnoses     Olecranon bursitis, left elbow    -  1    S/P cubital tunnel release           Follow-ups after your visit        Follow-up notes from your care team     Return if symptoms worsen or fail to improve.      Your next 10 appointments already scheduled     May 01, 2018 11:15 AM CDT   Return Visit with Kwabena Patricia DO   [facility] ([facility])    [address]  [place] [address] [phone]              Who to contact     If you have questions or need follow up information about today's clinic visit or your schedule please contact [facility] directly at [phone].  Normal or non-critical lab and imaging results will be communicated to you by [employer], letter or phone within 4 business days after the clinic has received the results. If you do not hear from us within 7 days, please contact the clinic through [employer] or phone. If you have a critical or abnormal lab result, we will notify you by phone as soon as possible.  Submit refill requests through [employer] or call your pharmacy and they will forward the refill request to us. Please allow 3 business days for your refill to be completed.          Additional Information About Your Visit        [employer] Information     [employer] lets you send messages to your doctor, view your test results, renew your prescriptions, schedule appointments and more. To sign up, go to [email or web address] . Click on \"Log in\" on the left side of the screen, which will take you to the Welcome page. Then click on \"Sign up Now\" on the right side of the page.     You will be asked to enter the access code listed " "below, as well as some personal information. Please follow the directions to create your username and password.     Your access code is: [email or web address]  Expires: 2018  5:49 PM     Your access code will  in 90 days. If you need help or a new code, please call your [facility] or [phone].        Care EveryWhere ID     This is your Care EveryWhere ID. This could be used by other organizations to access your [place] medical records  [id number]        Your Vitals Were     Pulse Height BMI (Body Mass Index)             64 1.791 m (5' 10.5\") 41.31 kg/m2          Blood Pressure from Last 3 Encounters:   04/10/18 130/88   18 130/88   18 130/86    Weight from Last 3 Encounters:   04/10/18 132.5 kg (292 lb)   18 132.5 kg (292 lb)   18 132.5 kg (292 lb)              We Performed the Following     Medium Joint/Bursa injection and/or drainage (Elbow, TM, Wrist, Ankle)        Primary Care Provider Office Phone # Fax #    Reinier Ritchie [phone] [address] [facility] [address]        Equal Access to Services     [facility] : Hadii ck damono Soomaali, waaxda luqadaha, qaybta kaalmada adeegyada, stephen farnsworth. So [facility] [phone].    ATENCIÓN: Si habla español, tiene a norris disposición servicios gratuitos de asistencia lingüística. Llame al [phone].    We comply with applicable federal civil rights laws and [place] laws. We do not discriminate on the basis of race, color, national origin, age, disability, sex, sexual orientation, or gender identity.            Thank you!     Thank you for choosing [facility]  for your care. Our goal is always to provide you with excellent care. Hearing back from our patients is one way we can continue to improve our services. Please take a few minutes to complete the written survey that you may receive in the mail after your visit with us. Thank you!      "        Your Updated Medication List - Protect others around you: Learn how to safely use, store and throw away your medicines at www.disposemymeds.org.          This list is accurate as of 4/10/18 10:17 AM.  Always use your most recent med list.                   Brand Name Dispense Instructions for use Diagnosis    acetaminophen 650 MG CR tablet    TYLENOL     Take up to 4 tablet daily for pain as needed        furosemide 20 MG tablet    LASIX     Take 20 mg by mouth daily as needed For leg swelling-limit use. Salt restriction.        gabapentin 100 MG capsule    NEURONTIN     Take 100-200 mg by mouth 4 times daily as needed For burning/shooting nerve pain        metFORMIN 500 MG tablet    GLUCOPHAGE     Take 500 mg by mouth        methylPREDNISolone 4 MG tablet    MEDROL     Take 2-8 mg by mouth daily With a meal - wean dose every 2 to 4 weeks - for PMR        omeprazole 40 MG capsule    priLOSEC     Take 40 mg by mouth daily        traMADol 50 MG tablet    ULTRAM     Take 50 mg by mouth 2 times daily as needed for pain        varenicline 1 MG tablet    CHANTIX     Take 1 mg by mouth 2 times daily (with meals) Quit smoking

## 2018-04-10 NOTE — PROGRESS NOTES
CHIEF COMPLAINT: Consult (left elbow)    HPI: This 52 year old male presents today with complaint of soft tissue swelling and fluctuance to the back of the left elbow.  Discomfort and some tightness with bending the elbow.  The patient had left elbow cubital tunnel surgery and left carpal tunnel surgery in December by Dr. Huang.  Over time he has noticed some swelling of the back of the elbow soft tissue.    REVIEW OF SYSTEMS:    The review of systems as documented in the HPI and on the intake questionnaire, completed by the patient 4/10/2018, have been reviewed by myself and the pertinent positives and negatives addressed.  The remainder of the complete review of systems was non-contributory.    (PFSH) PAST, FAMILY, and/or SOCIAL HISTORY:    PAST MEDICAL HISTORY:  Past Medical History:   Diagnosis Date     Bilateral carpal tunnel syndrome     9/21/2017     Carpal tunnel syndrome of left wrist     12/21/2017     Condition influencing health status     No Comments Provided     Lesion of left ulnar nerve     9/25/2017     Other specified postprocedural states     11/22/2017     Other specified postprocedural states     12/21/2017     Other specified postprocedural states     12/21/2017       PAST SURGICAL HISTORY:  Past Surgical History:   Procedure Laterality Date     APPENDECTOMY OPEN      2005,Laparoscopic for acute appendicitis     ARTHROSCOPY KNEE Left 3/19/2018    Procedure: ARTHROSCOPY KNEE;  Left Knee Arthroscopy, Partial Meniscectomy, Chondroplasty;  Surgeon: Kwabena Patricia, DO;  Location: [facility] OR     ATTEMPTED ARTHROSCOPY      knee ? left     COLONOSCOPY      1/29/2014,Tubular adenoma of rectum - follow up 5 years     RELEASE CARPAL TUNNEL Right     11/22/2017,[id number],OTHER,Right     RELEASE CARPAL TUNNEL Left     12/21/2017,[id number],OTHER,Left     RELEASE ULNAR NERVE (ELBOW)      12/21/2017,[id number],OTHER,Left       FAMILY HISTORY:  Family History   Problem Relation Age of Onset     Genetic Disorder Maternal  Grandfather      Genetic,Polymyalgia Rheumatica       Additional [facility] information documented on the intake form completed by the patient 4/10/2018 was reviewed by myself.    ALLERGIES:  No Known Allergies    CURRENT MEDICATIONS:    Current Outpatient Prescriptions on File Prior to Visit:  metFORMIN (GLUCOPHAGE) 500 MG tablet Take 500 mg by mouth   acetaminophen (TYLENOL) 650 MG CR tablet Take up to 4 tablet daily for pain as needed   furosemide (LASIX) 20 MG tablet Take 20 mg by mouth daily as needed For leg swelling-limit use. Salt restriction.   gabapentin (NEURONTIN) 100 MG capsule Take 100-200 mg by mouth 4 times daily as needed For burning/shooting nerve pain   methylPREDNISolone (MEDROL) 4 MG tablet Take 2-8 mg by mouth daily With a meal - wean dose every 2 to 4 weeks - for PMR   omeprazole (PRILOSEC) 40 MG capsule Take 40 mg by mouth daily   traMADol (ULTRAM) 50 MG tablet Take 50 mg by mouth 2 times daily as needed for pain   varenicline (CHANTIX) 1 MG tablet Take 1 mg by mouth 2 times daily (with meals) Quit smoking     No current facility-administered medications on file prior to visit.     PHYSICAL EXAM:   There were no vitals taken for this visit. There is no height or weight on file to calculate BMI.    General Appearance: Pleasant male in good appearance, mood and affect.  Alert and orientated times three (time, date and location).    Left elbow examination:  Examination demonstrates a scar along the medial cubital tunnel region that appears to be well-healed.  Patient has some general fluctuance of the soft tissue and bursa area over the left elbow olecranon with extension down along the proximal ulna.  Top to bottom length is about 8-10 cm.  Medial to lateral swelling is noted as well.  Side to side length is about 6 cm.  Some of it appears to be soft tissue thickening but there is some fluid collection under the skin in the bursa area.    Xray/MRI/MRA:  Radiographic images were independently reviewed  and discussed with the patient.   X-rays of the left elbow demonstrates the area of soft tissue swelling over the posterior elbow and olecranon seen best on the lateral view.    IMPRESSION:  Left elbow olecranon bursitis  History of left elbow surgery, cubital tunnel decompression of the ulnar nerve December 2017    PLAN:  Discussion included review of x-rays.  Recommendations include  Aspiration of fluid from the left olecranon bursa and injection of cortisone today.  Patient in agreement.     Procedure: Consent was obtained.  The back of the left elbow was prepped.  The elbow was slightly flexed.  I localized the skin with 2 mL Xylocaine 1% plain.  Using an 18-gauge needle I inserted it into the bursa sac on the back of the elbow and was able to remove about 8 mL of blood-tinged bursa fluid.  I washed out the site with a combination of Xylocaine and Celestone and injected 2 mL of Celestone and 2 mL Xylocaine into the area.  A soft pressure dressing was applied with Ace wrap.    Patient tolerated this well.  Recommend using the soft pressure dressing today and tomorrow.  Avoid pressure on the back of the elbow.  Avoid excessive flexion and extension the rest of the week.    Patient has an appointment to be seen the beginning of May.  Questions answered.    Kwabena Patricia D.O., F.A.O.A.O.  Orthopedic Surgeon    [address] [address]  Phone [phone]  Fax [phone]

## 2018-04-10 NOTE — NURSING NOTE
Patient is here for a consult on his left elbow pain.  Mandy Arreola LPN .......4/10/2018 9:26 AM   Prior to the start of the procedure and with procedural staff participation, I verbally confirmed the patient s identity using two indicators, relevant allergies, that the procedure was appropriate and matched the consent or emergent situation, and that the correct equipment/implants were available. Immediately prior to starting the procedure I conducted the Time Out with the procedural staff and re-confirmed the patient s name, procedure, and site/side. (The Joint Commission universal protocol was followed.)  Yes    Sedation (Moderate or Deep): None

## 2018-04-10 NOTE — TELEPHONE ENCOUNTER
Last office visit with Reinier Ritchie MD on 3-8-18, no future one with Reinier Ritchie MD at this time.      Mandy Prajapati LPN 4/10/2018 12:24 PM

## 2018-05-01 ENCOUNTER — OFFICE VISIT (OUTPATIENT)
Dept: ORTHOPEDICS | Facility: OTHER | Age: 53
End: 2018-05-01
Attending: ORTHOPAEDIC SURGERY
Payer: COMMERCIAL

## 2018-05-01 VITALS
DIASTOLIC BLOOD PRESSURE: 96 MMHG | SYSTOLIC BLOOD PRESSURE: 138 MMHG | WEIGHT: 292 LBS | BODY MASS INDEX: 40.88 KG/M2 | HEIGHT: 71 IN | HEART RATE: 68 BPM

## 2018-05-01 DIAGNOSIS — Z98.890 S/P ARTHROSCOPIC SURGERY OF LEFT KNEE: Primary | ICD-10-CM

## 2018-05-01 PROCEDURE — 99024 POSTOP FOLLOW-UP VISIT: CPT | Performed by: ORTHOPAEDIC SURGERY

## 2018-05-01 ASSESSMENT — PAIN SCALES - GENERAL: PAINLEVEL: NO PAIN (0)

## 2018-05-01 NOTE — MR AVS SNAPSHOT
"              After Visit Summary   2018    Gregg Aguayo    MRN: [id number]           Patient Information     Date Of Birth          1965        Visit Information        Provider Department      2018 11:15 AM Kwabena Patricia DO [facility]        Today's Diagnoses     S/P arthroscopic surgery of left knee    -  1       Follow-ups after your visit        Follow-up notes from your care team     Return if symptoms worsen or fail to improve.      Who to contact     If you have questions or need follow up information about today's clinic visit or your schedule please contact [facility] [place] [facility] directly at [phone].  Normal or non-critical lab and imaging results will be communicated to you by OnFarmhart, letter or phone within 4 business days after the clinic has received the results. If you do not hear from us within 7 days, please contact the clinic through OnFarmhart or phone. If you have a critical or abnormal lab result, we will notify you by phone as soon as possible.  Submit refill requests through [employer] or call your pharmacy and they will forward the refill request to us. Please allow 3 business days for your refill to be completed.          Additional Information About Your Visit        MyChart Information     [employer] lets you send messages to your doctor, view your test results, renew your prescriptions, schedule appointments and more. To sign up, go to [email or web address] . Click on \"Log in\" on the left side of the screen, which will take you to the Welcome page. Then click on \"Sign up Now\" on the right side of the page.     You will be asked to enter the access code listed below, as well as some personal information. Please follow the directions to create your username and password.     Your access code is: [email or web address]  Expires: 2018  5:49 PM     Your access code will  in 90 days. If you need help or a new code, please call your [place] clinic " "or [phone].        Care EveryWhere ID     This is your Care EveryWhere ID. This could be used by other organizations to access your [facility] medical records  [id number]        Your Vitals Were     Pulse Height BMI (Body Mass Index)             68 1.791 m (5' 10.5\") 41.31 kg/m2          Blood Pressure from Last 3 Encounters:   05/01/18 (!) 138/104   04/10/18 130/88   04/03/18 130/88    Weight from Last 3 Encounters:   05/01/18 132.5 kg (292 lb)   04/10/18 132.5 kg (292 lb)   04/03/18 132.5 kg (292 lb)              Today, you had the following     No orders found for display       Primary Care Provider Office Phone # Fax #    Reinier Ritchie [phone] [address] [facility] [address]        Equal Access to Services     [facility] : Hadii ck damono Soolesya, waaxda luqadaha, qaybta kaalmada ademarshayada, stephen barkley . So [facility] [phone].    ATENCIÓN: Si habla español, tiene a norris disposición servicios gratuitos de asistencia lingüística. Fay al [phone].    We comply with applicable federal civil rights laws and [place] laws. We do not discriminate on the basis of race, color, national origin, age, disability, sex, sexual orientation, or gender identity.            Thank you!     Thank you for choosing [facility]  for your care. Our goal is always to provide you with excellent care. Hearing back from our patients is one way we can continue to improve our services. Please take a few minutes to complete the written survey that you may receive in the mail after your visit with us. Thank you!             Your Updated Medication List - Protect others around you: Learn how to safely use, store and throw away your medicines at www.disposemymeds.org.          This list is accurate as of 5/1/18 11:29 AM.  Always use your most recent med list.                   Brand Name Dispense Instructions for use Diagnosis    " acetaminophen 650 MG CR tablet    TYLENOL     Take up to 4 tablet daily for pain as needed        furosemide 20 MG tablet    LASIX     Take 20 mg by mouth daily as needed For leg swelling-limit use. Salt restriction.        gabapentin 100 MG capsule    NEURONTIN    360 capsule    Take 1-2 capsules (100-200 mg) by mouth 4 times daily as needed    Polymyalgia rheumatica (H)       metFORMIN 500 MG tablet    GLUCOPHAGE    180 tablet    Take 1-2 tablets (500-1,000 mg) by mouth daily (with dinner)    Steroid-induced hyperglycemia       methylPREDNISolone 4 MG tablet    MEDROL     Take 2-8 mg by mouth daily With a meal - wean dose every 2 to 4 weeks - for PMR        omeprazole 40 MG capsule    priLOSEC     Take 40 mg by mouth daily        traMADol 50 MG tablet    ULTRAM     Take 50 mg by mouth 2 times daily as needed for pain        varenicline 1 MG tablet    CHANTIX     Take 1 mg by mouth 2 times daily (with meals) Quit smoking

## 2018-05-01 NOTE — NURSING NOTE
Rechecked patient's blood pressure and it was still higher than it should be.  Told patient that he should see his primary to talk about his blood pressures.  He stated that he was going to make an appointment to see Dr. Ritchie next week and would talk about his blood pressure at that time.  Mandy Arreola LPN .......5/1/2018 11:38 AM

## 2018-05-01 NOTE — PATIENT INSTRUCTIONS
Patient was instructed to follow up with his primary regarding his blood pressure.  Mandy Arreola LPN .......5/1/2018 11:43 AM

## 2018-05-01 NOTE — PROGRESS NOTES
"PROGRESS NOTE    SUBJECTIVE:  Gregg Aguayo is here for recheck of a left knee.     HPI: History of left knee arthroscopy on 3/19/2018.  Patient relates he is doing better after his last visit.  No significant pain to the knee.  Ambulates unassisted and walking about 5-6 miles a day.  Notices some swelling of the knee after certain activities.  Also feeling of some tightness with bending.    Review of Systems:  Constitutional: Denies constitutional problems    PFSH:  No change in information. See earlier PFSH questionnaire completed by the patient on initial visit.    OBJECTIVE:  BP (!) 138/104  Pulse 68  Ht 1.791 m (5' 10.5\")  Wt 132.5 kg (292 lb)  BMI 41.31 kg/m2Body mass index is 41.31 kg/(m^2).  Patient is alert and orientated x3 and answers questions appropriately.    Left  knee exam:   Portal sites are clean.  Mild left knee swelling.  Comfortable motion.  No pain with bending.  Ambulates unassisted.    ASSESSMENT:  6 weeks after left knee arthroscopy with partial lateral meniscectomy and chondroplasty.  Overall clinically progressing well  Residual left knee swelling    PLAN:  Recommendation to continue with low impact activities.  Ice the knee as needed  Discussed the use of over-the-counter medication such as Tylenol or ibuprofen for the knee.  Patient is comfortable monitoring the knee and will call back if needed.  Questions answered.    Kwabena Patricia D.O.  Orthopedic Surgeon    [facility]  [address] [place] [address]  Phone [phone]  Fax [phone]    5/1/2018              "

## 2018-07-13 RX ORDER — OMEPRAZOLE 40 MG/1
CAPSULE, DELAYED RELEASE ORAL
Qty: 90 CAPSULE | OUTPATIENT
Start: 2018-07-13

## 2018-07-17 ENCOUNTER — TELEPHONE (OUTPATIENT)
Dept: INTERNAL MEDICINE | Facility: OTHER | Age: 53
End: 2018-07-17

## 2018-07-17 NOTE — TELEPHONE ENCOUNTER
DJS:  Patient has an appointment for Blood Tests & Check Predizone, Lymes & other, on 8/30/2018 at 11:00 but would like a work in appointment sooner if at all possible.  Please call    Thank

## 2018-07-23 NOTE — PROGRESS NOTES
Patient Information     Patient Name  Gregg Aguayo MRN  [id number] Sex  Male   1965      Letter by Reinier Ritchie MD at      Author:  Reinier Ritchie MD Service:  (none) Author Type:  (none)    Filed:   Date of Service:   Status:  (Other)           Gregg Aguayo  [address]  [facility] [place] [address]          2018    Dear Mr. Aguayo:    Following are the tests completed during your last clinic visit.  The results of these tests are included below.      --> No fracture noted.  Proceed with MRI as ordered.      2018 - Study:XR KNEE 3 VIEWS LEFT    History:52 years Male Acute pain of left knee    Comparisons:None    Technique: 3 views    IMPRESSION: Mild soft tissue swelling anterior knee. Left knee otherwise normal.    Electronically Signed By: Lashonda Funez M.D. on 2018 1:16 PM    If you have any further questions or problems contact my office at  [phone]   --- or send TicketBox message --- otherwise schedule an appointment.    Clinic : [phone]  Appointment line: [phone]     Thank you,    Reinier Ritchie MD    Internal Medicine  [facility]     Reviewed and electronically signed by provider.

## 2018-07-23 NOTE — PROGRESS NOTES
Patient Information     Patient Name  Gregg Aguayo MRN  [id number] Sex  Male   1965      Letter by Reinier Ritchie MD at      Author:  Reinier Ritchie MD Service:  (none) Author Type:  (none)    Filed:   Encounter Date:  10/12/2017 Status:  (Other)           Gregg Aguayo  [address]  [facility] [address]          2017    Dear Mr. Aguayo:    Following are the tests completed during your last clinic visit.  The results of these tests are included below.      So far, labs are normal.  The send out labs to [facility] are still pending.    Results for orders placed or performed in visit on 10/12/17      SEDIMENTATION RATE      Result  Value Ref Range    SEDIMENTATION RATE        3 <21 mm/hr   CRP, inflammatory      Result  Value Ref Range    C-REACTIVE PROTEIN <1.000 <1.000 mg/dL   Hgb A1c      Result  Value Ref Range    HEMOGLOBIN A1C MONITORING (POCT) 5.3 4.0 - 6.2 %    ESTIMATED AVERAGE GLUCOSE  105 mg/dL         If you have any further questions or problems contact my office at  [phone]   --- or send [employer] message --- otherwise schedule an appointment.    Clinic : [phone]  Appointment line: [phone]     Thank you,    Reinier Ritchie MD    Internal Medicine  [facility]     Reviewed and electronically signed by provider.

## 2018-07-24 NOTE — PROGRESS NOTES
Patient Information     Patient Name  Gregg Aguayo MRN  [id number] Sex  Male   1965      Letter by Reinier Ritchie MD at      Author:  Reinier Ritchie MD Service:  (none) Author Type:  (none)    Filed:   Date of Service:   Status:  (Other)           Gregg Aguayo  [address]  [place] [address]          2018    Dear Mr. Aguayo:    Following are the tests completed during your last clinic visit.  The results of these tests are included below.      2018 - Exam: MR KNEE LEFT WO    History: Acute pain of left knee    Technique: Sagittal, coronal and axial images were obtained with combination of proton density, T2 and T1 weighted sequences.    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 There is probably a tiny bone infarct in the medial tibial plateau as well.    Anterior and posterior cruciate ligaments are intact as are medial and lateral collateral ligaments and the extensor mechanism. There is no medial meniscal tear.    The lateral meniscus is abnormal. There is globular increased signal intensity within the anterior horn and anterior body of the lateral meniscus. This communicates with the superior articular surface. There is lateral extrusion of the meniscus.    There is full-thickness articular cartilage loss over the lateral tibial plateau especially posteriorly with prominent associated marrow edema. No linear fracture line is seen. Cartilage over the lateral femoral condyle is better preserved.    There is mild narrowing articular cartilage in the medial joint space without full-thickness loss. There is mild thinning of cartilage over the posterior patella without full-thickness cartilage loss. Small to moderate-sized joint effusion is seen. There is no popliteal cyst.    There is nonspecific subcutaneous edema over the anterior knee with small bursal fluid collection anterior to the intact patellar tendon.            Impression:   1. Tear through the anterior horn and body of the lateral meniscus with some lateral extrusion of the meniscus.  2. Prominent articular cartilage loss over the posterior lateral tibial plateau with prominent marrow edema. This may be reactive marrow edema associated with the articular cartilage loss. It could also represent a bone bruise. No linear fracture line is seen.  3. Fluid collection anterior to the patellar tendon, probably within a bursa. There is some surrounding soft tissue edema.    Electronically Signed By: Roseline Grissom M.D. on 1/19/2018 7:53 PM    If you have any further questions or problems contact my office at  [phone]   --- or send [employer] message --- otherwise schedule an appointment.    Clinic : [phone]  Appointment line: [phone]     Thank you,    Reinier Ritchie MD    Internal Medicine  [facility]     Reviewed and electronically signed by provider.

## 2018-07-24 NOTE — PROGRESS NOTES
Patient Information     Patient Name  Gregg Aguayo MRN  [id number] Sex  Male   1965      Letter by Reinier Ritchie MD at      Author:  Reinier Ritchie MD Service:  (none) Author Type:  (none)    Filed:   Encounter Date:  2017 Status:  (Other)           Gregg Aguayo  [address]  [facility] [address]          2017    Dear Mr. Aguayo:    Following are the tests completed during your last clinic visit.  The results of these tests are included below.      Your labs look very good.  Prediabetes/diabetes is no longer noted.    We could consider reducing your dose -- or possibly even stopping metformin.    Results for orders placed or performed in visit on 17      HEMOGLOBIN A1C MONITORING (POCT)      Result  Value Ref Range    HEMOGLOBIN A1C MONITORING (POCT) 5.1 4.0 - 6.2 %    ESTIMATED AVERAGE GLUCOSE  100 mg/dL   SEDIMENTATION RATE      Result  Value Ref Range    SEDIMENTATION RATE        3 <21 mm/hr   CRP, inflammatory      Result  Value Ref Range    C-REACTIVE PROTEIN <1.000 <1.000 mg/dL         If you have any further questions or problems contact my office at  [phone]   --- or send [employer] message --- otherwise schedule an appointment.    Clinic : [phone]  Appointment line: [phone]     Thank you,    Reinier Ritchie MD    Internal Medicine  [facility]     Reviewed and electronically signed by provider.

## 2018-07-25 ENCOUNTER — OFFICE VISIT (OUTPATIENT)
Dept: INTERNAL MEDICINE | Facility: OTHER | Age: 53
End: 2018-07-25
Attending: INTERNAL MEDICINE
Payer: COMMERCIAL

## 2018-07-25 VITALS
DIASTOLIC BLOOD PRESSURE: 86 MMHG | BODY MASS INDEX: 42.01 KG/M2 | SYSTOLIC BLOOD PRESSURE: 132 MMHG | WEIGHT: 297 LBS | HEART RATE: 60 BPM

## 2018-07-25 DIAGNOSIS — Z72.0 TOBACCO ABUSE: ICD-10-CM

## 2018-07-25 DIAGNOSIS — M65.30 TRIGGER FINGER OF RIGHT HAND, UNSPECIFIED FINGER: ICD-10-CM

## 2018-07-25 DIAGNOSIS — R12 HEARTBURN: ICD-10-CM
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
## 2018-07-25 DIAGNOSIS — M35.3 POLYMYALGIA RHEUMATICA (H): ICD-10-CM

## 2018-07-25 DIAGNOSIS — M25.562 LEFT MEDIAL KNEE PAIN: ICD-10-CM

## 2018-07-25 DIAGNOSIS — R60.0 BILATERAL LEG EDEMA: Primary | ICD-10-CM

## 2018-07-25 DIAGNOSIS — S70.261A TICK BITE OF HIP, RIGHT, INITIAL ENCOUNTER: ICD-10-CM

## 2018-07-25 LAB
ALBUMIN SERPL-MCNC: 4.2 G/DL (ref 3.5–5.7)
ALP SERPL-CCNC: 53 U/L (ref 34–104)
ALT SERPL W P-5'-P-CCNC: 32 U/L (ref 7–52)
ANION GAP SERPL CALCULATED.3IONS-SCNC: 7 MMOL/L (ref 3–14)
AST SERPL W P-5'-P-CCNC: 25 U/L (ref 13–39)
BASOPHILS # BLD AUTO: 0.1 10E9/L (ref 0–0.2)
BASOPHILS NFR BLD AUTO: 1.2 %
BILIRUB SERPL-MCNC: 0.8 MG/DL (ref 0.3–1)
BUN SERPL-MCNC: 11 MG/DL (ref 7–25)
CALCIUM SERPL-MCNC: 9.6 MG/DL (ref 8.6–10.3)
CHLORIDE SERPL-SCNC: 103 MMOL/L (ref 98–107)
CO2 SERPL-SCNC: 29 MMOL/L (ref 21–31)
CREAT SERPL-MCNC: 0.82 MG/DL (ref 0.7–1.3)
CRP SERPL-MCNC: 0 MG/L
DIFFERENTIAL METHOD BLD: NORMAL
EOSINOPHIL # BLD AUTO: 0.3 10E9/L (ref 0–0.7)
EOSINOPHIL NFR BLD AUTO: 4.8 %
ERYTHROCYTE [DISTWIDTH] IN BLOOD BY AUTOMATED COUNT: 12.6 % (ref 10–15)
GFR SERPL CREATININE-BSD FRML MDRD: >90 ML/MIN/1.7M2
GLUCOSE SERPL-MCNC: 108 MG/DL (ref 70–105)
HCT VFR BLD AUTO: 45.2 % (ref 40–53)
HGB BLD-MCNC: 16 G/DL (ref 13.3–17.7)
IMM GRANULOCYTES # BLD: 0 10E9/L (ref 0–0.4)
IMM GRANULOCYTES NFR BLD: 0.4 %
LYMPHOCYTES # BLD AUTO: 1.5 10E9/L (ref 0.8–5.3)
LYMPHOCYTES NFR BLD AUTO: 27.1 %
MCH RBC QN AUTO: 32.9 PG (ref 26.5–33)
MCHC RBC AUTO-ENTMCNC: 35.4 G/DL (ref 31.5–36.5)
MCV RBC AUTO: 93 FL (ref 78–100)
MONOCYTES # BLD AUTO: 0.7 10E9/L (ref 0–1.3)
MONOCYTES NFR BLD AUTO: 11.8 %
NEUTROPHILS # BLD AUTO: 3.1 10E9/L (ref 1.6–8.3)
NEUTROPHILS NFR BLD AUTO: 54.7 %
PLATELET # BLD AUTO: 186 10E9/L (ref 150–450)
POTASSIUM SERPL-SCNC: 4 MMOL/L (ref 3.5–5.1)
PROT SERPL-MCNC: 6.7 G/DL (ref 6.4–8.9)
RBC # BLD AUTO: 4.87 10E12/L (ref 4.4–5.9)
SODIUM SERPL-SCNC: 139 MMOL/L (ref 134–144)
WBC # BLD AUTO: 5.7 10E9/L (ref 4–11)

## 2018-07-25 PROCEDURE — 36415 COLL VENOUS BLD VENIPUNCTURE: CPT | Performed by: INTERNAL MEDICINE

## 2018-07-25 PROCEDURE — 99214 OFFICE O/P EST MOD 30 MIN: CPT | Performed by: INTERNAL MEDICINE

## 2018-07-25 PROCEDURE — 86140 C-REACTIVE PROTEIN: CPT | Performed by: INTERNAL MEDICINE

## 2018-07-25 PROCEDURE — 80053 COMPREHEN METABOLIC PANEL: CPT | Performed by: INTERNAL MEDICINE

## 2018-07-25 PROCEDURE — 85025 COMPLETE CBC W/AUTO DIFF WBC: CPT | Performed by: INTERNAL MEDICINE

## 2018-07-25 RX ORDER — DOXYCYCLINE 100 MG/1
100 CAPSULE ORAL 2 TIMES DAILY
Qty: 28 CAPSULE | Refills: 0 | Status: SHIPPED | OUTPATIENT
Start: 2018-07-25 | End: 2018-08-30

## 2018-07-25 RX ORDER — TRAMADOL HYDROCHLORIDE 50 MG/1
50 TABLET ORAL 2 TIMES DAILY PRN
Qty: 60 TABLET | Refills: 5 | Status: SHIPPED | OUTPATIENT
Start: 2018-07-25 | End: 2019-03-21

## 2018-07-25 RX ORDER — FUROSEMIDE 20 MG
20 TABLET ORAL DAILY PRN
Qty: 90 TABLET | Refills: 3 | Status: SHIPPED | OUTPATIENT
Start: 2018-07-25 | End: 2019-09-10

## 2018-07-25 RX ORDER — OMEPRAZOLE 40 MG/1
40 CAPSULE, DELAYED RELEASE ORAL DAILY
Qty: 90 CAPSULE | Refills: 3 | Status: SHIPPED | OUTPATIENT
Start: 2018-07-25 | End: 2019-09-10

## 2018-07-25 ASSESSMENT — ENCOUNTER SYMPTOMS
DIZZINESS: 0
CONFUSION: 0
DIARRHEA: 0
HEADACHES: 1
CHILLS: 0
FEVER: 0
SHORTNESS OF BREATH: 0
COUGH: 0
ARTHRALGIAS: 1
WHEEZING: 0
EYE PAIN: 0
FATIGUE: 0
MYALGIAS: 0
VOMITING: 0
HEMATURIA: 0
NAUSEA: 0
LIGHT-HEADEDNESS: 0
PALPITATIONS: 0
BACK PAIN: 1
AGITATION: 0
BRUISES/BLEEDS EASILY: 0
ABDOMINAL PAIN: 0
DYSURIA: 0

## 2018-07-25 ASSESSMENT — ANXIETY QUESTIONNAIRES
6. BECOMING EASILY ANNOYED OR IRRITABLE: NOT AT ALL
2. NOT BEING ABLE TO STOP OR CONTROL WORRYING: NOT AT ALL
7. FEELING AFRAID AS IF SOMETHING AWFUL MIGHT HAPPEN: NOT AT ALL
IF YOU CHECKED OFF ANY PROBLEMS ON THIS QUESTIONNAIRE, HOW DIFFICULT HAVE THESE PROBLEMS MADE IT FOR YOU TO DO YOUR WORK, TAKE CARE OF THINGS AT HOME, OR GET ALONG WITH OTHER PEOPLE: NOT DIFFICULT AT ALL
5. BEING SO RESTLESS THAT IT IS HARD TO SIT STILL: NOT AT ALL
1. FEELING NERVOUS, ANXIOUS, OR ON EDGE: NOT AT ALL
GAD7 TOTAL SCORE: 0
3. WORRYING TOO MUCH ABOUT DIFFERENT THINGS: NOT AT ALL

## 2018-07-25 ASSESSMENT — PATIENT HEALTH QUESTIONNAIRE - PHQ9: 5. POOR APPETITE OR OVEREATING: NOT AT ALL

## 2018-07-25 ASSESSMENT — PAIN SCALES - GENERAL: PAINLEVEL: MILD PAIN (3)

## 2018-07-25 NOTE — PROGRESS NOTES
Nursing Notes:   Mandy Prajapati LPN  7/25/2018 10:10 AM  Signed  Patient presents to the clinic for medication refills, only take Metformin when on Prednisone.  Last administration of Tramadol was about 3 nights ago-patients uses this as a last option.      Mandy Prajapati LPN 7/25/2018 9:55 AM      Nursing note reviewed with patient.  Accurracy and completeness verified.   Mr. Aguayo is a 52 year old male who:  Patient presents with:  Diabetes    HPI     ICD-10-CM    1. Bilateral leg edema R60.0 furosemide (LASIX) 20 MG tablet   2. Left medial knee pain M25.562 traMADol (ULTRAM) 50 MG tablet     ORTHOPEDICS ADULT REFERRAL   3. Trigger finger of right hand, unspecified finger - Thumb and 3rd finger M65.30 traMADol (ULTRAM) 50 MG tablet     ORTHOPEDICS ADULT REFERRAL   4. Polymyalgia rheumatica (H) M35.3 CRP inflammation     CBC and Differential     Comprehensive Metabolic Panel     CRP inflammation     CBC and Differential     Comprehensive Metabolic Panel   5. Tobacco abuse Z72.0    6. Heartburn R12 omeprazole (PRILOSEC) 40 MG capsule   7. Tick bite of hip, right, initial encounter S70.261A doxycycline (VIBRAMYCIN) 100 MG capsule    W57.XXXA      Bilateral leg edema - doing well with lasix. Needs refills. Much improved with use.     Left knee pain - still using tramadol. Finds very helpful. At some point will need to get knee replacement, per his surgeon.     Trigger finger, since he had his right hand carpal tunnel surgery completed, states that he has been having triggering of his thumb and middle finger.  States that he will actually wake up in the middle of the night with pain.  He is no longer working at this time.  He went on snf or disability due to his polymyalgia.  He would like to get this fixed as soon as possible.  Orthopedic referral sent.    Left knee pain, at this point still having some problems intermittently.  He is not yet at the point of wanting to get a knee replacement but has been  thinking about it.  Tramadol is still effective for him at this time.    Polymyalgia, chronic, still get some flareup of his myalgia symptoms.  He would like labs today.    Tobacco abuse, ongoing but has been cutting down.  Smoking only about one third of a pack per day at this time.    Heartburn, doing well with omeprazole.  Needs refills.    Tick bite couple weeks ago, reports a week or 2 after the bite he got significant headaches and body aches and arthralgias.  Advised probably has Lyme disease.  Recommend treatment.  Start doxycycline.    Functional Capacity: > 4 METS.     + aches and pains, headaches 1-2 weeks after tick bite.     Reports that he can climb a flight of stairs without any chest pain/heaviness or shortness of breath.   No orthopnea/paroxysmal nocturnal dyspnea  Review of Systems   Constitutional: Negative for chills, fatigue and fever.   HENT: Negative for congestion and hearing loss.    Eyes: Negative for pain and visual disturbance.   Respiratory: Negative for cough, shortness of breath and wheezing.    Cardiovascular: Negative for chest pain and palpitations.   Gastrointestinal: Negative for abdominal pain, diarrhea, nausea and vomiting.   Endocrine: Negative for cold intolerance and heat intolerance.   Genitourinary: Negative for dysuria and hematuria.   Musculoskeletal: Positive for arthralgias and back pain. Negative for myalgias.        + trigger fingers - right hand   Skin: Positive for rash. Negative for pallor.   Allergic/Immunologic: Negative for immunocompromised state.   Neurological: Positive for headaches. Negative for dizziness and light-headedness.   Hematological: Does not bruise/bleed easily.   Psychiatric/Behavioral: Negative for agitation and confusion.        TRESSA:   TRESSA-7 SCORE 3/8/2018 7/25/2018   Total Score 0 0     PHQ9:  PHQ-9 SCORE 1/11/2018 3/8/2018 7/25/2018   Total Score 0 0 0       I have personally reviewed the past medical history, past surgical history,  medications, allergies, family and social history as listed below, on 7/25/2018.    No Known Allergies    Current Outpatient Prescriptions   Medication Sig Dispense Refill     acetaminophen (TYLENOL) 650 MG CR tablet Take up to 4 tablet daily for pain as needed       doxycycline (VIBRAMYCIN) 100 MG capsule Take 1 capsule (100 mg) by mouth 2 times daily 28 capsule 0     furosemide (LASIX) 20 MG tablet Take 1 tablet (20 mg) by mouth daily as needed For leg swelling-limit use. Salt restriction. 90 tablet 3     gabapentin (NEURONTIN) 100 MG capsule Take 1-2 capsules (100-200 mg) by mouth 4 times daily as needed 360 capsule 3     metFORMIN (GLUCOPHAGE) 500 MG tablet Take 1-2 tablets (500-1,000 mg) by mouth daily (with dinner) 180 tablet 3     methylPREDNISolone (MEDROL) 4 MG tablet Take 2-8 mg by mouth daily With a meal - wean dose every 2 to 4 weeks - for PMR       omeprazole (PRILOSEC) 40 MG capsule Take 1 capsule (40 mg) by mouth daily 90 capsule 3     traMADol (ULTRAM) 50 MG tablet Take 1 tablet (50 mg) by mouth 2 times daily as needed for pain 60 tablet 5     varenicline (CHANTIX) 1 MG tablet Take 1 mg by mouth 2 times daily (with meals) Quit smoking       [DISCONTINUED] furosemide (LASIX) 20 MG tablet Take 20 mg by mouth daily as needed For leg swelling-limit use. Salt restriction.          Patient Active Problem List    Diagnosis Date Noted     Trigger finger of right hand, unspecified finger - Thumb and 3rd finger 07/25/2018     Priority: Medium     Heartburn 07/25/2018     Priority: Medium     Tick bite of hip, right, initial encounter 07/25/2018     Priority: Medium     Steroid-induced hyperglycemia 04/10/2018     Priority: Medium     Psychosexual dysfunction with inhibited sexual excitement 01/29/2018     Priority: Medium     Esophageal reflux 01/29/2018     Priority: Medium     Tobacco abuse 01/29/2018     Priority: Medium     Overview:   IMO Update 10/11       Acute pain of left knee 01/11/2018     Priority:  Medium     Fall at home, initial encounter 01/11/2018     Priority: Medium     History of arthroscopic knee surgery 01/11/2018     Priority: Medium     Knee effusion, left 01/11/2018     Priority: Medium     Left medial knee pain 01/11/2018     Priority: Medium     Left carpal tunnel syndrome 12/21/2017     Priority: Medium     History of carpal tunnel surgery of left wrist 12/21/2017     Priority: Medium     S/P cubital tunnel release 12/21/2017     Priority: Medium     History of carpal tunnel surgery of right wrist 11/22/2017     Priority: Medium     AILEEN on CPAP 11/20/2017     Priority: Medium     Dermatomyositis (H) 10/12/2017     Priority: Medium     Cubital tunnel syndrome on left 09/25/2017     Priority: Medium     Bilateral leg edema 07/05/2017     Priority: Medium     Polymyalgia rheumatica (H) 11/11/2016     Priority: Medium     Arthralgia of multiple joints 10/26/2016     Priority: Medium     Hypertrophic and atrophic condition of skin 06/07/2012     Priority: Medium     Contact dermatitis and eczema 09/20/2011     Priority: Medium     Past Medical History:   Diagnosis Date     Bilateral carpal tunnel syndrome     9/21/2017     Carpal tunnel syndrome of left wrist     12/21/2017     Condition influencing health status     No Comments Provided     Lesion of left ulnar nerve     9/25/2017     Other specified postprocedural states     11/22/2017     Other specified postprocedural states     12/21/2017     Other specified postprocedural states     12/21/2017     Past Surgical History:   Procedure Laterality Date     APPENDECTOMY OPEN      2005,Laparoscopic for acute appendicitis     ARTHROSCOPY KNEE Left 3/19/2018    Procedure: ARTHROSCOPY KNEE;  Left Knee Arthroscopy, Partial Meniscectomy, Chondroplasty;  Surgeon: Kwabena Patricia DO;  Location: [facility] OR     ATTEMPTED ARTHROSCOPY      knee ? left     COLONOSCOPY      1/29/2014,Tubular adenoma of rectum - follow up 5 years     RELEASE CARPAL TUNNEL Right      "11/22/2017,138159,OTHER,Right     RELEASE CARPAL TUNNEL Left     12/21/2017,785700,OTHER,Left     RELEASE ULNAR NERVE (ELBOW)      12/21/2017,592153,OTHER,Left     Social History     Social History     Marital status:      Spouse name: N/A     Number of children: N/A     Years of education: N/A     Social History Main Topics     Smoking status: Current Every Day Smoker     Packs/day: 0.50     Types: Cigarettes     Smokeless tobacco: Never Used     Alcohol use 0.0 oz/week      Comment: Alcoholic Drinks/day: 1-2 drinks every other day     Drug use: No     Sexual activity: Not Asked     Other Topics Concern     None     Social History Narrative    Tobacco-one half pack per day.  Ethanol-occasional.    and remarried.  Has two children from his first marriage.      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     Family History   Problem Relation Age of Onset     Genetic Disorder Maternal Grandfather      Genetic,Polymyalgia Rheumatica     Heart Murmur Mother      No Known Problems Father        EXAM:   Vitals:    07/25/18 0957   BP: 132/86   BP Location: Right arm   Patient Position: Sitting   Cuff Size: Adult Regular   Pulse: 60   Weight: 297 lb (134.7 kg)       Current Pain Score: Mild Pain (3)     BP Readings from Last 3 Encounters:   07/25/18 132/86   05/01/18 (!) 138/96   04/10/18 130/88    Wt Readings from Last 3 Encounters:   07/25/18 297 lb (134.7 kg)   05/01/18 292 lb (132.5 kg)   04/10/18 292 lb (132.5 kg)      Estimated body mass index is 42.01 kg/(m^2) as calculated from the following:    Height as of 5/1/18: 5' 10.5\" (1.791 m).    Weight as of this encounter: 297 lb (134.7 kg).     Physical Exam   Constitutional: He appears well-developed and well-nourished. No distress.   HENT:   Head: Normocephalic and atraumatic.   Eyes: No scleral icterus.   Cardiovascular: Normal rate and regular rhythm.    Pulmonary/Chest: Effort normal. No respiratory distress. He has " no wheezes.   Abdominal: Soft. There is no tenderness.   + obesity   Musculoskeletal: He exhibits edema and tenderness. He exhibits no deformity.   + trigger finger - right thumb, 3rd finger. + locking noted.    Neurological: He is alert. No cranial nerve deficit.   Skin: Skin is warm and dry.   Psychiatric: He has a normal mood and affect.      INVESTIGATIONS:  Results for orders placed or performed in visit on 07/25/18   CBC and Differential   Result Value Ref Range    WBC 5.7 4.0 - 11.0 10e9/L    RBC Count 4.87 4.4 - 5.9 10e12/L    Hemoglobin 16.0 13.3 - 17.7 g/dL    Hematocrit 45.2 40.0 - 53.0 %    MCV 93 78 - 100 fl    MCH 32.9 26.5 - 33.0 pg    MCHC 35.4 31.5 - 36.5 g/dL    RDW 12.6 10.0 - 15.0 %    Platelet Count 186 150 - 450 10e9/L    Diff Method Automated Method     % Neutrophils 54.7 %    % Lymphocytes 27.1 %    % Monocytes 11.8 %    % Eosinophils 4.8 %    % Basophils 1.2 %    % Immature Granulocytes 0.4 %    Absolute Neutrophil 3.1 1.6 - 8.3 10e9/L    Absolute Lymphocytes 1.5 0.8 - 5.3 10e9/L    Absolute Monocytes 0.7 0.0 - 1.3 10e9/L    Absolute Eosinophils 0.3 0.0 - 0.7 10e9/L    Absolute Basophils 0.1 0.0 - 0.2 10e9/L    Abs Immature Granulocytes 0.0 0 - 0.4 10e9/L       ASSESSMENT AND PLAN:  Problem List Items Addressed This Visit        Nervous and Auditory    Left medial knee pain    Relevant Medications    traMADol (ULTRAM) 50 MG tablet    Other Relevant Orders    ORTHOPEDICS ADULT REFERRAL       Digestive    Heartburn    Relevant Medications    omeprazole (PRILOSEC) 40 MG capsule       Musculoskeletal and Integumentary    Bilateral leg edema - Primary    Relevant Medications    traMADol (ULTRAM) 50 MG tablet    furosemide (LASIX) 20 MG tablet    Polymyalgia rheumatica (H)    Relevant Orders    CRP inflammation (Completed)    CBC and Differential (Completed)    Comprehensive Metabolic Panel (Completed)    Trigger finger of right hand, unspecified finger - Thumb and 3rd finger    Relevant  Medications    traMADol (ULTRAM) 50 MG tablet    Other Relevant Orders    ORTHOPEDICS ADULT REFERRAL    Tick bite of hip, right, initial encounter    Relevant Medications    doxycycline (VIBRAMYCIN) 100 MG capsule       Behavioral    Tobacco abuse        reviewed diet, exercise and weight control, very strongly urged to quit smoking to reduce cardiovascular risk  -- Expected clinical course discussed    -- Medications and their side effects discussed    Patient Instructions   1. Bilateral leg edema  - furosemide (LASIX) 20 MG tablet; Take 1 tablet (20 mg) by mouth daily as needed For leg swelling-limit use. Salt restriction.  Dispense: 90 tablet; Refill: 3    2. Left medial knee pain  - traMADol (ULTRAM) 50 MG tablet; Take 1 tablet (50 mg) by mouth 2 times daily as needed for pain  Dispense: 60 tablet; Refill: 5    - ORTHOPEDICS ADULT REFERRAL - Dr. [facility]  - they will call with date/time of appointment.      3. Trigger finger of right hand, unspecified finger - Thumb and 3rd finger  - traMADol (ULTRAM) 50 MG tablet; Take 1 tablet (50 mg) by mouth 2 times daily as needed for pain  Dispense: 60 tablet; Refill: 5  - ORTHOPEDICS ADULT REFERRAL    4. Polymyalgia rheumatica (H)  - CRP inflammation; Future  - CBC and Differential; Future  - Comprehensive Metabolic Panel; Future    5. Tobacco abuse    QUIT SMOKING!!     -- Choose a quit date (within 1 month). Quitting smoking abruptly is more successful than gradually cutting back.   -- Tell everyone about it (friends, family, coworkers)   -- Think about when you smoke the most, and what you'll do during those times (eg when in the car, work breaks, etc)     Consider:   -- Start varenicline (Chantix) 1 week before your quit date   -- Start bupropion (Wellbutrin/Zyban) 1 week before your quit date -- Welbutrin 1 pill daily for 1 week then 1 pill twice daily      -- Stop smoking on quit date   -- Starting with quit date, use nicotine lozenges/gum  as needed for cravings     -- Quit Plan - Call them in the next 1-2 weeks to help you quit smoking.                        [phone] ([phone])                        http://www.quitplan.com   -- http://smokefree.gov/      6. Heartburn  - omeprazole (PRILOSEC) 40 MG capsule; Take 1 capsule (40 mg) by mouth daily  Dispense: 90 capsule; Refill: 3    7. Tick bite of hip, right, initial encounter  START:   - doxycycline (VIBRAMYCIN) 100 MG capsule; Take 1 capsule (100 mg) by mouth 2 times daily  Dispense: 28 capsule; Refill: 0      Return as needed for follow-up with Dr. Ritchie.    Clinic : [phone]  Appointment line: [phone]      Reinier Ritchie MD  Internal Medicine  [facility]

## 2018-07-25 NOTE — NURSING NOTE
Patient presents to the clinic for medication refills, only take Metformin when on Prednisone.  Last administration of Tramadol was about 3 nights ago-patients uses this as a last option.      Mandy Prajapati LPN 7/25/2018 9:55 AM

## 2018-07-25 NOTE — PATIENT INSTRUCTIONS
1. Bilateral leg edema  - furosemide (LASIX) 20 MG tablet; Take 1 tablet (20 mg) by mouth daily as needed For leg swelling-limit use. Salt restriction.  Dispense: 90 tablet; Refill: 3    2. Left medial knee pain  - traMADol (ULTRAM) 50 MG tablet; Take 1 tablet (50 mg) by mouth 2 times daily as needed for pain  Dispense: 60 tablet; Refill: 5    - ORTHOPEDICS ADULT REFERRAL - Dr. [facility]  - they will call with date/time of appointment.      3. Trigger finger of right hand, unspecified finger - Thumb and 3rd finger  - traMADol (ULTRAM) 50 MG tablet; Take 1 tablet (50 mg) by mouth 2 times daily as needed for pain  Dispense: 60 tablet; Refill: 5  - ORTHOPEDICS ADULT REFERRAL    4. Polymyalgia rheumatica (H)  - CRP inflammation; Future  - CBC and Differential; Future  - Comprehensive Metabolic Panel; Future    5. Tobacco abuse    QUIT SMOKING!!     -- Choose a quit date (within 1 month). Quitting smoking abruptly is more successful than gradually cutting back.   -- Tell everyone about it (friends, family, coworkers)   -- Think about when you smoke the most, and what you'll do during those times (eg when in the car, work breaks, etc)     Consider:   -- Start varenicline (Chantix) 1 week before your quit date   -- Start bupropion (Wellbutrin/Zyban) 1 week before your quit date -- Welbutrin 1 pill daily for 1 week then 1 pill twice daily      -- Stop smoking on quit date   -- Starting with quit date, use nicotine lozenges/gum as needed for cravings     -- Quit Plan - Call them in the next 1-2 weeks to help you quit smoking.                        [phone] ([phone]                        http://www.quitplan.com   -- http://smokefree.gov/      6. Heartburn  - omeprazole (PRILOSEC) 40 MG capsule; Take 1 capsule (40 mg) by mouth daily  Dispense: 90 capsule; Refill: 3    7. Tick bite of hip, right, initial encounter  START:   - doxycycline (VIBRAMYCIN) 100 MG capsule; Take 1 capsule (100 mg) by  mouth 2 times daily  Dispense: 28 capsule; Refill: 0      Return as needed for follow-up with Dr. Ritchie.    Clinic : [phone]  Appointment line: [phone]

## 2018-07-25 NOTE — LETTER
Gregg Aguayo  [address]   [place] [address]    7/25/2018      Dear Gregg Aguayo,    The result of your recent tests are included below:    Labs look good.  Continue current medications.    To help with weight loss and improve blood sugar control....    -- Try to avoid Carbohydrates as much as possible -- breads, pasta, baked goods, cakes, oatmeal, cold cereal, potatoes.   These are turned to sugar in one metabolic conversion, cause insulin secretion and increased fat deposition / weight gain.      -- Eat more lean meats, proteins, eggs, nuts.       Results for orders placed or performed in visit on 07/25/18   CRP inflammation   Result Value Ref Range    CRP Inflammation 0.0 <0.5 mg/L   CBC and Differential   Result Value Ref Range    WBC 5.7 4.0 - 11.0 10e9/L    RBC Count 4.87 4.4 - 5.9 10e12/L    Hemoglobin 16.0 13.3 - 17.7 g/dL    Hematocrit 45.2 40.0 - 53.0 %    MCV 93 78 - 100 fl    MCH 32.9 26.5 - 33.0 pg    MCHC 35.4 31.5 - 36.5 g/dL    RDW 12.6 10.0 - 15.0 %    Platelet Count 186 150 - 450 10e9/L    Diff Method Automated Method     % Neutrophils 54.7 %    % Lymphocytes 27.1 %    % Monocytes 11.8 %    % Eosinophils 4.8 %    % Basophils 1.2 %    % Immature Granulocytes 0.4 %    Absolute Neutrophil 3.1 1.6 - 8.3 10e9/L    Absolute Lymphocytes 1.5 0.8 - 5.3 10e9/L    Absolute Monocytes 0.7 0.0 - 1.3 10e9/L    Absolute Eosinophils 0.3 0.0 - 0.7 10e9/L    Absolute Basophils 0.1 0.0 - 0.2 10e9/L    Abs Immature Granulocytes 0.0 0 - 0.4 10e9/L   Comprehensive Metabolic Panel   Result Value Ref Range    Sodium 139 134 - 144 mmol/L    Potassium 4.0 3.5 - 5.1 mmol/L    Chloride 103 98 - 107 mmol/L    Carbon Dioxide 29 21 - 31 mmol/L    Anion Gap 7 3 - 14 mmol/L    Glucose 108 (H) 70 - 105 mg/dL    Urea Nitrogen 11 7 - 25 mg/dL    Creatinine 0.82 0.70 - 1.30 mg/dL    GFR Estimate >90 >60 mL/min/1.7m2    GFR Estimate If Black >90 >60 mL/min/1.7m2    Calcium 9.6 8.6 - 10.3 mg/dL    Bilirubin Total 0.8 0.3  - 1.0 mg/dL    Albumin 4.2 3.5 - 5.7 g/dL    Protein Total 6.7 6.4 - 8.9 g/dL    Alkaline Phosphatase 53 34 - 104 U/L    ALT 32 7 - 52 U/L    AST 25 13 - 39 U/L       If you have any further questions or problems, please contact my office at [phone] and schedule an appointment.    Clinic : [phone]  Appointment line: [phone]     Thank you,    Reinier Ritchie MD    Internal Medicine  [facility]     Reviewed and electronically signed by provider.

## 2018-07-25 NOTE — MR AVS SNAPSHOT
After Visit Summary   7/25/2018    Gregg Aguayo    MRN: [id number]           Patient Information     Date Of Birth          1965        Visit Information        Provider Department      7/25/2018 10:00 AM Reinier Ritchie MD [facility]        Today's Diagnoses     Bilateral leg edema    -  1    Left medial knee pain        Trigger finger of right hand, unspecified finger - Thumb and 3rd finger        Polymyalgia rheumatica (H)        Tobacco abuse        Heartburn        Tick bite of hip, right, initial encounter          Care Instructions    1. Bilateral leg edema  - furosemide (LASIX) 20 MG tablet; Take 1 tablet (20 mg) by mouth daily as needed For leg swelling-limit use. Salt restriction.  Dispense: 90 tablet; Refill: 3    2. Left medial knee pain  - traMADol (ULTRAM) 50 MG tablet; Take 1 tablet (50 mg) by mouth 2 times daily as needed for pain  Dispense: 60 tablet; Refill: 5    - ORTHOPEDICS ADULT REFERRAL - Dr[facility]  - they will call with date/time of appointment.      3. Trigger finger of right hand, unspecified finger - Thumb and 3rd finger  - traMADol (ULTRAM) 50 MG tablet; Take 1 tablet (50 mg) by mouth 2 times daily as needed for pain  Dispense: 60 tablet; Refill: 5  - ORTHOPEDICS ADULT REFERRAL    4. Polymyalgia rheumatica (H)  - CRP inflammation; Future  - CBC and Differential; Future  - Comprehensive Metabolic Panel; Future    5. Tobacco abuse    QUIT SMOKING!!     -- Choose a quit date (within 1 month). Quitting smoking abruptly is more successful than gradually cutting back.   -- Tell everyone about it (friends, family, coworkers)   -- Think about when you smoke the most, and what you'll do during those times (eg when in the car, work breaks, etc)     Consider:   -- Start varenicline (Chantix) 1 week before your quit date   -- Start bupropion (Wellbutrin/Zyban) 1 week before your quit date -- Welbutrin 1 pill daily for 1 week  then 1 pill twice daily      -- Stop smoking on quit date   -- Starting with quit date, use nicotine lozenges/gum as needed for cravings     -- Quit Plan - Call them in the next 1-2 weeks to help you quit smoking.                        [phone] ([phone])                        http://www.quitplan.[employer]   -- http://smokefree.gov/      6. Heartburn  - omeprazole (PRILOSEC) 40 MG capsule; Take 1 capsule (40 mg) by mouth daily  Dispense: 90 capsule; Refill: 3    7. Tick bite of hip, right, initial encounter  START:   - doxycycline (VIBRAMYCIN) 100 MG capsule; Take 1 capsule (100 mg) by mouth 2 times daily  Dispense: 28 capsule; Refill: 0      Return as needed for follow-up with Dr. Ritchie.    Clinic : [phone]  Appointment line: [phone]            Follow-ups after your visit        Additional Services     ORTHOPEDICS ADULT REFERRAL       Your provider [facility] Dr. Grissom    Please be aware that coverage of these services is subject to the terms and limitations of your health insurance plan.  Call member services at your health plan with any benefit or coverage questions.      Please bring the following to your appointment:    >>   Any x-rays, CTs or MRIs which have been performed.  Contact the facility where they were done to arrange for  prior to your scheduled appointment.    >>   List of current medications   >>   This referral request   >>   Any documents/labs given to you for this referral                  Follow-up notes from your care team     Return if symptoms worsen or fail to improve.      Future tests that were ordered for you today     Open Future Orders        Priority Expected Expires Ordered    CRP inflammation Routine  7/25/2019 7/25/2018    CBC and Differential Routine  7/25/2019 7/25/2018    Comprehensive Metabolic Panel Routine  7/25/2019 7/25/2018            Who to contact     If you have questions or need follow up information  about today's clinic visit or your schedule please contact [facility] directly at [phone].  Normal or non-critical lab and imaging results will be communicated to you by MyChart, letter or phone within 4 business days after the clinic has received the results. If you do not hear from us within 7 days, please contact the clinic through MyChart or phone. If you have a critical or abnormal lab result, we will notify you by phone as soon as possible.  Submit refill requests through [employer] or call your pharmacy and they will forward the refill request to us. Please allow 3 business days for your refill to be completed.          Additional Information About Your Visit        Care EveryWhere ID     This is your Care EveryWhere ID. This could be used by other organizations to access your [place] medical records  [id number]        Your Vitals Were     Pulse BMI (Body Mass Index)                60 42.01 kg/m2           Blood Pressure from Last 3 Encounters:   07/25/18 132/86   05/01/18 (!) 138/96   04/10/18 130/88    Weight from Last 3 Encounters:   07/25/18 297 lb (134.7 kg)   05/01/18 292 lb (132.5 kg)   04/10/18 292 lb (132.5 kg)              We Performed the Following     ORTHOPEDICS ADULT REFERRAL          Today's Medication Changes          These changes are accurate as of 7/25/18 10:21 AM.  If you have any questions, ask your nurse or doctor.               Start taking these medicines.        Dose/Directions    doxycycline 100 MG capsule   Commonly known as:  VIBRAMYCIN   Used for:  Tick bite of hip, right, initial encounter   Started by:  Reinier Ritchie MD        Dose:  100 mg   Take 1 capsule (100 mg) by mouth 2 times daily   Quantity:  28 capsule   Refills:  0            Where to get your medicines      These medications were sent to [facility] - [place], [place] - [address]  [address], [facility] [address]     Phone:  [phone]      doxycycline 100 MG capsule    furosemide 20 MG tablet    omeprazole 40 MG capsule         Some of these will need a paper prescription and others can be bought over the counter.  Ask your nurse if you have questions.     Bring a paper prescription for each of these medications     traMADol 50 MG tablet               Information about OPIOIDS     PRESCRIPTION OPIOIDS: WHAT YOU NEED TO KNOW   We gave you an opioid (narcotic) pain medicine. It is important to manage your pain, but opioids are not always the best choice. You should first try all the other options your care team gave you. Take this medicine for as short a time (and as few doses) as possible.     These medicines have risks:    DO NOT drive when on new or higher doses of pain medicine. These medicines can affect your alertness and reaction times, and you could be arrested for driving under the influence (DUI). If you need to use opioids long-term, talk to your care team about driving.    DO NOT operate heave machinery    DO NOT do any other dangerous activities while taking these medicines.     DO NOT drink any alcohol while taking these medicines.      If the opioid prescribed includes acetaminophen, DO NOT take with any other medicines that contain acetaminophen. Read all labels carefully. Look for the word  acetaminophen  or  Tylenol.  Ask your pharmacist if you have questions or are unsure.    You can get addicted to pain medicines, especially if you have a history of addiction (chemical, alcohol or substance dependence). Talk to your care team about ways to reduce this risk.    Store your pills in a secure place, locked if possible. We will not replace any lost or stolen medicine. If you don t finish your medicine, please throw away (dispose) as directed by your pharmacist. The [place] Pollution Control Agency has more information about safe disposal: https://www.pca.state.[place]/living-green/managing-unwanted-medications.     All opioids tend to  cause constipation. Drink plenty of water and eat foods that have a lot of fiber, such as fruits, vegetables, prune juice, apple juice and high-fiber cereal. Take a laxative (Miralax, milk of magnesia, Colace, Senna) if you don t move your bowels at least every other day.          Primary Care Provider Office Phone # Fax #    Reinier Ritchie [phone] [address] [facility] [address]        Equal Access to Services     [facility] Hospital[facility] : Hadii ck ku hadasho Soomaali, waaxda luqadaha, qaybta kaalmada adeegyada, stephen barkley . So [facility] [phone].    ATENCIÓN: Si habla español, tiene a norris disposición servicios gratuitos de asistencia lingüística. Llame al [phone].    We comply with applicable federal civil rights laws and [place] laws. We do not discriminate on the basis of race, color, national origin, age, disability, sex, sexual orientation, or gender identity.            Thank you!     Thank you for choosing [facility] AND \[facility]\""  for your care. Our goal is always to provide you with excellent care. Hearing back from our patients is one way we can continue to improve our services. Please take a few minutes to complete the written survey that you may receive in the mail after your visit with us. Thank you!             Your Updated Medication List - Protect others around you: Learn how to safely use, store and throw away your medicines at www.disposemymeds.org.          This list is accurate as of 7/25/18 10:21 AM.  Always use your most recent med list.                   Brand Name Dispense Instructions for use Diagnosis    acetaminophen 650 MG CR tablet    TYLENOL     Take up to 4 tablet daily for pain as needed        doxycycline 100 MG capsule    VIBRAMYCIN    28 capsule    Take 1 capsule (100 mg) by mouth 2 times daily    Tick bite of hip, right, initial encounter       furosemide 20 MG tablet    LASIX    90 tablet    Take 1 tablet  (20 mg) by mouth daily as needed For leg swelling-limit use. Salt restriction.    Bilateral leg edema       gabapentin 100 MG capsule    NEURONTIN    360 capsule    Take 1-2 capsules (100-200 mg) by mouth 4 times daily as needed    Polymyalgia rheumatica (H)       metFORMIN 500 MG tablet    GLUCOPHAGE    180 tablet    Take 1-2 tablets (500-1,000 mg) by mouth daily (with dinner)    Steroid-induced hyperglycemia       methylPREDNISolone 4 MG tablet    MEDROL     Take 2-8 mg by mouth daily With a meal - wean dose every 2 to 4 weeks - for PMR        omeprazole 40 MG capsule    priLOSEC    90 capsule    Take 1 capsule (40 mg) by mouth daily    Heartburn       traMADol 50 MG tablet    ULTRAM    60 tablet    Take 1 tablet (50 mg) by mouth 2 times daily as needed for pain    Left medial knee pain, Trigger finger of right hand, unspecified finger       varenicline 1 MG tablet    CHANTIX     Take 1 mg by mouth 2 times daily (with meals) Quit smoking

## 2018-07-26 ASSESSMENT — PATIENT HEALTH QUESTIONNAIRE - PHQ9: SUM OF ALL RESPONSES TO PHQ QUESTIONS 1-9: 0

## 2018-07-26 ASSESSMENT — ANXIETY QUESTIONNAIRES: GAD7 TOTAL SCORE: 0

## 2018-08-14 ENCOUNTER — TRANSFERRED RECORDS (OUTPATIENT)
Dept: HEALTH INFORMATION MANAGEMENT | Facility: OTHER | Age: 53
End: 2018-08-14

## 2018-08-16 ENCOUNTER — TELEPHONE (OUTPATIENT)
Dept: INTERNAL MEDICINE | Facility: OTHER | Age: 53
End: 2018-08-16

## 2018-08-16 NOTE — TELEPHONE ENCOUNTER
Patient placed Reinier Ritchie MD on 8-30-18, at 1000 for pre-op 9-7-18, trigger finger Dr. Grissom.      Mandy Prajapati LPN 8/16/2018 3:40 PM

## 2018-08-30 ENCOUNTER — OFFICE VISIT (OUTPATIENT)
Dept: INTERNAL MEDICINE | Facility: OTHER | Age: 53
End: 2018-08-30
Attending: INTERNAL MEDICINE
Payer: COMMERCIAL

## 2018-08-30 VITALS
TEMPERATURE: 97.8 F | OXYGEN SATURATION: 98 % | SYSTOLIC BLOOD PRESSURE: 132 MMHG | RESPIRATION RATE: 20 BRPM | WEIGHT: 299.25 LBS | BODY MASS INDEX: 42.33 KG/M2 | HEART RATE: 80 BPM | DIASTOLIC BLOOD PRESSURE: 86 MMHG

## 2018-08-30 DIAGNOSIS — Z72.0 TOBACCO ABUSE: ICD-10-CM

## 2018-08-30 DIAGNOSIS — Z01.818 PREOP GENERAL PHYSICAL EXAM: Primary | ICD-10-CM

## 2018-08-30 DIAGNOSIS — M65.30 TRIGGER FINGER, ACQUIRED: ICD-10-CM

## 2018-08-30 DIAGNOSIS — M35.3 POLYMYALGIA RHEUMATICA (H): ICD-10-CM

## 2018-08-30 DIAGNOSIS — M25.50 ARTHRALGIA OF MULTIPLE JOINTS: ICD-10-CM

## 2018-08-30 DIAGNOSIS — G47.33 OSA ON CPAP: ICD-10-CM

## 2018-08-30 DIAGNOSIS — M33.13 DERMATOMYOSITIS (H): ICD-10-CM

## 2018-08-30 PROBLEM — K21.9 ESOPHAGEAL REFLUX: Status: RESOLVED | Noted: 2018-01-29 | Resolved: 2018-08-30
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
## 2018-08-30 PROBLEM — S70.261A: Status: RESOLVED | Noted: 2018-07-25 | Resolved: 2018-08-30

## 2018-08-30 PROCEDURE — 99213 OFFICE O/P EST LOW 20 MIN: CPT | Performed by: INTERNAL MEDICINE

## 2018-08-30 ASSESSMENT — ANXIETY QUESTIONNAIRES
IF YOU CHECKED OFF ANY PROBLEMS ON THIS QUESTIONNAIRE, HOW DIFFICULT HAVE THESE PROBLEMS MADE IT FOR YOU TO DO YOUR WORK, TAKE CARE OF THINGS AT HOME, OR GET ALONG WITH OTHER PEOPLE: NOT DIFFICULT AT ALL
7. FEELING AFRAID AS IF SOMETHING AWFUL MIGHT HAPPEN: NOT AT ALL
6. BECOMING EASILY ANNOYED OR IRRITABLE: NOT AT ALL
3. WORRYING TOO MUCH ABOUT DIFFERENT THINGS: NOT AT ALL
5. BEING SO RESTLESS THAT IT IS HARD TO SIT STILL: NOT AT ALL
1. FEELING NERVOUS, ANXIOUS, OR ON EDGE: NOT AT ALL
2. NOT BEING ABLE TO STOP OR CONTROL WORRYING: NOT AT ALL
GAD7 TOTAL SCORE: 0

## 2018-08-30 ASSESSMENT — PATIENT HEALTH QUESTIONNAIRE - PHQ9: 5. POOR APPETITE OR OVEREATING: NOT AT ALL

## 2018-08-30 ASSESSMENT — PAIN SCALES - GENERAL: PAINLEVEL: MODERATE PAIN (5)

## 2018-08-30 NOTE — MR AVS SNAPSHOT
After Visit Summary   8/30/2018    Gregg Aguayo    MRN: [id number]           Patient Information     Date Of Birth          1965        Visit Information        Provider Department      8/30/2018 10:00 AM Reinier Ritchie MD [facility]        Today's Diagnoses     Preop general physical exam    -  1    Trigger finger, acquired - Right hand - 1st and 3rd fingers        Arthralgia of multiple joints        Dermatomyositis (H)        Polymyalgia rheumatica (H)        AILEEN on CPAP - uses nightly, finds very helpful        Tobacco abuse          Care Instructions      Before Your Surgery      Call your surgeon if there is any change in your health. This includes signs of a cold or flu (such as a sore throat, runny nose, cough, rash or fever).    Do not smoke, drink alcohol or take over the counter medicine (unless your surgeon or primary care doctor tells you to) for the 24 hours before and after surgery.    If you take prescribed drugs: Follow your doctor s orders about which medicines to take and which to stop until after surgery.    Eating and drinking prior to surgery: follow the instructions from your surgeon    Take a shower or bath the night before surgery. Use the soap your surgeon gave you to gently clean your skin. If you do not have soap from your surgeon, use your regular soap. Do not shave or scrub the surgery site.  Wear clean pajamas and have clean sheets on your bed.     QUIT SMOKING!!     -- Choose a quit date (within 1 month). Quitting smoking abruptly is more successful than gradually cutting back.   -- Tell everyone about it (friends, family, coworkers)   -- Think about when you smoke the most, and what you'll do during those times (eg when in the car, work breaks, etc)     Consider:   -- Start varenicline (Chantix) 1 week before your quit date   -- Start bupropion (Wellbutrin/Zyban) 1 week before your quit date -- Welbutrin 1 pill daily for 1 week then 1  pill twice daily      -- Stop smoking on quit date   -- Starting with quit date, use nicotine lozenges/gum as needed for cravings     -- Quit Plan - Call them in the next 1-2 weeks to help you quit smoking.                        [phone] ([phone])                        http://www.quitplan.[employer]   -- http://smokefree.gov/    Bring your CPAP with to surgery.           Follow-ups after your visit        Your next 10 appointments already scheduled     Sep 07, 2018   Procedure with Monster Grissom MD   [facility] ([facility]    [address]  [place] [address] [phone]              Who to contact     If you have questions or need follow up information about today's clinic visit or your schedule please contact [facility] directly at [phone].  Normal or non-critical lab and imaging results will be communicated to you by MyChart, letter or phone within 4 business days after the clinic has received the results. If you do not hear from us within 7 days, please contact the clinic through [employer] or phone. If you have a critical or abnormal lab result, we will notify you by phone as soon as possible.  Submit refill requests through [employer] or call your pharmacy and they will forward the refill request to us. Please allow 3 business days for your refill to be completed.          Additional Information About Your Visit        Care EveryWhere ID     This is your Care EveryWhere ID. This could be used by other organizations to access your [place] medical records  [id number]        Your Vitals Were     Pulse Temperature Respirations Pulse Oximetry BMI (Body Mass Index)       80 97.8  F (36.6  C) (Tympanic) 20 98% 42.33 kg/m2        Blood Pressure from Last 3 Encounters:   08/30/18 132/86   07/25/18 132/86   05/01/18 (!) 138/96    Weight from Last 3 Encounters:   08/30/18 299 lb 4 oz (135.7 kg)   07/25/18 297 lb (134.7 kg)   05/01/18  292 lb (132.5 kg)              Today, you had the following     No orders found for display       Primary Care Provider Office Phone # Fax #    Reinier Ritchie [phone] [address]  [place] [address]        Equal Access to Services     [facility] : Hadii ck maravilla hadcandelariao Soomaali, waaxda luqadaha, qaybta kaalmada adeegyada, stephen nicolascelsoshelby barkley . So [facility] [phone].    ATENCIÓN: Si habla español, tiene a norris disposición servicios gratuitos de asistencia lingüística. Llame al [phone].    We comply with applicable federal civil rights laws and [place] laws. We do not discriminate on the basis of race, color, national origin, age, disability, sex, sexual orientation, or gender identity.            Thank you!     Thank you for choosing [facility] AND [facility]  for your care. Our goal is always to provide you with excellent care. Hearing back from our patients is one way we can continue to improve our services. Please take a few minutes to complete the written survey that you may receive in the mail after your visit with us. Thank you!             Your Updated Medication List - Protect others around you: Learn how to safely use, store and throw away your medicines at www.disposemymeds.org.          This list is accurate as of 8/30/18 10:20 AM.  Always use your most recent med list.                   Brand Name Dispense Instructions for use Diagnosis    acetaminophen 650 MG CR tablet    TYLENOL     Take up to 4 tablet daily for pain as needed        furosemide 20 MG tablet    LASIX    90 tablet    Take 1 tablet (20 mg) by mouth daily as needed For leg swelling-limit use. Salt restriction.    Bilateral leg edema       gabapentin 100 MG capsule    NEURONTIN    360 capsule    Take 1-2 capsules (100-200 mg) by mouth 4 times daily as needed    Polymyalgia rheumatica (H)       metFORMIN 500 MG tablet    GLUCOPHAGE    180 tablet    Take 1-2 tablets  (500-1,000 mg) by mouth daily (with dinner)    Steroid-induced hyperglycemia       methylPREDNISolone 4 MG tablet    MEDROL     Take 2-8 mg by mouth daily With a meal - wean dose every 2 to 4 weeks - for PMR        omeprazole 40 MG capsule    priLOSEC    90 capsule    Take 1 capsule (40 mg) by mouth daily    Heartburn       traMADol 50 MG tablet    ULTRAM    60 tablet    Take 1 tablet (50 mg) by mouth 2 times daily as needed for pain    Left medial knee pain, Trigger finger of right hand, unspecified finger       varenicline 1 MG tablet    CHANTIX     Take 1 mg by mouth 2 times daily (with meals) Quit smoking

## 2018-08-30 NOTE — NURSING NOTE
"Patient presents to the clinic for pre-op exam.   Metformin is only taken when he is on Steroids.      Chief Complaint   Patient presents with     Pre-Op Exam       Initial There were no vitals taken for this visit. Estimated body mass index is 42.01 kg/(m^2) as calculated from the following:    Height as of 5/1/18: 5' 10.5\" (1.791 m).    Weight as of 7/25/18: 297 lb (134.7 kg).  Medication Reconciliation: complete    Mandy Prajapati LPN        "

## 2018-08-31 ASSESSMENT — ANXIETY QUESTIONNAIRES: GAD7 TOTAL SCORE: 0

## 2018-08-31 ASSESSMENT — PATIENT HEALTH QUESTIONNAIRE - PHQ9: SUM OF ALL RESPONSES TO PHQ QUESTIONS 1-9: 0

## 2018-09-06 ENCOUNTER — ANESTHESIA EVENT (OUTPATIENT)
Dept: SURGERY | Facility: OTHER | Age: 53
End: 2018-09-06
Payer: COMMERCIAL

## 2018-09-07 ENCOUNTER — ANESTHESIA (OUTPATIENT)
Dept: SURGERY | Facility: OTHER | Age: 53
End: 2018-09-07
Payer: COMMERCIAL

## 2018-09-07 ENCOUNTER — HOSPITAL ENCOUNTER (OUTPATIENT)
Facility: OTHER | Age: 53
Discharge: HOME | End: 2018-09-07
Attending: RADIOLOGY | Admitting: RADIOLOGY
Payer: COMMERCIAL

## 2018-09-07 VITALS
SYSTOLIC BLOOD PRESSURE: 145 MMHG | OXYGEN SATURATION: 97 % | TEMPERATURE: 98.3 F | BODY MASS INDEX: 41.16 KG/M2 | RESPIRATION RATE: 16 BRPM | WEIGHT: 294 LBS | HEIGHT: 71 IN | DIASTOLIC BLOOD PRESSURE: 78 MMHG

## 2018-09-07 DIAGNOSIS — M65.30 TRIGGER FINGER, ACQUIRED: Primary | ICD-10-CM

## 2018-09-07 PROCEDURE — 26055 INCISE FINGER TENDON SHEATH: CPT | Performed by: NURSE ANESTHETIST, CERTIFIED REGISTERED

## 2018-09-07 PROCEDURE — 25000128 H RX IP 250 OP 636: Performed by: NURSE ANESTHETIST, CERTIFIED REGISTERED

## 2018-09-07 PROCEDURE — 25800025 ZZH RX 258: Performed by: SPECIALIST

## 2018-09-07 PROCEDURE — 40000306 ZZH STATISTIC PRE PROC ASSESS II: Performed by: SPECIALIST

## 2018-09-07 PROCEDURE — 36000052 ZZH SURGERY LEVEL 2 EA 15 ADDTL MIN: Performed by: SPECIALIST

## 2018-09-07 PROCEDURE — 37000009 ZZH ANESTHESIA TECHNICAL FEE, EACH ADDTL 15 MIN: Performed by: SPECIALIST

## 2018-09-07 PROCEDURE — 25000128 H RX IP 250 OP 636: Performed by: SPECIALIST

## 2018-09-07 PROCEDURE — 71000027 ZZH RECOVERY PHASE 2 EACH 15 MINS: Performed by: SPECIALIST

## 2018-09-07 PROCEDURE — 36000050 ZZH SURGERY LEVEL 2 1ST 30 MIN: Performed by: SPECIALIST

## 2018-09-07 PROCEDURE — 25000125 ZZHC RX 250: Performed by: SPECIALIST

## 2018-09-07 PROCEDURE — 37000008 ZZH ANESTHESIA TECHNICAL FEE, 1ST 30 MIN: Performed by: SPECIALIST

## 2018-09-07 PROCEDURE — 25000125 ZZHC RX 250: Performed by: NURSE ANESTHETIST, CERTIFIED REGISTERED

## 2018-09-07 PROCEDURE — 27210794 ZZH OR GENERAL SUPPLY STERILE: Performed by: SPECIALIST

## 2018-09-07 RX ORDER — SODIUM CHLORIDE, SODIUM LACTATE, POTASSIUM CHLORIDE, CALCIUM CHLORIDE 600; 310; 30; 20 MG/100ML; MG/100ML; MG/100ML; MG/100ML
INJECTION, SOLUTION INTRAVENOUS CONTINUOUS
Status: DISCONTINUED | OUTPATIENT
Start: 2018-09-07 | End: 2018-09-07 | Stop reason: HOSPADM

## 2018-09-07 RX ORDER — ONDANSETRON 2 MG/ML
INJECTION INTRAMUSCULAR; INTRAVENOUS PRN
Status: DISCONTINUED | OUTPATIENT
Start: 2018-09-07 | End: 2018-09-07

## 2018-09-07 RX ORDER — FENTANYL CITRATE 50 UG/ML
INJECTION, SOLUTION INTRAMUSCULAR; INTRAVENOUS PRN
Status: DISCONTINUED | OUTPATIENT
Start: 2018-09-07 | End: 2018-09-07

## 2018-09-07 RX ORDER — DEXAMETHASONE SODIUM PHOSPHATE 4 MG/ML
INJECTION, SOLUTION INTRA-ARTICULAR; INTRALESIONAL; INTRAMUSCULAR; INTRAVENOUS; SOFT TISSUE PRN
Status: DISCONTINUED | OUTPATIENT
Start: 2018-09-07 | End: 2018-09-07

## 2018-09-07 RX ORDER — HYDRALAZINE HYDROCHLORIDE 20 MG/ML
2.5-5 INJECTION INTRAMUSCULAR; INTRAVENOUS EVERY 10 MIN PRN
Status: DISCONTINUED | OUTPATIENT
Start: 2018-09-07 | End: 2018-09-07 | Stop reason: HOSPADM

## 2018-09-07 RX ORDER — LIDOCAINE HYDROCHLORIDE 20 MG/ML
INJECTION, SOLUTION INFILTRATION; PERINEURAL PRN
Status: DISCONTINUED | OUTPATIENT
Start: 2018-09-07 | End: 2018-09-07

## 2018-09-07 RX ORDER — ALBUTEROL SULFATE 0.83 MG/ML
2.5 SOLUTION RESPIRATORY (INHALATION) EVERY 4 HOURS PRN
Status: DISCONTINUED | OUTPATIENT
Start: 2018-09-07 | End: 2018-09-07 | Stop reason: HOSPADM

## 2018-09-07 RX ORDER — PROPOFOL 10 MG/ML
INJECTION, EMULSION INTRAVENOUS PRN
Status: DISCONTINUED | OUTPATIENT
Start: 2018-09-07 | End: 2018-09-07

## 2018-09-07 RX ORDER — LIDOCAINE 40 MG/G
CREAM TOPICAL
Status: DISCONTINUED | OUTPATIENT
Start: 2018-09-07 | End: 2018-09-07 | Stop reason: HOSPADM

## 2018-09-07 RX ORDER — MEPERIDINE HYDROCHLORIDE 50 MG/ML
12.5 INJECTION INTRAMUSCULAR; INTRAVENOUS; SUBCUTANEOUS
Status: DISCONTINUED | OUTPATIENT
Start: 2018-09-07 | End: 2018-09-07 | Stop reason: HOSPADM

## 2018-09-07 RX ORDER — PROPOFOL 10 MG/ML
INJECTION, EMULSION INTRAVENOUS CONTINUOUS PRN
Status: DISCONTINUED | OUTPATIENT
Start: 2018-09-07 | End: 2018-09-07

## 2018-09-07 RX ORDER — KETOROLAC TROMETHAMINE 30 MG/ML
INJECTION, SOLUTION INTRAMUSCULAR; INTRAVENOUS PRN
Status: DISCONTINUED | OUTPATIENT
Start: 2018-09-07 | End: 2018-09-07

## 2018-09-07 RX ORDER — ONDANSETRON 4 MG/1
4 TABLET, ORALLY DISINTEGRATING ORAL EVERY 30 MIN PRN
Status: DISCONTINUED | OUTPATIENT
Start: 2018-09-07 | End: 2018-09-07 | Stop reason: HOSPADM

## 2018-09-07 RX ORDER — OXYCODONE HYDROCHLORIDE 5 MG/1
5 TABLET ORAL EVERY 4 HOURS PRN
Status: DISCONTINUED | OUTPATIENT
Start: 2018-09-07 | End: 2018-09-07 | Stop reason: HOSPADM

## 2018-09-07 RX ORDER — CEFAZOLIN SODIUM 1 G/50ML
3 SOLUTION INTRAVENOUS
Status: COMPLETED | OUTPATIENT
Start: 2018-09-07 | End: 2018-09-07

## 2018-09-07 RX ORDER — ONDANSETRON 2 MG/ML
4 INJECTION INTRAMUSCULAR; INTRAVENOUS EVERY 30 MIN PRN
Status: DISCONTINUED | OUTPATIENT
Start: 2018-09-07 | End: 2018-09-07 | Stop reason: HOSPADM

## 2018-09-07 RX ORDER — FENTANYL CITRATE 50 UG/ML
25-50 INJECTION, SOLUTION INTRAMUSCULAR; INTRAVENOUS EVERY 5 MIN PRN
Status: DISCONTINUED | OUTPATIENT
Start: 2018-09-07 | End: 2018-09-07 | Stop reason: HOSPADM

## 2018-09-07 RX ORDER — CEFAZOLIN SODIUM 1 G/50ML
1 INJECTION, SOLUTION INTRAVENOUS SEE ADMIN INSTRUCTIONS
Status: DISCONTINUED | OUTPATIENT
Start: 2018-09-07 | End: 2018-09-07 | Stop reason: HOSPADM

## 2018-09-07 RX ORDER — DEXAMETHASONE SODIUM PHOSPHATE 4 MG/ML
4 INJECTION, SOLUTION INTRA-ARTICULAR; INTRALESIONAL; INTRAMUSCULAR; INTRAVENOUS; SOFT TISSUE EVERY 10 MIN PRN
Status: DISCONTINUED | OUTPATIENT
Start: 2018-09-07 | End: 2018-09-07 | Stop reason: HOSPADM

## 2018-09-07 RX ORDER — HYDROMORPHONE HYDROCHLORIDE 1 MG/ML
.3-.5 INJECTION, SOLUTION INTRAMUSCULAR; INTRAVENOUS; SUBCUTANEOUS EVERY 10 MIN PRN
Status: DISCONTINUED | OUTPATIENT
Start: 2018-09-07 | End: 2018-09-07 | Stop reason: HOSPADM

## 2018-09-07 RX ORDER — MAGNESIUM HYDROXIDE 1200 MG/15ML
LIQUID ORAL PRN
Status: DISCONTINUED | OUTPATIENT
Start: 2018-09-07 | End: 2018-09-07 | Stop reason: HOSPADM

## 2018-09-07 RX ORDER — NALOXONE HYDROCHLORIDE 0.4 MG/ML
.1-.4 INJECTION, SOLUTION INTRAMUSCULAR; INTRAVENOUS; SUBCUTANEOUS
Status: DISCONTINUED | OUTPATIENT
Start: 2018-09-07 | End: 2018-09-07 | Stop reason: HOSPADM

## 2018-09-07 RX ORDER — FENTANYL CITRATE 50 UG/ML
50 INJECTION, SOLUTION INTRAMUSCULAR; INTRAVENOUS
Status: DISCONTINUED | OUTPATIENT
Start: 2018-09-07 | End: 2018-09-07 | Stop reason: HOSPADM

## 2018-09-07 RX ORDER — BUPIVACAINE HYDROCHLORIDE 2.5 MG/ML
INJECTION, SOLUTION EPIDURAL; INFILTRATION; INTRACAUDAL PRN
Status: DISCONTINUED | OUTPATIENT
Start: 2018-09-07 | End: 2018-09-07 | Stop reason: HOSPADM

## 2018-09-07 RX ADMIN — LIDOCAINE HYDROCHLORIDE 40 MG: 20 INJECTION, SOLUTION INFILTRATION; PERINEURAL at 08:44

## 2018-09-07 RX ADMIN — FENTANYL CITRATE 50 MCG: 50 INJECTION, SOLUTION INTRAMUSCULAR; INTRAVENOUS at 08:57

## 2018-09-07 RX ADMIN — Medication 3 G: at 08:47

## 2018-09-07 RX ADMIN — FENTANYL CITRATE 25 MCG: 50 INJECTION, SOLUTION INTRAMUSCULAR; INTRAVENOUS at 08:40

## 2018-09-07 RX ADMIN — SODIUM CHLORIDE, SODIUM LACTATE, POTASSIUM CHLORIDE, AND CALCIUM CHLORIDE: 600; 310; 30; 20 INJECTION, SOLUTION INTRAVENOUS at 08:00

## 2018-09-07 RX ADMIN — DEXAMETHASONE SODIUM PHOSPHATE 4 MG: 4 INJECTION, SOLUTION INTRA-ARTICULAR; INTRALESIONAL; INTRAMUSCULAR; INTRAVENOUS; SOFT TISSUE at 08:44

## 2018-09-07 RX ADMIN — ONDANSETRON 4 MG: 2 INJECTION INTRAMUSCULAR; INTRAVENOUS at 08:44

## 2018-09-07 RX ADMIN — PROPOFOL 120 MCG/KG/MIN: 10 INJECTION, EMULSION INTRAVENOUS at 08:44

## 2018-09-07 RX ADMIN — FENTANYL CITRATE 25 MCG: 50 INJECTION, SOLUTION INTRAMUSCULAR; INTRAVENOUS at 08:46

## 2018-09-07 RX ADMIN — KETOROLAC TROMETHAMINE 30 MG: 30 INJECTION, SOLUTION INTRAMUSCULAR at 08:44

## 2018-09-07 RX ADMIN — MIDAZOLAM 2 MG: 1 INJECTION INTRAMUSCULAR; INTRAVENOUS at 08:40

## 2018-09-07 RX ADMIN — PROPOFOL 90 MG: 10 INJECTION, EMULSION INTRAVENOUS at 08:44

## 2018-09-07 ASSESSMENT — LIFESTYLE VARIABLES: TOBACCO_USE: 1

## 2018-09-07 NOTE — H&P (VIEW-ONLY)
--[facility]  [address]  [place] [address]  [phone]    PRE-OP EVALUATION:  Today's date: 2018    Gregg Aguayo (: 1965) presents for pre-operative evaluation assessment as requested by Dr. Grissom.  He requires evaluation and anesthesia risk assessment prior to undergoing surgery/procedure for treatment of Right Trigger Finger .    Proposed Surgery/ Procedure: Right Trigger Finger Release- 1st and 3rd fingers  Date of Surgery/ Procedure: 18  Time of Surgery/ Procedure: unknown  Hospital/Surgical Facility: [facility]  Fax number for surgical facility: n/a  Primary Physician: Reinier Ritchie  Type of Anesthesia Anticipated: General    Patient has a Health Care Directive or Living Will:  NO    1. NO - Do you have a history of heart attack, stroke, stent, bypass or surgery on an artery in the head, neck, heart or legs?  2. NO - Do you ever have any pain or discomfort in your chest?  3. NO - Do you have a history of  Heart Failure?  4. NO - Are you troubled by shortness of breath when: walking on the level, up a slight hill or at night?  5. NO - Do you currently have a cold, bronchitis or other respiratory infection?  6. NO - Do you have a cough, shortness of breath or wheezing?  7. NO - Do you sometimes get pains in the calves of your legs when you walk?  8. NO - Do you or anyone in your family have previous history of blood clots?  9. NO - Do you or does anyone in your family have a serious bleeding problem such as prolonged bleeding following surgeries or cuts?  10. NO - Have you ever had problems with anemia or been told to take iron pills?  11. NO - Have you had any abnormal blood loss such as black, tarry or bloody stools, or abnormal vaginal bleeding?  12. NO - Have you ever had a blood transfusion?  13. NO - Have you or any of your relatives ever had problems with anesthesia?  14. YES - Do you have sleep apnea, excessive snoring or daytime  drowsiness?  15. NO - Do you have any prosthetic heart valves?  16. NO - Do you have prosthetic joints?  17. NO - Is there any chance that you may be pregnant?      HPI:       ICD-10-CM    1. Preop general physical exam Z01.818    2. Trigger finger, acquired - Right hand - 1st and 3rd fingers M65.30    3. Arthralgia of multiple joints M25.50    4. Dermatomyositis (H) M33.90    5. Polymyalgia rheumatica (H) M35.3    6. AILEEN on CPAP - uses nightly, finds very helpful G47.33     Z99.89    7. Tobacco abuse Z72.0      HPI related to upcoming procedure: Patient has been having progressive problems with triggering of his right hand, specifically first and third fingers.  States that his second finger is actually starting to get affected because the other fingers are triggering so often.  Quite painful.  Now scheduled for surgery.    Has multiple joint arthritis.  Has some aches and pains.  Rarely uses tramadol at this time.    History of dermatomyositis/polymyalgia rheumatica.  At one point was on fairly moderate dose of prednisone with prednisone taper.  At this time he is no longer taking prednisone.  Not currently taking metformin.  He was advised to take metformin if he is back on the methylprednisolone.  Not taking either of these at this time.    Tobacco abuse, ongoing.  Down to about half pack per day.  He has gone as far as 1 full day without smoking.  He continues to reduce his tobacco use.    Sleep apnea, uses CPAP daily.  Finds very helpful.  Cannot sleep without it.    Heartburn, doing much better.  Continues on omeprazole.    MEDICAL HISTORY:     Patient Active Problem List    Diagnosis Date Noted     Trigger finger, acquired - Right hand - 1st and 3rd fingers 08/30/2018     Priority: Medium     Heartburn 07/25/2018     Priority: Medium     Steroid-induced hyperglycemia 04/10/2018     Priority: Medium     Psychosexual dysfunction with inhibited sexual excitement 01/29/2018     Priority: Medium     Tobacco abuse  01/29/2018     Priority: Medium     Overview:   IMO Update 10/11       Acute pain of left knee 01/11/2018     Priority: Medium     Fall at home, initial encounter 01/11/2018     Priority: Medium     History of arthroscopic knee surgery 01/11/2018     Priority: Medium     Knee effusion, left 01/11/2018     Priority: Medium     Left medial knee pain 01/11/2018     Priority: Medium     Left carpal tunnel syndrome 12/21/2017     Priority: Medium     History of carpal tunnel surgery of left wrist 12/21/2017     Priority: Medium     S/P cubital tunnel release 12/21/2017     Priority: Medium     History of carpal tunnel surgery of right wrist 11/22/2017     Priority: Medium     AILEEN on CPAP - uses nightly, finds very helpful 11/20/2017     Priority: Medium     Dermatomyositis (H) 10/12/2017     Priority: Medium     Cubital tunnel syndrome on left 09/25/2017     Priority: Medium     Bilateral leg edema 07/05/2017     Priority: Medium     Polymyalgia rheumatica (H) 11/11/2016     Priority: Medium     Arthralgia of multiple joints 10/26/2016     Priority: Medium     Hypertrophic and atrophic condition of skin 06/07/2012     Priority: Medium     Contact dermatitis and eczema 09/20/2011     Priority: Medium      Past Medical History:   Diagnosis Date     Bilateral carpal tunnel syndrome     9/21/2017     Carpal tunnel syndrome of left wrist     12/21/2017     Condition influencing health status     No Comments Provided     Lesion of left ulnar nerve     9/25/2017     Other specified postprocedural states     11/22/2017     Other specified postprocedural states     12/21/2017     Other specified postprocedural states     12/21/2017     Past Surgical History:   Procedure Laterality Date     APPENDECTOMY OPEN      2005,Laparoscopic for acute appendicitis     ARTHROSCOPY KNEE Left 3/19/2018    Procedure: ARTHROSCOPY KNEE;  Left Knee Arthroscopy, Partial Meniscectomy, Chondroplasty;  Surgeon: Kwabena Patricia DO;  Location:  OR      ATTEMPTED ARTHROSCOPY      knee ? left     COLONOSCOPY      1/29/2014,Tubular adenoma of rectum - follow up 5 years     RELEASE CARPAL TUNNEL Right     11/22/2017,[id number],OTHER,Right     RELEASE CARPAL TUNNEL Left     12/21/2017,[id number],OTHER,Left     RELEASE ULNAR NERVE (ELBOW)      12/21/2017,[id number],OTHER,Left     Current Outpatient Prescriptions   Medication Sig Dispense Refill     acetaminophen (TYLENOL) 650 MG CR tablet Take up to 4 tablet daily for pain as needed       furosemide (LASIX) 20 MG tablet Take 1 tablet (20 mg) by mouth daily as needed For leg swelling-limit use. Salt restriction. 90 tablet 3     gabapentin (NEURONTIN) 100 MG capsule Take 1-2 capsules (100-200 mg) by mouth 4 times daily as needed 360 capsule 3     metFORMIN (GLUCOPHAGE) 500 MG tablet Take 1-2 tablets (500-1,000 mg) by mouth daily (with dinner) 180 tablet 3     methylPREDNISolone (MEDROL) 4 MG tablet Take 2-8 mg by mouth daily With a meal - wean dose every 2 to 4 weeks - for PMR       omeprazole (PRILOSEC) 40 MG capsule Take 1 capsule (40 mg) by mouth daily 90 capsule 3     traMADol (ULTRAM) 50 MG tablet Take 1 tablet (50 mg) by mouth 2 times daily as needed for pain 60 tablet 5     varenicline (CHANTIX) 1 MG tablet Take 1 mg by mouth 2 times daily (with meals) Quit smoking       OTC products: no recent use of OTC ASA, NSAIDS or Steroids    No Known Allergies   Latex Allergy: NO    Social History   Substance Use Topics     Smoking status: Current Every Day Smoker     Types: Cigarettes     Smokeless tobacco: Never Used     Alcohol use 0.0 oz/week      Comment: Alcoholic Drinks/day: 1-2 drinks every other day     History   Drug Use No       REVIEW OF SYSTEMS:   CONSTITUTIONAL: NEGATIVE for fever, chills, change in weight  INTEGUMENTARY/SKIN: NEGATIVE for worrisome rashes, moles or lesions  EYES: NEGATIVE for vision changes or irritation  ENT/MOUTH: NEGATIVE for ear, mouth and throat problems  RESP: NEGATIVE for significant cough or  SOB  CV: NEGATIVE for chest pain, palpitations or peripheral edema  GI: NEGATIVE for nausea, abdominal pain, heartburn, or change in bowel habits  : NEGATIVE for frequency, dysuria, or hematuria  MUSCULOSKELETAL: This much less musculoskeletal pain and discomfort.  No longer on prednisone for polymyalgia/dermatomyositis.  Triggering of right hand first and third fingers.  Now getting some problems with right second finger as well.  More pain.  NEURO: NEGATIVE for weakness, dizziness or paresthesias  ENDOCRINE: NEGATIVE for temperature intolerance, skin/hair changes  HEME: NEGATIVE for bleeding problems  PSYCHIATRIC: NEGATIVE for changes in mood or affect    EXAM:   /86 (BP Location: Right arm, Patient Position: Sitting, Cuff Size: Adult Regular)  Pulse 80  Temp 97.8  F (36.6  C) (Tympanic)  Resp 20  Wt 299 lb 4 oz (135.7 kg)  SpO2 98%  BMI 42.33 kg/m2    GENERAL APPEARANCE: healthy, alert and no distress     EYES: EOMI,  PERRL     HENT: ear canals and TM's normal and nose and mouth without ulcers or lesions     NECK: no adenopathy, no asymmetry, masses, or scars and thyroid normal to palpation     RESP: lungs clear to auscultation - no rales, rhonchi or wheezes     CV: regular rates and rhythm, normal S1 S2, no S3 or S4 and no murmur, click or rub     ABDOMEN: soft, non-tender.      MS: Triggering of right hand first and third fingers.     SKIN: no suspicious lesions or rashes     NEURO: Normal strength and tone, sensory exam grossly normal, mentation intact and speech normal     PSYCH: mentation appears normal. and affect normal/bright     LYMPHATICS: No cervical adenopathy    DIAGNOSTICS:   11/20/2017 - EKG - normal sinus rhythm, rate of 65.     Recent Labs   Lab Test  07/25/18   1025  06/29/17   0851  10/07/16   1059   HGB  16.0  16.1   --    PLT  186  215   --    NA  139   --   139   POTASSIUM  4.0   --   4.1   CR  0.82   --   0.83        IMPRESSION:   Reason for surgery/procedure: Trigger finger  release, right hand, first and third fingers.  Diagnosis/reason for consult: Preoperative cardiopulmonary risk stratification.    The proposed surgical procedure is considered Low to INTERMEDIATE risk.    REVISED CARDIAC RISK INDEX  The patient has the following serious cardiovascular risks for perioperative complications such as (MI, PE, VFib and 3  AV Block):  No serious cardiac risks  INTERPRETATION: 0 risks: Class I (very low risk - 0.4% complication rate)    The patient has the following additional risks for perioperative complications:  - Sleep apnea - uses CPAP.       ICD-10-CM    1. Preop general physical exam Z01.818    2. Trigger finger, acquired - Right hand - 1st and 3rd fingers M65.30    3. Arthralgia of multiple joints M25.50    4. Dermatomyositis (H) M33.90    5. Polymyalgia rheumatica (H) M35.3    6. AILEEN on CPAP - uses nightly, finds very helpful G47.33     Z99.89    7. Tobacco abuse Z72.0        RECOMMENDATIONS:       Obstructive Sleep Apnea (or suspected sleep apnea)  Patient is to bring their home CPAP with them on the day of surgery    --Patient is to take all scheduled medications on the day of surgery EXCEPT for modifications listed below.    APPROVAL GIVEN to proceed with proposed procedure, without further diagnostic evaluation       Signed Electronically by: Reinier Ritchie MD    Copy of this evaluation report is provided to requesting physician.    [facility] Preop Guidelines    Revised Cardiac Risk Index

## 2018-09-07 NOTE — ANESTHESIA POSTPROCEDURE EVALUATION
Patient: Gregg Aguayo    Procedure(s):  Right Trigger Release Thumb, Index & Long Digits - Wound Class: I-Clean    Diagnosis:trigger finger  Diagnosis Additional Information: No value filed.    Anesthesia Type:  MAC    Note:  Anesthesia Post Evaluation    Patient location during evaluation: Phase 2  Patient participation: Able to fully participate in evaluation  Level of consciousness: awake and alert  Pain management: adequate  Airway patency: patent  Cardiovascular status: acceptable  Respiratory status: acceptable  Hydration status: acceptable  PONV: none             Last vitals:  Vitals:    09/07/18 0740   BP: (!) 153/91   Resp: 16   Temp: 97.6  F (36.4  C)   SpO2: 96%         Electronically Signed By: TREV SALINAS CRNA  September 7, 2018  9:22 AM

## 2018-09-07 NOTE — ANESTHESIA POSTPROCEDURE EVALUATION
Patient: Gregg Aguayo    Procedure(s):  Right Trigger Release Thumb, Index & Long Digits - Wound Class: I-Clean    Diagnosis:trigger finger  Diagnosis Additional Information: No value filed.    Anesthesia Type:  MAC    Note:  Anesthesia Post Evaluation    Patient location during evaluation: Phase 2  Patient participation: Able to fully participate in evaluation  Level of consciousness: awake and alert  Pain management: adequate  Airway patency: patent  Cardiovascular status: acceptable  Respiratory status: acceptable  Hydration status: acceptable  PONV: none             Last vitals:  Vitals:    09/07/18 0930 09/07/18 0945 09/07/18 1000   BP: 111/77 (!) 115/104 115/80   Resp: 16     Temp:      SpO2:  98% 98%         Electronically Signed By: TREV SALINAS CRNA  September 7, 2018  10:48 AM

## 2018-09-07 NOTE — OP NOTE
Procedure Date: 2018      POSTOPERATIVE DIAGNOSIS:  Right thumb, index and long finger trigger digit.      POSTOPERATIVE DIAGNOSIS:  Right thumb, index and long finger trigger digit.      PROCEDURE:  Right thumb, index and long finger trigger digit release.      SURGEON:  Monster Colvin MD      ASSISTANT:        ANESTHESIA:  Local with IV sedation.      INDICATIONS:  This patient presented with multiple trigger fingers and was desirous of a trigger digit release.  Risks and complications and benefits reviewed and he elected to proceed.        DESCRIPTION OF PROCEDURE:  Following medical clearance, the patient was brought to the operating room and placed on the operating table.  Pneumatic tourniquet was placed about his right upper extremity.  His right upper extremity was prepped and draped in a normal sterile manner.  Timeout procedure was performed confirming surgical site, patient and procedure.  The right upper extremity was elevated, exsanguinated, and the tourniquet was inflated to 250 mmHg.  A modified Brunner incision was planned over the thumb A1 pulley, as well as the index and long finger A1 pulleys.  These were infiltrated with local anesthetic without epinephrine.  Incisions were made, carried sharply down through skin and subcutaneous tissue.  Careful spreading was performed.  The A1 pulleys were identified and sharply incised under direct vision.  There was no evidence of triggering post-release.  The wounds were then irrigated.  Tourniquet was deflated.  Circulation returned promptly to the hand and fingers.  Hemostasis was obtained with pressure.  Skin was closed with  suture, a sterile dressing applied and the patient was brought to recovery in stable condition         MONSTER COLVIN MD             D: 2018   T: 2018   MT: NTS      Name:     RACHEL PLUMMER   MRN:      [id number]        Account:        [id number]   :      1965           Procedure Date:  09/07/2018      Document: [id number]

## 2018-09-07 NOTE — BRIEF OP NOTE
[facility] Brief Operative Note    Pre-operative diagnosis: trigger finger right thumb index and long    Post-operative diagnosis same   Procedure: Procedure(s):  Right Trigger Release Thumb & Long Digits - Wound Class: I-Clean   Surgeon(s): Surgeon(s) and Role:     * Monster Grissom MD - Primary   Estimated blood loss: * No values recorded between 9/7/2018  8:55 AM and 9/7/2018  9:12 AM *    Specimens: * No specimens in log *   Findings: Trigger digits

## 2018-09-07 NOTE — ANESTHESIA PREPROCEDURE EVALUATION
Anesthesia Evaluation     . Pt has had prior anesthetic. Type: General           ROS/MED HX    ENT/Pulmonary:     (+)sleep apnea, tobacco use, Current use 1/2 packs/day  uses CPAP , . .    Neurologic:  - neg neurologic ROS     Cardiovascular:  - neg cardiovascular ROS       METS/Exercise Tolerance: Comment: Limited activity d/t knee pain 4 - Raking leaves, gardening   Hematologic:  - neg hematologic  ROS       Musculoskeletal: Comment: Pain to left knee        GI/Hepatic:     (+) GERD Asymptomatic on medication,       Renal/Genitourinary:  - ROS Renal section negative       Endo:  - neg endo ROS       Psychiatric:  - neg psychiatric ROS       Infectious Disease:  - neg infectious disease ROS       Malignancy:      - no malignancy   Other:    - neg other ROS                 Physical Exam  Normal systems: dental    Airway   Mallampati: III  TM distance: >3 FB  Neck ROM: full  Comment: Large neck circumference.     Dental     Cardiovascular   Rhythm and rate: regular and normal      Pulmonary    breath sounds clear to auscultation                    Anesthesia Plan      History & Physical Review      ASA Status:  3 .    NPO Status:  > 8 hours    Plan for MAC with Propofol induction.     Pt agrees to Mac and GA if needed with LMA is ok. Will use Cpap in OR if needed, pt brought his own unit and is set up ready for use      Postoperative Care      Consents  Anesthetic plan, risks, benefits and alternatives discussed with:  Patient and Spouse..                          .

## 2018-09-07 NOTE — DISCHARGE INSTRUCTIONS
[facility]   Adult Discharge Orders & Instructions     For 24 hours after surgery    1. Get plenty of rest.  A responsible adult must stay with you for at least 24 hours after you leave the hospital.   2. Do not drive or use heavy equipment.  If you have weakness or tingling, don't drive or use heavy equipment until this feeling goes away.  3. Do not drink alcohol.  4. Avoid strenuous or risky activities.  Ask for help when climbing stairs.   5. You may feel lightheaded.  IF so, sit for a few minutes before standing.  Have someone help you get up.   6. If you have nausea (feel sick to your stomach): Drink only clear liquids such as apple juice, ginger ale, broth or 7-Up.  Rest may also help.  Be sure to drink enough fluids.  Move to a regular diet as you feel able.  7. You may have a slight fever. Call the doctor if your fever is over 101 F (38.3 C) (taken under the tongue) or lasts longer than 24 hours.  8. You may have a dry mouth, a sore throat, muscle aches or trouble sleeping.  These should go away after 24 hours.  9. Do not make important or legal decisions.   Call your doctor for any of the followin.  Signs of infection (fever, growing tenderness at the surgery site, a large amount of drainage or bleeding, severe pain, foul-smelling drainage, redness, swelling).    2. It has been over 8 to 10 hours since surgery and you are still not able to urinate (pass water).    3.  Headache for over 24 hours.    4.  Numbness, tingling or weakness the day after surgery (if you had spinal anesthesia).  To contact a doctor, call    [phone]

## 2018-09-07 NOTE — ANESTHESIA CARE TRANSFER NOTE
Patient: Gregg Aguayo    Procedure(s):  Right Trigger Release Thumb, Index & Long Digits - Wound Class: I-Clean    Diagnosis: trigger finger  Diagnosis Additional Information: No value filed.    Anesthesia Type:   MAC     Note:  Airway :Nasal Cannula  Patient transferred to:Phase II  Handoff Report: Identifed the Patient, Identified the Reponsible Provider, Reviewed the pertinent medical history, Discussed the surgical course, Reviewed Intra-OP anesthesia mangement and issues during anesthesia, Set expectations for post-procedure period and Allowed opportunity for questions and acknowledgement of understanding      Vitals: (Last set prior to Anesthesia Care Transfer)    CRNA VITALS  9/7/2018 0845 - 9/7/2018 0916      9/7/2018             Resp Rate (set): 10                Electronically Signed By: TREV Lafleur CRNA  September 7, 2018  9:16 AM

## 2018-09-07 NOTE — ANESTHESIA CARE TRANSFER NOTE
Patient: Gregg Aguayo    Procedure(s):  Right Trigger Release Thumb, Index & Long Digits - Wound Class: I-Clean    Diagnosis: trigger finger  Diagnosis Additional Information: No value filed.    Anesthesia Type:   MAC     Note:  Airway :Nasal Cannula  Patient transferred to:Phase II  Handoff Report: Identifed the Patient, Identified the Reponsible Provider, Reviewed the pertinent medical history, Discussed the surgical course, Reviewed Intra-OP anesthesia mangement and issues during anesthesia, Set expectations for post-procedure period and Allowed opportunity for questions and acknowledgement of understanding      Vitals: (Last set prior to Anesthesia Care Transfer)    CRNA VITALS  9/7/2018 0845 - 9/7/2018 0921      9/7/2018             Resp Rate (set): 10                Electronically Signed By: TREV Lafleur CRNA  September 7, 2018  9:21 AM

## 2018-09-07 NOTE — IP AVS SNAPSHOT
[facility]    [address]    [place] [address]    Phone:  [phone]    Fax:  [phone]                                       After Visit Summary   9/7/2018    Gregg Aguayo    MRN: [id number]           After Visit Summary Signature Page     I have received my discharge instructions, and my questions have been answered. I have discussed any challenges I see with this plan with the nurse or doctor.    ..........................................................................................................................................  Patient/Patient Representative Signature      ..........................................................................................................................................  Patient Representative Print Name and Relationship to Patient    ..................................................               ................................................  Date                                            Time    ..........................................................................................................................................  Reviewed by Signature/Title    ...................................................              ..............................................  Date                                                            Time          22EPIC Rev 08/18

## 2018-09-07 NOTE — IP AVS SNAPSHOT
MRN:[id number]                      After Visit Summary   9/7/2018    Gregg Aguayo    MRN: [id number]           Thank you!     Thank you for choosing [facility] for your care. Our goal is always to provide you with excellent care. Hearing back from our patients is one way we can continue to improve our services. Please take a few minutes to complete the written survey that you may receive in the mail after you visit with us. Thank you!        Patient Information     Date Of Birth          1965        Designated Caregiver       Most Recent Value    Caregiver    Will someone help with your care after discharge? yes      About your hospital stay     You were admitted on:  September 7, 2018 You last received care in the:  [facility]    You were discharged on:  September 7, 2018       Who to Call     For medical emergencies, please call 911.  For non-urgent questions about your medical care, please call your primary care provider or clinic, [phone]  For questions related to your surgery, please call your surgery clinic        Attending Provider     Provider Roseline Marcos MD Radiology       Primary Care Provider Office Phone # Fax #    Reinier Ritchie [phone]      After Care Instructions     Discharge Instructions       Review outpatient procedure discharge instructions with patient as directed by Provider            Ice to affected area       Ice pack to surgical site every 15 minutes per hour for 24 hours            No Alcohol       For 24 hours post procedure            No driving or operating machinery        until the day after procedure            Remove dressing - at 72 hours            Return to clinic       Return to clinic 9/18/18                  Your next 10 appointments already scheduled     Sep 21, 2018  2:00 PM CDT   Return Visit with Monster Grissom MD   [facility] ([facility])    [address]  [place] [address] [phone]              Further instructions from your care team       [facility]   Adult Discharge Orders & Instructions     For 24 hours after surgery    1. Get plenty of rest.  A responsible adult must stay with you for at least 24 hours after you leave the hospital.   2. Do not drive or use heavy equipment.  If you have weakness or tingling, don't drive or use heavy equipment until this feeling goes away.  3. Do not drink alcohol.  4. Avoid strenuous or risky activities.  Ask for help when climbing stairs.   5. You may feel lightheaded.  IF so, sit for a few minutes before standing.  Have someone help you get up.   6. If you have nausea (feel sick to your stomach): Drink only clear liquids such as apple juice, ginger ale, broth or 7-Up.  Rest may also help.  Be sure to drink enough fluids.  Move to a regular diet as you feel able.  7. You may have a slight fever. Call the doctor if your fever is over 101 F (38.3 C) (taken under the tongue) or lasts longer than 24 hours.  8. You may have a dry mouth, a sore throat, muscle aches or trouble sleeping.  These should go away after 24 hours.  9. Do not make important or legal decisions.   Call your doctor for any of the followin.  Signs of infection (fever, growing tenderness at the surgery site, a large amount of drainage or bleeding, severe pain, foul-smelling drainage, redness, swelling).    2. It has been over 8 to 10 hours since surgery and you are still not able to urinate (pass water).    3.  Headache for over 24 hours.    4.  Numbness, tingling or weakness the day after surgery (if you had spinal anesthesia).  To contact a doctor, call    [phone]    Pending Results     No orders found from 2018 to 2018.            Admission Information     Date & Time Provider Department Dept. Phone    2018 Roseline Grissom MD [facility] [phone]  "     Your Vitals Were     Blood Pressure Temperature Respirations Height Weight Pulse Oximetry    115/80 98.3  F (36.8  C) 16 1.803 m (5' 11\") 133.4 kg (294 lb) 98%    BMI (Body Mass Index)                   41 kg/m2           Care EveryWhere ID     This is your Care EveryWhere ID. This could be used by other organizations to access your [facility] medical records  [id number]        Equal Access to Services     KEITH BIRD : Hadii ck ku hadcandelariao Soomaali, waaxda luqadaha, qaybta kaalmada adeegyada, stephen sheppard ricoprasanna soler reg lajudyprasanna . So [facility] [phone].    ATENCIÓN: Si faviola boyd, tiene a norris disposición servicios gratuitos de asistencia lingüística. Libaname al [phone].    We comply with applicable federal civil rights laws and [place] laws. We do not discriminate on the basis of race, color, national origin, age, disability, sex, sexual orientation, or gender identity.               Review of your medicines      UNREVIEWED medicines. Ask your doctor about these medicines        Dose / Directions    acetaminophen 650 MG CR tablet   Commonly known as:  TYLENOL        Take up to 4 tablet daily for pain as needed   Refills:  0       furosemide 20 MG tablet   Commonly known as:  LASIX   Used for:  Bilateral leg edema        Dose:  20 mg   Take 1 tablet (20 mg) by mouth daily as needed For leg swelling-limit use. Salt restriction.   Quantity:  90 tablet   Refills:  3       gabapentin 100 MG capsule   Commonly known as:  NEURONTIN   Used for:  Polymyalgia rheumatica (H)        Dose:  100-200 mg   Take 1-2 capsules (100-200 mg) by mouth 4 times daily as needed   Quantity:  360 capsule   Refills:  3       metFORMIN 500 MG tablet   Commonly known as:  GLUCOPHAGE   Used for:  Steroid-induced hyperglycemia        Dose:  500-1000 mg   Take 1-2 tablets (500-1,000 mg) by mouth daily (with dinner)   Quantity:  180 tablet   Refills:  3       methylPREDNISolone 4 MG tablet   Commonly known as:  MEDROL        Dose:  " 2-8 mg   Take 2-8 mg by mouth daily With a meal - wean dose every 2 to 4 weeks - for PMR   Refills:  0       omeprazole 40 MG capsule   Commonly known as:  priLOSEC   Used for:  Heartburn        Dose:  40 mg   Take 1 capsule (40 mg) by mouth daily   Quantity:  90 capsule   Refills:  3       traMADol 50 MG tablet   Commonly known as:  ULTRAM   Used for:  Left medial knee pain, Trigger finger of right hand, unspecified finger        Dose:  50 mg   Take 1 tablet (50 mg) by mouth 2 times daily as needed for pain   Quantity:  60 tablet   Refills:  5       varenicline 1 MG tablet   Commonly known as:  CHANTIX        Dose:  1 mg   Take 1 mg by mouth 2 times daily (with meals) Quit smoking   Refills:  0                Protect others around you: Learn how to safely use, store and throw away your medicines at www.disposemymeds.org.             Medication List: This is a list of all your medications and when to take them. Check marks below indicate your daily home schedule. Keep this list as a reference.      Medications           Morning Afternoon Evening Bedtime As Needed    acetaminophen 650 MG CR tablet   Commonly known as:  TYLENOL   Take up to 4 tablet daily for pain as needed                                furosemide 20 MG tablet   Commonly known as:  LASIX   Take 1 tablet (20 mg) by mouth daily as needed For leg swelling-limit use. Salt restriction.                                gabapentin 100 MG capsule   Commonly known as:  NEURONTIN   Take 1-2 capsules (100-200 mg) by mouth 4 times daily as needed                                metFORMIN 500 MG tablet   Commonly known as:  GLUCOPHAGE   Take 1-2 tablets (500-1,000 mg) by mouth daily (with dinner)                                methylPREDNISolone 4 MG tablet   Commonly known as:  MEDROL   Take 2-8 mg by mouth daily With a meal - wean dose every 2 to 4 weeks - for PMR                                omeprazole 40 MG capsule   Commonly known as:  priLOSEC   Take 1  capsule (40 mg) by mouth daily                                traMADol 50 MG tablet   Commonly known as:  ULTRAM   Take 1 tablet (50 mg) by mouth 2 times daily as needed for pain                                varenicline 1 MG tablet   Commonly known as:  CHANTIX   Take 1 mg by mouth 2 times daily (with meals) Quit smoking                                          More Information        What is Trigger Finger?  Trigger finger is an inflammation of tissue inside your finger or thumb. It is also called tenosynovitis (ten-oh-sin-oh-VY-tis). Tendons (cordlike fibers that attach muscle to bone and allow you to bend the joints) become swollen. So does the synovium (a slick membrane that allows the tendons to move easily). This makes it hard to straighten the finger or thumb.    Causes  Repeated use of a tool with strong gripping, such as a drill or wrench, can irritate and inflame the tendons and the synovium. It is also more common in certain medical conditions, such as rheumatoid arthritis, gout, and diabetes. But often the cause of trigger finger is unknown.  Inside your finger  Tendons connect muscles in your forearm to the bones in your fingers. The tendons in each finger are surrounded by a protective tendon sheath. This sheath is lined with synovium, which produces a fluid that allows the tendons to slide easily when you bend and straighten the finger. If a tendon is irritated, it becomes inflamed.  When a tendon is inflamed  When a tendon is inflamed, it causes the lining of the tendon sheath to swell and thicken. Or the tendon itself may thicken. Then the sheath pinches the tendon. The tendon can then no longer slide easily inside the sheath. When you straighten your finger, the tendon sticks or  locks  as it tries to squeeze back through the sheath.    Symptoms  The first sign of trigger finger may be pain where the finger or thumb joins the palm. You may also notice some swelling. As the tendon becomes more  inflamed, the finger may start to catch when you try to straighten or bend it. When the locked tendon releases, the finger jumps, as if you were releasing the trigger of a gun. This further irritates the tendon. It may set up a cycle of catching and swelling.   Date Last Reviewed: 1/1/2018 2000-2017 [employer]. [address] [place] [address]. All rights reserved. This information is not intended as a substitute for professional medical care. Always follow your healthcare professional's instructions.                After Hand Surgery  After surgery, the better you take care of yourself--especially your hand--the sooner it will heal. Follow your surgeon s instructions. Try not to bump your hand, and don t move or lift anything while you re still wearing bandages, a splint, or a cast.  Care for your hand      Keep your hand elevated above heart level as much as possible for the first several days after surgery. This helps reduce swelling and pain.    To help prevent infection and speed healing, take care not to get your cast or bandages wet.  Relieve pain as directed  Your surgeon may prescribe pain medicine or suggest you take an anti-inflammatory medicine. You might also be instructed to apply ice (or another cold source) to your hand. If you use ice cubes, put them in a plastic bag and rest it on top of your bandages. Leave the cold source on your hand for as long as it s comfortable. Do this several times a day for the first few days after surgery. It may take several minutes before you can feel the cold through the cast or bandages.  Follow up with your surgeon  During a follow-up visit after surgery, your surgeon will check your progress. The stitches, bandages, splint, or cast may be removed. A new cast or splint may be placed. If your hand has healed enough, your surgeon may prescribe exercises.  Do prescribed hand exercises  Your surgeon may recommend that you do exercises. These  may be done under the guidance of a physical or occupational therapist. The exercises strengthen your hand, help you regain flexibility, and restore proper function. Do the exercises as advised.  Call your surgeon if you have...    A fever higher than 100.4 F (38.0 C) taken by mouth    Side effects from your medicine, such as prolonged nausea    A wet or loose dressing, or a dressing that is too tight    Excessive bleeding    Increased, ongoing pain or numbness    Signs of infection (such as drainage, warmth, or redness) at the incision site   Date Last Reviewed: 11/11/2015 2000-2017 The "[employer]. [address] [place] [address]. All rights reserved. This information is not intended as a substitute for professional medical care. Always follow your healthcare professional's instructions.

## 2018-09-21 ENCOUNTER — OFFICE VISIT (OUTPATIENT)
Dept: ORTHOPEDICS | Facility: OTHER | Age: 53
End: 2018-09-21
Attending: SPECIALIST
Payer: COMMERCIAL

## 2018-09-21 DIAGNOSIS — Z00.00 ROUTINE GENERAL MEDICAL EXAMINATION AT A HEALTH CARE FACILITY: Primary | ICD-10-CM

## 2018-09-21 NOTE — MR AVS SNAPSHOT
After Visit Summary   9/21/2018    Gregg Aguayo    MRN: [id number]           Patient Information     Date Of Birth          1965        Visit Information        Provider Department      9/21/2018 2:00 PM Monster Grissom MD [facility]        Today's Diagnoses     Routine general medical examination at a health care facility    -  1       Follow-ups after your visit        Your next 10 appointments already scheduled     Sep 28, 2018  8:30 AM CDT   Return Visit with Monster Grissom MD   [facility] ([facility])    [address]  [place] [address]   [phone]              Who to contact     If you have questions or need follow up information about today's clinic visit or your schedule please contact [facility] directly at [phone].  Normal or non-critical lab and imaging results will be communicated to you by MyChart, letter or phone within 4 business days after the clinic has received the results. If you do not hear from us within 7 days, please contact the clinic through MyChart or phone. If you have a critical or abnormal lab result, we will notify you by phone as soon as possible.  Submit refill requests through [employer] or call your pharmacy and they will forward the refill request to us. Please allow 3 business days for your refill to be completed.          Additional Information About Your Visit        Care EveryWhere ID     This is your Care EveryWhere ID. This could be used by other organizations to access your [facility] medical records  [id number]         Blood Pressure from Last 3 Encounters:   09/07/18 145/78   08/30/18 132/86   07/25/18 132/86    Weight from Last 3 Encounters:   09/07/18 133.4 kg (294 lb)   08/30/18 135.7 kg (299 lb 4 oz)   07/25/18 134.7 kg (297 lb)              Today, you had the following     No orders found for display       Primary Care Provider  Office Phone # Fax #    Reinier Ritchie [phone] [address]  [facility] [address]        Equal Access to Services     KEITH BIRD : Sal Valle, nemooswaldo yousif, lincolncarri haskinscourtney dirudyoswaldo, waxgoldie aubreyin hayaaprasanna nicolascelsoshelby farnsworth. So [facility] [phone].    ATENCIÓN: Si erila oliver, tiene a norris disposición servicios gratuitos de asistencia lingüística. Llame al [phone].    We comply with applicable federal civil rights laws and [place] laws. We do not discriminate on the basis of race, color, national origin, age, disability, sex, sexual orientation, or gender identity.            Thank you!     Thank you for choosing [facility]  for your care. Our goal is always to provide you with excellent care. Hearing back from our patients is one way we can continue to improve our services. Please take a few minutes to complete the written survey that you may receive in the mail after your visit with us. Thank you!             Your Updated Medication List - Protect others around you: Learn how to safely use, store and throw away your medicines at www.disposemymeds.org.          This list is accurate as of 9/21/18 11:59 PM.  Always use your most recent med list.                   Brand Name Dispense Instructions for use Diagnosis    acetaminophen 650 MG CR tablet    TYLENOL     Take up to 4 tablet daily for pain as needed        furosemide 20 MG tablet    LASIX    90 tablet    Take 1 tablet (20 mg) by mouth daily as needed For leg swelling-limit use. Salt restriction.    Bilateral leg edema       gabapentin 100 MG capsule    NEURONTIN    360 capsule    Take 1-2 capsules (100-200 mg) by mouth 4 times daily as needed    Polymyalgia rheumatica (H)       metFORMIN 500 MG tablet    GLUCOPHAGE    180 tablet    Take 1-2 tablets (500-1,000 mg) by mouth daily (with dinner)    Steroid-induced hyperglycemia       methylPREDNISolone 4 MG tablet    MEDROL      Take 2-8 mg by mouth daily With a meal - wean dose every 2 to 4 weeks - for PMR        omeprazole 40 MG capsule    priLOSEC    90 capsule    Take 1 capsule (40 mg) by mouth daily    Heartburn       traMADol 50 MG tablet    ULTRAM    60 tablet    Take 1 tablet (50 mg) by mouth 2 times daily as needed for pain    Left medial knee pain, Trigger finger of right hand, unspecified finger       varenicline 1 MG tablet    CHANTIX     Take 1 mg by mouth 2 times daily (with meals) Quit smoking

## 2018-09-28 ENCOUNTER — HOSPITAL ENCOUNTER (OUTPATIENT)
Dept: GENERAL RADIOLOGY | Facility: OTHER | Age: 53
Discharge: HOME | End: 2018-09-28
Attending: SPECIALIST | Admitting: SPECIALIST
Payer: COMMERCIAL

## 2018-09-28 ENCOUNTER — OFFICE VISIT (OUTPATIENT)
Dept: ORTHOPEDICS | Facility: OTHER | Age: 53
End: 2018-09-28
Attending: SPECIALIST
Payer: COMMERCIAL

## 2018-09-28 DIAGNOSIS — G89.29 CHRONIC PAIN OF LEFT KNEE: ICD-10-CM

## 2018-09-28 DIAGNOSIS — M25.562 CHRONIC PAIN OF LEFT KNEE: ICD-10-CM

## 2018-09-28 DIAGNOSIS — G89.29 CHRONIC PAIN OF LEFT KNEE: Primary | ICD-10-CM

## 2018-09-28 DIAGNOSIS — M25.562 CHRONIC PAIN OF LEFT KNEE: Primary | ICD-10-CM

## 2018-09-28 PROCEDURE — G0463 HOSPITAL OUTPT CLINIC VISIT: HCPCS | Mod: 25,24 | Performed by: SPECIALIST

## 2018-09-28 PROCEDURE — 99024 POSTOP FOLLOW-UP VISIT: CPT | Performed by: SPECIALIST

## 2018-09-28 PROCEDURE — 73564 X-RAY EXAM KNEE 4 OR MORE: CPT | Mod: LT

## 2018-09-28 NOTE — MR AVS SNAPSHOT
After Visit Summary   9/28/2018    Gregg Aguayo    MRN: [id number]           Patient Information     Date Of Birth          1965        Visit Information        Provider Department      9/28/2018 8:30 AM Monster Grissom MD [facility]        Today's Diagnoses     Chronic pain of left knee    -  1       Follow-ups after your visit        Your next 10 appointments already scheduled     Nov 06, 2018  9:00 AM CST   PHYSICAL with Reinier Ritchie MD   [facility] ([facility])    [address]  [place] [address] [phone]              Who to contact     If you have questions or need follow up information about today's clinic visit or your schedule please contact [facility] directly at [phone].  Normal or non-critical lab and imaging results will be communicated to you by MyChart, letter or phone within 4 business days after the clinic has received the results. If you do not hear from us within 7 days, please contact the clinic through MyChart or phone. If you have a critical or abnormal lab result, we will notify you by phone as soon as possible.  Submit refill requests through [employer] or call your pharmacy and they will forward the refill request to us. Please allow 3 business days for your refill to be completed.          Additional Information About Your Visit        Care EveryWhere ID     This is your Care EveryWhere ID. This could be used by other organizations to access your [facility] medical records  [id number]         Blood Pressure from Last 3 Encounters:   09/07/18 145/78   08/30/18 132/86   07/25/18 132/86    Weight from Last 3 Encounters:   09/07/18 133.4 kg (294 lb)   08/30/18 135.7 kg (299 lb 4 oz)   07/25/18 134.7 kg (297 lb)               Primary Care Provider Office Phone # Fax #    Reinier Ritchie [phone] [address] [place]  [address]        Equal Access to Services     [facility]: Hadii aad ku hadcandelariaadama Forestali, waurioswaldo estevezmelanieha, qamonique jozeflukestephen forte. So [facility] [phone].    ATENCIÓN: Si habla español, tiene a norris disposición servicios gratuitos de asistencia lingüística. Libaname al [phone].    We comply with applicable federal civil rights laws and [place] laws. We do not discriminate on the basis of race, color, national origin, age, disability, sex, sexual orientation, or gender identity.            Thank you!     Thank you for choosing [facility]  for your care. Our goal is always to provide you with excellent care. Hearing back from our patients is one way we can continue to improve our services. Please take a few minutes to complete the written survey that you may receive in the mail after your visit with us. Thank you!             Your Updated Medication List - Protect others around you: Learn how to safely use, store and throw away your medicines at www.disposemymeds.org.          This list is accurate as of 9/28/18 11:59 PM.  Always use your most recent med list.                   Brand Name Dispense Instructions for use Diagnosis    acetaminophen 650 MG CR tablet    TYLENOL     Take up to 4 tablet daily for pain as needed        furosemide 20 MG tablet    LASIX    90 tablet    Take 1 tablet (20 mg) by mouth daily as needed For leg swelling-limit use. Salt restriction.    Bilateral leg edema       gabapentin 100 MG capsule    NEURONTIN    360 capsule    Take 1-2 capsules (100-200 mg) by mouth 4 times daily as needed    Polymyalgia rheumatica (H)       metFORMIN 500 MG tablet    GLUCOPHAGE    180 tablet    Take 1-2 tablets (500-1,000 mg) by mouth daily (with dinner)    Steroid-induced hyperglycemia       methylPREDNISolone 4 MG tablet    MEDROL     Take 2-8 mg by mouth daily With a meal - wean dose every 2 to 4 weeks - for PMR        omeprazole 40 MG  capsule    priLOSEC    90 capsule    Take 1 capsule (40 mg) by mouth daily    Heartburn       traMADol 50 MG tablet    ULTRAM    60 tablet    Take 1 tablet (50 mg) by mouth 2 times daily as needed for pain    Left medial knee pain, Trigger finger of right hand, unspecified finger       varenicline 1 MG tablet    CHANTIX     Take 1 mg by mouth 2 times daily (with meals) Quit smoking

## 2018-11-06 ENCOUNTER — OFFICE VISIT (OUTPATIENT)
Dept: INTERNAL MEDICINE | Facility: OTHER | Age: 53
End: 2018-11-06
Attending: SPECIALIST
Payer: COMMERCIAL

## 2018-11-06 VITALS
HEART RATE: 76 BPM | SYSTOLIC BLOOD PRESSURE: 138 MMHG | BODY MASS INDEX: 42.12 KG/M2 | OXYGEN SATURATION: 98 % | RESPIRATION RATE: 18 BRPM | TEMPERATURE: 97.3 F | DIASTOLIC BLOOD PRESSURE: 88 MMHG | WEIGHT: 302 LBS

## 2018-11-06 DIAGNOSIS — G47.33 OSA ON CPAP: ICD-10-CM

## 2018-11-06 DIAGNOSIS — M35.3 POLYMYALGIA RHEUMATICA (H): ICD-10-CM

## 2018-11-06 DIAGNOSIS — R12 HEARTBURN: ICD-10-CM

## 2018-11-06 DIAGNOSIS — R60.0 BILATERAL LEG EDEMA: ICD-10-CM

## 2018-11-06 DIAGNOSIS — Z01.818 PREOP GENERAL PHYSICAL EXAM: Primary | ICD-10-CM

## 2018-11-06 DIAGNOSIS — Z72.0 TOBACCO ABUSE: ICD-10-CM

## 2018-11-06 DIAGNOSIS — M17.12 ARTHRITIS OF LEFT KNEE: ICD-10-CM

## 2018-11-06 DIAGNOSIS — M25.562 LEFT MEDIAL KNEE PAIN: ICD-10-CM

## 2018-11-06 PROBLEM — M25.462 KNEE EFFUSION, LEFT: Status: RESOLVED | Noted: 2018-01-11 | Resolved: 2018-11-06

## 2018-11-06 PROBLEM — M33.13 DERMATOMYOSITIS (H): Status: RESOLVED | Noted: 2017-10-12 | Resolved: 2018-11-06

## 2018-11-06 LAB
ALBUMIN SERPL-MCNC: 4.3 G/DL (ref 3.5–5.7)
ALBUMIN UR-MCNC: NEGATIVE MG/DL
ALP SERPL-CCNC: 51 U/L (ref 34–104)
ALT SERPL W P-5'-P-CCNC: 30 U/L (ref 7–52)
ANION GAP SERPL CALCULATED.3IONS-SCNC: 8 MMOL/L (ref 3–14)
APPEARANCE UR: CLEAR
AST SERPL W P-5'-P-CCNC: 24 U/L (ref 13–39)
BASOPHILS # BLD AUTO: 0.1 10E9/L (ref 0–0.2)
BASOPHILS NFR BLD AUTO: 1.4 %
BILIRUB SERPL-MCNC: 0.7 MG/DL (ref 0.3–1)
BILIRUB UR QL STRIP: NEGATIVE
BUN SERPL-MCNC: 14 MG/DL (ref 7–25)
CALCIUM SERPL-MCNC: 9.5 MG/DL (ref 8.6–10.3)
CHLORIDE SERPL-SCNC: 105 MMOL/L (ref 98–107)
CO2 SERPL-SCNC: 28 MMOL/L (ref 21–31)
COLOR UR AUTO: YELLOW
CREAT SERPL-MCNC: 0.81 MG/DL (ref 0.7–1.3)
DIFFERENTIAL METHOD BLD: NORMAL
EOSINOPHIL # BLD AUTO: 0.4 10E9/L (ref 0–0.7)
EOSINOPHIL NFR BLD AUTO: 6 %
ERYTHROCYTE [DISTWIDTH] IN BLOOD BY AUTOMATED COUNT: 12.6 % (ref 10–15)
ERYTHROCYTE [SEDIMENTATION RATE] IN BLOOD BY WESTERGREN METHOD: 12 MM/H (ref 1–10)
GFR SERPL CREATININE-BSD FRML MDRD: >90 ML/MIN/1.7M2
GLUCOSE SERPL-MCNC: 106 MG/DL (ref 70–105)
GLUCOSE UR STRIP-MCNC: NEGATIVE MG/DL
HCT VFR BLD AUTO: 45.2 % (ref 40–53)
HGB BLD-MCNC: 15.7 G/DL (ref 13.3–17.7)
HGB UR QL STRIP: NEGATIVE
IMM GRANULOCYTES # BLD: 0 10E9/L (ref 0–0.4)
IMM GRANULOCYTES NFR BLD: 0.2 %
KETONES UR STRIP-MCNC: NEGATIVE MG/DL
LEUKOCYTE ESTERASE UR QL STRIP: NEGATIVE
LYMPHOCYTES # BLD AUTO: 2 10E9/L (ref 0.8–5.3)
LYMPHOCYTES NFR BLD AUTO: 33.7 %
MCH RBC QN AUTO: 32.6 PG (ref 26.5–33)
MCHC RBC AUTO-ENTMCNC: 34.7 G/DL (ref 31.5–36.5)
MCV RBC AUTO: 94 FL (ref 78–100)
MONOCYTES # BLD AUTO: 0.7 10E9/L (ref 0–1.3)
MONOCYTES NFR BLD AUTO: 11.9 %
NEUTROPHILS # BLD AUTO: 2.7 10E9/L (ref 1.6–8.3)
NEUTROPHILS NFR BLD AUTO: 46.8 %
NITRATE UR QL: NEGATIVE
PH UR STRIP: 6.5 PH (ref 5–9)
PLATELET # BLD AUTO: 192 10E9/L (ref 150–450)
POTASSIUM SERPL-SCNC: 4 MMOL/L (ref 3.5–5.1)
PROT SERPL-MCNC: 6.8 G/DL (ref 6.4–8.9)
RBC # BLD AUTO: 4.81 10E12/L (ref 4.4–5.9)
SODIUM SERPL-SCNC: 141 MMOL/L (ref 134–144)
SOURCE: NORMAL
SP GR UR STRIP: 1.02 (ref 1–1.03)
UROBILINOGEN UR STRIP-ACNC: 0.2 EU/DL (ref 0.2–1)
WBC # BLD AUTO: 5.8 10E9/L (ref 4–11)

## 2018-11-06 PROCEDURE — 93000 ELECTROCARDIOGRAM COMPLETE: CPT | Performed by: INTERNAL MEDICINE

## 2018-11-06 PROCEDURE — 36415 COLL VENOUS BLD VENIPUNCTURE: CPT | Performed by: INTERNAL MEDICINE

## 2018-11-06 PROCEDURE — 85652 RBC SED RATE AUTOMATED: CPT | Performed by: INTERNAL MEDICINE

## 2018-11-06 PROCEDURE — 85025 COMPLETE CBC W/AUTO DIFF WBC: CPT | Performed by: INTERNAL MEDICINE

## 2018-11-06 PROCEDURE — 81003 URINALYSIS AUTO W/O SCOPE: CPT | Performed by: INTERNAL MEDICINE

## 2018-11-06 PROCEDURE — 99214 OFFICE O/P EST MOD 30 MIN: CPT | Mod: 25 | Performed by: INTERNAL MEDICINE

## 2018-11-06 PROCEDURE — 80053 COMPREHEN METABOLIC PANEL: CPT | Performed by: INTERNAL MEDICINE

## 2018-11-06 RX ORDER — VARENICLINE TARTRATE 1 MG/1
1 TABLET, FILM COATED ORAL 2 TIMES DAILY WITH MEALS
Qty: 60 TABLET | Refills: 1 | Status: SHIPPED | OUTPATIENT
Start: 2018-11-06 | End: 2020-09-11

## 2018-11-06 ASSESSMENT — ANXIETY QUESTIONNAIRES
1. FEELING NERVOUS, ANXIOUS, OR ON EDGE: NOT AT ALL
6. BECOMING EASILY ANNOYED OR IRRITABLE: NOT AT ALL
5. BEING SO RESTLESS THAT IT IS HARD TO SIT STILL: NOT AT ALL
3. WORRYING TOO MUCH ABOUT DIFFERENT THINGS: NOT AT ALL
2. NOT BEING ABLE TO STOP OR CONTROL WORRYING: NOT AT ALL
IF YOU CHECKED OFF ANY PROBLEMS ON THIS QUESTIONNAIRE, HOW DIFFICULT HAVE THESE PROBLEMS MADE IT FOR YOU TO DO YOUR WORK, TAKE CARE OF THINGS AT HOME, OR GET ALONG WITH OTHER PEOPLE: NOT DIFFICULT AT ALL
7. FEELING AFRAID AS IF SOMETHING AWFUL MIGHT HAPPEN: NOT AT ALL
GAD7 TOTAL SCORE: 0

## 2018-11-06 ASSESSMENT — PATIENT HEALTH QUESTIONNAIRE - PHQ9
SUM OF ALL RESPONSES TO PHQ QUESTIONS 1-9: 0
5. POOR APPETITE OR OVEREATING: NOT AT ALL

## 2018-11-06 ASSESSMENT — PAIN SCALES - GENERAL: PAINLEVEL: EXTREME PAIN (9)

## 2018-11-06 NOTE — PATIENT INSTRUCTIONS
Before Your Surgery      Call your surgeon if there is any change in your health. This includes signs of a cold or flu (such as a sore throat, runny nose, cough, rash or fever).    Do not smoke, drink alcohol or take over the counter medicine (unless your surgeon or primary care doctor tells you to) for the 24 hours before and after surgery.    If you take prescribed drugs: Follow your doctor s orders about which medicines to take and which to stop until after surgery.    Eating and drinking prior to surgery: follow the instructions from your surgeon    Take a shower or bath the night before surgery. Use the soap your surgeon gave you to gently clean your skin. If you do not have soap from your surgeon, use your regular soap. Do not shave or scrub the surgery site.  Wear clean pajamas and have clean sheets on your bed.     Labs and EKG today.    Skip Lasix on morning of surgery.    Skip metformin the day before and day of surgery.    Okay to use acetaminophen/Tylenol and tramadol as needed prior to surgery.    Bring your CPAP machine to surgery.    To help with weight loss and improve blood sugar control....    -- Try to avoid Carbohydrates as much as possible -- breads, pasta, baked goods, cakes, oatmeal, cold cereal, potatoes.   These are turned to sugar in one metabolic conversion, cause insulin secretion and increased fat deposition / weight gain.      -- Eat more lean meats, proteins, eggs, nuts, vegetables.     Return as needed for follow-up with Dr. Ritchie.    Clinic : [phone]  Appointment line: [phone]

## 2018-11-06 NOTE — PROGRESS NOTES
[facility]  [address]  [place] [address]  [phone]    PRE-OP EVALUATION:  Today's date: 2018    Gregg Aguayo (: 1965) presents for pre-operative evaluation assessment as requested by Dr. Grissom.  He requires evaluation and anesthesia risk assessment prior to undergoing surgery/procedure for treatment of Total Left Knee .    Proposed Surgery/ Procedure: Total Left Knee  Date of Surgery/ Procedure: 18  Time of Surgery/ Procedure: not reported   Hospital/Surgical Facility: [facility]  Fax number for surgical facility: n/a  Primary Physician: Reinier Ritchie  Type of Anesthesia Anticipated: General    Patient has a Health Care Directive or Living Will:  NO    1. NO - Do you have a history of heart attack, stroke, stent, bypass or surgery on an artery in the head, neck, heart or legs?  2. NO - Do you ever have any pain or discomfort in your chest?  3. NO - Do you have a history of  Heart Failure?  4. NO - Are you troubled by shortness of breath when: walking on the level, up a slight hill or at night?  5. NO - Do you currently have a cold, bronchitis or other respiratory infection?  6. NO - Do you have a cough, shortness of breath or wheezing?  7. NO - Do you sometimes get pains in the calves of your legs when you walk?  8. NO - Do you or anyone in your family have previous history of blood clots?  9. NO - Do you or does anyone in your family have a serious bleeding problem such as prolonged bleeding following surgeries or cuts?  10. NO - Have you ever had problems with anemia or been told to take iron pills?  11. NO - Have you had any abnormal blood loss such as black, tarry or bloody stools, or abnormal vaginal bleeding?  12. NO - Have you ever had a blood transfusion?  13. NO - Have you or any of your relatives ever had problems with anesthesia?  14. YES - Do you have sleep apnea, excessive snoring or daytime drowsiness?  15. NO - Do you have  any prosthetic heart valves?  16. NO - Do you have prosthetic joints?  17. NO - Is there any chance that you may be pregnant?      HPI:       ICD-10-CM    1. Preop general physical exam Z01.818    2. Arthritis of left knee M17.12    3. Left medial knee pain M25.562    4. Tobacco abuse Z72.0 varenicline (CHANTIX) 1 MG tablet   5. Polymyalgia rheumatica (H) M35.3 CBC and Differential     Comprehensive Metabolic Panel     EKG 12-LEAD CLINIC READ     Sedimentation Rate (ESR)     Urinalysis w Reflex Microscopic If Positive     CBC and Differential     Comprehensive Metabolic Panel     Sedimentation Rate (ESR)   6. AILEEN on CPAP - uses nightly, finds very helpful G47.33     Z99.89    7. Heartburn R12    8. Bilateral leg edema R60.0      HPI related to upcoming procedure: Patient has had progressive issues with left knee pain and swelling and is now scheduled for total knee replacement.    Tobacco abuse, has been cutting down.  Nearly quit with Chantix.  He is back to smoking a little bit today.  He would like a refill of Chantix so he can try to completely quit smoking again.    History of polymyalgia rheumatica.  At one point was on daily steroids.  States in the last 10 weeks he is only taken 2 doses.  He only takes metformin if taking the Medrol to knocked down steroid-induced high blood sugar.    Last EKG was over one year ago.  Needs labs today.    Sleep apnea, uses CPAP nightly.  Finds helpful.  Advised to bring his CPAP machine with him to surgery.    Heartburn, well controlled with Prilosec.  Uses daily.    Multiple joint pain, uses tramadol intermittently.  Has refills.    Bilateral leg edema, mostly left leg at this time.  Takes Lasix maybe once or twice a week.  Lower leg swelling in the left is largely due to his left knee issues.  He is hoping this will improve after surgery.    MEDICAL HISTORY:     Patient Active Problem List    Diagnosis Date Noted     Trigger finger, acquired - Right hand - 1st and 3rd  fingers 08/30/2018     Priority: Medium     Heartburn 07/25/2018     Priority: Medium     Steroid-induced hyperglycemia 04/10/2018     Priority: Medium     Psychosexual dysfunction with inhibited sexual excitement 01/29/2018     Priority: Medium     Tobacco abuse 01/29/2018     Priority: Medium     Overview:   IMO Update 10/11       Acute pain of left knee 01/11/2018     Priority: Medium     Fall at home, initial encounter 01/11/2018     Priority: Medium     History of arthroscopic knee surgery 01/11/2018     Priority: Medium     Left medial knee pain 01/11/2018     Priority: Medium     Left carpal tunnel syndrome 12/21/2017     Priority: Medium     History of carpal tunnel surgery of left wrist 12/21/2017     Priority: Medium     S/P cubital tunnel release 12/21/2017     Priority: Medium     History of carpal tunnel surgery of right wrist 11/22/2017     Priority: Medium     AILEEN on CPAP - uses nightly, finds very helpful 11/20/2017     Priority: Medium     Cubital tunnel syndrome on left 09/25/2017     Priority: Medium     Bilateral leg edema 07/05/2017     Priority: Medium     Polymyalgia rheumatica (H) 11/11/2016     Priority: Medium     Arthralgia of multiple joints 10/26/2016     Priority: Medium     Hypertrophic and atrophic condition of skin 06/07/2012     Priority: Medium     Contact dermatitis and eczema 09/20/2011     Priority: Medium      Past Medical History:   Diagnosis Date     Bilateral carpal tunnel syndrome     9/21/2017     Carpal tunnel syndrome of left wrist     12/21/2017     Condition influencing health status     No Comments Provided     Lesion of left ulnar nerve     9/25/2017     Other specified postprocedural states     11/22/2017     Other specified postprocedural states     12/21/2017     Other specified postprocedural states     12/21/2017     Past Surgical History:   Procedure Laterality Date     APPENDECTOMY OPEN      2005,Laparoscopic for acute appendicitis     ARTHROSCOPY KNEE Left  3/19/2018    Procedure: ARTHROSCOPY KNEE;  Left Knee Arthroscopy, Partial Meniscectomy, Chondroplasty;  Surgeon: Kwabena Patricia DO;  Location: [facility] OR     ATTEMPTED ARTHROSCOPY      knee ? left     COLONOSCOPY      1/29/2014,Tubular adenoma of rectum - follow up 5 years     RELEASE CARPAL TUNNEL Right     11/22/2017,[id number],OTHER,Right     RELEASE CARPAL TUNNEL Left     12/21/2017,[id number],OTHER,Left     RELEASE TRIGGER FINGER Right 9/7/2018    Procedure: RELEASE TRIGGER FINGER;  Right Trigger Release Thumb, Index & Long Digits;  Surgeon: Monster Grissom MD;  Location: [facility] OR     RELEASE ULNAR NERVE (ELBOW)      12/21/2017,[id number],OTHER,Left     Current Outpatient Prescriptions   Medication Sig Dispense Refill     acetaminophen (TYLENOL) 650 MG CR tablet Take up to 4 tablet daily for pain as needed       furosemide (LASIX) 20 MG tablet Take 1 tablet (20 mg) by mouth daily as needed For leg swelling-limit use. Salt restriction. 90 tablet 3     gabapentin (NEURONTIN) 100 MG capsule Take 1-2 capsules (100-200 mg) by mouth 4 times daily as needed 360 capsule 3     metFORMIN (GLUCOPHAGE) 500 MG tablet Take 1-2 tablets (500-1,000 mg) by mouth daily (with dinner) 180 tablet 3     methylPREDNISolone (MEDROL) 4 MG tablet Take 2-8 mg by mouth daily With a meal - wean dose every 2 to 4 weeks - for PMR       omeprazole (PRILOSEC) 40 MG capsule Take 1 capsule (40 mg) by mouth daily 90 capsule 3     traMADol (ULTRAM) 50 MG tablet Take 1 tablet (50 mg) by mouth 2 times daily as needed for pain 60 tablet 5     varenicline (CHANTIX) 1 MG tablet Take 1 tablet (1 mg) by mouth 2 times daily (with meals) Quit smoking 60 tablet 1     OTC products: no recent use of OTC ASA, NSAIDS or Steroids    No Known Allergies   Latex Allergy: NO    Social History   Substance Use Topics     Smoking status: Current Every Day Smoker     Packs/day: 0.50     Types: Cigarettes     Smokeless tobacco: Never Used     Alcohol use 0.0 oz/week      Comment:  Alcoholic Drinks/day: 1-2 drinks every other day     History   Drug Use No       REVIEW OF SYSTEMS:   CONSTITUTIONAL: NEGATIVE for fever, chills, change in weight  INTEGUMENTARY/SKIN: NEGATIVE for worrisome rashes, moles or lesions  EYES: NEGATIVE for vision changes or irritation  ENT/MOUTH: NEGATIVE for ear, mouth and throat problems  RESP: NEGATIVE for significant cough or SOB  BREAST: NEGATIVE for masses, tenderness or discharge  CV: NEGATIVE for chest pain, palpitations or peripheral edema  GI: NEGATIVE for nausea, abdominal pain, heartburn, or change in bowel habits  : NEGATIVE for frequency, dysuria, or hematuria  MUSCULOSKELETAL:multiple joint pain. Left knee swelling and pain.   NEURO: NEGATIVE for weakness, dizziness or paresthesias  ENDOCRINE: NEGATIVE for temperature intolerance, skin/hair changes  HEME: NEGATIVE for bleeding problems  PSYCHIATRIC: NEGATIVE for changes in mood or affect    EXAM:   /88 (BP Location: Right arm, Patient Position: Sitting, Cuff Size: Adult Large)  Pulse 76  Temp 97.3  F (36.3  C) (Tympanic)  Resp 18  Wt 302 lb (137 kg)  SpO2 98%  BMI 42.12 kg/m2    GENERAL APPEARANCE: healthy, alert and no distress     EYES: EOMI,  PERRL     HENT: ear canals and TM's normal and nose and mouth without ulcers or lesions     NECK: no adenopathy, no asymmetry, masses, or scars and thyroid normal to palpation     RESP: lungs clear to auscultation - no rales, rhonchi or wheezes     CV: regular rates and rhythm, normal S1 S2, no S3 or S4 and no murmur, click or rub     ABDOMEN:  soft, nontender, no HSM or masses and bowel sounds normal     ABDOMEN: obesity noted.      MS: moderate left knee arthritis and effusion to palpation. Slight limp.      SKIN: no suspicious lesions or rashes     NEURO: Normal strength and tone, sensory exam grossly normal, mentation intact and speech normal     PSYCH: mentation appears normal. and affect normal/bright     LYMPHATICS: No cervical  adenopathy    DIAGNOSTICS:   11/6/2018 EKG: Normal EKG with a rate of 65.  Compared to previous tracing dated 11/20/2017, no significant change    Results for orders placed or performed in visit on 11/06/18   Urinalysis w Reflex Microscopic If Positive   Result Value Ref Range    Color Urine Yellow     Appearance Urine Clear     Glucose Urine Negative NEG^Negative mg/dL    Bilirubin Urine Negative NEG^Negative    Ketones Urine Negative NEG^Negative mg/dL    Specific Gravity Urine 1.020 1.000 - 1.030    Blood Urine Negative NEG^Negative    pH Urine 6.5 5.0 - 9.0 pH    Protein Albumin Urine Negative NEG^Negative mg/dL    Urobilinogen Urine 0.2 0.2 - 1.0 EU/dL    Nitrite Urine Negative NEG^Negative    Leukocyte Esterase Urine Negative NEG^Negative    Source Midstream Urine    CBC and Differential   Result Value Ref Range    WBC 5.8 4.0 - 11.0 10e9/L    RBC Count 4.81 4.4 - 5.9 10e12/L    Hemoglobin 15.7 13.3 - 17.7 g/dL    Hematocrit 45.2 40.0 - 53.0 %    MCV 94 78 - 100 fl    MCH 32.6 26.5 - 33.0 pg    MCHC 34.7 31.5 - 36.5 g/dL    RDW 12.6 10.0 - 15.0 %    Platelet Count 192 150 - 450 10e9/L    Diff Method Automated Method     % Neutrophils 46.8 %    % Lymphocytes 33.7 %    % Monocytes 11.9 %    % Eosinophils 6.0 %    % Basophils 1.4 %    % Immature Granulocytes 0.2 %    Absolute Neutrophil 2.7 1.6 - 8.3 10e9/L    Absolute Lymphocytes 2.0 0.8 - 5.3 10e9/L    Absolute Monocytes 0.7 0.0 - 1.3 10e9/L    Absolute Eosinophils 0.4 0.0 - 0.7 10e9/L    Absolute Basophils 0.1 0.0 - 0.2 10e9/L    Abs Immature Granulocytes 0.0 0 - 0.4 10e9/L   Comprehensive Metabolic Panel   Result Value Ref Range    Sodium 141 134 - 144 mmol/L    Potassium 4.0 3.5 - 5.1 mmol/L    Chloride 105 98 - 107 mmol/L    Carbon Dioxide 28 21 - 31 mmol/L    Anion Gap 8 3 - 14 mmol/L    Glucose 106 (H) 70 - 105 mg/dL    Urea Nitrogen 14 7 - 25 mg/dL    Creatinine 0.81 0.70 - 1.30 mg/dL    GFR Estimate >90 >60 mL/min/1.7m2    GFR Estimate If Black >90 >60  mL/min/1.7m2    Calcium 9.5 8.6 - 10.3 mg/dL    Bilirubin Total 0.7 0.3 - 1.0 mg/dL    Albumin 4.3 3.5 - 5.7 g/dL    Protein Total 6.8 6.4 - 8.9 g/dL    Alkaline Phosphatase 51 34 - 104 U/L    ALT 30 7 - 52 U/L    AST 24 13 - 39 U/L   Sedimentation Rate (ESR)   Result Value Ref Range    Sed Rate 12 (H) 1 - 10 mm/h        Recent Labs   Lab Test  07/25/18   1025  06/29/17   0851  10/07/16   1059   HGB  16.0  16.1   --    PLT  186  215   --    NA  139   --   139   POTASSIUM  4.0   --   4.1   CR  0.82   --   0.83     IMPRESSION:   Reason for surgery/procedure: Left knee total knee arthroplasty.  Diagnosis/reason for consult: Preoperative cardiopulmonary risk stratification    The proposed surgical procedure is considered INTERMEDIATE risk.    REVISED CARDIAC RISK INDEX  The patient has the following serious cardiovascular risks for perioperative complications such as (MI, PE, VFib and 3  AV Block):  No serious cardiac risks  INTERPRETATION: 0 risks: Class I (very low risk - 0.4% complication rate)    The patient has the following additional risks for perioperative complications:    Morbid obesity - BMI = 42, intermittent tobacco use.         ICD-10-CM    1. Preop general physical exam Z01.818    2. Arthritis of left knee M17.12    3. Left medial knee pain M25.562    4. Tobacco abuse Z72.0 varenicline (CHANTIX) 1 MG tablet   5. Polymyalgia rheumatica (H) M35.3 CBC and Differential     Comprehensive Metabolic Panel     EKG 12-LEAD CLINIC READ     Sedimentation Rate (ESR)     Urinalysis w Reflex Microscopic If Positive     CBC and Differential     Comprehensive Metabolic Panel     Sedimentation Rate (ESR)   6. AILEEN on CPAP - uses nightly, finds very helpful G47.33     Z99.89    7. Heartburn R12    8. Bilateral leg edema R60.0        RECOMMENDATIONS:       Obstructive Sleep Apnea (or suspected sleep apnea)  Patient is to bring their home CPAP with them on the day of surgery      --Patient is to take all scheduled medications  on the day of surgery EXCEPT for modifications listed below.    Diabetes Medication Use  -----Hold usual oral and non-insulin diabetic meds (e.g. Metformin, Actos, Glipizide) while NPO.       APPROVAL GIVEN to proceed with proposed procedure, without further diagnostic evaluation       Signed Electronically by: Reinier Ritchie MD    Copy of this evaluation report is provided to requesting physician.    [facility] Preop Guidelines    Revised Cardiac Risk Index

## 2018-11-06 NOTE — MR AVS SNAPSHOT
After Visit Summary   11/6/2018    Gregg Aguayo    MRN: [id number]           Patient Information     Date Of Birth          1965        Visit Information        Provider Department      11/6/2018 9:00 AM Reinier Ritchie MD [facility]        Today's Diagnoses     Preop general physical exam    -  1    Arthritis of left knee        Left medial knee pain        Tobacco abuse        Polymyalgia rheumatica (H)        AILEEN on CPAP - uses nightly, finds very helpful        Heartburn        Bilateral leg edema          Care Instructions      Before Your Surgery      Call your surgeon if there is any change in your health. This includes signs of a cold or flu (such as a sore throat, runny nose, cough, rash or fever).    Do not smoke, drink alcohol or take over the counter medicine (unless your surgeon or primary care doctor tells you to) for the 24 hours before and after surgery.    If you take prescribed drugs: Follow your doctor s orders about which medicines to take and which to stop until after surgery.    Eating and drinking prior to surgery: follow the instructions from your surgeon    Take a shower or bath the night before surgery. Use the soap your surgeon gave you to gently clean your skin. If you do not have soap from your surgeon, use your regular soap. Do not shave or scrub the surgery site.  Wear clean pajamas and have clean sheets on your bed.     Labs and EKG today.    Skip Lasix on morning of surgery.    Skip metformin the day before and day of surgery.    Okay to use acetaminophen/Tylenol and tramadol as needed prior to surgery.    Bring your CPAP machine to surgery.    To help with weight loss and improve blood sugar control....    -- Try to avoid Carbohydrates as much as possible -- breads, pasta, baked goods, cakes, oatmeal, cold cereal, potatoes.   These are turned to sugar in one metabolic conversion, cause insulin secretion and increased fat deposition /  weight gain.      -- Eat more lean meats, proteins, eggs, nuts, vegetables.     Return as needed for follow-up with Dr. Ritchie.    Clinic : [phone]  Appointment line: [phone]            Follow-ups after your visit        Follow-up notes from your care team     Return if symptoms worsen or fail to improve.      Future tests that were ordered for you today     Open Future Orders        Priority Expected Expires Ordered    CBC and Differential Routine  11/6/2019 11/6/2018    Comprehensive Metabolic Panel Routine  11/6/2019 11/6/2018    Sedimentation Rate (ESR) Routine  11/6/2019 11/6/2018            Who to contact     If you have questions or need follow up information about today's clinic visit or your schedule please contact [facility] directly at [phone].  Normal or non-critical lab and imaging results will be communicated to you by MyChart, letter or phone within 4 business days after the clinic has received the results. If you do not hear from us within 7 days, please contact the clinic through MyChart or phone. If you have a critical or abnormal lab result, we will notify you by phone as soon as possible.  Submit refill requests through [employer] or call your pharmacy and they will forward the refill request to us. Please allow 3 business days for your refill to be completed.          Additional Information About Your Visit        Care EveryWhere ID     This is your Care EveryWhere ID. This could be used by other organizations to access your [facility] medical records  [id number]        Your Vitals Were     Pulse Temperature Respirations Pulse Oximetry BMI (Body Mass Index)       76 97.3  F (36.3  C) (Tympanic) 18 98% 42.12 kg/m2        Blood Pressure from Last 3 Encounters:   11/06/18 138/88   09/07/18 145/78   08/30/18 132/86    Weight from Last 3 Encounters:   11/06/18 302 lb (137 kg)   09/07/18 294 lb (133.4 kg)   08/30/18 299 lb 4 oz (135.7 kg)              We  Performed the Following     EKG 12-LEAD CLINIC READ     Urinalysis w Reflex Microscopic If Positive        Primary Care Provider Office Phone # Fax #    Reinier Ritchie [phone] [address]  [facility] [address]        Equal Access to Services     AYANAKYREE PELON : Hadii ck maravilla hadcandelariao Soomaali, waaxda luqadaha, qaybta kaalmada adeegyaoswaldo, stephen nicolascelsoshelby farnsworth. So [facility] [phone].    ATENCIÓN: Si habla español, tiene a norris disposición servicios gratuitos de asistencia lingüística. Llame al [phone].    We comply with applicable federal civil rights laws and [place] laws. We do not discriminate on the basis of race, color, national origin, age, disability, sex, sexual orientation, or gender identity.            Thank you!     Thank you for choosing [facility]  for your care. Our goal is always to provide you with excellent care. Hearing back from our patients is one way we can continue to improve our services. Please take a few minutes to complete the written survey that you may receive in the mail after your visit with us. Thank you!             Your Updated Medication List - Protect others around you: Learn how to safely use, store and throw away your medicines at www.disposemymeds.org.          This list is accurate as of 11/6/18  9:28 AM.  Always use your most recent med list.                   Brand Name Dispense Instructions for use Diagnosis    acetaminophen 650 MG CR tablet    TYLENOL     Take up to 4 tablet daily for pain as needed        furosemide 20 MG tablet    LASIX    90 tablet    Take 1 tablet (20 mg) by mouth daily as needed For leg swelling-limit use. Salt restriction.    Bilateral leg edema       gabapentin 100 MG capsule    NEURONTIN    360 capsule    Take 1-2 capsules (100-200 mg) by mouth 4 times daily as needed    Polymyalgia rheumatica (H)       metFORMIN 500 MG tablet    GLUCOPHAGE    180 tablet    Take 1-2  tablets (500-1,000 mg) by mouth daily (with dinner)    Steroid-induced hyperglycemia       methylPREDNISolone 4 MG tablet    MEDROL     Take 2-8 mg by mouth daily With a meal - wean dose every 2 to 4 weeks - for PMR        omeprazole 40 MG capsule    priLOSEC    90 capsule    Take 1 capsule (40 mg) by mouth daily    Heartburn       traMADol 50 MG tablet    ULTRAM    60 tablet    Take 1 tablet (50 mg) by mouth 2 times daily as needed for pain    Left medial knee pain, Trigger finger of right hand, unspecified finger       varenicline 1 MG tablet    CHANTIX     Take 1 mg by mouth 2 times daily (with meals) Quit smoking

## 2018-11-06 NOTE — NURSING NOTE
"Patient presents to the clinic for pre-op exam.    Chief Complaint   Patient presents with     Pre-Op Exam       Initial There were no vitals taken for this visit. Estimated body mass index is 41 kg/(m^2) as calculated from the following:    Height as of 9/7/18: 5' 11\" (1.803 m).    Weight as of 9/7/18: 294 lb (133.4 kg).  Medication Reconciliation: complete    Mandy Prajapati LPN    "

## 2018-11-06 NOTE — LETTER
November 8, 2018    Gregg Aguayo  [address]  [place] [address]      Dear Gregg Aguayo,    The result of your recent tests are included below:    Results for orders placed or performed in visit on 11/06/18   Urinalysis w Reflex Microscopic If Positive   Result Value Ref Range    Color Urine Yellow     Appearance Urine Clear     Glucose Urine Negative NEG^Negative mg/dL    Bilirubin Urine Negative NEG^Negative    Ketones Urine Negative NEG^Negative mg/dL    Specific Gravity Urine 1.020 1.000 - 1.030    Blood Urine Negative NEG^Negative    pH Urine 6.5 5.0 - 9.0 pH    Protein Albumin Urine Negative NEG^Negative mg/dL    Urobilinogen Urine 0.2 0.2 - 1.0 EU/dL    Nitrite Urine Negative NEG^Negative    Leukocyte Esterase Urine Negative NEG^Negative    Source Midstream Urine    CBC and Differential   Result Value Ref Range    WBC 5.8 4.0 - 11.0 10e9/L    RBC Count 4.81 4.4 - 5.9 10e12/L    Hemoglobin 15.7 13.3 - 17.7 g/dL    Hematocrit 45.2 40.0 - 53.0 %    MCV 94 78 - 100 fl    MCH 32.6 26.5 - 33.0 pg    MCHC 34.7 31.5 - 36.5 g/dL    RDW 12.6 10.0 - 15.0 %    Platelet Count 192 150 - 450 10e9/L    Diff Method Automated Method     % Neutrophils 46.8 %    % Lymphocytes 33.7 %    % Monocytes 11.9 %    % Eosinophils 6.0 %    % Basophils 1.4 %    % Immature Granulocytes 0.2 %    Absolute Neutrophil 2.7 1.6 - 8.3 10e9/L    Absolute Lymphocytes 2.0 0.8 - 5.3 10e9/L    Absolute Monocytes 0.7 0.0 - 1.3 10e9/L    Absolute Eosinophils 0.4 0.0 - 0.7 10e9/L    Absolute Basophils 0.1 0.0 - 0.2 10e9/L    Abs Immature Granulocytes 0.0 0 - 0.4 10e9/L   Comprehensive Metabolic Panel   Result Value Ref Range    Sodium 141 134 - 144 mmol/L    Potassium 4.0 3.5 - 5.1 mmol/L    Chloride 105 98 - 107 mmol/L    Carbon Dioxide 28 21 - 31 mmol/L    Anion Gap 8 3 - 14 mmol/L    Glucose 106 (H) 70 - 105 mg/dL    Urea Nitrogen 14 7 - 25 mg/dL    Creatinine 0.81 0.70 - 1.30 mg/dL    GFR Estimate >90 >60 mL/min/1.7m2    GFR Estimate  If Black >90 >60 mL/min/1.7m2    Calcium 9.5 8.6 - 10.3 mg/dL    Bilirubin Total 0.7 0.3 - 1.0 mg/dL    Albumin 4.3 3.5 - 5.7 g/dL    Protein Total 6.8 6.4 - 8.9 g/dL    Alkaline Phosphatase 51 34 - 104 U/L    ALT 30 7 - 52 U/L    AST 24 13 - 39 U/L   Sedimentation Rate (ESR)   Result Value Ref Range    Sed Rate 12 (H) 1 - 10 mm/h   EKG 12-LEAD CLINIC READ    Impression    Normal EKG with a rate of 65.  Compared to previous tracing dated 11/20/2017, no significant change  Reinier Ritchie MD       If you have any further questions or problems, please contact my office at [phone] and schedule an appointment.    Clinic : [phone]  Appointment line: [phone]     Thank you,    Reinier Ritchie MD    Internal Medicine  [facility]     Reviewed and electronically signed by provider.

## 2018-11-07 ASSESSMENT — ANXIETY QUESTIONNAIRES: GAD7 TOTAL SCORE: 0

## 2018-11-14 ENCOUNTER — TRANSFERRED RECORDS (OUTPATIENT)
Dept: HEALTH INFORMATION MANAGEMENT | Facility: OTHER | Age: 53
End: 2018-11-14

## 2018-11-16 DIAGNOSIS — Z96.652 S/P TOTAL KNEE REPLACEMENT, LEFT: Primary | ICD-10-CM

## 2018-11-19 ENCOUNTER — HOSPITAL ENCOUNTER (OUTPATIENT)
Dept: PHYSICAL THERAPY | Facility: OTHER | Age: 53
Setting detail: THERAPIES SERIES
End: 2018-11-19
Attending: SPECIALIST
Payer: COMMERCIAL

## 2018-11-19 DIAGNOSIS — Z96.652 S/P TOTAL KNEE REPLACEMENT, LEFT: ICD-10-CM

## 2018-11-19 PROCEDURE — 40000185 ZZHC STATISTIC PT OUTPT VISIT

## 2018-11-19 PROCEDURE — 97161 PT EVAL LOW COMPLEX 20 MIN: CPT | Mod: GP

## 2018-11-19 PROCEDURE — 97016 VASOPNEUMATIC DEVICE THERAPY: CPT | Mod: GP

## 2018-11-19 PROCEDURE — 97110 THERAPEUTIC EXERCISES: CPT | Mod: GP

## 2018-11-20 NOTE — PROGRESS NOTES
11/19/18 1300   General Information   Type of Visit Initial OP Ortho PT Evaluation   Start of Care Date 11/19/18   Referring Physician Dr. Grissom   Patient/Family Goals Statement To improve his mobility and reduce his pain   Orders Evaluate and Treat   Orders Comment eval and treat, ROM, strengthening, 2-3 x/week for 4 weeks   Date of Order 11/16/18   Insurance Type [employer]   Medical Diagnosis S/P total knee replacement, left Z96.652    Surgical/Medical history reviewed Yes   Precautions/Limitations fall precautions   General Information Comments Patient is a 53 year old male referred to physical therapy s/p left total knee replacement. Patient states that the surgery down in [place] in 11/14/2018. He was discharged on Friday from the hospital. Patient denies any issues with mobility at home. He states he has no issues with stairs and has been using a rolling walker. He has been doing exercises at home but most difficulty with SAQ and SLR. Currently while sitting, he has no pain. Patient states that he follows up with surgeon on 11/30/2018   Body Part(s)   Body Part(s) Knee   Presentation and Etiology   Pertinent history of current problem (include personal factors and/or comorbidities that impact the POC) osteoarthritis, previous knee surgeries   Impairments A. Pain;C. Swelling;D. Decreased ROM;E. Decreased flexibility;J. Burning;L. Tingling   Functional Limitations perform activities of daily living;perform desired leisure / sports activities   Symptom Location Left knee   How/Where did it occur With repetition/overuse   Onset date of current episode/exacerbation 11/14/18   Chronicity New   Pain rating (0-10 point scale) Best (/10);Worst (/10)   Best (/10) 5   Worst (/10) 8   Pain quality A. Sharp;C. Aching;D. Burning   Frequency of pain/symptoms B. Intermittent   Pain/symptoms are: Worse during the day   Pain/symptoms exacerbated by A. Sitting;G. Certain positions   Pain/symptoms eased by E. Changing  positions;H. Cold   Progression of symptoms since onset: Improved   Prior Level of Function   Prior Level of Function-Mobility Independent   Prior Level of Function-ADLs Independent   Current Level of Function   Patient role/employment history F. Retired   Living environment House/townhome   Home/community accessibility Denies any issues with mobility   Current equipment-Gait/Locomotion Front wheeled walker   Current equipment-ADL None   Fall Risk Screen   Fall screen completed by PT   Have you fallen 2 or more times in the past year? No   Have you fallen and had an injury in the past year? No   Is patient a fall risk? Yes;Department fall risk interventions implemented   Fall screen comments Due to knee surgery, patient is considered a fall risk. Will implement fall risk interventions when appropriate.    Knee Objective Findings   Side (if bilateral, select both right and left) Left   Observation Using Front wheeled walker, left knee extended sitting in chair, weight shifts to right in sitting   Integumentary  Aquacell bandage on knee. Minor spots on bandage of previous leakage. No signs of infection or current drainage   Posture Protracted shoulders   Gait/Locomotion Shortened step/stride length, limited knee flexion with swing phase, flat foot initial contact with lack of push off   Step Test Height n/a   Lachmans Test n/a   Anterior Drawer Test n/a   Posterior Drawer Test n/a   Varus Stress Test n/a   Valgus Stress Test n/a   Josey's Test n/a   Knee Special Test Comments Will take circumference measurements when aquacell bandage is removed   Left Knee Extension AROM -6 degrees   Left Knee Flexion AROM 60 degrees   Left Knee Flexion Strength n/a   Left Knee Extension Strength n/a   Planned Therapy Interventions   Planned Therapy Interventions balance training;gait training;joint mobilization;manual therapy;neuromuscular re-education;ROM;strengthening;stretching   Planned Modality Interventions   Planned  Modality Interventions Cryotherapy;Ultrasound;Electrical stimulation   Clinical Impression   Criteria for Skilled Therapeutic Interventions Met yes, treatment indicated   PT Diagnosis Impaired mobility, decreased strength and activity tolerance, impaired balance and gait   Influenced by the following impairments pain, inflammation, stiffness, weakness   Functional limitations due to impairments Prolonged ambulation, stairs, knee extension   Clinical Presentation Stable/Uncomplicated   Clinical Presentation Rationale Minimal reported comorbidities   Clinical Decision Making (Complexity) Low complexity   Therapy Frequency other (see comments)  (1-2 times per week)   Predicted Duration of Therapy Intervention (days/wks) 8 weeks   Risk & Benefits of therapy have been explained Yes   Patient, Family & other staff in agreement with plan of care Yes   Clinical Impression Comments Patient is a 53 year old male referred to physical therapy s/p left TKA. Surgery was 11/14/2018 in [place]. Currently, patient has dificulty with distal quad recruitment. He has a hard time with short arc quads and straight leg raises. Knee is inflammed when compared to his right. He would benefit from physical therapy services in order to reduce his pain and improve his mobility, strength and endurance   Education Assessment   Barriers to Learning No barriers   ORTHO GOALS   PT Ortho Eval Goals 1;2;3;4   Ortho Goal 1   Goal Identifier HEP   Goal Description Patient will demonstrate compliance and independence in his HEP in order to improve his mobility, strength and endurance   Target Date 12/18/18   Ortho Goal 2   Goal Identifier Dressing   Goal Description Patient will be able to complete all dressing activities with no increase in pain and no assistive device in order to improve ability to complete ADL's.    Target Date 12/18/18   Ortho Goal 3   Goal Identifier Mobility   Goal Description Patient will be able to ambulate longer than 10 minutes  with no AD and no loss of balance in order to improve mobility around the community   Target Date 01/14/19   Ortho Goal 4   Goal Identifier House work   Goal Description Patient will be able to complete all house work with no increase in symptoms and no AD in order to improve his mobility around the home   Target Date 01/14/19   Total Evaluation Time   Total Evaluation Time 25

## 2018-11-21 ENCOUNTER — HOSPITAL ENCOUNTER (OUTPATIENT)
Dept: PHYSICAL THERAPY | Facility: OTHER | Age: 53
Setting detail: THERAPIES SERIES
End: 2018-11-21
Attending: SPECIALIST
Payer: COMMERCIAL

## 2018-11-21 PROCEDURE — 97016 VASOPNEUMATIC DEVICE THERAPY: CPT | Mod: GP

## 2018-11-21 PROCEDURE — 40000185 ZZHC STATISTIC PT OUTPT VISIT

## 2018-11-21 PROCEDURE — 97110 THERAPEUTIC EXERCISES: CPT | Mod: GP

## 2018-11-23 ENCOUNTER — TELEPHONE (OUTPATIENT)
Dept: INTERNAL MEDICINE | Facility: OTHER | Age: 53
End: 2018-11-23

## 2018-11-23 ENCOUNTER — TELEPHONE (OUTPATIENT)
Dept: FAMILY MEDICINE | Facility: OTHER | Age: 53
End: 2018-11-23

## 2018-11-23 NOTE — TELEPHONE ENCOUNTER
Left message for patient to return call to clinic.  Karla García LPN............11/23/2018 10:45 AM.

## 2018-11-23 NOTE — TELEPHONE ENCOUNTER
Patient notified of the need to be seen for narcotics.  He tried to contact Surgeon and that provider is out of the office today.  Attempted to find an appointment for him today but there are none at this time.  He still has a few of the Norco left and will use them sparingly.      Karla García LPN............11/23/2018 11:05 AM

## 2018-11-23 NOTE — TELEPHONE ENCOUNTER
Returned call to patient. He will be out of pain killers on Sunday. Patient was no longer in town, and would like call back from PCP's nurse next week. He has his follow up next Friday for his knee.     Rachel Mcfarlane LPN on 11/23/2018 at 1:51 PM

## 2018-11-23 NOTE — TELEPHONE ENCOUNTER
DWS-Patient called looking for an appointment for pain killers, he stated he just had a total knee done on Wednesday and stated he was told to call here by nurse to see if he could be seen. Please call pt and advise.  Kory Marrero on 11/23/2018 at 11:22 AM

## 2018-11-26 ENCOUNTER — HOSPITAL ENCOUNTER (OUTPATIENT)
Dept: PHYSICAL THERAPY | Facility: OTHER | Age: 53
Setting detail: THERAPIES SERIES
End: 2018-11-26
Attending: SPECIALIST
Payer: COMMERCIAL

## 2018-11-26 PROCEDURE — 97016 VASOPNEUMATIC DEVICE THERAPY: CPT | Mod: GP

## 2018-11-26 PROCEDURE — 40000185 ZZHC STATISTIC PT OUTPT VISIT

## 2018-11-26 PROCEDURE — 97110 THERAPEUTIC EXERCISES: CPT | Mod: GP

## 2018-11-26 RX ORDER — HYDROCODONE BITARTRATE AND ACETAMINOPHEN 10; 325 MG/1; MG/1
1-2 TABLET ORAL EVERY 4 HOURS PRN
Refills: 0 | COMMUNITY
Start: 2018-11-16 | End: 2018-11-27

## 2018-11-26 NOTE — TELEPHONE ENCOUNTER
Patient had a total knee replacement going on 2 weeks ago (by Dr. Grissom)    He's only got 1 pain medication left.    He's wondering if he can get a refill?    Yesica Phan LPN  11/26/2018  2:12 PM

## 2018-11-26 NOTE — TELEPHONE ENCOUNTER
Spoke with patient.  Date and time worked for him and he has been added to the schedule.  Shirley Stark on 11/26/2018 at 3:45 PM

## 2018-11-27 ENCOUNTER — OFFICE VISIT (OUTPATIENT)
Dept: INTERNAL MEDICINE | Facility: OTHER | Age: 53
End: 2018-11-27
Attending: INTERNAL MEDICINE
Payer: COMMERCIAL

## 2018-11-27 VITALS
HEART RATE: 84 BPM | WEIGHT: 296.5 LBS | TEMPERATURE: 96.6 F | DIASTOLIC BLOOD PRESSURE: 88 MMHG | BODY MASS INDEX: 41.35 KG/M2 | SYSTOLIC BLOOD PRESSURE: 138 MMHG

## 2018-11-27 DIAGNOSIS — M25.50 ARTHRALGIA OF MULTIPLE JOINTS: Primary | ICD-10-CM

## 2018-11-27 DIAGNOSIS — M35.3 POLYMYALGIA RHEUMATICA (H): ICD-10-CM

## 2018-11-27 DIAGNOSIS — G89.18 ACUTE POST-OPERATIVE PAIN: ICD-10-CM

## 2018-11-27 PROCEDURE — 99214 OFFICE O/P EST MOD 30 MIN: CPT | Performed by: INTERNAL MEDICINE

## 2018-11-27 RX ORDER — HYDROCODONE BITARTRATE AND ACETAMINOPHEN 10; 325 MG/1; MG/1
1-2 TABLET ORAL EVERY 4 HOURS PRN
Qty: 180 TABLET | Refills: 0 | Status: SHIPPED | OUTPATIENT
Start: 2018-11-27 | End: 2019-01-25

## 2018-11-27 ASSESSMENT — ENCOUNTER SYMPTOMS
DIZZINESS: 0
JOINT SWELLING: 1
DIARRHEA: 0
ARTHRALGIAS: 1
WHEEZING: 0
EYE PAIN: 0
DYSURIA: 0
SHORTNESS OF BREATH: 0
VOMITING: 0
PALPITATIONS: 0
ABDOMINAL PAIN: 0
FATIGUE: 0
NAUSEA: 0
BRUISES/BLEEDS EASILY: 0
LIGHT-HEADEDNESS: 0
CONFUSION: 0
COUGH: 0
AGITATION: 0
MYALGIAS: 0
FEVER: 0
CHILLS: 0
HEMATURIA: 0

## 2018-11-27 ASSESSMENT — ANXIETY QUESTIONNAIRES
IF YOU CHECKED OFF ANY PROBLEMS ON THIS QUESTIONNAIRE, HOW DIFFICULT HAVE THESE PROBLEMS MADE IT FOR YOU TO DO YOUR WORK, TAKE CARE OF THINGS AT HOME, OR GET ALONG WITH OTHER PEOPLE: NOT DIFFICULT AT ALL
3. WORRYING TOO MUCH ABOUT DIFFERENT THINGS: NOT AT ALL
6. BECOMING EASILY ANNOYED OR IRRITABLE: NOT AT ALL
7. FEELING AFRAID AS IF SOMETHING AWFUL MIGHT HAPPEN: NOT AT ALL
5. BEING SO RESTLESS THAT IT IS HARD TO SIT STILL: NOT AT ALL
2. NOT BEING ABLE TO STOP OR CONTROL WORRYING: NOT AT ALL
GAD7 TOTAL SCORE: 0
1. FEELING NERVOUS, ANXIOUS, OR ON EDGE: NOT AT ALL

## 2018-11-27 ASSESSMENT — PAIN SCALES - GENERAL: PAINLEVEL: SEVERE PAIN (6)

## 2018-11-27 ASSESSMENT — PATIENT HEALTH QUESTIONNAIRE - PHQ9
SUM OF ALL RESPONSES TO PHQ QUESTIONS 1-9: 0
5. POOR APPETITE OR OVEREATING: NOT AT ALL

## 2018-11-27 NOTE — NURSING NOTE
"Patient presents to the clinic for follow up with total left knee on 11-14-18.  Last administration of [place] was today around 0630.  Patient has not taken any Tramadol since he was prescription Norco.      Chief Complaint   Patient presents with     Clinic Care Coordination - Follow-up       Initial There were no vitals taken for this visit. Estimated body mass index is 42.12 kg/(m^2) as calculated from the following:    Height as of 9/7/18: 5' 11\" (1.803 m).    Weight as of 11/6/18: 302 lb (137 kg).  Medication Reconciliation: complete    Mandy Prajapati LPN    "

## 2018-11-27 NOTE — MR AVS SNAPSHOT
After Visit Summary   11/27/2018    Gregg Aguayo    MRN: [id number]           Patient Information     Date Of Birth          1965        Visit Information        Provider Department      11/27/2018 1:20 PM Reinier Ritchie MD [facility]        Today's Diagnoses     Arthralgia of multiple joints    -  1    Acute post-operative pain        Polymyalgia rheumatica (H)          Care Instructions    Medications refilled -- limit [place] for severe pain. -- no driving after use. Okay to drive if not taking.     Use Ultram / Tramadol for mild/moderate pain.     Return as needed for follow-up with Dr. Ritchie.    Clinic : [phone]  Appointment line: [phone]            Follow-ups after your visit        Your next 10 appointments already scheduled     Nov 28, 2018 11:15 AM CST   Treatment with Omid Carson, PT   [facility] ([facility])    [address] [facility] [id number] [phone]            Nov 30, 2018 11:00 AM CST   [facility] Visit with Rafael Miller MD   [facility]    [address]  [facility] [address]   [phone]            Dec 03, 2018 11:15 AM CST   Treatment with Omid Carson, PT   [facility] ([facility])    [address] [facility] [address]   [phone]              Who to contact     If you have questions or need follow up information about today's clinic visit or your schedule please contact [facility] directly at [phone].  Normal or non-critical lab and imaging results will be communicated to you by MyChart, letter or phone within 4 business days after the clinic has received the results. If you do not hear from us within 7 days, please contact the clinic through MyChart or phone. If you have a critical or abnormal lab result, we will  notify you by phone as soon as possible.  Submit refill requests through [employer] or call your pharmacy and they will forward the refill request to us. Please allow 3 business days for your refill to be completed.          Additional Information About Your Visit        Care EveryWhere ID     This is your Care EveryWhere ID. This could be used by other organizations to access your [place] medical records  [id number]        Your Vitals Were     Pulse Temperature BMI (Body Mass Index)             84 96.6  F (35.9  C) (Tympanic) 41.35 kg/m2          Blood Pressure from Last 3 Encounters:   11/27/18 138/88   11/06/18 138/88   09/07/18 145/78    Weight from Last 3 Encounters:   11/27/18 296 lb 8 oz (134.5 kg)   11/06/18 302 lb (137 kg)   09/07/18 294 lb (133.4 kg)              Today, you had the following     No orders found for display         Where to get your medicines      Some of these will need a paper prescription and others can be bought over the counter.  Ask your nurse if you have questions.     Bring a paper prescription for each of these medications     HYDROcodone-acetaminophen  MG per tablet         Information about OPIOIDS     PRESCRIPTION OPIOIDS: WHAT YOU NEED TO KNOW   We gave you an opioid (narcotic) pain medicine. It is important to manage your pain, but opioids are not always the best choice. You should first try all the other options your care team gave you. Take this medicine for as short a time (and as few doses) as possible.    Some activities can increase your pain, such as bandage changes or therapy sessions. It may help to take your pain medicine 30 to 60 minutes before these activities. Reduce your stress by getting enough sleep, working on hobbies you enjoy and practicing relaxation or meditation. Talk to your care team about ways to manage your pain beyond prescription opioids.    These medicines have risks:    DO NOT drive when on new or higher doses of pain medicine. These  medicines can affect your alertness and reaction times, and you could be arrested for driving under the influence (DUI). If you need to use opioids long-term, talk to your care team about driving.    DO NOT operate heavy machinery    DO NOT do any other dangerous activities while taking these medicines.    DO NOT drink any alcohol while taking these medicines.     If the opioid prescribed includes acetaminophen, DO NOT take with any other medicines that contain acetaminophen. Read all labels carefully. Look for the word  acetaminophen  or  Tylenol.  Ask your pharmacist if you have questions or are unsure.    You can get addicted to pain medicines, especially if you have a history of addiction (chemical, alcohol or substance dependence). Talk to your care team about ways to reduce this risk.    All opioids tend to cause constipation. Drink plenty of water and eat foods that have a lot of fiber, such as fruits, vegetables, prune juice, apple juice and high-fiber cereal. Take a laxative (Miralax, milk of magnesia, Colace, Senna) if you don t move your bowels at least every other day. Other side effects include upset stomach, sleepiness, dizziness, throwing up, tolerance (needing more of the medicine to have the same effect), physical dependence and slowed breathing.    Store your pills in a secure place, locked if possible. We will not replace any lost or stolen medicine. If you don t finish your medicine, please throw away (dispose) as directed by your pharmacist. The [place] Pollution Control Agency has more information about safe disposal: [email or web address]://[email or web address].[email or web address]-medications         Primary Care Provider Office Phone # Fax #    Reinier Ritchie [phone] [address] [facility] [address]        Equal Access to Services     KEITH BIRD AH: alisson Browne qaybta kaalmada adeegyada, waxay idiin hayaan adeeg kharash  ladeann farnsworth. So [facility] [phone].    ATENCIÓN: Si erila oliver, tiene a norris disposición servicios gratuitos de asistencia lingüística. Fay amador [phone].    We comply with applicable federal civil rights laws and [place] laws. We do not discriminate on the basis of race, color, national origin, age, disability, sex, sexual orientation, or gender identity.            Thank you!     Thank you for choosing [facility] AND [facility]  for your care. Our goal is always to provide you with excellent care. Hearing back from our patients is one way we can continue to improve our services. Please take a few minutes to complete the written survey that you may receive in the mail after your visit with us. Thank you!             Your Updated Medication List - Protect others around you: Learn how to safely use, store and throw away your medicines at www.disposemymeds.org.          This list is accurate as of 11/27/18  1:40 PM.  Always use your most recent med list.                   Brand Name Dispense Instructions for use Diagnosis    acetaminophen 650 MG CR tablet    TYLENOL     Take up to 4 tablet daily for pain as needed        furosemide 20 MG tablet    LASIX    90 tablet    Take 1 tablet (20 mg) by mouth daily as needed For leg swelling-limit use. Salt restriction.    Bilateral leg edema       gabapentin 100 MG capsule    NEURONTIN    360 capsule    Take 1-2 capsules (100-200 mg) by mouth 4 times daily as needed    Polymyalgia rheumatica (H)       HYDROcodone-acetaminophen  MG per tablet    NORCO    180 tablet    Take 1-2 tablets by mouth every 4 hours as needed    Acute post-operative pain, Arthralgia of multiple joints, Polymyalgia rheumatica (H)       metFORMIN 500 MG tablet    GLUCOPHAGE    180 tablet    Take 1-2 tablets (500-1,000 mg) by mouth daily (with dinner)    Steroid-induced hyperglycemia       methylPREDNISolone 4 MG tablet    MEDROL     Take 2-8 mg by mouth daily With a meal - wean dose every  2 to 4 weeks - for PMR        omeprazole 40 MG DR capsule    priLOSEC    90 capsule    Take 1 capsule (40 mg) by mouth daily    Heartburn       traMADol 50 MG tablet    ULTRAM    60 tablet    Take 1 tablet (50 mg) by mouth 2 times daily as needed for pain    Left medial knee pain, Trigger finger of right hand, unspecified finger       varenicline 1 MG tablet    CHANTIX    60 tablet    Take 1 tablet (1 mg) by mouth 2 times daily (with meals) Quit smoking    Tobacco abuse

## 2018-11-27 NOTE — PATIENT INSTRUCTIONS
Medications refilled -- limit [place] for severe pain. -- no driving after use. Okay to drive if not taking.     Use Ultram / Tramadol for mild/moderate pain.     Return as needed for follow-up with Dr. Ritchie.    Clinic : [phone]  Appointment line: [phone]

## 2018-11-27 NOTE — PROGRESS NOTES
"Nursing Notes:   Mandy Prajapait LPN  11/27/2018  1:32 PM  Signed  Patient presents to the clinic for follow up with total left knee on 11-14-18.  Last administration of [place] was today around 0630.  Patient has not taken any Tramadol since he was prescription Norco.      Chief Complaint   Patient presents with     Clinic Care Coordination - Follow-up       Initial There were no vitals taken for this visit. Estimated body mass index is 42.12 kg/(m^2) as calculated from the following:    Height as of 9/7/18: 5' 11\" (1.803 m).    Weight as of 11/6/18: 302 lb (137 kg).  Medication Reconciliation: complete    Mandy Prajapati LPN    Nursing note reviewed with patient.  Accurracy and completeness verified.   Mr. Aguayo is a 53 year old male who:  Patient presents with:  Clinic Care Coordination - Follow-up      ICD-10-CM    1. Arthralgia of multiple joints M25.50 HYDROcodone-acetaminophen (NORCO)  MG per tablet   2. Acute post-operative pain G89.18 HYDROcodone-acetaminophen (NORCO)  MG per tablet   3. Polymyalgia rheumatica (H) M35.3 HYDROcodone-acetaminophen (NORCO)  MG per tablet     HPI  Patient presents for follow-up of postoperative pain.  Has numerous joints with arthritis and pain.  Also previously diagnosed with polymyalgia rheumatica.  That has been fairly well controlled recently but does have some chronic muscle aches and pains.    Most recently had left knee total knee replacement.  Having a lot of swelling in his left leg and left knee.  Just started some physical therapy and states that that really causes his knee to get sore.  He is still having range of motion limitation.    Has not been using his tramadol recently.  Advised that he start doing this again.  He is only been using hydrocodone when the pain gets quite severe.  Advised he would be much better off if using tramadol for mild to moderate pain and hydrocodone for breakthrough pain.  He would like a refill of his hydrocodone " today.  He does not take this normally.  He does not anticipate needing this any further than this prescription today.  He does not drive after using his pain medications.    - Prescriptions refilled times 1 month.    Functional Capacity: about 4 METS.   No orthopnea/paroxysmal nocturnal dyspnea  Review of Systems   Constitutional: Negative for chills, fatigue and fever.   HENT: Negative for congestion and hearing loss.    Eyes: Negative for pain and visual disturbance.   Respiratory: Negative for cough, shortness of breath and wheezing.    Cardiovascular: Negative for chest pain and palpitations.   Gastrointestinal: Negative for abdominal pain, diarrhea, nausea and vomiting.   Endocrine: Negative for cold intolerance and heat intolerance.   Genitourinary: Negative for dysuria and hematuria.   Musculoskeletal: Positive for arthralgias, gait problem (+ using walker. ) and joint swelling. Negative for myalgias.   Skin: Negative for pallor.   Allergic/Immunologic: Negative for immunocompromised state.   Neurological: Negative for dizziness and light-headedness.   Hematological: Does not bruise/bleed easily.   Psychiatric/Behavioral: Negative for agitation and confusion.      TRESSA:   TRESSA-7 SCORE 8/30/2018 11/6/2018 11/27/2018   Total Score 0 0 0     PHQ9:  PHQ-9 SCORE 8/30/2018 11/6/2018 11/27/2018   PHQ-9 Total Score 0 0 0       I have personally reviewed the past medical history, past surgical history, medications, allergies, family and social history as listed below, on 11/27/2018.    No Known Allergies    Current Outpatient Prescriptions   Medication Sig Dispense Refill     acetaminophen (TYLENOL) 650 MG CR tablet Take up to 4 tablet daily for pain as needed       furosemide (LASIX) 20 MG tablet Take 1 tablet (20 mg) by mouth daily as needed For leg swelling-limit use. Salt restriction. 90 tablet 3     gabapentin (NEURONTIN) 100 MG capsule Take 1-2 capsules (100-200 mg) by mouth 4 times daily as needed 360 capsule 3      HYDROcodone-acetaminophen (NORCO)  MG per tablet Take 1-2 tablets by mouth every 4 hours as needed 180 tablet 0     metFORMIN (GLUCOPHAGE) 500 MG tablet Take 1-2 tablets (500-1,000 mg) by mouth daily (with dinner) 180 tablet 3     methylPREDNISolone (MEDROL) 4 MG tablet Take 2-8 mg by mouth daily With a meal - wean dose every 2 to 4 weeks - for PMR       omeprazole (PRILOSEC) 40 MG capsule Take 1 capsule (40 mg) by mouth daily 90 capsule 3     traMADol (ULTRAM) 50 MG tablet Take 1 tablet (50 mg) by mouth 2 times daily as needed for pain 60 tablet 5     varenicline (CHANTIX) 1 MG tablet Take 1 tablet (1 mg) by mouth 2 times daily (with meals) Quit smoking 60 tablet 1        Patient Active Problem List    Diagnosis Date Noted     Trigger finger, acquired - Right hand - 1st and 3rd fingers 08/30/2018     Priority: Medium     Heartburn 07/25/2018     Priority: Medium     Steroid-induced hyperglycemia 04/10/2018     Priority: Medium     Psychosexual dysfunction with inhibited sexual excitement 01/29/2018     Priority: Medium     Tobacco abuse 01/29/2018     Priority: Medium     Overview:   IMO Update 10/11       Acute pain of left knee 01/11/2018     Priority: Medium     Fall at home, initial encounter 01/11/2018     Priority: Medium     History of arthroscopic knee surgery 01/11/2018     Priority: Medium     Left medial knee pain 01/11/2018     Priority: Medium     Left carpal tunnel syndrome 12/21/2017     Priority: Medium     History of carpal tunnel surgery of left wrist 12/21/2017     Priority: Medium     S/P cubital tunnel release 12/21/2017     Priority: Medium     History of carpal tunnel surgery of right wrist 11/22/2017     Priority: Medium     AILEEN on CPAP - uses nightly, finds very helpful 11/20/2017     Priority: Medium     Cubital tunnel syndrome on left 09/25/2017     Priority: Medium     Bilateral leg edema 07/05/2017     Priority: Medium     Polymyalgia rheumatica (H) 11/11/2016     Priority:  Medium     Arthralgia of multiple joints 10/26/2016     Priority: Medium     Hypertrophic and atrophic condition of skin 06/07/2012     Priority: Medium     Contact dermatitis and eczema 09/20/2011     Priority: Medium     Past Medical History:   Diagnosis Date     Bilateral carpal tunnel syndrome     9/21/2017     Carpal tunnel syndrome of left wrist     12/21/2017     Condition influencing health status     No Comments Provided     Lesion of left ulnar nerve     9/25/2017     Other specified postprocedural states     11/22/2017     Other specified postprocedural states     12/21/2017     Other specified postprocedural states     12/21/2017     Past Surgical History:   Procedure Laterality Date     APPENDECTOMY OPEN      2005,Laparoscopic for acute appendicitis     ARTHROSCOPY KNEE Left 3/19/2018    Procedure: ARTHROSCOPY KNEE;  Left Knee Arthroscopy, Partial Meniscectomy, Chondroplasty;  Surgeon: Kwabena Patricia DO;  Location: [facility] OR     ATTEMPTED ARTHROSCOPY      knee ? left     COLONOSCOPY      1/29/2014,Tubular adenoma of rectum - follow up 5 years     RELEASE CARPAL TUNNEL Right     11/22/2017,[id number],OTHER,Right     RELEASE CARPAL TUNNEL Left     12/21/2017,[id number],OTHER,Left     RELEASE TRIGGER FINGER Right 9/7/2018    Procedure: RELEASE TRIGGER FINGER;  Right Trigger Release Thumb, Index & Long Digits;  Surgeon: Monster Grissom MD;  Location: [facility] OR     RELEASE ULNAR NERVE (ELBOW)      12/21/2017,[id number],OTHER,Left     Social History     Social History     Marital status:      Spouse name: N/A     Number of children: N/A     Years of education: N/A     Social History Main Topics     Smoking status: Current Every Day Smoker     Packs/day: 0.30     Types: Cigarettes     Smokeless tobacco: Never Used     Alcohol use 0.0 oz/week      Comment: Alcoholic Drinks/day: 1-2 drinks every other day     Drug use: No     Sexual activity: No     Other Topics Concern     None     Social History Narrative    Tobacco-one  "half pack per day.  Ethanol-occasional.    and remarried.  Has two children from his first marriage.      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     Family History   Problem Relation Age of Onset     Genetic Disorder Maternal Grandfather      Genetic,Polymyalgia Rheumatica     Heart Murmur Mother      No Known Problems Father        EXAM:   Vitals:    11/27/18 1323   BP: 138/88   BP Location: Right arm   Patient Position: Sitting   Cuff Size: Adult Regular   Pulse: 84   Temp: 96.6  F (35.9  C)   TempSrc: Tympanic   Weight: 296 lb 8 oz (134.5 kg)       Current Pain Score: Severe Pain (6)     BP Readings from Last 3 Encounters:   11/27/18 138/88   11/06/18 138/88   09/07/18 145/78      Wt Readings from Last 3 Encounters:   11/27/18 296 lb 8 oz (134.5 kg)   11/06/18 302 lb (137 kg)   09/07/18 294 lb (133.4 kg)      Estimated body mass index is 41.35 kg/(m^2) as calculated from the following:    Height as of 9/7/18: 5' 11\" (1.803 m).    Weight as of this encounter: 296 lb 8 oz (134.5 kg).     Physical Exam   Constitutional: He appears well-developed and well-nourished.   Eyes: No scleral icterus.   Cardiovascular: Normal rate.    Pulmonary/Chest: Effort normal.   Musculoskeletal: He exhibits edema and tenderness (+ left knee incision appears intact under surgical dressing. ).   Neurological: He is alert.   Skin: Skin is warm and dry. No rash noted. No erythema.   Psychiatric: He has a normal mood and affect.      Procedures   INVESTIGATIONS:  Results for orders placed or performed in visit on 11/06/18   Urinalysis w Reflex Microscopic If Positive   Result Value Ref Range    Color Urine Yellow     Appearance Urine Clear     Glucose Urine Negative NEG^Negative mg/dL    Bilirubin Urine Negative NEG^Negative    Ketones Urine Negative NEG^Negative mg/dL    Specific Gravity Urine 1.020 1.000 - 1.030    Blood Urine Negative NEG^Negative    pH Urine 6.5 5.0 - 9.0 pH    Protein " Albumin Urine Negative NEG^Negative mg/dL    Urobilinogen Urine 0.2 0.2 - 1.0 EU/dL    Nitrite Urine Negative NEG^Negative    Leukocyte Esterase Urine Negative NEG^Negative    Source Midstream Urine    CBC and Differential   Result Value Ref Range    WBC 5.8 4.0 - 11.0 10e9/L    RBC Count 4.81 4.4 - 5.9 10e12/L    Hemoglobin 15.7 13.3 - 17.7 g/dL    Hematocrit 45.2 40.0 - 53.0 %    MCV 94 78 - 100 fl    MCH 32.6 26.5 - 33.0 pg    MCHC 34.7 31.5 - 36.5 g/dL    RDW 12.6 10.0 - 15.0 %    Platelet Count 192 150 - 450 10e9/L    Diff Method Automated Method     % Neutrophils 46.8 %    % Lymphocytes 33.7 %    % Monocytes 11.9 %    % Eosinophils 6.0 %    % Basophils 1.4 %    % Immature Granulocytes 0.2 %    Absolute Neutrophil 2.7 1.6 - 8.3 10e9/L    Absolute Lymphocytes 2.0 0.8 - 5.3 10e9/L    Absolute Monocytes 0.7 0.0 - 1.3 10e9/L    Absolute Eosinophils 0.4 0.0 - 0.7 10e9/L    Absolute Basophils 0.1 0.0 - 0.2 10e9/L    Abs Immature Granulocytes 0.0 0 - 0.4 10e9/L   Comprehensive Metabolic Panel   Result Value Ref Range    Sodium 141 134 - 144 mmol/L    Potassium 4.0 3.5 - 5.1 mmol/L    Chloride 105 98 - 107 mmol/L    Carbon Dioxide 28 21 - 31 mmol/L    Anion Gap 8 3 - 14 mmol/L    Glucose 106 (H) 70 - 105 mg/dL    Urea Nitrogen 14 7 - 25 mg/dL    Creatinine 0.81 0.70 - 1.30 mg/dL    GFR Estimate >90 >60 mL/min/1.7m2    GFR Estimate If Black >90 >60 mL/min/1.7m2    Calcium 9.5 8.6 - 10.3 mg/dL    Bilirubin Total 0.7 0.3 - 1.0 mg/dL    Albumin 4.3 3.5 - 5.7 g/dL    Protein Total 6.8 6.4 - 8.9 g/dL    Alkaline Phosphatase 51 34 - 104 U/L    ALT 30 7 - 52 U/L    AST 24 13 - 39 U/L   Sedimentation Rate (ESR)   Result Value Ref Range    Sed Rate 12 (H) 1 - 10 mm/h   EKG 12-LEAD CLINIC READ    Impression    Normal EKG with a rate of 65.  Compared to previous tracing dated 11/20/2017, no significant change  Reinier Ritchie MD       ASSESSMENT AND PLAN:  Problem List Items Addressed This Visit        Nervous and Auditory     Arthralgia of multiple joints - Primary    Relevant Medications    HYDROcodone-acetaminophen (NORCO)  MG per tablet    Polymyalgia rheumatica (H)    Relevant Medications    HYDROcodone-acetaminophen (NORCO)  MG per tablet      Other Visit Diagnoses     Acute post-operative pain        Relevant Medications    HYDROcodone-acetaminophen (NORCO)  MG per tablet        reviewed diet, exercise and weight control, recommended sodium restriction  -- Expected clinical course discussed    -- Medications and their side effects discussed    Patient Instructions   Medications refilled -- limit [place] for severe pain. -- no driving after use. Okay to drive if not taking.     Use Ultram / Tramadol for mild/moderate pain.     Return as needed for follow-up with Dr. Ritchie.    Clinic : [phone]  Appointment line: [phone]      Reinier Ritchie MD  Internal Medicine  [facility]     Portions of this note were dictated using speech recognition software. The note has been proofread but errors in the text may have been overlooked. Please contact me if there are any concerns regarding the accuracy of the dictation.

## 2018-11-28 ENCOUNTER — HOSPITAL ENCOUNTER (OUTPATIENT)
Dept: PHYSICAL THERAPY | Facility: OTHER | Age: 53
Setting detail: THERAPIES SERIES
End: 2018-11-28
Attending: SPECIALIST
Payer: COMMERCIAL

## 2018-11-28 PROCEDURE — 97016 VASOPNEUMATIC DEVICE THERAPY: CPT | Mod: GP

## 2018-11-28 PROCEDURE — 97110 THERAPEUTIC EXERCISES: CPT | Mod: GP

## 2018-11-28 PROCEDURE — 40000185 ZZHC STATISTIC PT OUTPT VISIT

## 2018-11-28 ASSESSMENT — ANXIETY QUESTIONNAIRES: GAD7 TOTAL SCORE: 0

## 2018-11-30 ENCOUNTER — OFFICE VISIT (OUTPATIENT)
Dept: ORTHOPEDICS | Facility: OTHER | Age: 53
End: 2018-11-30
Attending: ORTHOPAEDIC SURGERY
Payer: COMMERCIAL

## 2018-11-30 DIAGNOSIS — Z00.00 ROUTINE GENERAL MEDICAL EXAMINATION AT A HEALTH CARE FACILITY: Primary | ICD-10-CM

## 2018-11-30 PROCEDURE — 99207 ZZC NO CHARGE LOS: CPT | Performed by: ORTHOPAEDIC SURGERY

## 2018-12-03 ENCOUNTER — HOSPITAL ENCOUNTER (OUTPATIENT)
Dept: PHYSICAL THERAPY | Facility: OTHER | Age: 53
Setting detail: THERAPIES SERIES
End: 2018-12-03
Attending: SPECIALIST
Payer: COMMERCIAL

## 2018-12-03 PROCEDURE — 97110 THERAPEUTIC EXERCISES: CPT | Mod: GP

## 2018-12-03 PROCEDURE — 40000185 ZZHC STATISTIC PT OUTPT VISIT

## 2018-12-03 PROCEDURE — 97016 VASOPNEUMATIC DEVICE THERAPY: CPT | Mod: GP

## 2018-12-04 NOTE — PROGRESS NOTES
CHIEF COMPLAINT: Left Total Knee Arthroplasty Recheck    PROBLEMS:   Patient has no noted problems.    PATIENT REPORTED MEDICATIONS:  CHANTIX 1 MG ORAL TABLET (VARENICLINE TARTRATE) ; Route: ORAL  TRAMADOL HCL 50 MG ORAL TABLET (TRAMADOL HCL) ; Route: ORAL  PRILOSEC PACKET (OMEPRAZOLE MAGNESIUM PACK)   MEDROL 4 MG ORAL TABLET (METHYLPREDNISOLONE) ; Route: ORAL  METFORMIN  MG ORAL TABLET (METFORMIN HCL) ; Route: ORAL  GABAPENTIN 100 MG ORAL CAPSULE (GABAPENTIN) ; Route: ORAL  LASIX 20 MG ORAL TABLET (FUROSEMIDE) ; Route: ORAL  DOXYCYCLINE HYCLATE 100 MG ORAL TABLET (DOXYCYCLINE HYCLATE) ; Route: ORAL  TYLENOL 325 MG ORAL TABLET (ACETAMINOPHEN) ; Route: ORAL    PATIENT REPORTED ALLERGIES:   Patient has no noted allergies.      HISTORY OF PRESENT ILLNESS:    The patient is seen.   Juve is 16 days after left total knee arthroplasty.       PAST MEDICAL HISTORY:    Acid Reflux  Bilateral Carpal Tunnel       PAST ORTHOPEDIC SURGICAL HISTORY:    Left Total Knee Arthroplasty 11/14/18 - Dr. Grissom  Right Thumb, Index & Long Finger Trigger Digit Release 09/07/18  Left Carpal Tunnel Release, Left Ulnar Nerve Transposition 12/21/17 Dr. Huang  Right Carpal Tunnel Release 11/22/17 Dr. Huang    PAST SURGICAL HISTORY:    Appendectomy    SOCIAL HISTORY:   Marital Status:    Occupation:  Retired    PHYSICAL EXAMINATION:    On exam of the patient's left knee the wounds are healing.   Half of the staples were removed.    I do not think they should all be removed yet as there is one area especially that has incomplete healing on the upper one-third of the wound, but there is no erythema and no infection.    ASSESSMENT:    Status Post Left Total Knee Arthroplasty 11/14/18    PLAN:   Status Post Left Total Knee Arthroplasty 11/14/18:  The patient will continue his therapy.     He has adequate pain control.      The patient was asked to see Dr. Grissom in the next week or so and then I think the rest of the  staples could be removed.     At this point he looks to be doing very well following left total knee arthroplasty.    Dictated by Rafael Miller M.D.  D:  11/30/18  T:   12/04/18    Copy to:  Chau DOCKERY, Reinier SEXTON     Typed and/or reviewed and corrected by signing  below, and sent to the Physician for final review and signature.     This report was created using voice recording software and computer-generated templates. Although every effort has been made to review for and eliminate errors, some errors may still occur.     Electronically signed by Beckie Liang  on 12/04/2018 at 3:01 PM    ________________________________________________________________________

## 2018-12-06 ENCOUNTER — HOSPITAL ENCOUNTER (OUTPATIENT)
Dept: PHYSICAL THERAPY | Facility: OTHER | Age: 53
Setting detail: THERAPIES SERIES
End: 2018-12-06
Attending: SPECIALIST
Payer: COMMERCIAL

## 2018-12-06 PROCEDURE — 97016 VASOPNEUMATIC DEVICE THERAPY: CPT | Mod: GP

## 2018-12-06 PROCEDURE — 40000185 ZZHC STATISTIC PT OUTPT VISIT

## 2018-12-06 PROCEDURE — 97110 THERAPEUTIC EXERCISES: CPT | Mod: GP

## 2018-12-07 ENCOUNTER — OFFICE VISIT (OUTPATIENT)
Dept: ORTHOPEDICS | Facility: OTHER | Age: 53
End: 2018-12-07
Attending: SPECIALIST
Payer: COMMERCIAL

## 2018-12-07 DIAGNOSIS — Z00.00 ROUTINE GENERAL MEDICAL EXAMINATION AT A HEALTH CARE FACILITY: Primary | ICD-10-CM

## 2018-12-07 PROCEDURE — 99024 POSTOP FOLLOW-UP VISIT: CPT | Performed by: SPECIALIST

## 2018-12-10 ENCOUNTER — HOSPITAL ENCOUNTER (OUTPATIENT)
Dept: PHYSICAL THERAPY | Facility: OTHER | Age: 53
Setting detail: THERAPIES SERIES
End: 2018-12-10
Attending: SPECIALIST
Payer: COMMERCIAL

## 2018-12-10 PROCEDURE — 97016 VASOPNEUMATIC DEVICE THERAPY: CPT | Mod: GP

## 2018-12-10 PROCEDURE — 40000185 ZZHC STATISTIC PT OUTPT VISIT

## 2018-12-10 PROCEDURE — 97110 THERAPEUTIC EXERCISES: CPT | Mod: GP

## 2018-12-12 ENCOUNTER — TELEPHONE (OUTPATIENT)
Dept: INTERNAL MEDICINE | Facility: OTHER | Age: 53
End: 2018-12-12

## 2018-12-12 DIAGNOSIS — N52.9 ERECTILE DYSFUNCTION, UNSPECIFIED ERECTILE DYSFUNCTION TYPE: Primary | ICD-10-CM

## 2018-12-12 RX ORDER — TADALAFIL 10 MG/1
10 TABLET ORAL DAILY PRN
Qty: 10 TABLET | Refills: 11 | Status: SHIPPED | OUTPATIENT
Start: 2018-12-12 | End: 2018-12-17

## 2018-12-12 NOTE — TELEPHONE ENCOUNTER
Patient is wanting a generic form of Cialis . States has tried Viagra but is expensive.   Noemi Carrasquillo LPN .............12/12/2018     3:58 PM    Pended for 10 mg, please review and change as needed.

## 2018-12-13 ENCOUNTER — HOSPITAL ENCOUNTER (OUTPATIENT)
Dept: PHYSICAL THERAPY | Facility: OTHER | Age: 53
Setting detail: THERAPIES SERIES
End: 2018-12-13
Attending: SPECIALIST
Payer: COMMERCIAL

## 2018-12-13 PROCEDURE — 97110 THERAPEUTIC EXERCISES: CPT | Mod: GP

## 2018-12-13 NOTE — PROGRESS NOTES
Outpatient Physical Therapy Progress Note     Patient: Gregg Aguayo  : 1965    Beginning/End Dates of Reporting Period:  2018 to 2018    Referring Provider: Dr. Grissom    Therapy Diagnosis: Impaired mobility, decreased strength and activity tolerance, impaired balance and gait     Client Self Report: Patient states that he is doing well. He continues to have stiffness in the knee but notes this has improved. He reports that he is about 40% improved since initial evaluation. He can ride around in a vehicle with less pain and stairs have become easier at home. He is noticing that he is favoring his LE less.     Objective Measurements:  Objective Measure: Subjective Pain Rating  Details: 3-4/10    Objective Measure: Knee range of motion  Details: 7-98 degrees    Goals:  Goal Identifier HEP   Goal Description Patient will demonstrate compliance and independence in his HEP in order to improve his mobility, strength and endurance   Target Date 18   Date Met  18   Progress: MET     Goal Identifier Dressing   Goal Description Patient will be able to complete all dressing activities with no increase in pain and no assistive device in order to improve ability to complete ADL's.    Target Date 18   Date Met      Progress: This has improved drastically however he reports that this is not quite normal.      Goal Identifier Mobility   Goal Description Patient will be able to ambulate longer than 10 minutes with no AD and no loss of balance in order to improve mobility around the community   Target Date 19   Date Met  18   Progress: MET     Goal Identifier House work   Goal Description Patient will be able to complete all house work with no increase in symptoms and no AD in order to improve his mobility around the home   Target Date 19   Date Met      Progress: Patient still has some increase in pain when he stands too long at the kitchen sink.      Progress Toward  Goals:   Progress this reporting period: Patient has progressed well towards his goals since the start of therapy. He has demonstrated compliance with his home exercise program which has helped improve his mobility and strength. He is tolerating more activity at home, in therapy, and in the community without a significant increase in his pain. He would continue to benefit from skilled physical therapy services in order to improve his mobility, strength and endurance while reducing his pain.     Plan:  Continue therapy per current plan of care.    Discharge:  No

## 2018-12-14 ENCOUNTER — TELEPHONE (OUTPATIENT)
Dept: INTERNAL MEDICINE | Facility: OTHER | Age: 53
End: 2018-12-14

## 2018-12-14 DIAGNOSIS — F52.8 PSYCHOSEXUAL DYSFUNCTION WITH INHIBITED SEXUAL EXCITEMENT: Primary | ICD-10-CM

## 2018-12-14 NOTE — PROGRESS NOTES
CHIEF COMPLAINT: Left Total Knee Arthroplasty Recheck    PROBLEMS:   Patient has no noted problems.    PATIENT REPORTED MEDICATIONS:  CHANTIX 1 MG ORAL TABLET (VARENICLINE TARTRATE) ; Route: ORAL  TRAMADOL HCL 50 MG ORAL TABLET (TRAMADOL HCL) ; Route: ORAL  PRILOSEC PACKET (OMEPRAZOLE MAGNESIUM PACK)   MEDROL 4 MG ORAL TABLET (METHYLPREDNISOLONE) ; Route: ORAL  METFORMIN  MG ORAL TABLET (METFORMIN HCL) ; Route: ORAL  GABAPENTIN 100 MG ORAL CAPSULE (GABAPENTIN) ; Route: ORAL  LASIX 20 MG ORAL TABLET (FUROSEMIDE) ; Route: ORAL  DOXYCYCLINE HYCLATE 100 MG ORAL TABLET (DOXYCYCLINE HYCLATE) ; Route: ORAL  TYLENOL 325 MG ORAL TABLET (ACETAMINOPHEN) ; Route: ORAL    PATIENT REPORTED ALLERGIES:   Patient has no noted allergies.    HISTORY OF PRESENT ILLNESS:    Juve returns for followup status post left total knee arthroplasty.   Date of surgery is 11/14/18.  He is back today doing well.    PAST MEDICAL HISTORY:    Acid Reflux  Bilateral Carpal Tunnel       PAST ORTHOPEDIC SURGICAL HISTORY:    Left Total Knee Arthroplasty 11/14/18 - Dr. Grissom  Right Thumb, Index & Long Finger Trigger Digit Release 09/07/18  Left Carpal Tunnel Release, Left Ulnar Nerve Transposition 12/21/17 Dr. Huang  Right Carpal Tunnel Release 11/22/17 Dr. Huang    PAST SURGICAL HISTORY:    Appendectomy    SOCIAL HISTORY:   Marital Status:    Occupation:  Retired    PHYSICAL EXAMINATION:    Physical examination today shows his range of motion to be from 0  to 90 .  Incision is healed nicely.       ASSESSMENT:    Status Post Left Total Knee Arthroplasty Surgery 11/14/18, Doing Well    PLAN:   Status Post Left Total Knee Arthroplasty Surgery 11/14/18, Doing Well:  We removed his remaining sutures.  We would like to see him back in two to three weeks to monitor his progress, sooner if problems occur.    Dictated by Monster Grissom M.D.  D:  12/07/18  T:   12/13/18    Copy to:  Chau DOCKERY, Reinier SEXTON     Typed and/or reviewed  and corrected by signing  below, and sent to the Physician for final review and signature.     This report was created using voice recording software and computer-generated templates. Although every effort has been made to review for and eliminate errors, some errors may still occur.     Electronically signed by Beckie Liang  on 12/13/2018 at 3:15 PM    ________________________________________________________________________

## 2018-12-14 NOTE — TELEPHONE ENCOUNTER
Would like medication changed to Viagra generic brand since current brand is too expensive. Pt is leaving town

## 2018-12-14 NOTE — TELEPHONE ENCOUNTER
Called patient and let him know PCP was out of clinic until Monday and he was ok to wait until then.  Daniel Bains LPN ..............12/14/2018 11:33 AM

## 2018-12-16 NOTE — TELEPHONE ENCOUNTER
Okay to use generic.  Please notify pharmacy.  Otherwise have him transfer his prescription to [facility] pharmacy.    Reinier Ritchie MD

## 2018-12-17 RX ORDER — SILDENAFIL CITRATE 20 MG/1
20-100 TABLET ORAL DAILY PRN
Qty: 30 TABLET | Refills: 3 | Status: SHIPPED | OUTPATIENT
Start: 2018-12-17 | End: 2019-09-10

## 2018-12-17 NOTE — TELEPHONE ENCOUNTER
Call and spoke with pharmacist. They need a new rx with dose for generic viagra due to not having equivalent dosing. Rx is available for 20mg tabs in generic dose.     Lili Winters LPN on 12/17/2018 at 10:15 AM

## 2018-12-18 ENCOUNTER — HOSPITAL ENCOUNTER (OUTPATIENT)
Dept: PHYSICAL THERAPY | Facility: OTHER | Age: 53
Setting detail: THERAPIES SERIES
End: 2018-12-18
Attending: SPECIALIST
Payer: COMMERCIAL

## 2018-12-18 PROCEDURE — 97110 THERAPEUTIC EXERCISES: CPT | Mod: GP

## 2018-12-24 ENCOUNTER — HOSPITAL ENCOUNTER (OUTPATIENT)
Dept: PHYSICAL THERAPY | Facility: OTHER | Age: 53
Setting detail: THERAPIES SERIES
End: 2018-12-24
Attending: SPECIALIST
Payer: COMMERCIAL

## 2018-12-24 PROCEDURE — 97110 THERAPEUTIC EXERCISES: CPT | Mod: GP

## 2018-12-26 ENCOUNTER — HOSPITAL ENCOUNTER (OUTPATIENT)
Dept: PHYSICAL THERAPY | Facility: OTHER | Age: 53
Setting detail: THERAPIES SERIES
End: 2018-12-26
Attending: SPECIALIST
Payer: COMMERCIAL

## 2018-12-26 PROCEDURE — 97110 THERAPEUTIC EXERCISES: CPT | Mod: GP

## 2019-01-02 DIAGNOSIS — Z12.11 SPECIAL SCREENING FOR MALIGNANT NEOPLASMS, COLON: Primary | ICD-10-CM

## 2019-01-02 DIAGNOSIS — Z96.652 STATUS POST TOTAL LEFT KNEE REPLACEMENT: Primary | ICD-10-CM

## 2019-01-02 NOTE — TELEPHONE ENCOUNTER
Screening Questions for the Scheduling of Screening Colonoscopies   (If Colonoscopy is diagnostic, Provider should review the chart before scheduling.)  Are you younger than 50 or older than 80?  NO   Do you take aspirin or fish oil?   ASPIRIN (if yes, tell patient to stop 1 week prior to Colonoscopy)  Do you take warfarin (Coumadin), clopidogrel (Plavix), apixaban (Eliquis), dabigatram (Pradaxa), rivaroxaban (Xarelto) or any blood thinner? NO   Do you use oxygen at home?  C-PAP   Do you have kidney disease?  NO   Are you on dialysis? NO   Have you had a stroke or heart attack in the last year? NO   Have you had a stent in your heart or any blood vessel in the last year? NO   Have you had a transplant of any organ? NO   Have you had a colonoscopy or upper endoscopy (EGD) before?  YES           When?  2013 ?  [facility]   Date of scheduled Colonoscopy. 02/01/2019  Provider  GRAJEDA   [facility]

## 2019-01-03 ENCOUNTER — HOSPITAL ENCOUNTER (OUTPATIENT)
Dept: PHYSICAL THERAPY | Facility: OTHER | Age: 54
Setting detail: THERAPIES SERIES
End: 2019-01-03
Attending: SPECIALIST
Payer: COMMERCIAL

## 2019-01-03 PROCEDURE — 97110 THERAPEUTIC EXERCISES: CPT | Mod: GP

## 2019-01-03 RX ORDER — POLYETHYLENE GLYCOL 3350, SODIUM CHLORIDE, SODIUM BICARBONATE, POTASSIUM CHLORIDE 420; 11.2; 5.72; 1.48 G/4L; G/4L; G/4L; G/4L
4000 POWDER, FOR SOLUTION ORAL ONCE
Qty: 4000 ML | Refills: 0 | Status: SHIPPED | OUTPATIENT
Start: 2019-01-03 | End: 2019-02-13

## 2019-01-03 RX ORDER — BISACODYL 5 MG
TABLET, DELAYED RELEASE (ENTERIC COATED) ORAL
Qty: 2 TABLET | Refills: 0 | Status: ON HOLD | OUTPATIENT
Start: 2019-01-03 | End: 2019-02-01

## 2019-01-04 ENCOUNTER — HOSPITAL ENCOUNTER (OUTPATIENT)
Dept: GENERAL RADIOLOGY | Facility: OTHER | Age: 54
Discharge: HOME | End: 2019-01-04
Attending: SPECIALIST | Admitting: SPECIALIST
Payer: COMMERCIAL

## 2019-01-04 ENCOUNTER — OFFICE VISIT (OUTPATIENT)
Dept: ORTHOPEDICS | Facility: OTHER | Age: 54
End: 2019-01-04
Attending: SPECIALIST
Payer: COMMERCIAL

## 2019-01-04 DIAGNOSIS — Z00.00 ROUTINE GENERAL MEDICAL EXAMINATION AT A HEALTH CARE FACILITY: Primary | ICD-10-CM

## 2019-01-04 DIAGNOSIS — Z96.652 STATUS POST TOTAL LEFT KNEE REPLACEMENT: ICD-10-CM

## 2019-01-04 PROCEDURE — 99024 POSTOP FOLLOW-UP VISIT: CPT | Performed by: SPECIALIST

## 2019-01-04 PROCEDURE — 73560 X-RAY EXAM OF KNEE 1 OR 2: CPT | Mod: RT

## 2019-01-08 NOTE — PROGRESS NOTES
CHIEF COMPLAINT: Left Total Knee Arthroplasty Recheck    PROBLEMS:   Patient has no noted problems.    PATIENT REPORTED MEDICATIONS:  CHANTIX 1 MG ORAL TABLET (VARENICLINE TARTRATE) ; Route: ORAL  TRAMADOL HCL 50 MG ORAL TABLET (TRAMADOL HCL) ; Route: ORAL  PRILOSEC PACKET (OMEPRAZOLE MAGNESIUM PACK)   MEDROL 4 MG ORAL TABLET (METHYLPREDNISOLONE) ; Route: ORAL  METFORMIN  MG ORAL TABLET (METFORMIN HCL) ; Route: ORAL  GABAPENTIN 100 MG ORAL CAPSULE (GABAPENTIN) ; Route: ORAL  LASIX 20 MG ORAL TABLET (FUROSEMIDE) ; Route: ORAL  DOXYCYCLINE HYCLATE 100 MG ORAL TABLET (DOXYCYCLINE HYCLATE) ; Route: ORAL  TYLENOL 325 MG ORAL TABLET (ACETAMINOPHEN) ; Route: ORAL    PATIENT REPORTED ALLERGIES:   Patient has no noted allergies.      HISTORY OF PRESENT ILLNESS:    Juve returns for followup status post left total knee arthroplasty.   His date of surgery was 11/14/18.  He is back today doing well.     PAST MEDICAL HISTORY:    Acid Reflux  Bilateral Carpal Tunnel Syndrome    PAST ORTHOPEDIC SURGICAL HISTORY:    Left Total Knee Arthroplasty 11/14/18 - Dr. Grissom  Right Thumb, Index & Long Finger Trigger Digit Release 09/07/18  Left Carpal Tunnel Release, Left Ulnar Nerve Transposition 12/21/17 Dr. Huang  Right Carpal Tunnel Release 11/22/17 Dr. Huang    PAST SURGICAL HISTORY:    Appendectomy    SOCIAL HISTORY:   Marital Status:    Occupation:  Retired    PHYSICAL EXAMINATION:    Physical examination today of the patient's left lower extremity confirms his incision to have healed nicely.   His left knee shows full extension with flexion to 110 .   Collateral ligaments are stable.      X-RAY:  X-rays were not obtained today.     ASSESSMENT:    Status Post Left Total Knee Arthroplasty 11/14/18, Doing Well    PLAN:   Status Post Left Total Knee Arthroplasty 11/14/18, Doing Well:  I have recommended he come back on an annual basis for plain film radiographs, sooner if any problems occur.     Dental  prophylaxis was discussed.    Dictated by Monster Grissom M.D.  D:  01/04/19  T:   01/08/19    Copy to:  Chau DOCKERY, Reinier SEXTON     Typed and/or reviewed and corrected by signing  below, and sent to the Physician for final review and signature.     This report was created using voice recording software and computer-generated templates. Although every effort has been made to review for and eliminate errors, some errors may still occur.         Electronically signed by Beckie Liang  on 01/08/2019 at 9:46 AM    ________________________________________________________________________

## 2019-01-14 ENCOUNTER — OFFICE VISIT (OUTPATIENT)
Dept: INTERNAL MEDICINE | Facility: OTHER | Age: 54
End: 2019-01-14
Attending: INTERNAL MEDICINE
Payer: COMMERCIAL

## 2019-01-14 VITALS
HEART RATE: 80 BPM | DIASTOLIC BLOOD PRESSURE: 70 MMHG | SYSTOLIC BLOOD PRESSURE: 136 MMHG | BODY MASS INDEX: 40.62 KG/M2 | WEIGHT: 291.25 LBS

## 2019-01-14 DIAGNOSIS — L73.9 FOLLICULITIS: Primary | ICD-10-CM

## 2019-01-14 PROCEDURE — 99213 OFFICE O/P EST LOW 20 MIN: CPT | Performed by: INTERNAL MEDICINE

## 2019-01-14 ASSESSMENT — ANXIETY QUESTIONNAIRES
5. BEING SO RESTLESS THAT IT IS HARD TO SIT STILL: NOT AT ALL
1. FEELING NERVOUS, ANXIOUS, OR ON EDGE: NOT AT ALL
6. BECOMING EASILY ANNOYED OR IRRITABLE: NOT AT ALL
IF YOU CHECKED OFF ANY PROBLEMS ON THIS QUESTIONNAIRE, HOW DIFFICULT HAVE THESE PROBLEMS MADE IT FOR YOU TO DO YOUR WORK, TAKE CARE OF THINGS AT HOME, OR GET ALONG WITH OTHER PEOPLE: NOT DIFFICULT AT ALL
7. FEELING AFRAID AS IF SOMETHING AWFUL MIGHT HAPPEN: NOT AT ALL
2. NOT BEING ABLE TO STOP OR CONTROL WORRYING: NOT AT ALL
GAD7 TOTAL SCORE: 0
3. WORRYING TOO MUCH ABOUT DIFFERENT THINGS: NOT AT ALL

## 2019-01-14 ASSESSMENT — ENCOUNTER SYMPTOMS
SHORTNESS OF BREATH: 0
ABDOMINAL PAIN: 0
WOUND: 0

## 2019-01-14 ASSESSMENT — PAIN SCALES - GENERAL: PAINLEVEL: MILD PAIN (2)

## 2019-01-14 ASSESSMENT — PATIENT HEALTH QUESTIONNAIRE - PHQ9
5. POOR APPETITE OR OVEREATING: NOT AT ALL
SUM OF ALL RESPONSES TO PHQ QUESTIONS 1-9: 0

## 2019-01-14 NOTE — PATIENT INSTRUCTIONS
1. Folliculitis  START:   - cephALEXin (KEFLEX) 250 MG capsule; Take 1 capsule (250 mg) by mouth 4 times daily for 10 days  Dispense: 40 capsule; Refill: 0    Return as needed for follow-up with Dr. Ritchie.    Clinic : [phone]  Appointment line: [phone]    Patient Education     Folliculitis  Folliculitis is an inflammation of a hair follicle. A hair follicle is the little pocket where a hair grows out of the skin. Bacteria normally live on the skin. But sometimes bacteria can get trapped in a follicle and cause infection. This causes a bumpy rash. The area over the follicles is red and raised. It may itch or be painful. The bumps may have fluid (pus) inside. The pus may leak and then form crusts. Sores can spread to other areas of the body. Once it goes away, folliculitis can come back at any time. Severe cases may cause permanent hair loss and scarring.  Folliculitis can happen anywhere on the body where hair grows. It can be caused by rubbing from tight clothing. Ingrown hairs can cause it. Soaking in a hot tub or swimming pool that has bacteria in the water can cause it. It may also occur if a hair follicle is blocked by a bandage.  Sores often go away in a few days with no treatment. In some cases, medicine may be given. A small piece of skin or pus may be taken to find the type of bacteria causing the infection.  Home care  The healthcare provider may prescribe an antibiotic cream or ointment.  Oral antibiotics may also be prescribed. Or you may be told to use an over-the-counter antibiotic cream. Follow all instructions when using any of these medicines.  General care    Apply warm, moist compresses to the sores for 20 minutes up to 3 times a day. You can make a compress by soaking a cloth in warm water. Squeeze out excess water.    Don t cut, poke, or squeeze the sores. This can be painful and spread infection.    Don t scratch the affected area. Scratching can delay healing.    Don t shave  the areas affected by folliculitis.    If the sores leak fluid, cover the area with a nonstick gauze bandage. Use as little tape as possible. Carefully discard all soiled bandages.    Dress in loose cotton clothing.    Change sheets and blankets if they are soiled by pus. Wash all clothes, towels, sheets, and cloth diapers in soap and hot water. Do not share clothes, towels, or sheets with other family members.    Do not soak the sores in bath water. This can spread infection. Instead, keep the area clean by gently washing sores with soap and warm water.    Wash your hands or use antibacterial gels often to prevent spreading the bacteria.  Follow-up care  Follow up with your healthcare provider, or as advised.  When to seek medical advice  Call your healthcare provider right away if any of these occur:    Fever of 100.4 F (38 C) or higher    Spreading of the rash    Rash does not get better with treatment    Redness or swelling that gets worse    Rash becomes more painful    Foul-smelling fluid leaking from the skin    Rash improves, but then comes back   Date Last Reviewed: 11/1/2016 2000-2018 [employer]. [address] [place] [address], [place] [address]. All rights reserved. This information is not intended as a substitute for professional medical care. Always follow your healthcare professional's instructions.

## 2019-01-14 NOTE — PROGRESS NOTES
"Nursing Notes:   Mandy Prajapati LPN  1/14/2019 10:04 AM  Signed  Patient presents to the clinic for concerns about a rash all over the body for the past couple of months.      Chief Complaint   Patient presents with     Derm Problem       Initial /70 (BP Location: Right arm, Patient Position: Sitting, Cuff Size: Adult Regular)   Pulse 80   Wt 132.1 kg (291 lb 4 oz)   BMI 40.62 kg/m    Estimated body mass index is 40.62 kg/m  as calculated from the following:    Height as of 9/7/18: 1.803 m (5' 11\").    Weight as of this encounter: 132.1 kg (291 lb 4 oz).  Medication Reconciliation: complete    Mandy Prajapati LPN      Nursing note reviewed with patient.  Accuracy and completeness verified.   Mr. Aguayo is a 53 year old male who:  Patient presents with:  Derm Problem      ICD-10-CM    1. Folliculitis L73.9 cephALEXin (KEFLEX) 250 MG capsule     HPI  Patient presents for evaluation of skin rash.  States that he has had it for possibly 2 months.  Initially was just on his left leg this is now spread to both legs some on his trunk, worse on his left abdomen, upper thighs, also on his arms.    He is not sensitive to poison ivy.    States that he itches pretty bad.  When he scratches the skin will have almost a burning sensation.    There are only a few excoriated open areas otherwise the lesions themselves are closed.  They are papular appearing.  Associated with hair follicles.    He has not been in any hot tubs.  Has not done any bathing in the bath.  Has only been taking showers.    Using body scrub.  No recent changes in perfumes or clothing soap or laundry detergent.    States his wife has a small area now as well.    Exam is most consistent with folliculitis.  Start antibacterial soap.  Printout provided regarding folliculitis.  Start Keflex for skin infection.    Close follow-up if needed for new or worsening symptoms.    Functional Capacity: > 4 METS.   Review of Systems   Respiratory: Negative for " shortness of breath.    Cardiovascular: Negative for chest pain.   Gastrointestinal: Negative for abdominal pain.   Skin: Positive for rash (+ worse on left leg, now on both legs, buttocks, thighs, trunk and arms). Negative for wound.        TRESSA:   TRESSA-7 SCORE 11/6/2018 11/27/2018 1/14/2019   Total Score 0 0 0     PHQ9:  PHQ-9 SCORE 11/6/2018 11/27/2018 1/14/2019   PHQ-9 Total Score 0 0 0       I have personally reviewed the past medical history, past surgical history, medications, allergies, family and social history as listed below, on 1/14/2019.    No Known Allergies    Current Outpatient Medications   Medication Sig Dispense Refill     acetaminophen (TYLENOL) 650 MG CR tablet Take up to 4 tablet daily for pain as needed       bisacodyl (DULCOLAX) 5 MG EC tablet Use as directed per colonoscopy prep. 2 tablet 0     cephALEXin (KEFLEX) 250 MG capsule Take 1 capsule (250 mg) by mouth 4 times daily for 10 days 40 capsule 0     furosemide (LASIX) 20 MG tablet Take 1 tablet (20 mg) by mouth daily as needed For leg swelling-limit use. Salt restriction. 90 tablet 3     gabapentin (NEURONTIN) 100 MG capsule Take 1-2 capsules (100-200 mg) by mouth 4 times daily as needed 360 capsule 3     HYDROcodone-acetaminophen (NORCO)  MG per tablet Take 1-2 tablets by mouth every 4 hours as needed 180 tablet 0     metFORMIN (GLUCOPHAGE) 500 MG tablet Take 1-2 tablets (500-1,000 mg) by mouth daily (with dinner) 180 tablet 3     methylPREDNISolone (MEDROL) 4 MG tablet Take 2-8 mg by mouth daily With a meal - wean dose every 2 to 4 weeks - for PMR       omeprazole (PRILOSEC) 40 MG capsule Take 1 capsule (40 mg) by mouth daily 90 capsule 3     sildenafil (REVATIO) 20 MG tablet Take 1-5 tablets ( mg) by mouth daily as needed (For Erectile Dysfunction - take 30 min to 1 hour prior to [place]) 30 tablet 3     traMADol (ULTRAM) 50 MG tablet Take 1 tablet (50 mg) by mouth 2 times daily as needed for pain 60 tablet 5      varenicline (CHANTIX) 1 MG tablet Take 1 tablet (1 mg) by mouth 2 times daily (with meals) Quit smoking 60 tablet 1        Patient Active Problem List    Diagnosis Date Noted     Trigger finger, acquired - Right hand - 1st and 3rd fingers 08/30/2018     Priority: Medium     Heartburn 07/25/2018     Priority: Medium     Steroid-induced hyperglycemia 04/10/2018     Priority: Medium     Psychosexual dysfunction with inhibited sexual excitement 01/29/2018     Priority: Medium     Tobacco abuse 01/29/2018     Priority: Medium     Overview:   IMO Update 10/11       Acute pain of left knee 01/11/2018     Priority: Medium     Fall at home, initial encounter 01/11/2018     Priority: Medium     History of arthroscopic knee surgery 01/11/2018     Priority: Medium     Left medial knee pain 01/11/2018     Priority: Medium     Left carpal tunnel syndrome 12/21/2017     Priority: Medium     History of carpal tunnel surgery of left wrist 12/21/2017     Priority: Medium     S/P cubital tunnel release 12/21/2017     Priority: Medium     History of carpal tunnel surgery of right wrist 11/22/2017     Priority: Medium     AILEEN on CPAP - uses nightly, finds very helpful 11/20/2017     Priority: Medium     Cubital tunnel syndrome on left 09/25/2017     Priority: Medium     Bilateral leg edema 07/05/2017     Priority: Medium     Polymyalgia rheumatica (H) 11/11/2016     Priority: Medium     Arthralgia of multiple joints 10/26/2016     Priority: Medium     Hypertrophic and atrophic condition of skin 06/07/2012     Priority: Medium     Contact dermatitis and eczema 09/20/2011     Priority: Medium     Past Medical History:   Diagnosis Date     Bilateral carpal tunnel syndrome     9/21/2017     Carpal tunnel syndrome of left wrist     12/21/2017     Condition influencing health status     No Comments Provided     Lesion of left ulnar nerve     9/25/2017     Other specified postprocedural states     11/22/2017     Other specified postprocedural  states     12/21/2017     Other specified postprocedural states     12/21/2017     Past Surgical History:   Procedure Laterality Date     APPENDECTOMY OPEN      2005,Laparoscopic for acute appendicitis     ARTHROSCOPY KNEE Left 3/19/2018    Procedure: ARTHROSCOPY KNEE;  Left Knee Arthroscopy, Partial Meniscectomy, Chondroplasty;  Surgeon: Kwabena Patricia DO;  Location: [facility] OR     ATTEMPTED ARTHROSCOPY      knee ? left     COLONOSCOPY  01/29/2014 1/29/2014,Tubular adenoma of rectum - follow up 5 years     RELEASE CARPAL TUNNEL Right     11/22/2017,[id number],OTHER,Right     RELEASE CARPAL TUNNEL Left     12/21/2017,[id number],OTHER,Left     RELEASE TRIGGER FINGER Right 9/7/2018    Procedure: RELEASE TRIGGER FINGER;  Right Trigger Release Thumb, Index & Long Digits;  Surgeon: Monster Grissom MD;  Location: [facility] OR     RELEASE ULNAR NERVE (ELBOW)      12/21/2017,[id number],OTHER,Left     Social History     Socioeconomic History     Marital status:      Spouse name: None     Number of children: None     Years of education: None     Highest education level: None   Social Needs     Financial resource strain: None     Food insecurity - worry: None     Food insecurity - inability: None     Transportation needs - medical: None     Transportation needs - non-medical: None   Occupational History     None   Tobacco Use     Smoking status: Current Every Day Smoker     Packs/day: 0.30     Types: Cigarettes     Smokeless tobacco: Never Used   Substance and Sexual Activity     Alcohol use: Yes     Alcohol/week: 0.0 oz     Comment: Alcoholic Drinks/day: 1-2 drinks every other day     Drug use: No     Sexual activity: No   Other Topics Concern     Parent/sibling w/ CABG, MI or angioplasty before 65F 55M? Not Asked   Social History Narrative    Tobacco-one half pack per day.  Ethanol-occasional.    and remarried.  Has two children from his first marriage.      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     Family History   Problem Relation Age of Onset     Genetic Disorder Maternal Grandfather         Genetic,Polymyalgia Rheumatica     Heart Murmur Mother      No Known Problems Father        EXAM:   Vitals:    01/14/19 0954   BP: 136/70   BP Location: Right arm   Patient Position: Sitting   Cuff Size: Adult Regular   Pulse: 80   Weight: 132.1 kg (291 lb 4 oz)       Current Pain Score: Mild Pain (2)     BP Readings from Last 3 Encounters:   01/14/19 136/70   11/27/18 138/88   11/06/18 138/88      Wt Readings from Last 3 Encounters:   01/14/19 132.1 kg (291 lb 4 oz)   11/27/18 134.5 kg (296 lb 8 oz)   11/06/18 137 kg (302 lb)      Estimated body mass index is 40.62 kg/m  as calculated from the following:    Height as of 9/7/18: 1.803 m (5' 11\").    Weight as of this encounter: 132.1 kg (291 lb 4 oz).     Physical Exam   Constitutional: He appears well-developed and well-nourished.   Eyes: No scleral icterus.   Cardiovascular: Normal rate.   Pulmonary/Chest: Effort normal.   Musculoskeletal: He exhibits edema.   Neurological: He is alert.   Skin: Rash (Papular rash, follicular appearing on arms, legs, trunk.  No open pustules.  No vesicles.) noted.   Psychiatric: He has a normal mood and affect.        Procedures   INVESTIGATIONS:  Results for orders placed or performed during the hospital encounter of 01/04/19   XR Knee Standing 1v Ap Bilateral & 2v Left    Narrative    PROCEDURE:  XR KNEE STANDING 1 V AP BILATERAL AND 2 V LEFT    HISTORY: Status post total left knee replacement    COMPARISON:  9/28/2018    TECHNIQUE:  Standing view both knees and 2 views of the left knee were  obtained.    FINDINGS:  Left knee prosthesis is in good position. No findings of  hardware failure or loosening. No acute fracture or dislocation. Mild  degenerative changes of the right knee are unchanged.       Impression    IMPRESSION: Satisfactory postoperative appearance of the left knee.      BRANDI HOUSE MD "       ASSESSMENT AND PLAN:  Problem List Items Addressed This Visit     None      Visit Diagnoses     Folliculitis    -  Primary    Relevant Medications    cephALEXin (KEFLEX) 250 MG capsule        -- Expected clinical course discussed    -- Medications and their side effects discussed    Patient Instructions   1. Folliculitis  START:   - cephALEXin (KEFLEX) 250 MG capsule; Take 1 capsule (250 mg) by mouth 4 times daily for 10 days  Dispense: 40 capsule; Refill: 0    Return as needed for follow-up with Dr. Ritchie.    Clinic : [phone]  Appointment line: [phone]    Patient Education     Folliculitis  Folliculitis is an inflammation of a hair follicle. A hair follicle is the little pocket where a hair grows out of the skin. Bacteria normally live on the skin. But sometimes bacteria can get trapped in a follicle and cause infection. This causes a bumpy rash. The area over the follicles is red and raised. It may itch or be painful. The bumps may have fluid (pus) inside. The pus may leak and then form crusts. Sores can spread to other areas of the body. Once it goes away, folliculitis can come back at any time. Severe cases may cause permanent hair loss and scarring.  Folliculitis can happen anywhere on the body where hair grows. It can be caused by rubbing from tight clothing. Ingrown hairs can cause it. Soaking in a hot tub or swimming pool that has bacteria in the water can cause it. It may also occur if a hair follicle is blocked by a bandage.  Sores often go away in a few days with no treatment. In some cases, medicine may be given. A small piece of skin or pus may be taken to find the type of bacteria causing the infection.  Home care  The healthcare provider may prescribe an antibiotic cream or ointment.  Oral antibiotics may also be prescribed. Or you may be told to use an over-the-counter antibiotic cream. Follow all instructions when using any of these medicines.  General care    Apply warm,  moist compresses to the sores for 20 minutes up to 3 times a day. You can make a compress by soaking a cloth in warm water. Squeeze out excess water.    Don t cut, poke, or squeeze the sores. This can be painful and spread infection.    Don t scratch the affected area. Scratching can delay healing.    Don t shave the areas affected by folliculitis.    If the sores leak fluid, cover the area with a nonstick gauze bandage. Use as little tape as possible. Carefully discard all soiled bandages.    Dress in loose cotton clothing.    Change sheets and blankets if they are soiled by pus. Wash all clothes, towels, sheets, and cloth diapers in soap and hot water. Do not share clothes, towels, or sheets with other family members.    Do not soak the sores in bath water. This can spread infection. Instead, keep the area clean by gently washing sores with soap and warm water.    Wash your hands or use antibacterial gels often to prevent spreading the bacteria.  Follow-up care  Follow up with your healthcare provider, or as advised.  When to seek medical advice  Call your healthcare provider right away if any of these occur:    Fever of 100.4 F (38 C) or higher    Spreading of the rash    Rash does not get better with treatment    Redness or swelling that gets worse    Rash becomes more painful    Foul-smelling fluid leaking from the skin    Rash improves, but then comes back   Date Last Reviewed: 11/1/2016 2000-2018 [employer]. [address] [place] [address]. All rights reserved. This information is not intended as a substitute for professional medical care. Always follow your healthcare professional's instructions.             Reinier Ritchie MD  Internal Medicine  [facility]     Portions of this note were dictated using speech recognition software. The note has been proofread but errors in the text may have been overlooked. Please contact me if there are any concerns regarding  the accuracy of the dictation.

## 2019-01-14 NOTE — NURSING NOTE
"Patient presents to the clinic for concerns about a rash all over the body for the past couple of months.      Chief Complaint   Patient presents with     Derm Problem       Initial /70 (BP Location: Right arm, Patient Position: Sitting, Cuff Size: Adult Regular)   Pulse 80   Wt 132.1 kg (291 lb 4 oz)   BMI 40.62 kg/m   Estimated body mass index is 40.62 kg/m  as calculated from the following:    Height as of 9/7/18: 1.803 m (5' 11\").    Weight as of this encounter: 132.1 kg (291 lb 4 oz).  Medication Reconciliation: complete    Mandy Prajapati LPN      "

## 2019-01-15 ENCOUNTER — TELEPHONE (OUTPATIENT)
Dept: INTERNAL MEDICINE | Facility: OTHER | Age: 54
End: 2019-01-15

## 2019-01-15 ASSESSMENT — ANXIETY QUESTIONNAIRES: GAD7 TOTAL SCORE: 0

## 2019-01-15 NOTE — PROGRESS NOTES
Outpatient Physical Therapy Discharge Note     Patient: Gregg Aguayo  : 1965    Beginning/End Dates of Reporting Period:  2018 to 1/15/2019    Referring Provider: Dr. Grissom     Therapy Diagnosis: Impaired mobility, decreased strength and activity tolerance, impaired balance and gait     Client Self Report: Patient notes that today he is still sore. He notes that he knee swells up so much after activity. He is getting around the house and community well. Follow up with surgeon is tomorrow.     *This is subjective information from last attended visit    Objective Measurements:  Objective Measure: Subjective Pain Rating  Details: 3-4/10  Objective Measure: Knee range of motion  Details: 8-114      Goals:  Goal Identifier HEP   Goal Description Patient will demonstrate compliance and independence in his HEP in order to improve his mobility, strength and endurance   Target Date 18   Date Met  18   Progress:     Goal Identifier Dressing   Goal Description Patient will be able to complete all dressing activities with no increase in pain and no assistive device in order to improve ability to complete ADL's.    Target Date 18   Date Met      Progress:     Goal Identifier Mobility   Goal Description Patient will be able to ambulate longer than 10 minutes with no AD and no loss of balance in order to improve mobility around the community   Target Date 19   Date Met  18   Progress:     Goal Identifier House work   Goal Description Patient will be able to complete all house work with no increase in symptoms and no AD in order to improve his mobility around the home   Target Date 19   Date Met      Progress:       Progress Toward Goals:   Unable to assess progress towards goals as discharge is unplanned. He had tolerated treatment well and was improving in his mobility and with his function at home    Plan:  Discharge from therapy.    Discharge:    Reason for Discharge:  Patient chooses to discontinue therapy. He met with his surgeon and he decided that he could continue with his exercises independently at home.     Equipment Issued: none    Discharge Plan: Patient to continue home program. Follow up with physician as needed.

## 2019-01-15 NOTE — ADDENDUM NOTE
Encounter addended by: Omid Carson PT on: 1/15/2019 10:39 AM   Actions taken: Sign clinical note, Episode resolved

## 2019-01-15 NOTE — TELEPHONE ENCOUNTER
Patient called back and writer notified him of the below.     Lili Winters LPN on 1/15/2019 at 3:35 PM

## 2019-01-16 NOTE — MR AVS SNAPSHOT
After Visit Summary   11/30/2018    Gregg Aguayo    MRN: [id number]           Patient Information     Date Of Birth          1965        Visit Information        Provider Department      11/30/2018 11:00 AM Rafael Miller MD [facility]        Today's Diagnoses     Routine general medical examination at a health care facility    -  1       Follow-ups after your visit        Your next 10 appointments already scheduled     Dec 06, 2018  1:00 PM CST   Treatment with Omid Peratalo, PT   [facility] ([facility])    [address] [place] [address]   [phone]            Dec 07, 2018 12:30 PM CST   Return Visit with Monster Grissom MD   [facility] ([facility])    [address]  [facility] [address]   [phone]            Dec 10, 2018  8:00 AM CST   Treatment with Omid Peratalo, PT   [facility] ([facility])    [address] [place] [address]   [phone]            Dec 13, 2018  9:30 AM CST   Treatment with Omid Peratalo, PT   [facility] ([facility])    [address] [place] [address]   [phone]              Who to contact     If you have questions or need follow up information about today's clinic visit or your schedule please contact [facility] directly at [phone].  Normal or non-critical lab and imaging results will be communicated to you by [employer]le[employer], letter or phone within 4 business days after the clinic has received the results. If you do not hear from us within 7 days, please contact the clinic through Restlethart or phone. If you have a critical or abnormal lab result, we will notify you by phone as soon as possible.  Submit refill requests through [employer] or call your pharmacy and they will forward the  refill request to us. Please allow 3 business days for your refill to be completed.          Additional Information About Your Visit        Care EveryWhere ID     This is your Care EveryWhere ID. This could be used by other organizations to access your [place] medical records  [id number]         Blood Pressure from Last 3 Encounters:   11/27/18 138/88   11/06/18 138/88   09/07/18 145/78    Weight from Last 3 Encounters:   11/27/18 134.5 kg (296 lb 8 oz)   11/06/18 137 kg (302 lb)   09/07/18 133.4 kg (294 lb)              Today, you had the following     No orders found for display       Primary Care Provider Office Phone # Fax #    Reinier Ritchie [phone]       [address] [facility] [address]        Equal Access to Services     KYREE BIRD : Hadii ck fraser Soolesya, waaxda luqadaha, qaybta kaalmaoswaldo renner, stephen barkley . So [facility] [phone].    ATENCIÓN: Si habla español, tiene a norris disposición servicios gratuitos de asistencia lingüística. Fay al [phone].    We comply with applicable federal civil rights laws and [place] laws. We do not discriminate on the basis of race, color, national origin, age, disability, sex, sexual orientation, or gender identity.            Thank you!     Thank you for choosing [facility] AND [facility]  for your care. Our goal is always to provide you with excellent care. Hearing back from our patients is one way we can continue to improve our services. Please take a few minutes to complete the written survey that you may receive in the mail after your visit with us. Thank you!             Your Updated Medication List - Protect others around you: Learn how to safely use, store and throw away your medicines at www.disposemymeds.org.          This list is accurate as of 11/30/18 11:59 PM.  Always use your most recent med list.                   Brand Name Dispense Instructions for use Diagnosis     A/P: 63M s/p L TKA POD 1  Pain Control  DVT ppx  WBAT  PT/OT  FU HMV output  Incentive Spirometry  Medical management appreciated  DC planning acetaminophen 650 MG CR tablet    TYLENOL     Take up to 4 tablet daily for pain as needed        furosemide 20 MG tablet    LASIX    90 tablet    Take 1 tablet (20 mg) by mouth daily as needed For leg swelling-limit use. Salt restriction.    Bilateral leg edema       gabapentin 100 MG capsule    NEURONTIN    360 capsule    Take 1-2 capsules (100-200 mg) by mouth 4 times daily as needed    Polymyalgia rheumatica (H)       HYDROcodone-acetaminophen  MG per tablet    NORCO    180 tablet    Take 1-2 tablets by mouth every 4 hours as needed    Acute post-operative pain, Arthralgia of multiple joints, Polymyalgia rheumatica (H)       metFORMIN 500 MG tablet    GLUCOPHAGE    180 tablet    Take 1-2 tablets (500-1,000 mg) by mouth daily (with dinner)    Steroid-induced hyperglycemia       methylPREDNISolone 4 MG tablet    MEDROL     Take 2-8 mg by mouth daily With a meal - wean dose every 2 to 4 weeks - for PMR        omeprazole 40 MG DR capsule    priLOSEC    90 capsule    Take 1 capsule (40 mg) by mouth daily    Heartburn       traMADol 50 MG tablet    ULTRAM    60 tablet    Take 1 tablet (50 mg) by mouth 2 times daily as needed for pain    Left medial knee pain, Trigger finger of right hand, unspecified finger       varenicline 1 MG tablet    CHANTIX    60 tablet    Take 1 tablet (1 mg) by mouth 2 times daily (with meals) Quit smoking    Tobacco abuse

## 2019-02-01 ENCOUNTER — ANESTHESIA EVENT (OUTPATIENT)
Dept: SURGERY | Facility: OTHER | Age: 54
End: 2019-02-01
Payer: COMMERCIAL

## 2019-02-01 ENCOUNTER — ANESTHESIA (OUTPATIENT)
Dept: SURGERY | Facility: OTHER | Age: 54
End: 2019-02-01
Payer: COMMERCIAL

## 2019-02-01 ENCOUNTER — HOSPITAL ENCOUNTER (OUTPATIENT)
Facility: OTHER | Age: 54
Discharge: HOME | End: 2019-02-01
Attending: SURGERY | Admitting: SURGERY
Payer: COMMERCIAL

## 2019-02-01 VITALS
WEIGHT: 291 LBS | SYSTOLIC BLOOD PRESSURE: 127 MMHG | TEMPERATURE: 97.4 F | HEART RATE: 59 BPM | BODY MASS INDEX: 40.74 KG/M2 | RESPIRATION RATE: 20 BRPM | OXYGEN SATURATION: 97 % | HEIGHT: 71 IN | DIASTOLIC BLOOD PRESSURE: 85 MMHG

## 2019-02-01 DIAGNOSIS — Z12.11 ENCOUNTER FOR COLONOSCOPY DUE TO HISTORY OF ADENOMATOUS COLONIC POLYPS: Primary | ICD-10-CM

## 2019-02-01 DIAGNOSIS — Z86.0101 ENCOUNTER FOR COLONOSCOPY DUE TO HISTORY OF ADENOMATOUS COLONIC POLYPS: Primary | ICD-10-CM

## 2019-02-01 DIAGNOSIS — Z86.0101 HISTORY OF ADENOMATOUS POLYP OF COLON: ICD-10-CM

## 2019-02-01 PROCEDURE — 25000128 H RX IP 250 OP 636: Performed by: SURGERY

## 2019-02-01 PROCEDURE — 45378 DIAGNOSTIC COLONOSCOPY: CPT | Performed by: SURGERY

## 2019-02-01 PROCEDURE — 25000128 H RX IP 250 OP 636: Performed by: NURSE ANESTHETIST, CERTIFIED REGISTERED

## 2019-02-01 PROCEDURE — G0105 COLORECTAL SCRN; HI RISK IND: HCPCS | Performed by: SURGERY

## 2019-02-01 PROCEDURE — 45378 DIAGNOSTIC COLONOSCOPY: CPT | Performed by: NURSE ANESTHETIST, CERTIFIED REGISTERED

## 2019-02-01 PROCEDURE — 25000125 ZZHC RX 250: Performed by: NURSE ANESTHETIST, CERTIFIED REGISTERED

## 2019-02-01 PROCEDURE — 40000010 ZZH STATISTIC ANES STAT CODE-CRNA PER MINUTE: Performed by: SURGERY

## 2019-02-01 RX ORDER — LIDOCAINE 40 MG/G
CREAM TOPICAL
Status: DISCONTINUED | OUTPATIENT
Start: 2019-02-01 | End: 2019-02-01 | Stop reason: HOSPADM

## 2019-02-01 RX ORDER — ONDANSETRON 4 MG/1
4 TABLET, ORALLY DISINTEGRATING ORAL EVERY 6 HOURS PRN
Status: DISCONTINUED | OUTPATIENT
Start: 2019-02-01 | End: 2019-02-01 | Stop reason: HOSPADM

## 2019-02-01 RX ORDER — PROPOFOL 10 MG/ML
INJECTION, EMULSION INTRAVENOUS CONTINUOUS PRN
Status: DISCONTINUED | OUTPATIENT
Start: 2019-02-01 | End: 2019-02-01

## 2019-02-01 RX ORDER — LIDOCAINE HYDROCHLORIDE 20 MG/ML
INJECTION, SOLUTION INFILTRATION; PERINEURAL PRN
Status: DISCONTINUED | OUTPATIENT
Start: 2019-02-01 | End: 2019-02-01

## 2019-02-01 RX ORDER — NALOXONE HYDROCHLORIDE 0.4 MG/ML
.1-.4 INJECTION, SOLUTION INTRAMUSCULAR; INTRAVENOUS; SUBCUTANEOUS
Status: DISCONTINUED | OUTPATIENT
Start: 2019-02-01 | End: 2019-02-01 | Stop reason: HOSPADM

## 2019-02-01 RX ORDER — ONDANSETRON 2 MG/ML
4 INJECTION INTRAMUSCULAR; INTRAVENOUS EVERY 6 HOURS PRN
Status: DISCONTINUED | OUTPATIENT
Start: 2019-02-01 | End: 2019-02-01 | Stop reason: HOSPADM

## 2019-02-01 RX ORDER — FLUMAZENIL 0.1 MG/ML
0.2 INJECTION, SOLUTION INTRAVENOUS
Status: DISCONTINUED | OUTPATIENT
Start: 2019-02-01 | End: 2019-02-01 | Stop reason: HOSPADM

## 2019-02-01 RX ORDER — ONDANSETRON 2 MG/ML
4 INJECTION INTRAMUSCULAR; INTRAVENOUS
Status: DISCONTINUED | OUTPATIENT
Start: 2019-02-01 | End: 2019-02-01 | Stop reason: HOSPADM

## 2019-02-01 RX ORDER — SODIUM CHLORIDE, SODIUM LACTATE, POTASSIUM CHLORIDE, CALCIUM CHLORIDE 600; 310; 30; 20 MG/100ML; MG/100ML; MG/100ML; MG/100ML
INJECTION, SOLUTION INTRAVENOUS CONTINUOUS
Status: DISCONTINUED | OUTPATIENT
Start: 2019-02-01 | End: 2019-02-01 | Stop reason: HOSPADM

## 2019-02-01 RX ORDER — PROPOFOL 10 MG/ML
INJECTION, EMULSION INTRAVENOUS PRN
Status: DISCONTINUED | OUTPATIENT
Start: 2019-02-01 | End: 2019-02-01

## 2019-02-01 RX ADMIN — PROPOFOL 80 MG: 10 INJECTION, EMULSION INTRAVENOUS at 08:08

## 2019-02-01 RX ADMIN — SODIUM CHLORIDE, SODIUM LACTATE, POTASSIUM CHLORIDE, AND CALCIUM CHLORIDE: 600; 310; 30; 20 INJECTION, SOLUTION INTRAVENOUS at 07:30

## 2019-02-01 RX ADMIN — PROPOFOL 140 MCG/KG/MIN: 10 INJECTION, EMULSION INTRAVENOUS at 08:08

## 2019-02-01 RX ADMIN — LIDOCAINE HYDROCHLORIDE 40 MG: 20 INJECTION, SOLUTION INFILTRATION; PERINEURAL at 08:08

## 2019-02-01 ASSESSMENT — MIFFLIN-ST. JEOR: SCORE: 2177.1

## 2019-02-01 ASSESSMENT — LIFESTYLE VARIABLES: TOBACCO_USE: 1

## 2019-02-01 NOTE — H&P
PRE-PROCEDURE NOTE    CHIEF COMPLAINT / REASON FORPROCEDURE:  Need for screening colonoscopy. History of colon polyps.    PERTINENT HISTORY   Patient with no complaints. Previous colonoscopy 2014-tubular adenoma. No diarrhea, constipation, abdominal pain or rectal bleeding. No family history of colon polyps or colon cancer.  Past Medical History:   Diagnosis Date     Bilateral carpal tunnel syndrome     9/21/2017     Carpal tunnel syndrome of left wrist     12/21/2017     Condition influencing health status     No Comments Provided     Lesion of left ulnar nerve     9/25/2017     Other specified postprocedural states     11/22/2017     Other specified postprocedural states     12/21/2017     Other specified postprocedural states     12/21/2017     Past Surgical History:   Procedure Laterality Date     APPENDECTOMY OPEN      2005,Laparoscopic for acute appendicitis     ARTHROSCOPY KNEE Left 3/19/2018    Procedure: ARTHROSCOPY KNEE;  Left Knee Arthroscopy, Partial Meniscectomy, Chondroplasty;  Surgeon: Kwabena Patricia DO;  Location: [facility] OR     ATTEMPTED ARTHROSCOPY      knee ? left     COLONOSCOPY  01/29/2014 1/29/2014,Tubular adenoma of rectum - follow up 5 years     JOINT REPLACEMENT Left 11/2018     RELEASE CARPAL TUNNEL Right     11/22/2017,[id number],OTHER,Right     RELEASE CARPAL TUNNEL Left     12/21/2017,[id number],OTHER,Left     RELEASE TRIGGER FINGER Right 9/7/2018    Procedure: RELEASE TRIGGER FINGER;  Right Trigger Release Thumb, Index & Long Digits;  Surgeon: Monster Grissom MD;  Location: [facility] OR     RELEASE ULNAR NERVE (ELBOW)      12/21/2017,[id number],OTHER,Left     Other:  None  Bleeding tendencies:  No    Relevant Family History:  none    Relevant Social History:  none    A relevant review of systems was performed and was Negative.    ALLERGIES/SENSITIVITIES: No Known Allergies     CURRENTMEDICATIONS:      No current facility-administered medications on file prior to encounter.   Current Outpatient  "Medications on File Prior to Encounter:  omeprazole (PRILOSEC) 40 MG capsule Take 1 capsule (40 mg) by mouth daily   traMADol (ULTRAM) 50 MG tablet Take 1 tablet (50 mg) by mouth 2 times daily as needed for pain   acetaminophen (TYLENOL) 650 MG CR tablet Take up to 4 tablet daily for pain as needed   furosemide (LASIX) 20 MG tablet Take 1 tablet (20 mg) by mouth daily as needed For leg swelling-limit use. Salt restriction.   gabapentin (NEURONTIN) 100 MG capsule Take 1-2 capsules (100-200 mg) by mouth 4 times daily as needed   metFORMIN (GLUCOPHAGE) 500 MG tablet Take 1-2 tablets (500-1,000 mg) by mouth daily (with dinner)   methylPREDNISolone (MEDROL) 4 MG tablet Take 2-8 mg by mouth daily With a meal - wean dose every 2 to 4 weeks - for PMR   [] polyethylene glycol-electrolytes (NULYTELY) 420 g solution Take 4,000 mLs by mouth once for 1 dose   sildenafil (REVATIO) 20 MG tablet Take 1-5 tablets ( mg) by mouth daily as needed (For Erectile Dysfunction - take 30 min to 1 hour prior to [place])   varenicline (CHANTIX) 1 MG tablet Take 1 tablet (1 mg) by mouth 2 times daily (with meals) Quit smoking     Current Facility-Administered Medications   Medication     lactated ringers infusion     lidocaine (LMX4) cream     lidocaine 1 % 1 mL     ondansetron (ZOFRAN) injection 4 mg     sodium chloride (PF) 0.9% PF flush 3 mL     sodium chloride (PF) 0.9% PF flush 3 mL       PRE-SEDATION ASSESSMENT:    /85 (Cuff Size: Adult Regular)   Pulse 63   Temp 95.9  F (35.5  C) (Tympanic)   Resp 16   Ht 1.803 m (5' 11\")   Wt 132 kg (291 lb)   SpO2 97%   BMI 40.59 kg/m    Lung Exam:  Normal  Heart Exam:  Normal    Comment(s):      IMPRESSION:  Need for screening colonoscopy.    PLAN:  I discussed screening colonoscopy with the patient.  "

## 2019-02-01 NOTE — ANESTHESIA PREPROCEDURE EVALUATION
Anesthesia Pre-Procedure Evaluation    Patient: Gregg Aguayo   MRN: [id number] : 1965          Preoperative Diagnosis: history of polyps    Procedure(s):  COLONOSCOPY    Past Medical History:   Diagnosis Date     Bilateral carpal tunnel syndrome     2017     Carpal tunnel syndrome of left wrist     2017     Condition influencing health status     No Comments Provided     Lesion of left ulnar nerve     2017     Other specified postprocedural states     2017     Other specified postprocedural states     2017     Other specified postprocedural states     2017     Past Surgical History:   Procedure Laterality Date     APPENDECTOMY OPEN      ,Laparoscopic for acute appendicitis     ARTHROSCOPY KNEE Left 3/19/2018    Procedure: ARTHROSCOPY KNEE;  Left Knee Arthroscopy, Partial Meniscectomy, Chondroplasty;  Surgeon: Kwabena Patricia DO;  Location: [facility] OR     ATTEMPTED ARTHROSCOPY      knee ? left     COLONOSCOPY  2014,Tubular adenoma of rectum - follow up 5 years     JOINT REPLACEMENT Left 2018     RELEASE CARPAL TUNNEL Right     2017,[id number],OTHER,Right     RELEASE CARPAL TUNNEL Left     2017,[id number],OTHER,Left     RELEASE TRIGGER FINGER Right 2018    Procedure: RELEASE TRIGGER FINGER;  Right Trigger Release Thumb, Index & Long Digits;  Surgeon: Monster Grissom MD;  Location: [facility] OR     RELEASE ULNAR NERVE (ELBOW)      2017,[id number],OTHER,Left       Anesthesia Evaluation     . Pt has had prior anesthetic. Type: General, MAC and Regional           ROS/MED HX    ENT/Pulmonary:     (+)sleep apnea, tobacco use, Current use 1/2 packs/day  uses CPAP , . .    Neurologic:  - neg neurologic ROS     Cardiovascular: Comment: No leg edema today    (+) -Peripheral Vascular Disease-- Non Symptomatic, --. : . . . :. .       METS/Exercise Tolerance: Comment: Recovering from total knee surgery and his activity level has been going up    Hematologic:   "- neg hematologic  ROS       Musculoskeletal:   (+) arthritis, , , -       GI/Hepatic:     (+) GERD Asymptomatic on medication,       Renal/Genitourinary:  - ROS Renal section negative       Endo:  - neg endo ROS       Psychiatric:  - neg psychiatric ROS       Infectious Disease:  - neg infectious disease ROS       Malignancy:      - no malignancy   Other:    - neg other ROS                      Physical Exam  Normal systems: pulmonary and dental    Airway   Mallampati: III  TM distance: >3 FB  Neck ROM: full    Dental     Cardiovascular   Rhythm and rate: regular and normal      Pulmonary             Lab Results   Component Value Date    WBC 5.8 11/06/2018    HGB 15.7 11/06/2018    HCT 45.2 11/06/2018     11/06/2018    CRP 0.0 07/25/2018    SED 12 (H) 11/06/2018     11/06/2018    POTASSIUM 4.0 11/06/2018    CHLORIDE 105 11/06/2018    CO2 28 11/06/2018    BUN 14 11/06/2018    CR 0.81 11/06/2018     (H) 11/06/2018    ARCHIE 9.5 11/06/2018    ALBUMIN 4.3 11/06/2018    PROTTOTAL 6.8 11/06/2018    ALT 30 11/06/2018    AST 24 11/06/2018    ALKPHOS 51 11/06/2018    BILITOTAL 0.7 11/06/2018       Preop Vitals  BP Readings from Last 3 Encounters:   02/01/19 117/85   01/14/19 136/70   11/27/18 138/88    Pulse Readings from Last 3 Encounters:   02/01/19 63   01/14/19 80   11/27/18 84      Resp Readings from Last 3 Encounters:   02/01/19 16   11/06/18 18   09/07/18 16    SpO2 Readings from Last 3 Encounters:   02/01/19 97%   11/06/18 98%   09/07/18 97%      Temp Readings from Last 1 Encounters:   02/01/19 95.9  F (35.5  C) (Tympanic)    Ht Readings from Last 1 Encounters:   02/01/19 1.803 m (5' 11\")      Wt Readings from Last 1 Encounters:   02/01/19 132 kg (291 lb)    Estimated body mass index is 40.59 kg/m  as calculated from the following:    Height as of this encounter: 1.803 m (5' 11\").    Weight as of this encounter: 132 kg (291 lb).       Anesthesia Plan      History & Physical Review      ASA Status:  " 3 .    NPO Status:  > 6 hours    Plan for MAC with Propofol induction.          Postoperative Care      Consents  Anesthetic plan, risks, benefits and alternatives discussed with:  Patient and Spouse..                 TREV SALINAS CRNA

## 2019-02-01 NOTE — ANESTHESIA POSTPROCEDURE EVALUATION
Patient: Gregg Aguayo    Procedure(s):  COLONOSCOPY    Diagnosis:history of polyps  Diagnosis Additional Information: No value filed.    Anesthesia Type:  MAC    Note:  Anesthesia Post Evaluation    Patient location during evaluation: Phase 2  Patient participation: Able to fully participate in evaluation  Level of consciousness: awake and alert  Pain management: adequate  Airway patency: patent  Cardiovascular status: acceptable  Respiratory status: acceptable  Hydration status: acceptable  PONV: none             Last vitals:  Vitals:    02/01/19 0713 02/01/19 0716   BP: 117/85    Pulse: 63    Resp: 16    Temp: 95.9  F (35.5  C)    SpO2:  97%         Electronically Signed By: TREV SALINAS CRNA  February 1, 2019  8:30 AM

## 2019-02-01 NOTE — ANESTHESIA CARE TRANSFER NOTE
Patient: Gregg Aguayo    Procedure(s):  COLONOSCOPY    Diagnosis: history of polyps  Diagnosis Additional Information: No value filed.    Anesthesia Type:   MAC     Note:  Airway :Room Air  Patient transferred to:Phase II  Handoff Report: Identifed the Patient, Identified the Reponsible Provider, Reviewed the pertinent medical history, Discussed the surgical course, Reviewed Intra-OP anesthesia mangement and issues during anesthesia, Set expectations for post-procedure period and Allowed opportunity for questions and acknowledgement of understanding      Vitals: (Last set prior to Anesthesia Care Transfer)    CRNA VITALS  2/1/2019 0755 - 2/1/2019 0827      2/1/2019             Pulse:  77    Ht Rate:  78    SpO2:  94 %    Resp Rate (set):  10                Electronically Signed By: TREV SALINAS CRNA  February 1, 2019  8:27 AM

## 2019-02-01 NOTE — DISCHARGE INSTRUCTIONS
Procedure you had done: colonoscopy  Your health care provider is:  Reinier Ritchie  Your surgeon is Dr. Kanwal Mahan.   Please call your health care provider or surgeon at [phone] if:    - you feel you are getting worse or having an increase in problems    - fever greater than 101 degrees  - increasing shortness of breath or chest pain  - any signs of infection (increasing redness, swelling, tenderness, warmth, change in appearance, or  increased drainage)  - blood in your urine or stool  - coughing or vomiting blood  - nausea (upset stomach) and vomiting and/or diarrhea that will not stop  - severe pain that is not relieved by medicine, rest or ice  You have had medications for sedation. Please be aware that this can cause drowsiness and impaired judgment for up to 24 hours after your procedure. Do not drive, operate power tools or drink alcohol for 24 hours.  If samples were taken-you will get a phone call and a letter with your results in the next 7-10 days. If you don't get results, please call and let us know!

## 2019-02-01 NOTE — OP NOTE
PROCEDURE NOTE    SURGEON:Kanwal Mahan MD.    PRE-OP DIAGNOSIS:  Screening Colonoscopy, history of adenomatous colon polyp      POST-OP DIAGNOSIS: normal colon, scattered diverticula    PROCEDURE:  Screening colonoscopy    ESTIMATED BLOODLOSS: none    COMPLICATIONS:  None    SPECIMEN:  none    ANESTHESIA:  See anesthesia note, anesthesia requested due to: AILEEN and BMI more than 40    INDICATION FOR THE PROCEDURE: The patient is a 53 year old male. The patient has no complaints. I explained to the patient the risks, benefits and alternatives to screening colonoscopy for evaluating the colon for colon polyps and colon cancer. We specifically discussed the risks of bleeding, infection, perforation, potential inability to reach the cecum and the risks of sedation. The patient's questions were answered and the patient wished to proceed. Informed consent paperwork was completed.    PROCEDURE: The patient was taken to the endoscopy suite. Appropriate monitors were attached. The patient was placed in the left lateral decubitus position.Timeout was performed confirming the patient's identity and procedure to be performed. After appropriate sedation was confirmed, digital rectal exam was performed. There was normal tone and no gross abnormality was noted. The lubricated colonoscope was introduced into the anus the colon was insufflated with air. The prep quality was adequate. Under direct visualization the scope was advanced to the cecum. The ileocecal valve was intubated and the terminal ileum inspected. No gross abnormality was noted. The scope was withdrawn back into the cecum. The mucosa of colon was inspected while withdrawing the scope. No abnormalities were noted other than scattered diverticula. The scope was retroflexed in the rectum and the anorectal junction was inspected. No abnormalities were noted. The scope was returned to a neutral position and the colon was decompressed. The scope was removed. The patient  tolerated the procedure with no immediately apparent complication. The patient was taken to recovery in stable condition.    FOLLOW UP:RECOMMEND high fiber diet, follow up: 10 year screening colonoscopy.

## 2019-02-13 ENCOUNTER — OFFICE VISIT (OUTPATIENT)
Dept: FAMILY MEDICINE | Facility: OTHER | Age: 54
End: 2019-02-13
Attending: NURSE PRACTITIONER
Payer: COMMERCIAL

## 2019-02-13 VITALS
HEIGHT: 71 IN | DIASTOLIC BLOOD PRESSURE: 96 MMHG | BODY MASS INDEX: 40.71 KG/M2 | WEIGHT: 290.8 LBS | HEART RATE: 76 BPM | TEMPERATURE: 97.7 F | RESPIRATION RATE: 18 BRPM | SYSTOLIC BLOOD PRESSURE: 148 MMHG

## 2019-02-13 DIAGNOSIS — H65.91 OME (OTITIS MEDIA WITH EFFUSION), RIGHT: ICD-10-CM

## 2019-02-13 DIAGNOSIS — J01.00 SUBACUTE MAXILLARY SINUSITIS: Primary | ICD-10-CM

## 2019-02-13 PROCEDURE — 99213 OFFICE O/P EST LOW 20 MIN: CPT | Performed by: NURSE PRACTITIONER

## 2019-02-13 RX ORDER — TRIAMCINOLONE ACETONIDE 55 UG/1
2 SPRAY, METERED NASAL DAILY
Qty: 1 BOTTLE | Refills: 11 | Status: SHIPPED | OUTPATIENT
Start: 2019-02-13 | End: 2019-09-17

## 2019-02-13 ASSESSMENT — PATIENT HEALTH QUESTIONNAIRE - PHQ9
5. POOR APPETITE OR OVEREATING: NOT AT ALL
SUM OF ALL RESPONSES TO PHQ QUESTIONS 1-9: 0

## 2019-02-13 ASSESSMENT — ANXIETY QUESTIONNAIRES
2. NOT BEING ABLE TO STOP OR CONTROL WORRYING: NOT AT ALL
1. FEELING NERVOUS, ANXIOUS, OR ON EDGE: NOT AT ALL
5. BEING SO RESTLESS THAT IT IS HARD TO SIT STILL: NOT AT ALL
GAD7 TOTAL SCORE: 0
6. BECOMING EASILY ANNOYED OR IRRITABLE: NOT AT ALL
7. FEELING AFRAID AS IF SOMETHING AWFUL MIGHT HAPPEN: NOT AT ALL
IF YOU CHECKED OFF ANY PROBLEMS ON THIS QUESTIONNAIRE, HOW DIFFICULT HAVE THESE PROBLEMS MADE IT FOR YOU TO DO YOUR WORK, TAKE CARE OF THINGS AT HOME, OR GET ALONG WITH OTHER PEOPLE: NOT DIFFICULT AT ALL
3. WORRYING TOO MUCH ABOUT DIFFERENT THINGS: NOT AT ALL

## 2019-02-13 ASSESSMENT — MIFFLIN-ST. JEOR: SCORE: 2176.19

## 2019-02-13 ASSESSMENT — ENCOUNTER SYMPTOMS
SINUS PRESSURE: 1
SINUS PAIN: 1

## 2019-02-13 ASSESSMENT — PAIN SCALES - GENERAL: PAINLEVEL: MODERATE PAIN (4)

## 2019-02-13 NOTE — PROGRESS NOTES
Nursing Notes:   Fantasma Angel LPN  2/13/2019  2:19 PM  Signed  Patient presents to clinic today with sinus pressure, right ear plugged, and headache. Used OTC to help with the pain and pressure and sleeping.     No LMP for male patient.  Medication Reconciliation: complete    Fantasma Angel LPN  2/13/2019 1:55 PM    Nursing note reviewed with patient.  Accurracy and completeness verified.   Mr. Aguayo is a 53 year old male who:  Patient presents with:  Facial Pain  Otalgia  Headache      ICD-10-CM    1. Subacute maxillary sinusitis J01.00 amoxicillin-clavulanate (AUGMENTIN) 875-125 MG tablet     triamcinolone (NASACORT) 55 MCG/ACT nasal aerosol   2. OME (otitis media with effusion), right H65.91 amoxicillin-clavulanate (AUGMENTIN) 875-125 MG tablet     HPI presents with 1 week of maxillary and frontal sinus pain and pressure and right otalgia with sensation of being plugged  Previously had a cold virus--denies any fever, myalgia, nausea or diarrhea  No tobacco use  Does wear BiPAP at night every night faithfully  Has not used his nasal corticosteroid--- plans to be flying out of town in a couple of days on vacation      Review of Systems   HENT: Positive for congestion, ear pain, sinus pressure and sinus pain.    All other systems reviewed and are negative.     All other systems reviewed and negative.     TRESSA:   TRESSA-7 SCORE 11/27/2018 1/14/2019 2/13/2019   Total Score 0 0 0     PHQ9:  PHQ-9 SCORE 11/27/2018 1/14/2019 2/13/2019   PHQ-9 Total Score 0 0 0       I have personally reviewed the past medical history, past surgical history, medications, allergies, family and social history as listed below, on 2/13/2019.    No Known Allergies    Current Outpatient Medications   Medication Sig Dispense Refill     acetaminophen (TYLENOL) 650 MG CR tablet Take up to 4 tablet daily for pain as needed       amoxicillin-clavulanate (AUGMENTIN) 875-125 MG tablet Take 1 tablet by mouth 2 times daily for 10 days 20 tablet 0      furosemide (LASIX) 20 MG tablet Take 1 tablet (20 mg) by mouth daily as needed For leg swelling-limit use. Salt restriction. 90 tablet 3     gabapentin (NEURONTIN) 100 MG capsule Take 1-2 capsules (100-200 mg) by mouth 4 times daily as needed 360 capsule 3     metFORMIN (GLUCOPHAGE) 500 MG tablet Take 1-2 tablets (500-1,000 mg) by mouth daily (with dinner) 180 tablet 3     methylPREDNISolone (MEDROL) 4 MG tablet Take 2-8 mg by mouth daily With a meal - wean dose every 2 to 4 weeks - for PMR       omeprazole (PRILOSEC) 40 MG capsule Take 1 capsule (40 mg) by mouth daily 90 capsule 3     sildenafil (REVATIO) 20 MG tablet Take 1-5 tablets ( mg) by mouth daily as needed (For Erectile Dysfunction - take 30 min to 1 hour prior to Elrod) 30 tablet 3     traMADol (ULTRAM) 50 MG tablet Take 1 tablet (50 mg) by mouth 2 times daily as needed for pain 60 tablet 5     triamcinolone (NASACORT) 55 MCG/ACT nasal aerosol Spray 2 sprays into both nostrils daily 1 Bottle 11     varenicline (CHANTIX) 1 MG tablet Take 1 tablet (1 mg) by mouth 2 times daily (with meals) Quit smoking 60 tablet 1        Patient Active Problem List    Diagnosis Date Noted     Trigger finger, acquired - Right hand - 1st and 3rd fingers 08/30/2018     Priority: Medium     Heartburn 07/25/2018     Priority: Medium     Steroid-induced hyperglycemia 04/10/2018     Priority: Medium     Psychosexual dysfunction with inhibited sexual excitement 01/29/2018     Priority: Medium     Tobacco abuse 01/29/2018     Priority: Medium     Overview:   IMO Update 10/11       Acute pain of left knee 01/11/2018     Priority: Medium     Fall at home, initial encounter 01/11/2018     Priority: Medium     History of arthroscopic knee surgery 01/11/2018     Priority: Medium     Left medial knee pain 01/11/2018     Priority: Medium     Left carpal tunnel syndrome 12/21/2017     Priority: Medium     History of carpal tunnel surgery of left wrist 12/21/2017     Priority:  Medium     S/P cubital tunnel release 12/21/2017     Priority: Medium     History of carpal tunnel surgery of right wrist 11/22/2017     Priority: Medium     AILEEN on CPAP - uses nightly, finds very helpful 11/20/2017     Priority: Medium     Cubital tunnel syndrome on left 09/25/2017     Priority: Medium     Bilateral leg edema 07/05/2017     Priority: Medium     Polymyalgia rheumatica (H) 11/11/2016     Priority: Medium     Arthralgia of multiple joints 10/26/2016     Priority: Medium     Hypertrophic and atrophic condition of skin 06/07/2012     Priority: Medium     Contact dermatitis and eczema 09/20/2011     Priority: Medium     Past Medical History:   Diagnosis Date     Bilateral carpal tunnel syndrome     9/21/2017     Carpal tunnel syndrome of left wrist     12/21/2017     Condition influencing health status     No Comments Provided     Lesion of left ulnar nerve     9/25/2017     Other specified postprocedural states     11/22/2017     Other specified postprocedural states     12/21/2017     Other specified postprocedural states     12/21/2017     Past Surgical History:   Procedure Laterality Date     APPENDECTOMY OPEN      2005,Laparoscopic for acute appendicitis     ARTHROSCOPY KNEE Left 3/19/2018    Procedure: ARTHROSCOPY KNEE;  Left Knee Arthroscopy, Partial Meniscectomy, Chondroplasty;  Surgeon: Kwabena Patricia DO;  Location: [facility] OR     ATTEMPTED ARTHROSCOPY      knee ? left     COLONOSCOPY  01/29/2014 1/29/2014,Tubular adenoma of rectum - follow up 5 years     COLONOSCOPY  02/01/2019    normal with diverticula, follow up 10 years, 2/1/29     COLONOSCOPY N/A 2/1/2019    Procedure: COLONOSCOPY;  Surgeon: Kanwal Mahan MD;  Location: [facility] OR     JOINT REPLACEMENT Left 11/2018     RELEASE CARPAL TUNNEL Right     11/22/2017,[id number],OTHER,Right     RELEASE CARPAL TUNNEL Left     12/21/2017,[id number],OTHER,Left     RELEASE TRIGGER FINGER Right 9/7/2018    Procedure: RELEASE TRIGGER FINGER;  Right Trigger Release  "Thumb, Index & Long Digits;  Surgeon: Monster Grissom MD;  Location: [facility] OR     RELEASE ULNAR NERVE (ELBOW)      12/21/2017,[id number],OTHER,Left     Social History     Socioeconomic History     Marital status:      Spouse name: None     Number of children: None     Years of education: None     Highest education level: None   Social Needs     Financial resource strain: None     Food insecurity - worry: None     Food insecurity - inability: None     Transportation needs - medical: None     Transportation needs - non-medical: None   Occupational History     None   Tobacco Use     Smoking status: Current Every Day Smoker     Packs/day: 0.30     Types: Cigarettes     Smokeless tobacco: Never Used   Substance and Sexual Activity     Alcohol use: Yes     Alcohol/week: 0.0 oz     Comment: Alcoholic Drinks/day: 1-2 drinks every other day     Drug use: No     Sexual activity: Yes     Partners: Female   Other Topics Concern     Parent/sibling w/ CABG, MI or angioplasty before 65F 55M? Not Asked   Social History Narrative    Tobacco-one half pack per day.  Ethanol-occasional.    and remarried.  Has two children from his first marriage.      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     Family History   Problem Relation Age of Onset     Genetic Disorder Maternal Grandfather         Genetic,Polymyalgia Rheumatica     Heart Murmur Mother      No Known Problems Father        EXAM:   Vitals:    02/13/19 1359 02/13/19 1404   BP: (!) 148/98 (!) 148/96   BP Location: Right arm Left arm   Patient Position: Sitting Sitting   Cuff Size: Adult Large Adult Large   Pulse: 76    Resp: 18    Temp: 97.7  F (36.5  C)    TempSrc: Tympanic    Weight: 131.9 kg (290 lb 12.8 oz)    Height: 1.803 m (5' 11\")        Current Pain Score: Moderate Pain (4)     BP Readings from Last 3 Encounters:   02/13/19 (!) 148/96   02/01/19 127/85   01/14/19 136/70      Wt Readings from Last 3 Encounters:   02/13/19 " "131.9 kg (290 lb 12.8 oz)   02/01/19 132 kg (291 lb)   01/14/19 132.1 kg (291 lb 4 oz)      Estimated body mass index is 40.56 kg/m  as calculated from the following:    Height as of this encounter: 1.803 m (5' 11\").    Weight as of this encounter: 131.9 kg (290 lb 12.8 oz).     Physical Exam   Constitutional: He is oriented to person, place, and time. He appears well-developed and well-nourished.   HENT:   Head: Normocephalic and atraumatic.   Neck: Normal range of motion. Neck supple.   Cardiovascular: Normal rate.   Pulmonary/Chest: Effort normal.   Musculoskeletal: Normal range of motion.   Lymphadenopathy:     He has no cervical adenopathy.   Neurological: He is alert and oriented to person, place, and time.   Skin: Skin is warm and dry.   Psychiatric: He has a normal mood and affect. His behavior is normal. Judgment and thought content normal.   Nursing note and vitals reviewed.      INVESTIGATIONS:  Results for orders placed or performed during the hospital encounter of 01/04/19   XR Knee Standing 1v Ap Bilateral & 2v Left    Narrative    PROCEDURE:  XR KNEE STANDING 1 V AP BILATERAL AND 2 V LEFT    HISTORY: Status post total left knee replacement    COMPARISON:  9/28/2018    TECHNIQUE:  Standing view both knees and 2 views of the left knee were  obtained.    FINDINGS:  Left knee prosthesis is in good position. No findings of  hardware failure or loosening. No acute fracture or dislocation. Mild  degenerative changes of the right knee are unchanged.       Impression    IMPRESSION: Satisfactory postoperative appearance of the left knee.      BRANDI HOUSE MD       ASSESSMENT AND PLAN:  Problem List Items Addressed This Visit     None      Visit Diagnoses     Subacute maxillary sinusitis    -  Primary    Relevant Medications    amoxicillin-clavulanate (AUGMENTIN) 875-125 MG tablet    triamcinolone (NASACORT) 55 MCG/ACT nasal aerosol    OME (otitis media with effusion), right        Relevant Medications    " amoxicillin-clavulanate (AUGMENTIN) 875-125 MG tablet          Right otitis media and maxillary sinusitis secondary to viral cold congestion and eustachian tube dysfunction    Recommend using sinus saline rinse to promote flow and drainage--- restart Nasacort--was refilled today to promote flow and drainage  Will start Augmentin    Discussed expected course return if not showing improvement 24-48 hours--or sooner if worsening    -- Expected clinical course discussed    -- Medications and their side effects discussed    There are no Patient Instructions on file for this visit.  Chanel Keyes CNP  [facility]     Portions of this note were dictated using speech recognition software. The note has been proofread but errors in the text may have been overlooked. Please contact me if there are any concerns regarding the accuracy of the dictation.

## 2019-02-13 NOTE — NURSING NOTE
Patient presents to clinic today with sinus pressure, right ear plugged, and headache. Used OTC to help with the pain and pressure and sleeping.     No LMP for male patient.  Medication Reconciliation: complete    Fantasma Angel LPN  2/13/2019 1:55 PM

## 2019-02-14 ASSESSMENT — ANXIETY QUESTIONNAIRES: GAD7 TOTAL SCORE: 0

## 2019-02-25 ENCOUNTER — TELEPHONE (OUTPATIENT)
Dept: INTERNAL MEDICINE | Facility: OTHER | Age: 54
End: 2019-02-25

## 2019-02-25 NOTE — TELEPHONE ENCOUNTER
Patient returned call and he has been added to Dr. Ritchie's Thursday schedule.  Shirley Stark on 2/25/2019 at 3:35 PM

## 2019-02-25 NOTE — TELEPHONE ENCOUNTER
? Thursday or Friday morning or afternoon. Okay to use one of the same-day spots if needed.   Reinier Ritchie MD

## 2019-02-25 NOTE — TELEPHONE ENCOUNTER
Patient called for an appointment for occasional dizziness.  He has a first available of 03/13/2019 at 10:20 but would like to be seen sooner if possible    Please call to schedule/advise    Thank you

## 2019-02-27 ENCOUNTER — OFFICE VISIT (OUTPATIENT)
Dept: INTERNAL MEDICINE | Facility: OTHER | Age: 54
End: 2019-02-27
Attending: INTERNAL MEDICINE
Payer: COMMERCIAL

## 2019-02-27 VITALS
HEART RATE: 68 BPM | BODY MASS INDEX: 40.76 KG/M2 | TEMPERATURE: 97.1 F | WEIGHT: 292.25 LBS | DIASTOLIC BLOOD PRESSURE: 88 MMHG | SYSTOLIC BLOOD PRESSURE: 138 MMHG | RESPIRATION RATE: 16 BRPM

## 2019-02-27 DIAGNOSIS — R55 PRE-SYNCOPE: Primary | ICD-10-CM

## 2019-02-27 DIAGNOSIS — M24.541 CONTRACTURE OF HAND JOINT, RIGHT: ICD-10-CM

## 2019-02-27 DIAGNOSIS — M25.50 MULTIPLE JOINT PAIN: ICD-10-CM

## 2019-02-27 DIAGNOSIS — G47.33 OSA ON CPAP: ICD-10-CM

## 2019-02-27 DIAGNOSIS — M35.3 POLYMYALGIA RHEUMATICA (H): ICD-10-CM

## 2019-02-27 PROCEDURE — 99214 OFFICE O/P EST MOD 30 MIN: CPT | Performed by: INTERNAL MEDICINE

## 2019-02-27 ASSESSMENT — ENCOUNTER SYMPTOMS
HEMATURIA: 0
SHORTNESS OF BREATH: 0
BACK PAIN: 0
BRUISES/BLEEDS EASILY: 0
CHILLS: 0
ADENOPATHY: 0
MYALGIAS: 1
CHEST TIGHTNESS: 0
ABDOMINAL PAIN: 0
FEVER: 0
DIZZINESS: 1
ARTHRALGIAS: 1
LIGHT-HEADEDNESS: 1
PALPITATIONS: 1
CONFUSION: 0
WOUND: 0

## 2019-02-27 ASSESSMENT — PATIENT HEALTH QUESTIONNAIRE - PHQ9
5. POOR APPETITE OR OVEREATING: NOT AT ALL
SUM OF ALL RESPONSES TO PHQ QUESTIONS 1-9: 0

## 2019-02-27 ASSESSMENT — ANXIETY QUESTIONNAIRES
1. FEELING NERVOUS, ANXIOUS, OR ON EDGE: NOT AT ALL
5. BEING SO RESTLESS THAT IT IS HARD TO SIT STILL: NOT AT ALL
6. BECOMING EASILY ANNOYED OR IRRITABLE: NOT AT ALL
IF YOU CHECKED OFF ANY PROBLEMS ON THIS QUESTIONNAIRE, HOW DIFFICULT HAVE THESE PROBLEMS MADE IT FOR YOU TO DO YOUR WORK, TAKE CARE OF THINGS AT HOME, OR GET ALONG WITH OTHER PEOPLE: NOT DIFFICULT AT ALL
7. FEELING AFRAID AS IF SOMETHING AWFUL MIGHT HAPPEN: NOT AT ALL
GAD7 TOTAL SCORE: 0
3. WORRYING TOO MUCH ABOUT DIFFERENT THINGS: NOT AT ALL
2. NOT BEING ABLE TO STOP OR CONTROL WORRYING: NOT AT ALL

## 2019-02-27 ASSESSMENT — PAIN SCALES - GENERAL: PAINLEVEL: SEVERE PAIN (6)

## 2019-02-27 NOTE — PROGRESS NOTES
"Nursing Notes:   Mandy Prajapati LPN  2/27/2019  9:57 AM  Signed  Patient presents to the clinic for dizzy spells on and off over the past week.    Chief Complaint   Patient presents with     Dizziness       Initial BP (!) 150/92 (BP Location: Right arm, Patient Position: Sitting, Cuff Size: Adult Regular)   Pulse 68   Temp 97.1  F (36.2  C) (Tympanic)   Resp 16   Wt 132.6 kg (292 lb 4 oz)   BMI 40.76 kg/m    Estimated body mass index is 40.76 kg/m  as calculated from the following:    Height as of 2/13/19: 1.803 m (5' 11\").    Weight as of this encounter: 132.6 kg (292 lb 4 oz).  Medication Reconciliation: complete    Mandy Prajapati LPN    Nursing note reviewed with patient.  Accuracy and completeness verified.   Mr. Aguayo is a 53 year old male who:  Patient presents with:  Dizziness      ICD-10-CM    1. Pre-syncope R55 Zio Patch Holter Adult Pediatric Greater than 48 hrs   2. AILEEN on CPAP - uses nightly, finds very helpful G47.33     Z99.89    3. Polymyalgia rheumatica (H) M35.3 RHEUMATOLOGY REFERRAL   4. Contracture of hand joint, right M24.541 RHEUMATOLOGY REFERRAL   5. Multiple joint pain M25.50 RHEUMATOLOGY REFERRAL     HPI  Patient presents for evaluation of presyncopal episode that happened twice.  He was done in [place] about a week ago.  He was standing and walking with his wife and talking with his wife and suddenly he felt very lightheaded his legs get super weak and he was basically going to pass out and fall down.  He states his symptoms were extremely brief.  He did catch himself and suddenly was able to get leg strength back and was able to stand up without actually hitting the ground.    Reports that he could hear his pulse in his ears but did not feel any heart palpitations before or afterwards.    He did not have any seizure activity.  His daughter has seizures.    Does not sound like vasovagal issues.  As he was up and talking and walking.    He has not taken any blood pressure " medications recently.  He has not used Viagra in over a month.  Rarely takes Lasix.    Has been having more joint pain and myalgia pain recently and in the last couple days just tapered himself off prednisone again for his polymyalgia.    States his right hand is been having some turning, his fingers are turning.  He cannot open his right hand fully anymore.  He would like to see the rheumatologist again.  Referral sent.    With his presyncopal episode happening twice, sounds like it would be best to start with a cardiac monitor.  He has never had these symptoms in the past.  Advised we may not capture them on a heart monitor if he does not have any more events during the couple weeks that he would be wearing it.  He would like to proceed.  Orders placed.    Functional Capacity: normally > or about 4 METS. + joint pains. + Enjoys swimming  Review of Systems   Constitutional: Negative for chills and fever.   HENT: Negative for congestion.    Eyes: Negative for visual disturbance.   Respiratory: Negative for chest tightness and shortness of breath.    Cardiovascular: Positive for palpitations (+ rare). Negative for chest pain.   Gastrointestinal: Negative for abdominal pain.   Genitourinary: Negative for hematuria.   Musculoskeletal: Positive for arthralgias and myalgias. Negative for back pain.   Skin: Negative for rash and wound.   Neurological: Positive for dizziness, syncope (+ pre-syncope x2) and light-headedness.   Hematological: Negative for adenopathy. Does not bruise/bleed easily.   Psychiatric/Behavioral: Negative for confusion.      TRESSA:   TRESSA-7 SCORE 1/14/2019 2/13/2019 2/27/2019   Total Score 0 0 0     PHQ9:  PHQ-9 SCORE 1/14/2019 2/13/2019 2/27/2019   PHQ-9 Total Score 0 0 0       I have personally reviewed the past medical history, past surgical history, medications, allergies, family and social history as listed below.     No Known Allergies    Current Outpatient Medications   Medication Sig Dispense  Refill     acetaminophen (TYLENOL) 650 MG CR tablet Take up to 4 tablet daily for pain as needed       furosemide (LASIX) 20 MG tablet Take 1 tablet (20 mg) by mouth daily as needed For leg swelling-limit use. Salt restriction. 90 tablet 3     gabapentin (NEURONTIN) 100 MG capsule Take 1-2 capsules (100-200 mg) by mouth 4 times daily as needed 360 capsule 3     metFORMIN (GLUCOPHAGE) 500 MG tablet Take 1-2 tablets (500-1,000 mg) by mouth daily (with dinner) 180 tablet 3     methylPREDNISolone (MEDROL) 4 MG tablet Take 2-8 mg by mouth daily With a meal - wean dose every 2 to 4 weeks - for PMR       omeprazole (PRILOSEC) 40 MG capsule Take 1 capsule (40 mg) by mouth daily 90 capsule 3     sildenafil (REVATIO) 20 MG tablet Take 1-5 tablets ( mg) by mouth daily as needed (For Erectile Dysfunction - take 30 min to 1 hour prior to [place]) 30 tablet 3     traMADol (ULTRAM) 50 MG tablet Take 1 tablet (50 mg) by mouth 2 times daily as needed for pain 60 tablet 5     triamcinolone (NASACORT) 55 MCG/ACT nasal aerosol Spray 2 sprays into both nostrils daily 1 Bottle 11     varenicline (CHANTIX) 1 MG tablet Take 1 tablet (1 mg) by mouth 2 times daily (with meals) Quit smoking 60 tablet 1        Patient Active Problem List    Diagnosis Date Noted     Trigger finger, acquired - Right hand - 1st and 3rd fingers 08/30/2018     Priority: Medium     Heartburn 07/25/2018     Priority: Medium     Steroid-induced hyperglycemia 04/10/2018     Priority: Medium     Psychosexual dysfunction with inhibited sexual excitement 01/29/2018     Priority: Medium     Tobacco abuse 01/29/2018     Priority: Medium     Overview:   IMO Update 10/11       Acute pain of left knee 01/11/2018     Priority: Medium     Fall at home, initial encounter 01/11/2018     Priority: Medium     History of arthroscopic knee surgery 01/11/2018     Priority: Medium     Left medial knee pain 01/11/2018     Priority: Medium     Left carpal tunnel syndrome  12/21/2017     Priority: Medium     History of carpal tunnel surgery of left wrist 12/21/2017     Priority: Medium     S/P cubital tunnel release 12/21/2017     Priority: Medium     History of carpal tunnel surgery of right wrist 11/22/2017     Priority: Medium     AILEEN on CPAP - uses nightly, finds very helpful 11/20/2017     Priority: Medium     Cubital tunnel syndrome on left 09/25/2017     Priority: Medium     Bilateral leg edema 07/05/2017     Priority: Medium     Polymyalgia rheumatica (H) 11/11/2016     Priority: Medium     Arthralgia of multiple joints 10/26/2016     Priority: Medium     Hypertrophic and atrophic condition of skin 06/07/2012     Priority: Medium     Contact dermatitis and eczema 09/20/2011     Priority: Medium     Past Medical History:   Diagnosis Date     Bilateral carpal tunnel syndrome     9/21/2017     Carpal tunnel syndrome of left wrist     12/21/2017     Condition influencing health status     No Comments Provided     Lesion of left ulnar nerve     9/25/2017     Other specified postprocedural states     11/22/2017     Other specified postprocedural states     12/21/2017     Other specified postprocedural states     12/21/2017     Past Surgical History:   Procedure Laterality Date     APPENDECTOMY OPEN      2005,Laparoscopic for acute appendicitis     ARTHROSCOPY KNEE Left 3/19/2018    Procedure: ARTHROSCOPY KNEE;  Left Knee Arthroscopy, Partial Meniscectomy, Chondroplasty;  Surgeon: Kwabena Patricia DO;  Location: [facility] OR     ATTEMPTED ARTHROSCOPY      knee ? left     COLONOSCOPY  01/29/2014 1/29/2014,Tubular adenoma of rectum - follow up 5 years     COLONOSCOPY  02/01/2019    normal with diverticula, follow up 10 years, 2/1/29     COLONOSCOPY N/A 2/1/2019    Procedure: COLONOSCOPY;  Surgeon: Kanwal Mahan MD;  Location: [facility] OR     JOINT REPLACEMENT Left 11/2018     RELEASE CARPAL TUNNEL Right     11/22/2017,[id number],OTHER,Right     RELEASE CARPAL TUNNEL Left     12/21/2017,[id number],OTHER,Left      RELEASE TRIGGER FINGER Right 9/7/2018    Procedure: RELEASE TRIGGER FINGER;  Right Trigger Release Thumb, Index & Long Digits;  Surgeon: Monster Grissom MD;  Location: [facility] OR     RELEASE ULNAR NERVE (ELBOW)      12/21/2017,[id number],OTHER,Left     Social History     Socioeconomic History     Marital status:      Spouse name: None     Number of children: None     Years of education: None     Highest education level: None   Occupational History     None   Social Needs     Financial resource strain: None     Food insecurity:     Worry: None     Inability: None     Transportation needs:     Medical: None     Non-medical: None   Tobacco Use     Smoking status: Current Every Day Smoker     Packs/day: 0.30     Types: Cigarettes     Smokeless tobacco: Never Used   Substance and Sexual Activity     Alcohol use: Yes     Alcohol/week: 0.0 oz     Comment: weekends only     Drug use: No     Sexual activity: Yes     Partners: Female   Lifestyle     Physical activity:     Days per week: None     Minutes per session: None     Stress: None   Relationships     Social connections:     Talks on phone: None     Gets together: None     Attends [employer] service: None     Active member of club or organization: None     Attends meetings of clubs or organizations: None     Relationship status: None     Intimate partner violence:     Fear of current or ex partner: None     Emotionally abused: None     Physically abused: None     Forced sexual activity: None   Other Topics Concern     Parent/sibling w/ CABG, MI or angioplasty before 65F 55M? Not Asked   Social History Narrative    Tobacco-one half pack per day.  Ethanol-occasional.    and remarried.  Has two children from his first marriage.      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     Family History   Problem Relation Age of Onset     Genetic Disorder Maternal Grandfather         Genetic,Polymyalgia Rheumatica     Heart Murmur  "Mother      No Known Problems Father        EXAM:   Vitals:    02/27/19 0950   BP: 138/88   BP Location: Right arm   Patient Position: Sitting   Cuff Size: Adult Regular   Pulse: 68   Resp: 16   Temp: 97.1  F (36.2  C)   TempSrc: Tympanic   Weight: 132.6 kg (292 lb 4 oz)       Current Pain Score: Severe Pain (6)     BP Readings from Last 3 Encounters:   02/27/19 138/88   02/13/19 (!) 148/96   02/01/19 127/85      Wt Readings from Last 3 Encounters:   02/27/19 132.6 kg (292 lb 4 oz)   02/13/19 131.9 kg (290 lb 12.8 oz)   02/01/19 132 kg (291 lb)      Estimated body mass index is 40.76 kg/m  as calculated from the following:    Height as of 2/13/19: 1.803 m (5' 11\").    Weight as of this encounter: 132.6 kg (292 lb 4 oz).     Physical Exam   Constitutional: He appears well-developed and well-nourished. No distress.   HENT:   Head: Normocephalic and atraumatic.   Eyes: Conjunctivae are normal. No scleral icterus.   Neck: Neck supple.   Cardiovascular: Normal rate and regular rhythm.   Pulmonary/Chest: Effort normal.   Abdominal: Soft. There is no tenderness.   Musculoskeletal: He exhibits tenderness and deformity. He exhibits no edema.   Lymphadenopathy:     He has no cervical adenopathy.   Neurological: He is alert.   Skin: Skin is warm and dry. No rash noted. He is not diaphoretic.   Psychiatric: He has a normal mood and affect.      Procedures   INVESTIGATIONS:  Results for orders placed or performed during the hospital encounter of 01/04/19   XR Knee Standing 1v Ap Bilateral & 2v Left    Narrative    PROCEDURE:  XR KNEE STANDING 1 V AP BILATERAL AND 2 V LEFT    HISTORY: Status post total left knee replacement    COMPARISON:  9/28/2018    TECHNIQUE:  Standing view both knees and 2 views of the left knee were  obtained.    FINDINGS:  Left knee prosthesis is in good position. No findings of  hardware failure or loosening. No acute fracture or dislocation. Mild  degenerative changes of the right knee are unchanged.    "    Impression    IMPRESSION: Satisfactory postoperative appearance of the left knee.      BRANDI HOUSE MD       ASSESSMENT AND PLAN:  Problem List Items Addressed This Visit        Nervous and Auditory    Polymyalgia rheumatica (H)    Relevant Orders    RHEUMATOLOGY REFERRAL       Respiratory    AILEEN on CPAP - uses nightly, finds very helpful      Other Visit Diagnoses     Pre-syncope    -  Primary    Relevant Orders    Zio Patch Holter Adult Pediatric Greater than 48 hrs    Contracture of hand joint, right        Relevant Orders    RHEUMATOLOGY REFERRAL    Multiple joint pain        Relevant Orders    RHEUMATOLOGY REFERRAL        reviewed diet, exercise and weight control, recommended sodium restriction  -- Expected clinical course discussed    -- Medications and their side effects discussed    Patient Instructions   1. Pre-syncope    Does not sound neurologic in nature.  Concerned about possible irregular heart rhythm or pauses or skipped beats.  Recommend extended heart monitor.    - Zio Patch Holter Adult Pediatric Greater than 48 hrs; Future  - they will call with date/time of appointment.     2. AILEEN on CPAP - uses nightly, finds very helpful  --Continue to use her CPAP regularly.    3. Polymyalgia rheumatica (H)  4. Contracture of hand joint, right  5. Multiple joint pain  - RHEUMATOLOGY REFERRAL --with the multiple joint pain, right finger/joint deformity, multiple joint pain, recommend follow-up with rheumatology  - they will call with date/time of appointment.     Return as needed for follow-up with Dr. Ritchie.    Clinic : [phone]  Appointment line: [phone]        Reinier Ritchie MD  Internal Medicine  [facility]     Portions of this note were dictated using speech recognition software. The note has been proofread but errors in the text may have been overlooked. Please contact me if there are any concerns regarding the accuracy of the dictation.

## 2019-02-27 NOTE — NURSING NOTE
"Patient presents to the clinic for dizzy spells on and off over the past week.    Chief Complaint   Patient presents with     Dizziness       Initial BP (!) 150/92 (BP Location: Right arm, Patient Position: Sitting, Cuff Size: Adult Regular)   Pulse 68   Temp 97.1  F (36.2  C) (Tympanic)   Resp 16   Wt 132.6 kg (292 lb 4 oz)   BMI 40.76 kg/m   Estimated body mass index is 40.76 kg/m  as calculated from the following:    Height as of 2/13/19: 1.803 m (5' 11\").    Weight as of this encounter: 132.6 kg (292 lb 4 oz).  Medication Reconciliation: complete    Mandy Prajapati LPN    "

## 2019-02-27 NOTE — PATIENT INSTRUCTIONS
1. Pre-syncope    Does not sound neurologic in nature.  Concerned about possible irregular heart rhythm or pauses or skipped beats.  Recommend extended heart monitor.    - Zio Patch Holter Adult Pediatric Greater than 48 hrs; Future  - they will call with date/time of appointment.     2. AILEEN on CPAP - uses nightly, finds very helpful  --Continue to use her CPAP regularly.    3. Polymyalgia rheumatica (H)  4. Contracture of hand joint, right  5. Multiple joint pain  - RHEUMATOLOGY REFERRAL --with the multiple joint pain, right finger/joint deformity, multiple joint pain, recommend follow-up with rheumatology  - they will call with date/time of appointment.     Return as needed for follow-up with Dr. Ritchie.    Clinic : [phone]  Appointment line: [phone]

## 2019-02-28 ASSESSMENT — ANXIETY QUESTIONNAIRES: GAD7 TOTAL SCORE: 0

## 2019-03-07 ENCOUNTER — HOSPITAL ENCOUNTER (OUTPATIENT)
Dept: CARDIOLOGY | Facility: OTHER | Age: 54
Discharge: HOME | End: 2019-03-07
Attending: INTERNAL MEDICINE | Admitting: INTERNAL MEDICINE
Payer: COMMERCIAL

## 2019-03-07 DIAGNOSIS — R55 PRE-SYNCOPE: ICD-10-CM

## 2019-03-07 PROCEDURE — 0298T ZZC EXT ECG > 48HR TO 21 DAY REVIEW AND INTERPRETATN: CPT | Performed by: INTERNAL MEDICINE

## 2019-03-07 PROCEDURE — 0296T ZIO PATCH HOLTER ADULT PEDIATRIC GREATER THAN 48 HRS: CPT

## 2019-03-07 NOTE — LETTER
March 30, 2019      Gregg Aguayo  [address]   [place] [address]        Dear ,    We are writing to inform you of your test results.    No life-threatening heart arrhythmias noted.    Some rare PVCs which seem to correlate with your patient triggered events.    Resulted Orders   Zio Patch Holter Adult Pediatric Greater than 48 hrs    Narrative    Procedure: Zio patch monitor    Findings: Patient's monitor was in place for 12 days and 18 hours.    Minimum heart rate 43 bpm, average heart rate 73 bpm, maximum heart rate   141 bpm. Rhythm/s present include sinus rhythm, ventricular ectopy.  There   were rare ventricular ectopic beats accounting for less than 1% of all   beats. There were 0 ventricular triplets and rare couplets. There were   rare supraventricular ectopic beats.  There were 18 triggered events   during which time the noted rhythms were sinus rhythm and ventricular   ectopy.  There were 4 diary entries during which time the noted rhythms   were sinus rhythm.  Junctional rhythm is present.  Review of actual   tracings showed no other abnormality.    Impression: Zio patch monitor showing predominantly normal sinus rhythm   with rare ventricular ectopy that may correlate to symptoms.  No other   abnormalities.    Millie Eng DO  Internal Medicine         If you have any questions or concerns, please call the clinic at the number listed above.       Sincerely,         RESPIRATORY THERAPY TECH

## 2019-03-07 NOTE — PATIENT INSTRUCTIONS
Patient instructed not to:   -take baths, swim, sauna   -remove device prior to 14 days   -use electric blankets   -shower or sweat excessively within first 24 hours of device application  Patient instructed to:   -shower as needed   -be carefull when toweling off and dressing   -press button when cardiac symptoms occur   -document symptoms in log book   -remove and return device (send via mail to [email or web address])   -Call [employer] with any questions

## 2019-03-18 DIAGNOSIS — M65.30 TRIGGER FINGER OF RIGHT HAND, UNSPECIFIED FINGER: ICD-10-CM

## 2019-03-18 DIAGNOSIS — M25.562 LEFT MEDIAL KNEE PAIN: ICD-10-CM

## 2019-03-21 RX ORDER — TRAMADOL HYDROCHLORIDE 50 MG/1
50 TABLET ORAL 2 TIMES DAILY PRN
Qty: 60 TABLET | Refills: 5 | Status: SHIPPED | OUTPATIENT
Start: 2019-03-21 | End: 2019-07-24

## 2019-03-21 NOTE — TELEPHONE ENCOUNTER
[facility] #[id number] sent Rx request for the following:      traMADol (ULTRAM) 50 MG tablet  Sig: Take 1 tablet (50 mg) by mouth 2 times daily as needed for pain  Last Prescription Date:   7/25/18  Last Fill Qty/Refills:         60, R-5    Last Office Visit:              2/27/19  Future Office visit:           None.    Routing refill request to provider for review/approval because:  Drug not on the [facility], [facility] or [facility] refill protocol or controlled substance    Unable to complete prescription refill per RN Medication Refill Policy. Karla Ovalles RN .............. 3/21/2019  10:36 AM

## 2019-04-05 ENCOUNTER — TELEPHONE (OUTPATIENT)
Dept: INTERNAL MEDICINE | Facility: OTHER | Age: 54
End: 2019-04-05

## 2019-04-05 NOTE — TELEPHONE ENCOUNTER
Ne talked to patient then told him that it would be good for him to make a follow up appointment to go over the results further and make a plan.  Mandy Arreola LPN .......4/5/2019 1:22 PM

## 2019-04-05 NOTE — TELEPHONE ENCOUNTER
Called patient back and he stated that he had some questions about the letter he was sent and explaining more what it meant.  Told him that I would have Ne the Cardiology RN go over it with him.  Mandy Arreola LPN .......4/5/2019 1:04 PM

## 2019-04-15 ENCOUNTER — TELEPHONE (OUTPATIENT)
Dept: INTERNAL MEDICINE | Facility: OTHER | Age: 54
End: 2019-04-15

## 2019-04-15 ENCOUNTER — OFFICE VISIT (OUTPATIENT)
Dept: INTERNAL MEDICINE | Facility: OTHER | Age: 54
End: 2019-04-15
Attending: INTERNAL MEDICINE
Payer: COMMERCIAL

## 2019-04-15 VITALS
HEART RATE: 82 BPM | WEIGHT: 293.2 LBS | DIASTOLIC BLOOD PRESSURE: 76 MMHG | TEMPERATURE: 97.2 F | SYSTOLIC BLOOD PRESSURE: 134 MMHG | HEIGHT: 71 IN | BODY MASS INDEX: 41.05 KG/M2 | RESPIRATION RATE: 16 BRPM

## 2019-04-15 DIAGNOSIS — I49.3 SYMPTOMATIC PREMATURE VENTRICULAR CONTRACTIONS: Primary | ICD-10-CM

## 2019-04-15 DIAGNOSIS — M35.3 POLYMYALGIA RHEUMATICA (H): ICD-10-CM

## 2019-04-15 PROCEDURE — 99214 OFFICE O/P EST MOD 30 MIN: CPT | Performed by: INTERNAL MEDICINE

## 2019-04-15 RX ORDER — METOPROLOL SUCCINATE 25 MG/1
25-50 TABLET, EXTENDED RELEASE ORAL EVERY EVENING
Qty: 180 TABLET | Refills: 3 | Status: SHIPPED | OUTPATIENT
Start: 2019-04-15 | End: 2020-04-10

## 2019-04-15 ASSESSMENT — ENCOUNTER SYMPTOMS
ADENOPATHY: 0
FEVER: 0
DIZZINESS: 1
BACK PAIN: 0
HEMATURIA: 0
MYALGIAS: 1
SHORTNESS OF BREATH: 0
CHEST TIGHTNESS: 0
WOUND: 0
BRUISES/BLEEDS EASILY: 0
CHILLS: 0
LIGHT-HEADEDNESS: 1
ARTHRALGIAS: 1
PALPITATIONS: 1
ABDOMINAL PAIN: 0
CONFUSION: 0

## 2019-04-15 ASSESSMENT — PAIN SCALES - GENERAL: PAINLEVEL: NO PAIN (0)

## 2019-04-15 ASSESSMENT — MIFFLIN-ST. JEOR: SCORE: 2187.08

## 2019-04-15 NOTE — PROGRESS NOTES
Nursing Notes:   Lora Hobbs LPN  4/15/2019 10:53 AM  Signed  Patient presents to the clinic for Follow-up on zio patch.     Medication Reconciliation: complete   Lora Hobbs LPN............. April 15, 2019 10:16 AM            Nursing note reviewed with patient.  Accuracy and completeness verified.   Mr. Aguayo is a 53 year old male who:  Patient presents with:  RECHECK      ICD-10-CM    1. Symptomatic premature ventricular contractions I49.3 metoprolol succinate ER (TOPROL-XL) 25 MG 24 hr tablet   2. Polymyalgia rheumatica (H) M35.3 methylPREDNISolone (MEDROL) 2 MG tablet     HPI  Patient presents for follow-up of recent heart monitor.  Had extended cardiac monitor on and had less than 1% PVC burden.  Despite this he states that they are quite uncomfortable and he would like to talk about the results of his recent heart monitor as well as talk with some treatment options.    States that he has a couple cups of coffee in the morning typically around 7 AM and then does not have any other caffeine during the day.    Home treatment and information provided regarding PVCs.  Questions addressed.    He would like to get a medication to help stop or to reduce the frequency or symptoms of his heart palpitations.  Treatment options reviewed and discussed.  Due to some intermittent prednisone use, we will go ahead with metoprolol 25-50 mg daily for PVCs.  Advised if needed we could increase dosing.    Probably myalgia rheumatica, still intermittently using some prednisone.  He would take it for 5-10 days and then stop.  The aches and pains typically improve quite a bit with this.  He is trying to not have to take it on a daily basis.  Needs refills.    Functional Capacity: > or about 4 METS.   Review of Systems   Constitutional: Negative for chills and fever.   HENT: Negative for congestion.    Eyes: Negative for visual disturbance.   Respiratory: Negative for chest tightness and shortness of breath.     Cardiovascular: Positive for palpitations (+ Symptomatic PVCs). Negative for chest pain.   Gastrointestinal: Negative for abdominal pain.   Genitourinary: Negative for hematuria.   Musculoskeletal: Positive for arthralgias and myalgias. Negative for back pain.   Skin: Negative for rash and wound.   Neurological: Positive for dizziness and light-headedness (-- occasionally if stands up abruptly). Negative for syncope.   Hematological: Negative for adenopathy. Does not bruise/bleed easily.   Psychiatric/Behavioral: Negative for confusion.        TRESSA:   TRESSA-7 SCORE 1/14/2019 2/13/2019 2/27/2019   Total Score 0 0 0     PHQ9:  PHQ-9 SCORE 1/14/2019 2/13/2019 2/27/2019   PHQ-9 Total Score 0 0 0       I have personally reviewed the past medical history, past surgical history, medications, allergies, family and social history as listed below.     No Known Allergies    Current Outpatient Medications   Medication Sig Dispense Refill     acetaminophen (TYLENOL) 650 MG CR tablet Take up to 4 tablet daily for pain as needed       furosemide (LASIX) 20 MG tablet Take 1 tablet (20 mg) by mouth daily as needed For leg swelling-limit use. Salt restriction. 90 tablet 3     gabapentin (NEURONTIN) 100 MG capsule Take 1-2 capsules (100-200 mg) by mouth 4 times daily as needed 360 capsule 3     metFORMIN (GLUCOPHAGE) 500 MG tablet Take 1-2 tablets (500-1,000 mg) by mouth daily (with dinner) 180 tablet 3     methylPREDNISolone (MEDROL) 2 MG tablet Take 1-4 tablets (2-8 mg) by mouth daily With a meal - wean dose every 2 to 4 weeks - for  tablet 3     metoprolol succinate ER (TOPROL-XL) 25 MG 24 hr tablet Take 1-2 tablets (25-50 mg) by mouth every evening -- adjust dose as needed for heart palpitations 180 tablet 3     omeprazole (PRILOSEC) 40 MG capsule Take 1 capsule (40 mg) by mouth daily 90 capsule 3     sildenafil (REVATIO) 20 MG tablet Take 1-5 tablets ( mg) by mouth daily as needed (For Erectile Dysfunction - take 30  min to 1 hour prior to [place]) 30 tablet 3     traMADol (ULTRAM) 50 MG tablet Take 1 tablet (50 mg) by mouth 2 times daily as needed for pain 60 tablet 5     triamcinolone (NASACORT) 55 MCG/ACT nasal aerosol Spray 2 sprays into both nostrils daily 1 Bottle 11     varenicline (CHANTIX) 1 MG tablet Take 1 tablet (1 mg) by mouth 2 times daily (with meals) Quit smoking 60 tablet 1        Patient Active Problem List    Diagnosis Date Noted     Symptomatic premature ventricular contractions 04/15/2019     Priority: Medium     Trigger finger, acquired - Right hand - 1st and 3rd fingers 08/30/2018     Priority: Medium     Heartburn 07/25/2018     Priority: Medium     Steroid-induced hyperglycemia 04/10/2018     Priority: Medium     Psychosexual dysfunction with inhibited sexual excitement 01/29/2018     Priority: Medium     Tobacco abuse 01/29/2018     Priority: Medium     Overview:   IMO Update 10/11       Acute pain of left knee 01/11/2018     Priority: Medium     Fall at home, initial encounter 01/11/2018     Priority: Medium     History of arthroscopic knee surgery 01/11/2018     Priority: Medium     Left medial knee pain 01/11/2018     Priority: Medium     Left carpal tunnel syndrome 12/21/2017     Priority: Medium     History of carpal tunnel surgery of left wrist 12/21/2017     Priority: Medium     S/P cubital tunnel release 12/21/2017     Priority: Medium     History of carpal tunnel surgery of right wrist 11/22/2017     Priority: Medium     AILEEN on CPAP - uses nightly, finds very helpful 11/20/2017     Priority: Medium     Cubital tunnel syndrome on left 09/25/2017     Priority: Medium     Bilateral leg edema 07/05/2017     Priority: Medium     Polymyalgia rheumatica (H) 11/11/2016     Priority: Medium     Arthralgia of multiple joints 10/26/2016     Priority: Medium     Hypertrophic and atrophic condition of skin 06/07/2012     Priority: Medium     Contact dermatitis and eczema 09/20/2011     Priority: Medium      Past Medical History:   Diagnosis Date     Bilateral carpal tunnel syndrome     9/21/2017     Carpal tunnel syndrome of left wrist     12/21/2017     Condition influencing health status     No Comments Provided     Lesion of left ulnar nerve     9/25/2017     Other specified postprocedural states     11/22/2017     Other specified postprocedural states     12/21/2017     Other specified postprocedural states     12/21/2017     Past Surgical History:   Procedure Laterality Date     APPENDECTOMY OPEN      2005,Laparoscopic for acute appendicitis     ARTHROSCOPY KNEE Left 3/19/2018    Procedure: ARTHROSCOPY KNEE;  Left Knee Arthroscopy, Partial Meniscectomy, Chondroplasty;  Surgeon: Kwabena Patricia DO;  Location: [facility] OR     ATTEMPTED ARTHROSCOPY      knee ? left     COLONOSCOPY  01/29/2014 1/29/2014,Tubular adenoma of rectum - follow up 5 years     COLONOSCOPY  02/01/2019    normal with diverticula, follow up 10 years, 2/1/29     COLONOSCOPY N/A 2/1/2019    Procedure: COLONOSCOPY;  Surgeon: Kanwal Mahan MD;  Location: [facility] OR     JOINT REPLACEMENT Left 11/2018     RELEASE CARPAL TUNNEL Right     11/22/2017,[id number],OTHER,Right     RELEASE CARPAL TUNNEL Left     12/21/2017,[id number],OTHER,Left     RELEASE TRIGGER FINGER Right 9/7/2018    Procedure: RELEASE TRIGGER FINGER;  Right Trigger Release Thumb, Index & Long Digits;  Surgeon: Monster Grissom MD;  Location: [facility] OR     RELEASE ULNAR NERVE (ELBOW)      12/21/2017,[id number],OTHER,Left     Social History     Socioeconomic History     Marital status:      Spouse name: None     Number of children: None     Years of education: None     Highest education level: None   Occupational History     None   Social Needs     Financial resource strain: None     Food insecurity:     Worry: None     Inability: None     Transportation needs:     Medical: None     Non-medical: None   Tobacco Use     Smoking status: Current Every Day Smoker     Packs/day: 0.30     Types: Cigarettes  "    Smokeless tobacco: Never Used   Substance and Sexual Activity     Alcohol use: Yes     Alcohol/week: 0.0 oz     Comment: weekends only     Drug use: No     Sexual activity: Yes     Partners: Female   Lifestyle     Physical activity:     Days per week: None     Minutes per session: None     Stress: None   Relationships     Social connections:     Talks on phone: None     Gets together: None     Attends [employer] service: None     Active member of club or organization: None     Attends meetings of clubs or organizations: None     Relationship status: None     Intimate partner violence:     Fear of current or ex partner: None     Emotionally abused: None     Physically abused: None     Forced sexual activity: None   Other Topics Concern     Parent/sibling w/ CABG, MI or angioplasty before 65F 55M? Not Asked   Social History Narrative    Tobacco-one half pack per day.  Ethanol-occasional.    and remarried.  Has two children from his first marriage.      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     Family History   Problem Relation Age of Onset     Genetic Disorder Maternal Grandfather         Genetic,Polymyalgia Rheumatica     Heart Murmur Mother      No Known Problems Father        EXAM:   Vitals:    04/15/19 1018   BP: 134/76   BP Location: Right arm   Patient Position: Sitting   Cuff Size: Adult Regular   Pulse: 82   Resp: 16   Temp: 97.2  F (36.2  C)   TempSrc: Tympanic   Weight: 133 kg (293 lb 3.2 oz)   Height: 1.803 m (5' 11\")       Current Pain Score: No Pain (0)     BP Readings from Last 3 Encounters:   04/15/19 134/76   02/27/19 138/88   02/13/19 (!) 148/96      Wt Readings from Last 3 Encounters:   04/15/19 133 kg (293 lb 3.2 oz)   02/27/19 132.6 kg (292 lb 4 oz)   02/13/19 131.9 kg (290 lb 12.8 oz)      Estimated body mass index is 40.89 kg/m  as calculated from the following:    Height as of this encounter: 1.803 m (5' 11\").    Weight as of this encounter: " 133 kg (293 lb 3.2 oz).     Physical Exam     Procedures   INVESTIGATIONS:  Results for orders placed or performed during the hospital encounter of 03/07/19   Zio Patch Holter Adult Pediatric Greater than 48 hrs    Narrative    Procedure: Zio patch monitor    Findings: Patient's monitor was in place for 12 days and 18 hours.    Minimum heart rate 43 bpm, average heart rate 73 bpm, maximum heart rate   141 bpm. Rhythm/s present include sinus rhythm, ventricular ectopy.  There   were rare ventricular ectopic beats accounting for less than 1% of all   beats. There were 0 ventricular triplets and rare couplets. There were   rare supraventricular ectopic beats.  There were 18 triggered events   during which time the noted rhythms were sinus rhythm and ventricular   ectopy.  There were 4 diary entries during which time the noted rhythms   were sinus rhythm.  Junctional rhythm is present.  Review of actual   tracings showed no other abnormality.    Impression: Zio patch monitor showing predominantly normal sinus rhythm   with rare ventricular ectopy that may correlate to symptoms.  No other   abnormalities.    Millie Eng DO  Internal Medicine         ASSESSMENT AND PLAN:  Problem List Items Addressed This Visit        Nervous and Auditory    Polymyalgia rheumatica (H)    Relevant Medications    methylPREDNISolone (MEDROL) 2 MG tablet       Circulatory    Symptomatic premature ventricular contractions - Primary    Relevant Medications    metoprolol succinate ER (TOPROL-XL) 25 MG 24 hr tablet        reviewed diet, exercise and weight control  -- Expected clinical course discussed    -- Medications and their side effects discussed    Patient Instructions   1. Symptomatic premature ventricular contractions  -- see print-outs on PVCs / palpitations.     START:   - metoprolol succinate ER (TOPROL-XL) 25 MG 24 hr tablet; Take 1-2 tablets (25-50 mg) by mouth every evening -- adjust dose as needed for heart palpitations   Dispense: 180 tablet; Refill: 3    2. Polymyalgia rheumatica (H)  Refilled.  - methylPREDNISolone (MEDROL) 2 MG tablet; Take 1-4 tablets (2-8 mg) by mouth daily With a meal - wean dose every 2 to 4 weeks - for PMR  Dispense: 200 tablet; Refill: 3    Return as needed for follow-up with Dr. Ritchie.    Clinic : [phone]  Appointment line: [phone]     Reinier Ritchie MD  Internal Medicine  [facility]     Portions of this note were dictated using speech recognition software. The note has been proofread but errors in the text may have been overlooked. Please contact me if there are any concerns regarding the accuracy of the dictation.

## 2019-04-15 NOTE — TELEPHONE ENCOUNTER
Called and spoke with patient after proper verification. Patient states he would like to see Reinier Ritchei MD to go over results of his Zio patch. Patient placed in schedule at 10:20 last minute cancellation.     Lora Hobbs LPN............. April 15, 2019 9:26 AM

## 2019-04-15 NOTE — PATIENT INSTRUCTIONS
1. Symptomatic premature ventricular contractions  -- see print-outs on PVCs / palpitations.     START:   - metoprolol succinate ER (TOPROL-XL) 25 MG 24 hr tablet; Take 1-2 tablets (25-50 mg) by mouth every evening -- adjust dose as needed for heart palpitations  Dispense: 180 tablet; Refill: 3    2. Polymyalgia rheumatica (H)  Refilled.  - methylPREDNISolone (MEDROL) 2 MG tablet; Take 1-4 tablets (2-8 mg) by mouth daily With a meal - wean dose every 2 to 4 weeks - for PMR  Dispense: 200 tablet; Refill: 3    Return as needed for follow-up with Dr. Ritchie.    Clinic : [phone]  Appointment line: [phone]

## 2019-04-15 NOTE — NURSING NOTE
Patient presents to the clinic for Follow-up on zio patch.     Medication Reconciliation: complete   Lora Hobbs LPN............. April 15, 2019 10:16 AM

## 2019-04-16 ENCOUNTER — TELEPHONE (OUTPATIENT)
Dept: INTERNAL MEDICINE | Facility: OTHER | Age: 54
End: 2019-04-16

## 2019-04-16 NOTE — TELEPHONE ENCOUNTER
Pharmacy does not have 2 mg strength for Medrol, they do have 4 mg.  Verbal okay to dispense 4 mg 0.5-2 tablets daily per Reinier Ritchie MD.  Pharmacist will contact patient when this is completed.      Mandy Prajapati LPN 4/16/2019 4:52 PM

## 2019-04-24 ENCOUNTER — TELEPHONE (OUTPATIENT)
Dept: INTERNAL MEDICINE | Facility: OTHER | Age: 54
End: 2019-04-24

## 2019-04-24 NOTE — TELEPHONE ENCOUNTER
Received denial for formulary issue for Medrol 2mg tablets. Faxed to [phone] on 4/24/2019 at 1:45 PM

## 2019-04-24 NOTE — TELEPHONE ENCOUNTER
Pharmacist was notified to verbally changed to 4 mg on 4-16-19-see that telephone note for details.  Pharmacy confirms that patient already picked up the medication.      Mandy Prajapati LPN 4/24/2019 2:58 PM

## 2019-07-18 DIAGNOSIS — M65.30 TRIGGER FINGER OF RIGHT HAND, UNSPECIFIED FINGER: ICD-10-CM

## 2019-07-18 DIAGNOSIS — M25.562 LEFT MEDIAL KNEE PAIN: ICD-10-CM

## 2019-07-24 RX ORDER — TRAMADOL HYDROCHLORIDE 50 MG/1
50 TABLET ORAL 2 TIMES DAILY PRN
Qty: 60 TABLET | Refills: 1 | Status: SHIPPED | OUTPATIENT
Start: 2019-07-24 | End: 2019-09-10

## 2019-07-24 NOTE — TELEPHONE ENCOUNTER
Joby Shannon sent Rx request for the following:      Tramadol 50 mg tablet      Last Prescription Date:   3/21/19  Last Fill Qty/Refills:         60, R-5    Last Office Visit:              4/15/19   Future Office visit:           None noted    Pt had Rx to last through September, but requested refill outside of 30 day period. Needs new Rx.    Routing refill request to provider for review/approval because:    Drug not on the [facility], [facility] or [facility] refill protocol or controlled substance    Unable to complete prescription refill per RNMedication Refill Policy.................... Karla Smith ....................  7/24/2019   8:00 AM

## 2019-09-09 NOTE — PROGRESS NOTES
Nursing Notes:   Annmarie Swanson LPN  9/10/2019  1:58 PM  Signed  Chief Complaint   Patient presents with     RECHECK     Derm Problem       Medication Reconciliation: complete   Patient here for nodule on right side of neck for a while, PMR is acting up again. Patient has been having heart palpitation and would like to know if there is other testing.   Annmarie Swanson, RAMAN  ..................9/10/2019   1:41 PM   Nursing note reviewed with patient.  Accuracy and completeness verified.   Mr. Aguayo is a 53 year old male who:  Patient presents with:  RECHECK  Derm Problem      ICD-10-CM    1. Bilateral leg edema R60.0 furosemide (LASIX) 20 MG tablet     potassium chloride ER (K-TAB/KLOR-CON) 10 MEQ CR tablet     Echocardiogram Complete   2. Heartburn R12 omeprazole (PRILOSEC) 40 MG DR capsule   3. Left medial knee pain M25.562 traMADol (ULTRAM) 50 MG tablet   4. Trigger finger of right hand, unspecified finger - Thumb and 3rd finger M65.30 traMADol (ULTRAM) 50 MG tablet   5. Polymyalgia rheumatica (H) M35.3 gabapentin (NEURONTIN) 100 MG capsule   6. Psychosexual dysfunction with inhibited sexual excitement F52.8 sildenafil (REVATIO) 20 MG tablet     sildenafil (VIAGRA) 100 MG tablet   7. Cutaneous cyst - right neck L72.9 GENERAL SURG ADULT REFERRAL   8. SOBOE (shortness of breath on exertion) R06.02 Echocardiogram Complete     HPI  Patient presents with bilateral edema.  States that she actually gets a little winded at times because he is holding some of water.  His weight is up quite a bit.  When Lasix does seem to help in the morning but then by the afternoon he seems to be having fluid retention again.  He would like to have doses changed.  He did have low potassium in the past when taking higher doses of diuretics.  He would like to start potassium as well.    Heartburn, intermittent.  Worse if he forgets his Prilosec.  Needs refills.    Having some medial left knee pain.  Rarely needing to use  tramadol.  States that he only uses it when needed for severe pain issues.  He would like a small refill.  7-day supply printed today.    Polymyalgia.  Last had a flareup within the last month.  He took about 1 week worth of his prednisone.  He has follow-up with his rheumatologist in about 1 month.  They are talking about the possibility of starting some steroid sparing agents.  Patient reports there is a possibility he is developing some inflammatory joints disease or possibly some rheumatoid arthritis.  Advised if he does start to have that type of a problem then DMARDs are probably a very good idea otherwise if only needed prednisone per month for his polymyalgia he may wish to wait on starting DMARDs.    Erectile dysfunction.  Intermittent.  Uses Viagra on occasion.  He will check with online pharmacy to find out cost/coverage versus local pharmacy.  Prescriptions printed.    Shortness of breath, intermittent.  Worse when having leg swelling.  Check echocardiogram    Functional Capacity: > or about 4 METS.   Review of Systems   Constitutional: Negative for chills and fever.   HENT: Negative for congestion.    Eyes: Negative for visual disturbance.   Respiratory: Positive for shortness of breath. Negative for chest tightness.    Cardiovascular: Positive for palpitations (+ Symptomatic PVCs) and leg swelling. Negative for chest pain.   Gastrointestinal: Negative for abdominal pain.   Genitourinary: Negative for hematuria.   Musculoskeletal: Positive for arthralgias and myalgias. Negative for back pain.   Skin: Negative for rash and wound.        + skin tags on neck, cyst on right lateral neck with drainage intermittently   Neurological: Positive for dizziness and light-headedness (-- occasionally if stands up abruptly). Negative for syncope.   Hematological: Negative for adenopathy. Does not bruise/bleed easily.   Psychiatric/Behavioral: Negative for confusion.        TRESSA:   TRESSA-7 SCORE 1/14/2019 2/13/2019  2/27/2019   Total Score 0 0 0     PHQ9:  PHQ-9 SCORE 1/14/2019 2/13/2019 2/27/2019   PHQ-9 Total Score 0 0 0       I have personally reviewed the past medical history, past surgical history, medications, allergies, family and social history as listed below.     No Known Allergies    Current Outpatient Medications   Medication Sig Dispense Refill     furosemide (LASIX) 20 MG tablet Take 1-3 tablets (20-60 mg) by mouth daily as needed (For Edema) 270 tablet 3     gabapentin (NEURONTIN) 100 MG capsule Take 1-2 capsules (100-200 mg) by mouth 4 times daily as needed For nerve pain 120 capsule 3     omeprazole (PRILOSEC) 40 MG DR capsule Take 1 capsule (40 mg) by mouth daily 90 capsule 3     potassium chloride ER (K-TAB/KLOR-CON) 10 MEQ CR tablet Take 2 tablets (20 mEq) by mouth daily - with food (take on days you use Furosemide) 180 tablet 3     sildenafil (REVATIO) 20 MG tablet Take 1-5 tablets ( mg) by mouth daily as needed (For Erectile Dysfunction - take 30 min to 1 hour prior to [facility]) 30 tablet 3     sildenafil (VIAGRA) 100 MG tablet Take 1/4 to 1 tablet 30 min to 4 hours prior to sex for ED 30 tablet 11     traMADol (ULTRAM) 50 MG tablet Take 1 tablet (50 mg) by mouth 2 times daily as needed for pain --- need to fill every 28 days if using regularly 14 tablet 5     acetaminophen (TYLENOL) 650 MG CR tablet Take up to 4 tablet daily for pain as needed       metFORMIN (GLUCOPHAGE) 500 MG tablet Take 1-2 tablets (500-1,000 mg) by mouth daily (with dinner) 180 tablet 3     methylPREDNISolone (MEDROL) 2 MG tablet Take 1-4 tablets (2-8 mg) by mouth daily With a meal - wean dose every 2 to 4 weeks - for  tablet 3     metoprolol succinate ER (TOPROL-XL) 25 MG 24 hr tablet Take 1-2 tablets (25-50 mg) by mouth every evening -- adjust dose as needed for heart palpitations 180 tablet 3     triamcinolone (NASACORT) 55 MCG/ACT nasal aerosol Spray 2 sprays into both nostrils daily 1 Bottle 11     varenicline  (CHANTIX) 1 MG tablet Take 1 tablet (1 mg) by mouth 2 times daily (with meals) Quit smoking 60 tablet 1        Patient Active Problem List    Diagnosis Date Noted     Symptomatic premature ventricular contractions 04/15/2019     Priority: Medium     Trigger finger, acquired - Right hand - 1st and 3rd fingers 08/30/2018     Priority: Medium     Heartburn 07/25/2018     Priority: Medium     Steroid-induced hyperglycemia 04/10/2018     Priority: Medium     Psychosexual dysfunction with inhibited sexual excitement 01/29/2018     Priority: Medium     Tobacco abuse 01/29/2018     Priority: Medium     Overview:   IMO Update 10/11       Acute pain of left knee 01/11/2018     Priority: Medium     Fall at home, initial encounter 01/11/2018     Priority: Medium     History of arthroscopic knee surgery 01/11/2018     Priority: Medium     Left medial knee pain 01/11/2018     Priority: Medium     Left carpal tunnel syndrome 12/21/2017     Priority: Medium     History of carpal tunnel surgery of left wrist 12/21/2017     Priority: Medium     S/P cubital tunnel release 12/21/2017     Priority: Medium     History of carpal tunnel surgery of right wrist 11/22/2017     Priority: Medium     AILEEN on CPAP - uses nightly, finds very helpful 11/20/2017     Priority: Medium     Cubital tunnel syndrome on left 09/25/2017     Priority: Medium     Bilateral leg edema 07/05/2017     Priority: Medium     Polymyalgia rheumatica (H) 11/11/2016     Priority: Medium     Arthralgia of multiple joints 10/26/2016     Priority: Medium     Hypertrophic and atrophic condition of skin 06/07/2012     Priority: Medium     Contact dermatitis and eczema 09/20/2011     Priority: Medium     Past Medical History:   Diagnosis Date     Bilateral carpal tunnel syndrome     9/21/2017     Carpal tunnel syndrome of left wrist     12/21/2017     Condition influencing health status     No Comments Provided     Lesion of left ulnar nerve     9/25/2017     Other specified  postprocedural states     11/22/2017     Other specified postprocedural states     12/21/2017     Other specified postprocedural states     12/21/2017     Past Surgical History:   Procedure Laterality Date     APPENDECTOMY OPEN      2005,Laparoscopic for acute appendicitis     ARTHROSCOPY KNEE Left 3/19/2018    Procedure: ARTHROSCOPY KNEE;  Left Knee Arthroscopy, Partial Meniscectomy, Chondroplasty;  Surgeon: Kwabena Patricia DO;  Location: [facility] OR     ATTEMPTED ARTHROSCOPY      knee ? left     COLONOSCOPY  01/29/2014 1/29/2014,Tubular adenoma of rectum - follow up 5 years     COLONOSCOPY  02/01/2019    normal with diverticula, follow up 10 years, 2/1/29     COLONOSCOPY N/A 2/1/2019    normal, follow up 2/1/29     JOINT REPLACEMENT Left 11/2018     RELEASE CARPAL TUNNEL Right     11/22/2017,[id number],OTHER,Right     RELEASE CARPAL TUNNEL Left     12/21/2017,[id number],OTHER,Left     RELEASE TRIGGER FINGER Right 9/7/2018    Procedure: RELEASE TRIGGER FINGER;  Right Trigger Release Thumb, Index & Long Digits;  Surgeon: Monster Grissom MD;  Location: [facility] OR     RELEASE ULNAR NERVE (ELBOW)      12/21/2017,[id number],OTHER,Left     Social History     Socioeconomic History     Marital status:      Spouse name: None     Number of children: None     Years of education: None     Highest education level: None   Occupational History     None   Social Needs     Financial resource strain: None     Food insecurity:     Worry: None     Inability: None     Transportation needs:     Medical: None     Non-medical: None   Tobacco Use     Smoking status: Current Every Day Smoker     Packs/day: 0.30     Types: Cigarettes     Smokeless tobacco: Never Used   Substance and Sexual Activity     Alcohol use: Yes     Alcohol/week: 0.0 oz     Frequency: Monthly or less     Drug use: No     Sexual activity: Yes     Partners: Female   Lifestyle     Physical activity:     Days per week: None     Minutes per session: None     Stress: None  "  Relationships     Social connections:     Talks on phone: None     Gets together: None     Attends Worship service: None     Active member of club or organization: None     Attends meetings of clubs or organizations: None     Relationship status: None     Intimate partner violence:     Fear of current or ex partner: None     Emotionally abused: None     Physically abused: None     Forced sexual activity: None   Other Topics Concern     Parent/sibling w/ CABG, MI or angioplasty before 65F 55M? Not Asked   Social History Narrative    Tobacco-one half pack per day.  Ethanol-occasional.    and remarried.  Has two children from his first marriage.      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     Family History   Problem Relation Age of Onset     Genetic Disorder Maternal Grandfather         Genetic,Polymyalgia Rheumatica     Heart Murmur Mother      No Known Problems Father        EXAM:   Vitals:    09/10/19 1343   BP: 138/80   BP Location: Right arm   Patient Position: Sitting   Cuff Size: Adult Large   Pulse: 80   Resp: 16   Temp: 97.1  F (36.2  C)   TempSrc: Tympanic   SpO2: 97%   Weight: 138.3 kg (305 lb)       Current Pain Score: Severe Pain (6)     BP Readings from Last 3 Encounters:   09/10/19 138/80   04/15/19 134/76   02/27/19 138/88      Wt Readings from Last 3 Encounters:   09/10/19 138.3 kg (305 lb)   04/15/19 133 kg (293 lb 3.2 oz)   02/27/19 132.6 kg (292 lb 4 oz)      Estimated body mass index is 42.54 kg/m  as calculated from the following:    Height as of 4/15/19: 1.803 m (5' 11\").    Weight as of this encounter: 138.3 kg (305 lb).     Physical Exam   Constitutional: He appears well-developed and well-nourished. No distress.   HENT:   Head: Normocephalic and atraumatic.   Eyes: Conjunctivae are normal. No scleral icterus.   Neck: Neck supple.   Cardiovascular: Normal rate and regular rhythm.   Pulmonary/Chest: Effort normal.   Abdominal: Soft. There is " no tenderness.   Musculoskeletal: He exhibits edema, tenderness and deformity.   Lymphadenopathy:     He has no cervical adenopathy.   Neurological: He is alert.   Skin: Skin is warm and dry. No rash noted. He is not diaphoretic.   + skin tags on neck, cyst on right lateral neck   Psychiatric: He has a normal mood and affect.      Procedures   INVESTIGATIONS:  Results for orders placed or performed during the hospital encounter of 03/07/19   Zio Patch Holter Adult Pediatric Greater than 48 hrs    Narrative    Procedure: Zio patch monitor    Findings: Patient's monitor was in place for 12 days and 18 hours.    Minimum heart rate 43 bpm, average heart rate 73 bpm, maximum heart rate   141 bpm. Rhythm/s present include sinus rhythm, ventricular ectopy.  There   were rare ventricular ectopic beats accounting for less than 1% of all   beats. There were 0 ventricular triplets and rare couplets. There were   rare supraventricular ectopic beats.  There were 18 triggered events   during which time the noted rhythms were sinus rhythm and ventricular   ectopy.  There were 4 diary entries during which time the noted rhythms   were sinus rhythm.  Junctional rhythm is present.  Review of actual   tracings showed no other abnormality.    Impression: Zio patch monitor showing predominantly normal sinus rhythm   with rare ventricular ectopy that may correlate to symptoms.  No other   abnormalities.    Millie Eng DO  Internal Medicine         ASSESSMENT AND PLAN:  Problem List Items Addressed This Visit        Nervous and Auditory    Left medial knee pain    Relevant Medications    traMADol (ULTRAM) 50 MG tablet    Polymyalgia rheumatica (H)    Relevant Medications    gabapentin (NEURONTIN) 100 MG capsule       Digestive    Heartburn    Relevant Medications    omeprazole (PRILOSEC) 40 MG DR capsule       Musculoskeletal and Integumentary    Bilateral leg edema - Primary    Relevant Medications    furosemide (LASIX) 20 MG tablet     traMADol (ULTRAM) 50 MG tablet    potassium chloride ER (K-TAB/KLOR-CON) 10 MEQ CR tablet    Other Relevant Orders    Echocardiogram Complete       Other    Psychosexual dysfunction with inhibited sexual excitement    Relevant Medications    sildenafil (REVATIO) 20 MG tablet    sildenafil (VIAGRA) 100 MG tablet      Other Visit Diagnoses     Trigger finger of right hand, unspecified finger - Thumb and 3rd finger        Relevant Medications    traMADol (ULTRAM) 50 MG tablet    Cutaneous cyst - right neck        Relevant Orders    GENERAL SURG ADULT REFERRAL    SOBOE (shortness of breath on exertion)        Relevant Orders    Echocardiogram Complete        reviewed diet, exercise and weight control, recommended sodium restriction, cardiovascular risk and specific lipid/LDL goals reviewed  -- Expected clinical course discussed    -- Medications and their side effects discussed    Patient Instructions   Medications refilled.   Labs in the future at your convenience.     Take lasix and potassium on same days.     ECHO ordered  - they will call with date/time of appointment.      Return as needed for follow-up with Dr. Ritchie.    Clinic : [phone]  Appointment line: [phone]     Reinier Ritchie MD  Internal Medicine  [facility]     Portions of this note were dictated using speech recognition software. The note has been proofread but errors in the text may have been overlooked. Please contact me if there are any concerns regarding the accuracy of the dictation.

## 2019-09-10 ENCOUNTER — OFFICE VISIT (OUTPATIENT)
Dept: INTERNAL MEDICINE | Facility: OTHER | Age: 54
End: 2019-09-10
Attending: INTERNAL MEDICINE
Payer: COMMERCIAL

## 2019-09-10 VITALS
SYSTOLIC BLOOD PRESSURE: 138 MMHG | BODY MASS INDEX: 42.54 KG/M2 | WEIGHT: 305 LBS | OXYGEN SATURATION: 97 % | RESPIRATION RATE: 16 BRPM | DIASTOLIC BLOOD PRESSURE: 80 MMHG | TEMPERATURE: 97.1 F | HEART RATE: 80 BPM

## 2019-09-10 DIAGNOSIS — R06.02 SOBOE (SHORTNESS OF BREATH ON EXERTION): ICD-10-CM

## 2019-09-10 DIAGNOSIS — F52.8 PSYCHOSEXUAL DYSFUNCTION WITH INHIBITED SEXUAL EXCITEMENT: ICD-10-CM

## 2019-09-10 DIAGNOSIS — M65.30 TRIGGER FINGER OF RIGHT HAND, UNSPECIFIED FINGER: ICD-10-CM

## 2019-09-10 DIAGNOSIS — R12 HEARTBURN: ICD-10-CM

## 2019-09-10 DIAGNOSIS — M25.562 LEFT MEDIAL KNEE PAIN: ICD-10-CM

## 2019-09-10 DIAGNOSIS — R60.0 BILATERAL LEG EDEMA: Primary | ICD-10-CM

## 2019-09-10 DIAGNOSIS — L72.9 CUTANEOUS CYST: ICD-10-CM

## 2019-09-10 DIAGNOSIS — M35.3 POLYMYALGIA RHEUMATICA (H): ICD-10-CM

## 2019-09-10 PROCEDURE — 99214 OFFICE O/P EST MOD 30 MIN: CPT | Performed by: INTERNAL MEDICINE

## 2019-09-10 RX ORDER — SILDENAFIL CITRATE 20 MG/1
20-100 TABLET ORAL DAILY PRN
Qty: 30 TABLET | Refills: 3 | Status: SHIPPED | OUTPATIENT
Start: 2019-09-10 | End: 2021-12-16

## 2019-09-10 RX ORDER — FUROSEMIDE 20 MG
20-60 TABLET ORAL DAILY PRN
Qty: 270 TABLET | Refills: 3 | Status: SHIPPED | OUTPATIENT
Start: 2019-09-10 | End: 2020-04-10

## 2019-09-10 RX ORDER — SILDENAFIL 100 MG/1
TABLET, FILM COATED ORAL
Qty: 30 TABLET | Refills: 11 | Status: SHIPPED | OUTPATIENT
Start: 2019-09-10

## 2019-09-10 RX ORDER — OMEPRAZOLE 40 MG/1
40 CAPSULE, DELAYED RELEASE ORAL DAILY
Qty: 90 CAPSULE | Refills: 3 | Status: SHIPPED | OUTPATIENT
Start: 2019-09-10 | End: 2020-09-02

## 2019-09-10 RX ORDER — POTASSIUM CHLORIDE 750 MG/1
20 TABLET, EXTENDED RELEASE ORAL DAILY
Qty: 180 TABLET | Refills: 3 | Status: SHIPPED | OUTPATIENT
Start: 2019-09-10 | End: 2020-09-11

## 2019-09-10 RX ORDER — TRAMADOL HYDROCHLORIDE 50 MG/1
50 TABLET ORAL 2 TIMES DAILY PRN
Qty: 14 TABLET | Refills: 5 | Status: SHIPPED | OUTPATIENT
Start: 2019-09-10 | End: 2019-11-08

## 2019-09-10 RX ORDER — GABAPENTIN 100 MG/1
100-200 CAPSULE ORAL 4 TIMES DAILY PRN
Qty: 120 CAPSULE | Refills: 3 | Status: SHIPPED | OUTPATIENT
Start: 2019-09-10 | End: 2020-09-11

## 2019-09-10 SDOH — HEALTH STABILITY: MENTAL HEALTH: HOW OFTEN DO YOU HAVE A DRINK CONTAINING ALCOHOL?: MONTHLY OR LESS

## 2019-09-10 ASSESSMENT — ENCOUNTER SYMPTOMS
ARTHRALGIAS: 1
LIGHT-HEADEDNESS: 1
CHILLS: 0
HEMATURIA: 0
FEVER: 0
BACK PAIN: 0
WOUND: 0
PALPITATIONS: 1
BRUISES/BLEEDS EASILY: 0
CONFUSION: 0
ABDOMINAL PAIN: 0
CHEST TIGHTNESS: 0
ADENOPATHY: 0
DIZZINESS: 1
MYALGIAS: 1
SHORTNESS OF BREATH: 1

## 2019-09-10 ASSESSMENT — PAIN SCALES - GENERAL: PAINLEVEL: SEVERE PAIN (6)

## 2019-09-10 NOTE — PATIENT INSTRUCTIONS
Medications refilled.   Labs in the future at your convenience.     Take lasix and potassium on same days.     ECHO ordered  - they will call with date/time of appointment.      Return as needed for follow-up with Dr. Ritchie.    Clinic : [phone]  Appointment line: [phone]

## 2019-09-10 NOTE — NURSING NOTE
Chief Complaint   Patient presents with     RECHECK     Derm Problem       Medication Reconciliation: complete   Patient here for nodule on right side of neck for a while, PMR is acting up again. Patient has been having heart palpitation and would like to know if there is other testing.   Annmarie Swanson LPN  ..................9/10/2019   1:41 PM

## 2019-09-11 ENCOUNTER — TELEPHONE (OUTPATIENT)
Dept: INTERNAL MEDICINE | Facility: OTHER | Age: 54
End: 2019-09-11

## 2019-09-11 NOTE — TELEPHONE ENCOUNTER
Patient was calling back to set up appointment with general surgery for Cyst removal.  Registration notified to call patient back on cell phone.      Mandy Prajapati LPN 9/11/2019 10:34 AM

## 2019-09-17 ENCOUNTER — OFFICE VISIT (OUTPATIENT)
Dept: SURGERY | Facility: OTHER | Age: 54
End: 2019-09-17
Attending: INTERNAL MEDICINE
Payer: COMMERCIAL

## 2019-09-17 VITALS
TEMPERATURE: 98.1 F | WEIGHT: 303.8 LBS | DIASTOLIC BLOOD PRESSURE: 76 MMHG | BODY MASS INDEX: 42.37 KG/M2 | HEART RATE: 76 BPM | RESPIRATION RATE: 16 BRPM | SYSTOLIC BLOOD PRESSURE: 130 MMHG

## 2019-09-17 DIAGNOSIS — L98.9 SKIN LESION: Primary | ICD-10-CM

## 2019-09-17 PROCEDURE — 11421 EXC H-F-NK-SP B9+MARG 0.6-1: CPT | Performed by: SURGERY

## 2019-09-17 PROCEDURE — 88304 TISSUE EXAM BY PATHOLOGIST: CPT

## 2019-09-17 ASSESSMENT — PAIN SCALES - GENERAL: PAINLEVEL: SEVERE PAIN (6)

## 2019-09-17 NOTE — PROGRESS NOTES
Procedure Note      Pre/Post Operative Diagnosis:   Right neck skin cyst, left neck skin lesion     Procedure:   Removal of Right neck skin cyst, left neck skin lesion      Surgeon: Blake Oden MD    Local Anesthesia: 1% lidocaine with 0.25% Marcaine with epinephrine    Indication for the procedure:  This is a 53 year old male  patient referred by Reinier Ritchie with a Right neck skin cyst and a left neck skin lesion .       After explaining the risks to include bleeding, infection, recurrence or need for reexcision, and scarring the patient wished to proceed.    Procedure:   The left neck area was prepped and draped in usual sterile fashion with ChloraPrep.   After, adequate local anesthesia, an 2.4 cm X 0.6 cm elliptical skin incision was made to encompass the  0.8 cm dermal/subcutaneous cyst  The skin was closed with 4-0 Monocryl and Dermabond.      The Right neck area was prepped and draped in usual sterile fashion with ChloraPrep.   After, adequate local anesthesia, an 2.5 cm X 0.8 cm elliptical skin incision was made to encompass the  0.6 cm lesion with margins.  The skin was closed with 4-0 Monocryl and Dermabond.      Plan:  The patient will be called to review the pathology. Patient will follow up if there any problems with the wound including redness or drainage.      Blake Oden MD....................  9/17/2019   3:30 PM

## 2019-09-17 NOTE — NURSING NOTE
"Chief Complaint   Patient presents with     Procedure     cyst on right neck       Initial /76 (BP Location: Right arm, Patient Position: Sitting, Cuff Size: Adult Large)   Pulse 76   Temp 98.1  F (36.7  C) (Tympanic)   Resp 16   Wt 137.8 kg (303 lb 12.8 oz)   BMI 42.37 kg/m   Estimated body mass index is 42.37 kg/m  as calculated from the following:    Height as of 4/15/19: 1.803 m (5' 11\").    Weight as of this encounter: 137.8 kg (303 lb 12.8 oz).  Medication Reconciliation: Completed     Lu Hannah LPN  "

## 2019-09-24 ENCOUNTER — HOSPITAL ENCOUNTER (OUTPATIENT)
Dept: CARDIOLOGY | Facility: OTHER | Age: 54
Discharge: HOME | End: 2019-09-24
Attending: INTERNAL MEDICINE | Admitting: INTERNAL MEDICINE
Payer: COMMERCIAL

## 2019-09-24 DIAGNOSIS — R60.0 BILATERAL LEG EDEMA: ICD-10-CM

## 2019-09-24 DIAGNOSIS — R06.02 SOBOE (SHORTNESS OF BREATH ON EXERTION): ICD-10-CM

## 2019-09-24 PROCEDURE — 93306 TTE W/DOPPLER COMPLETE: CPT | Mod: 26 | Performed by: INTERNAL MEDICINE

## 2019-09-24 PROCEDURE — 93306 TTE W/DOPPLER COMPLETE: CPT

## 2019-11-08 DIAGNOSIS — M65.30 TRIGGER FINGER OF RIGHT HAND, UNSPECIFIED FINGER: ICD-10-CM

## 2019-11-08 DIAGNOSIS — M25.562 LEFT MEDIAL KNEE PAIN: ICD-10-CM

## 2019-11-08 RX ORDER — TRAMADOL HYDROCHLORIDE 50 MG/1
50 TABLET ORAL 2 TIMES DAILY PRN
Qty: 56 TABLET | Refills: 5 | Status: SHIPPED | OUTPATIENT
Start: 2019-11-08 | End: 2020-03-18

## 2019-11-25 NOTE — TELEPHONE ENCOUNTER
Noted. I can see him at 1:20 PM on Tuesday 11/27 -- if patient is available. Otherwise later in the day at one of the EKG spots.   Reinier Ritchie MD   PT BACK FROM RADIOLOGY AMBULATORY WITH STEADY GAIT.

## 2019-12-06 ENCOUNTER — OFFICE VISIT (OUTPATIENT)
Dept: ORTHOPEDICS | Facility: OTHER | Age: 54
End: 2019-12-06
Attending: SPECIALIST
Payer: COMMERCIAL

## 2019-12-06 ENCOUNTER — HOSPITAL ENCOUNTER (OUTPATIENT)
Dept: GENERAL RADIOLOGY | Facility: OTHER | Age: 54
Discharge: HOME | End: 2019-12-06
Attending: SPECIALIST | Admitting: SPECIALIST
Payer: COMMERCIAL

## 2019-12-06 DIAGNOSIS — Z96.652 HISTORY OF TOTAL LEFT KNEE REPLACEMENT: Primary | ICD-10-CM

## 2019-12-06 DIAGNOSIS — Z96.652 HISTORY OF TOTAL LEFT KNEE REPLACEMENT: ICD-10-CM

## 2019-12-06 DIAGNOSIS — Z00.00 ROUTINE GENERAL MEDICAL EXAMINATION AT A HEALTH CARE FACILITY: Primary | ICD-10-CM

## 2019-12-06 PROCEDURE — 73560 X-RAY EXAM OF KNEE 1 OR 2: CPT | Mod: RT

## 2019-12-06 PROCEDURE — 99024 POSTOP FOLLOW-UP VISIT: CPT | Performed by: SPECIALIST

## 2019-12-06 PROCEDURE — G0463 HOSPITAL OUTPT CLINIC VISIT: HCPCS | Mod: 25

## 2019-12-12 NOTE — PROGRESS NOTES
CHIEF COMPLAINT: Left Total Knee Arthroplasty Recheck    PROBLEMS:   Patient has no noted problems.    PATIENT REPORTED MEDICATIONS:  CHANTIX 1 MG ORAL TABLET (VARENICLINE TARTRATE) ; Route: ORAL  TRAMADOL HCL 50 MG ORAL TABLET (TRAMADOL HCL) ; Route: ORAL  PRILOSEC PACKET (OMEPRAZOLE MAGNESIUM PACK)   MEDROL 4 MG ORAL TABLET (METHYLPREDNISOLONE) ; Route: ORAL  METFORMIN  MG ORAL TABLET (METFORMIN HCL) ; Route: ORAL  GABAPENTIN 100 MG ORAL CAPSULE (GABAPENTIN) ; Route: ORAL  LASIX 20 MG ORAL TABLET (FUROSEMIDE) ; Route: ORAL  DOXYCYCLINE HYCLATE 100 MG ORAL TABLET (DOXYCYCLINE HYCLATE) ; Route: ORAL  TYLENOL 325 MG ORAL TABLET (ACETAMINOPHEN) ; Route: ORAL    PATIENT REPORTED ALLERGIES:   Patient has no noted allergies.    HISTORY OF PRESENT ILLNESS:    Juve returns for followup one year status post left total knee arthroplasty.  He is very pleased with his results.      PAST MEDICAL HISTORY:    Acid Reflux  Bilateral Carpal Tunnel Syndrome    PAST ORTHOPEDIC SURGICAL HISTORY:    Left Total Knee Arthroplasty 11/14/18 - Dr. Grissom  Right Thumb, Index & Long Finger Trigger Digit Release 09/07/18  Left Carpal Tunnel Release, Left Ulnar Nerve Transposition 12/21/17 Dr. Huang  Right Carpal Tunnel Release 11/22/17 Dr. Huang    PAST SURGICAL HISTORY:    Appendectomy    SOCIAL HISTORY:   Marital Status:    Occupation:  Retired    PHYSICAL EXAMINATION:    Physical examination today shows the patient to be alert, oriented x3, and appropriate.  His left knee range of motion is 0  to 115 , collateral ligaments are stable, pulses are brisk and symmetric.    X-RAY:  Standing AP views of both knees, lateral view of the left knee, and bilateral merchant views show the patient to be status post left total knee arthroplasty.   Component is well aligned and well affixed.  No evidence of lucency and/or subsidence.    ASSESSMENT:    One year status post left total knee arthroplasty doing well    PLAN:    Plan will be to see him back on an annual basis for plain film radiographs, sooner if any problems occur.    Dictated by Monster Grissom M.D.  D:  12/06/19  T:   12/11/19    Copy to:  Chau DOCKERY, Reinier SEXTON     Typed and/or reviewed and corrected by signing  below, and sent to the Physician for final review and signature.     This report was created using voice recording software and computer-generated templates. Although every effort has been made to review for and eliminate errors, some errors may still occur.     Electronically signed by Beckie Liang  on 12/11/2019 at 7:00 AM    Electronically signed by Monster Grissom MD on 12/11/2019 at 7:58 PM  ________________________________________________________________________

## 2019-12-16 ENCOUNTER — HOSPITAL ENCOUNTER (EMERGENCY)
Facility: OTHER | Age: 54
Discharge: HOME | End: 2019-12-16
Attending: EMERGENCY MEDICINE | Admitting: EMERGENCY MEDICINE
Payer: COMMERCIAL

## 2019-12-16 ENCOUNTER — APPOINTMENT (OUTPATIENT)
Dept: CT IMAGING | Facility: OTHER | Age: 54
End: 2019-12-16
Attending: EMERGENCY MEDICINE
Payer: COMMERCIAL

## 2019-12-16 VITALS
BODY MASS INDEX: 42 KG/M2 | RESPIRATION RATE: 16 BRPM | HEART RATE: 51 BPM | HEIGHT: 71 IN | OXYGEN SATURATION: 99 % | TEMPERATURE: 98 F | WEIGHT: 300 LBS | DIASTOLIC BLOOD PRESSURE: 84 MMHG | SYSTOLIC BLOOD PRESSURE: 150 MMHG

## 2019-12-16 DIAGNOSIS — R51.9 NONINTRACTABLE HEADACHE, UNSPECIFIED CHRONICITY PATTERN, UNSPECIFIED HEADACHE TYPE: ICD-10-CM

## 2019-12-16 PROCEDURE — 25000128 H RX IP 250 OP 636: Performed by: EMERGENCY MEDICINE

## 2019-12-16 PROCEDURE — 96375 TX/PRO/DX INJ NEW DRUG ADDON: CPT | Performed by: EMERGENCY MEDICINE

## 2019-12-16 PROCEDURE — 99284 EMERGENCY DEPT VISIT MOD MDM: CPT | Mod: 25 | Performed by: EMERGENCY MEDICINE

## 2019-12-16 PROCEDURE — 70450 CT HEAD/BRAIN W/O DYE: CPT

## 2019-12-16 PROCEDURE — 25800030 ZZH RX IP 258 OP 636: Performed by: EMERGENCY MEDICINE

## 2019-12-16 PROCEDURE — 96374 THER/PROPH/DIAG INJ IV PUSH: CPT | Performed by: EMERGENCY MEDICINE

## 2019-12-16 RX ORDER — HYDROXYCHLOROQUINE SULFATE 200 MG/1
400 TABLET, FILM COATED ORAL
COMMUNITY
Start: 2019-10-09

## 2019-12-16 RX ORDER — KETOROLAC TROMETHAMINE 30 MG/ML
30 INJECTION, SOLUTION INTRAMUSCULAR; INTRAVENOUS ONCE
Status: COMPLETED | OUTPATIENT
Start: 2019-12-16 | End: 2019-12-16

## 2019-12-16 RX ORDER — DIPHENHYDRAMINE HYDROCHLORIDE 50 MG/ML
25 INJECTION INTRAMUSCULAR; INTRAVENOUS ONCE
Status: COMPLETED | OUTPATIENT
Start: 2019-12-16 | End: 2019-12-16

## 2019-12-16 RX ADMIN — PROCHLORPERAZINE EDISYLATE 10 MG: 5 INJECTION INTRAMUSCULAR; INTRAVENOUS at 20:14

## 2019-12-16 RX ADMIN — DIPHENHYDRAMINE HYDROCHLORIDE 25 MG: 50 INJECTION INTRAMUSCULAR; INTRAVENOUS at 20:15

## 2019-12-16 RX ADMIN — KETOROLAC TROMETHAMINE 30 MG: 30 INJECTION, SOLUTION INTRAMUSCULAR at 20:54

## 2019-12-16 RX ADMIN — SODIUM CHLORIDE 1000 ML: 9 INJECTION, SOLUTION INTRAVENOUS at 20:29

## 2019-12-16 ASSESSMENT — ENCOUNTER SYMPTOMS
CHILLS: 0
DYSURIA: 0
VOMITING: 0
SHORTNESS OF BREATH: 0
AGITATION: 0
ARTHRALGIAS: 0
FEVER: 0
NAUSEA: 1
CHEST TIGHTNESS: 0
LIGHT-HEADEDNESS: 0

## 2019-12-16 ASSESSMENT — MIFFLIN-ST. JEOR: SCORE: 2212.92

## 2019-12-16 NOTE — ED AVS SNAPSHOT
[facility]  [address]  [place] [address]  Phone:  [phone]  Fax:  [phone]                                    Gregg Aguayo   MRN: [id number]    Department:  [facility]   Date of Visit:  12/16/2019           After Visit Summary Signature Page    I have received my discharge instructions, and my questions have been answered. I have discussed any challenges I see with this plan with the nurse or doctor.    ..........................................................................................................................................  Patient/Patient Representative Signature      ..........................................................................................................................................  Patient Representative Print Name and Relationship to Patient    ..................................................               ................................................  Date                                   Time    ..........................................................................................................................................  Reviewed by Signature/Title    ...................................................              ..............................................  Date                                               Time          22EPIC Rev 08/18

## 2019-12-17 NOTE — ED TRIAGE NOTES
"Pt arrives to the ED via private car.  Pt states that approx. 1230 he got an overwhelming headache.  Pt states taking a tramadol and that did not help.  Pt reports feeling nauseated, neck and head pain, \"I can feel my heartbeat in my head\".  Pt states it has not gotten any better and is concerned.  "

## 2019-12-17 NOTE — ED PROVIDER NOTES
History     Chief Complaint   Patient presents with     Headache     HPI  Gregg Aguayo is a 54 year old male who is here with a headache.  This began a little over 7 hours ago.  It came on quite suddenly over the course of only a minute or 2.  It began posteriorly and went up to the area behind his left eye.  It is now including his whole head but it is still worse on the left.  He had some nausea but no vomiting.  Not feeling dizzy or lightheaded.  No visual or hearing changes.  He is mentating clearly.  No numbness tingling or weakness anywhere.  He tried some tramadol with no relief.  He said he has never had a headache like this and that probably the worst headache of his life.    Allergies:  No Known Allergies    Problem List:    Patient Active Problem List    Diagnosis Date Noted     Symptomatic premature ventricular contractions 04/15/2019     Priority: Medium     Trigger finger, acquired - Right hand - 1st and 3rd fingers 08/30/2018     Priority: Medium     Heartburn 07/25/2018     Priority: Medium     Steroid-induced hyperglycemia 04/10/2018     Priority: Medium     Psychosexual dysfunction with inhibited sexual excitement 01/29/2018     Priority: Medium     Tobacco abuse 01/29/2018     Priority: Medium     Overview:   IMO Update 10/11       Acute pain of left knee 01/11/2018     Priority: Medium     Fall at home, initial encounter 01/11/2018     Priority: Medium     History of arthroscopic knee surgery 01/11/2018     Priority: Medium     Left medial knee pain 01/11/2018     Priority: Medium     Left carpal tunnel syndrome 12/21/2017     Priority: Medium     History of carpal tunnel surgery of left wrist 12/21/2017     Priority: Medium     S/P cubital tunnel release 12/21/2017     Priority: Medium     History of carpal tunnel surgery of right wrist 11/22/2017     Priority: Medium     AILEEN on CPAP - uses nightly, finds very helpful 11/20/2017     Priority: Medium     Cubital tunnel syndrome on left  09/25/2017     Priority: Medium     Bilateral leg edema 07/05/2017     Priority: Medium     Polymyalgia rheumatica (H) 11/11/2016     Priority: Medium     Arthralgia of multiple joints 10/26/2016     Priority: Medium     Hypertrophic and atrophic condition of skin 06/07/2012     Priority: Medium     Contact dermatitis and eczema 09/20/2011     Priority: Medium        Past Medical History:    Past Medical History:   Diagnosis Date     Bilateral carpal tunnel syndrome      Carpal tunnel syndrome of left wrist      Condition influencing health status      Lesion of left ulnar nerve      Other specified postprocedural states      Other specified postprocedural states      Other specified postprocedural states        Past Surgical History:    Past Surgical History:   Procedure Laterality Date     APPENDECTOMY OPEN      2005,Laparoscopic for acute appendicitis     ARTHROSCOPY KNEE Left 3/19/2018    Procedure: ARTHROSCOPY KNEE;  Left Knee Arthroscopy, Partial Meniscectomy, Chondroplasty;  Surgeon: Kwabena Patricia DO;  Location: [facility] OR     ATTEMPTED ARTHROSCOPY      knee ? left     COLONOSCOPY  01/29/2014 1/29/2014,Tubular adenoma of rectum - follow up 5 years     COLONOSCOPY  02/01/2019    normal with diverticula, follow up 10 years, 2/1/29     COLONOSCOPY N/A 2/1/2019    normal, follow up 2/1/29     JOINT REPLACEMENT Left 11/2018     RELEASE CARPAL TUNNEL Right     11/22/2017,[id number],OTHER,Right     RELEASE CARPAL TUNNEL Left     12/21/2017,[id number],OTHER,Left     RELEASE TRIGGER FINGER Right 9/7/2018    Procedure: RELEASE TRIGGER FINGER;  Right Trigger Release Thumb, Index & Long Digits;  Surgeon: Monster Grissom MD;  Location: [facility] OR     RELEASE ULNAR NERVE (ELBOW)      12/21/2017,[id number],OTHER,Left       Family History:    Family History   Problem Relation Age of Onset     Genetic Disorder Maternal Grandfather         Genetic,Polymyalgia Rheumatica     Heart Murmur Mother      No Known Problems Father        Social  "History:  Marital Status:   [2]  Social History     Tobacco Use     Smoking status: Current Every Day Smoker     Packs/day: 0.30     Types: Cigarettes     Smokeless tobacco: Never Used   Substance Use Topics     Alcohol use: Yes     Alcohol/week: 0.0 standard drinks     Frequency: Monthly or less     Drug use: No        Medications:    acetaminophen (TYLENOL) 650 MG CR tablet  furosemide (LASIX) 20 MG tablet  gabapentin (NEURONTIN) 100 MG capsule  metFORMIN (GLUCOPHAGE) 500 MG tablet  methylPREDNISolone (MEDROL) 2 MG tablet  metoprolol succinate ER (TOPROL-XL) 25 MG 24 hr tablet  omeprazole (PRILOSEC) 40 MG DR capsule  potassium chloride ER (K-TAB/KLOR-CON) 10 MEQ CR tablet  traMADol (ULTRAM) 50 MG tablet  hydroxychloroquine (PLAQUENIL) 200 MG tablet  sildenafil (REVATIO) 20 MG tablet  sildenafil (VIAGRA) 100 MG tablet  varenicline (CHANTIX) 1 MG tablet          Review of Systems   Constitutional: Negative for chills and fever.   HENT: Negative for congestion.    Eyes: Negative for visual disturbance.   Respiratory: Negative for chest tightness and shortness of breath.    Cardiovascular: Negative for chest pain.   Gastrointestinal: Positive for nausea. Negative for vomiting.   Genitourinary: Negative for dysuria.   Musculoskeletal: Negative for arthralgias.   Skin: Negative for rash.   Neurological: Negative for light-headedness.   Psychiatric/Behavioral: Negative for agitation.       Physical Exam   BP: (!) 202/117  Pulse: 60  Heart Rate: 57  Temp: 98  F (36.7  C)  Resp: 16  Height: 180.3 cm (5' 11\")  Weight: 136.1 kg (300 lb)  SpO2: 99 %      Physical Exam  Vitals signs and nursing note reviewed.   Constitutional:       Appearance: Normal appearance.   HENT:      Head: Normocephalic and atraumatic.   Eyes:      Extraocular Movements: Extraocular movements intact.      Conjunctiva/sclera: Conjunctivae normal.      Pupils: Pupils are equal, round, and reactive to light.   Neck:      Musculoskeletal: Normal " range of motion.   Cardiovascular:      Rate and Rhythm: Normal rate and regular rhythm.      Heart sounds: Normal heart sounds.   Pulmonary:      Effort: Pulmonary effort is normal.      Breath sounds: Normal breath sounds.   Abdominal:      General: Bowel sounds are normal.   Musculoskeletal: Normal range of motion.   Skin:     General: Skin is warm and dry.   Neurological:      Mental Status: He is alert and oriented to person, place, and time.      GCS: GCS eye subscore is 4. GCS verbal subscore is 5. GCS motor subscore is 6.      Coordination: Romberg sign negative. Coordination normal. Finger-Nose-Finger Test normal.      Comments: Cranial nerves II through XII intact   Psychiatric:         Mood and Affect: Mood normal.         Behavior: Behavior normal.         ED Course     ED Course as of Dec 16 2142   Mon Dec 16, 2019   1955 Given the fact he has never had a headache like this before and it came on quickly, I will get a CT scan of his head.  We will also place an IV give some fluids, Compazine and Benadryl.  Will not do Toradol until we know there is no bleeding.        Procedures            Results for orders placed or performed during the hospital encounter of 12/16/19 (from the past 24 hour(s))   CT Head w/o Contrast    Narrative    PROCEDURE: CT HEAD W/O CONTRAST     HISTORY: worst headache of his life, sudden onset.    COMPARISON: None.    TECHNIQUE:  Helical images of the head from the foramen magnum to the  vertex were obtained without contrast.    FINDINGS: The ventricles and sulci are normal in volume. No acute  intracranial hemorrhage, mass effect, midline shift, hydrocephalus or  basilar cystern effacement are present.    The grey-white matter interface is preserved.    The calvarium is intact. There is subtotal opacification the right  mastoids.      Impression    IMPRESSION: No CT evidence of an acute intracranial process.      ISATU CARUSO MD       Medications   0.9% sodium chloride  BOLUS (0 mLs Intravenous Stopped 12/16/19 2130)   prochlorperazine (COMPAZINE) injection 10 mg (10 mg Intravenous Given 12/16/19 2014)   diphenhydrAMINE (BENADRYL) injection 25 mg (25 mg Intravenous Given 12/16/19 2015)   ketorolac (TORADOL) injection 30 mg (30 mg Intravenous Given 12/16/19 2054)       Assessments & Plan (with Medical Decision Making)     I have reviewed the nursing notes.    I have reviewed the findings, diagnosis, plan and need for follow up with the patient.  CT scan of head is reassuring.  He was given some Toradol and is feeling markedly better and wishes to go home.  He says he sometimes gets what sounds like MRI and he takes ibuprofen right away and he says he can stop them with that.  Today he did not get anything like an aura.  He took some tramadol but was not sure if he could take ibuprofen with it.  I told him that it would be fine to take both of those if he gets another headache like this again in the future.  Return here if this is not helping.    New Prescriptions    No medications on file       Final diagnoses:   Nonintractable headache, unspecified chronicity pattern, unspecified headache type       12/16/2019   [facility] AND [facility]     Derek White MD  12/16/19 2147

## 2019-12-18 ENCOUNTER — OFFICE VISIT (OUTPATIENT)
Dept: FAMILY MEDICINE | Facility: OTHER | Age: 54
End: 2019-12-18
Attending: NURSE PRACTITIONER
Payer: COMMERCIAL

## 2019-12-18 VITALS
DIASTOLIC BLOOD PRESSURE: 86 MMHG | WEIGHT: 307.38 LBS | OXYGEN SATURATION: 92 % | RESPIRATION RATE: 20 BRPM | HEART RATE: 50 BPM | SYSTOLIC BLOOD PRESSURE: 152 MMHG | BODY MASS INDEX: 43.03 KG/M2 | HEIGHT: 71 IN | TEMPERATURE: 96.5 F

## 2019-12-18 DIAGNOSIS — Z85.828 HISTORY OF SKIN CANCER: ICD-10-CM

## 2019-12-18 DIAGNOSIS — L91.8 SKIN TAG: Primary | ICD-10-CM

## 2019-12-18 PROCEDURE — 11200 RMVL SKIN TAGS UP TO&INC 15: CPT | Performed by: NURSE PRACTITIONER

## 2019-12-18 PROCEDURE — 88304 TISSUE EXAM BY PATHOLOGIST: CPT

## 2019-12-18 PROCEDURE — 11102 TANGNTL BX SKIN SINGLE LES: CPT | Performed by: NURSE PRACTITIONER

## 2019-12-18 ASSESSMENT — PAIN SCALES - GENERAL: PAINLEVEL: SEVERE PAIN (6)

## 2019-12-18 ASSESSMENT — MIFFLIN-ST. JEOR: SCORE: 2246.37

## 2019-12-18 NOTE — NURSING NOTE
Patient presents to clinic for removal of skin tags in both underarms.    Medication Reconciliation: complete    Karla García LPN

## 2019-12-18 NOTE — PROGRESS NOTES
SUBJECTIVE:   Gregg Aguayo is a 54 year old male who presents to clinic today for the following health issues:    HPI  Patient presents for evaluation of skin tag removal. He has several under his armpit that have been rubbing and are becoming painful. He reports he had some lesions on his neck that he had removed and they turned out to be cancerous. He is worried that these could be malignant.     Patient Active Problem List    Diagnosis Date Noted     Symptomatic premature ventricular contractions 04/15/2019     Priority: Medium     Trigger finger, acquired - Right hand - 1st and 3rd fingers 08/30/2018     Priority: Medium     Heartburn 07/25/2018     Priority: Medium     Steroid-induced hyperglycemia 04/10/2018     Priority: Medium     Psychosexual dysfunction with inhibited sexual excitement 01/29/2018     Priority: Medium     Tobacco abuse 01/29/2018     Priority: Medium     Overview:   IMO Update 10/11       Acute pain of left knee 01/11/2018     Priority: Medium     Fall at home, initial encounter 01/11/2018     Priority: Medium     History of arthroscopic knee surgery 01/11/2018     Priority: Medium     Left medial knee pain 01/11/2018     Priority: Medium     Left carpal tunnel syndrome 12/21/2017     Priority: Medium     History of carpal tunnel surgery of left wrist 12/21/2017     Priority: Medium     S/P cubital tunnel release 12/21/2017     Priority: Medium     History of carpal tunnel surgery of right wrist 11/22/2017     Priority: Medium     AILEEN on CPAP - uses nightly, finds very helpful 11/20/2017     Priority: Medium     Cubital tunnel syndrome on left 09/25/2017     Priority: Medium     Bilateral leg edema 07/05/2017     Priority: Medium     Polymyalgia rheumatica (H) 11/11/2016     Priority: Medium     Arthralgia of multiple joints 10/26/2016     Priority: Medium     Hypertrophic and atrophic condition of skin 06/07/2012     Priority: Medium     Contact dermatitis and eczema 09/20/2011      "Priority: Medium     Past Medical History:   Diagnosis Date     Bilateral carpal tunnel syndrome     9/21/2017     Carpal tunnel syndrome of left wrist     12/21/2017     Condition influencing health status     No Comments Provided     Lesion of left ulnar nerve     9/25/2017     Other specified postprocedural states     11/22/2017     Other specified postprocedural states     12/21/2017     Other specified postprocedural states     12/21/2017      Past Surgical History:   Procedure Laterality Date     APPENDECTOMY OPEN      2005,Laparoscopic for acute appendicitis     ARTHROSCOPY KNEE Left 3/19/2018    Procedure: ARTHROSCOPY KNEE;  Left Knee Arthroscopy, Partial Meniscectomy, Chondroplasty;  Surgeon: Kwabena Patricia DO;  Location: [facility] OR     ATTEMPTED ARTHROSCOPY      knee ? left     COLONOSCOPY  01/29/2014 1/29/2014,Tubular adenoma of rectum - follow up 5 years     COLONOSCOPY  02/01/2019    normal with diverticula, follow up 10 years, 2/1/29     COLONOSCOPY N/A 2/1/2019    normal, follow up 2/1/29     JOINT REPLACEMENT Left 11/2018     RELEASE CARPAL TUNNEL Right     11/22/2017,[id number],OTHER,Right     RELEASE CARPAL TUNNEL Left     12/21/2017,[id number],OTHER,Left     RELEASE TRIGGER FINGER Right 9/7/2018    Procedure: RELEASE TRIGGER FINGER;  Right Trigger Release Thumb, Index & Long Digits;  Surgeon: Monster Grissom MD;  Location: [facility] OR     RELEASE ULNAR NERVE (ELBOW)      12/21/2017,[id number],OTHER,Left       Review of Systems   Skin: Positive for wound.        OBJECTIVE:     BP (!) 152/86 (BP Location: Right arm, Patient Position: Sitting, Cuff Size: Adult Large)   Pulse 50   Temp 96.5  F (35.8  C) (Tympanic)   Resp 20   Ht 1.803 m (5' 11\")   Wt 139.4 kg (307 lb 6 oz)   SpO2 92%   BMI 42.87 kg/m    Body mass index is 42.87 kg/m .  Physical Exam  Constitutional:       Appearance: Normal appearance.   Neurological:      Mental Status: He is alert.     He has ten pedunculated flesh colored lesions under R/L " armpit. Each was cleansed with chlorhexidine solution and numbed with lidocaine/epinephrine. 8/10 were cut with sterile scissors at the base of the stalk 2 of the small lesions had cryotherapy 3 session of thaw/freeze. Areas that had bleeding were chemically cauterized to obtain hemostasis. Patient tolerated procedure well. One lesion was sent for biopsy due to history of skin cancer.     Diagnostic Test Results:  Biopsy pending    ASSESSMENT/PLAN:   1. Skin tag  Removed in office. Discussed to keep areas dry and clean until scabbed over. No further follow-up required. Follow-up for further skin tag removal as desired.   - Surgical pathology exam  - REMOVAL OF SKIN TAGS, FIRST 15    2. History of skin cancer  Biopsy obtained on larger lesion due to history of skin cancer.   - Surgical pathology exam      Raquel Barrientos [facility] AND [facility] [place]

## 2019-12-19 ASSESSMENT — ENCOUNTER SYMPTOMS: WOUND: 1

## 2020-01-06 ENCOUNTER — TRANSFERRED RECORDS (OUTPATIENT)
Dept: HEALTH INFORMATION MANAGEMENT | Facility: OTHER | Age: 55
End: 2020-01-06

## 2020-02-05 ENCOUNTER — TRANSFERRED RECORDS (OUTPATIENT)
Dept: HEALTH INFORMATION MANAGEMENT | Facility: OTHER | Age: 55
End: 2020-02-05

## 2020-03-13 ENCOUNTER — TELEPHONE (OUTPATIENT)
Dept: INTERNAL MEDICINE | Facility: OTHER | Age: 55
End: 2020-03-13

## 2020-03-13 DIAGNOSIS — M65.30 TRIGGER FINGER OF RIGHT HAND, UNSPECIFIED FINGER: ICD-10-CM

## 2020-03-13 DIAGNOSIS — T38.0X5A STEROID-INDUCED HYPERGLYCEMIA: Primary | ICD-10-CM

## 2020-03-13 DIAGNOSIS — M25.562 LEFT MEDIAL KNEE PAIN: ICD-10-CM

## 2020-03-13 DIAGNOSIS — R73.9 STEROID-INDUCED HYPERGLYCEMIA: Primary | ICD-10-CM

## 2020-03-13 NOTE — TELEPHONE ENCOUNTER
Patient had a standing order for Metformin 500 mg 1-2 tabs daily when on steroids.  This was at [facility].      [facility] sent over a fax requesting a refill on this.      Last office visit with Reinier Ritchie MD 9-10-19, no future ones present.      Order pending.      Mandy Carlos LPN 3/13/2020 3:26 PM

## 2020-03-17 NOTE — TELEPHONE ENCOUNTER
TRAMADOL 50MG TABLET   Last Written Prescription Date:  11/8/2019  Last Fill Quantity: 56,   # refills: 5  Last Office Visit: 12/18/2019  Future Office visit:       Routing refill request to provider for review/approval because:  Drug not on the [facility], [facility] or [facility] refill protocol or controlled substance.  Will forward to provider for consideration. Unable to complete prescription refill per RNMedication Refill Policy.................... Anusha Suarez RN ....................  3/17/2020   11:42 AM

## 2020-03-18 RX ORDER — TRAMADOL HYDROCHLORIDE 50 MG/1
TABLET ORAL
Qty: 56 TABLET | Refills: 5 | Status: SHIPPED | OUTPATIENT
Start: 2020-03-18 | End: 2021-09-14

## 2020-03-24 ENCOUNTER — TRANSFERRED RECORDS (OUTPATIENT)
Dept: HEALTH INFORMATION MANAGEMENT | Facility: OTHER | Age: 55
End: 2020-03-24

## 2020-04-10 ENCOUNTER — OFFICE VISIT (OUTPATIENT)
Dept: INTERNAL MEDICINE | Facility: OTHER | Age: 55
End: 2020-04-10
Attending: INTERNAL MEDICINE
Payer: COMMERCIAL

## 2020-04-10 ENCOUNTER — HOSPITAL ENCOUNTER (OUTPATIENT)
Dept: GENERAL RADIOLOGY | Facility: OTHER | Age: 55
End: 2020-04-10
Attending: INTERNAL MEDICINE
Payer: COMMERCIAL

## 2020-04-10 VITALS
HEART RATE: 70 BPM | RESPIRATION RATE: 16 BRPM | OXYGEN SATURATION: 97 % | DIASTOLIC BLOOD PRESSURE: 84 MMHG | TEMPERATURE: 98.3 F | SYSTOLIC BLOOD PRESSURE: 128 MMHG | BODY MASS INDEX: 43.37 KG/M2 | HEIGHT: 71 IN | WEIGHT: 309.8 LBS

## 2020-04-10 DIAGNOSIS — G47.33 OSA ON CPAP: Primary | ICD-10-CM

## 2020-04-10 DIAGNOSIS — M54.50 LUMBAR PAIN WITH RADIATION DOWN RIGHT LEG: ICD-10-CM

## 2020-04-10 DIAGNOSIS — M35.3 POLYMYALGIA RHEUMATICA (H): ICD-10-CM

## 2020-04-10 DIAGNOSIS — Z01.818 PREOPERATIVE EXAMINATION: ICD-10-CM

## 2020-04-10 DIAGNOSIS — Z72.0 TOBACCO ABUSE: ICD-10-CM

## 2020-04-10 DIAGNOSIS — R60.0 BILATERAL LEG EDEMA: ICD-10-CM

## 2020-04-10 DIAGNOSIS — T38.0X5A STEROID-INDUCED HYPERGLYCEMIA: ICD-10-CM

## 2020-04-10 DIAGNOSIS — M79.604 LUMBAR PAIN WITH RADIATION DOWN RIGHT LEG: ICD-10-CM

## 2020-04-10 DIAGNOSIS — I49.3 SYMPTOMATIC PREMATURE VENTRICULAR CONTRACTIONS: ICD-10-CM

## 2020-04-10 DIAGNOSIS — R73.9 STEROID-INDUCED HYPERGLYCEMIA: ICD-10-CM

## 2020-04-10 PROBLEM — G56.22 CUBITAL TUNNEL SYNDROME ON LEFT: Status: RESOLVED | Noted: 2017-09-25 | Resolved: 2020-04-10

## 2020-04-10 PROBLEM — M65.30 TRIGGER FINGER, ACQUIRED: Status: RESOLVED | Noted: 2018-08-30 | Resolved: 2020-04-10

## 2020-04-10 PROBLEM — Z98.890 HISTORY OF CARPAL TUNNEL SURGERY OF RIGHT WRIST: Status: RESOLVED | Noted: 2017-11-22 | Resolved: 2020-04-10

## 2020-04-10 PROBLEM — M25.562 ACUTE PAIN OF LEFT KNEE: Status: RESOLVED | Noted: 2018-01-11 | Resolved: 2020-04-10
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
## 2020-04-10 PROBLEM — Z98.890 HISTORY OF CARPAL TUNNEL SURGERY OF LEFT WRIST: Status: RESOLVED | Noted: 2017-12-21 | Resolved: 2020-04-10
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
## 2020-04-10 PROBLEM — Z98.890 S/P CUBITAL TUNNEL RELEASE: Status: RESOLVED | Noted: 2017-12-21 | Resolved: 2020-04-10

## 2020-04-10 PROBLEM — M25.562 LEFT MEDIAL KNEE PAIN: Status: RESOLVED | Noted: 2018-01-11 | Resolved: 2020-04-10

## 2020-04-10 PROBLEM — Z98.890 HISTORY OF ARTHROSCOPIC KNEE SURGERY: Status: RESOLVED | Noted: 2018-01-11 | Resolved: 2020-04-10

## 2020-04-10 PROBLEM — G56.02 LEFT CARPAL TUNNEL SYNDROME: Status: RESOLVED | Noted: 2017-12-21 | Resolved: 2020-04-10

## 2020-04-10 LAB
ANION GAP SERPL CALCULATED.3IONS-SCNC: 5 MMOL/L (ref 3–14)
BUN SERPL-MCNC: 16 MG/DL (ref 7–25)
CALCIUM SERPL-MCNC: 9.5 MG/DL (ref 8.6–10.3)
CHLORIDE SERPL-SCNC: 103 MMOL/L (ref 98–107)
CO2 SERPL-SCNC: 30 MMOL/L (ref 21–31)
CREAT SERPL-MCNC: 0.9 MG/DL (ref 0.7–1.3)
ERYTHROCYTE [DISTWIDTH] IN BLOOD BY AUTOMATED COUNT: 12.9 % (ref 10–15)
GFR SERPL CREATININE-BSD FRML MDRD: 87 ML/MIN/{1.73_M2}
GLUCOSE SERPL-MCNC: 105 MG/DL (ref 70–105)
HCT VFR BLD AUTO: 46.1 % (ref 40–53)
HGB BLD-MCNC: 15.9 G/DL (ref 13.3–17.7)
INTERPRETATION ECG - MUSE: NORMAL
MCH RBC QN AUTO: 32.7 PG (ref 26.5–33)
MCHC RBC AUTO-ENTMCNC: 34.5 G/DL (ref 31.5–36.5)
MCV RBC AUTO: 95 FL (ref 78–100)
PLATELET # BLD AUTO: 184 10E9/L (ref 150–450)
POTASSIUM SERPL-SCNC: 4.2 MMOL/L (ref 3.5–5.1)
RBC # BLD AUTO: 4.86 10E12/L (ref 4.4–5.9)
SODIUM SERPL-SCNC: 138 MMOL/L (ref 134–144)
WBC # BLD AUTO: 5.9 10E9/L (ref 4–11)

## 2020-04-10 PROCEDURE — 85027 COMPLETE CBC AUTOMATED: CPT | Mod: ZL | Performed by: INTERNAL MEDICINE

## 2020-04-10 PROCEDURE — 93000 ELECTROCARDIOGRAM COMPLETE: CPT | Performed by: INTERNAL MEDICINE

## 2020-04-10 PROCEDURE — 99215 OFFICE O/P EST HI 40 MIN: CPT | Performed by: INTERNAL MEDICINE

## 2020-04-10 PROCEDURE — 36415 COLL VENOUS BLD VENIPUNCTURE: CPT | Mod: ZL | Performed by: INTERNAL MEDICINE

## 2020-04-10 PROCEDURE — 71046 X-RAY EXAM CHEST 2 VIEWS: CPT

## 2020-04-10 PROCEDURE — 80048 BASIC METABOLIC PNL TOTAL CA: CPT | Mod: ZL | Performed by: INTERNAL MEDICINE

## 2020-04-10 RX ORDER — METHYLPREDNISOLONE 4 MG/1
TABLET ORAL
COMMUNITY
Start: 2020-03-13 | End: 2020-06-18

## 2020-04-10 RX ORDER — METOPROLOL SUCCINATE 25 MG/1
50 TABLET, EXTENDED RELEASE ORAL EVERY EVENING
Qty: 180 TABLET | Refills: 3 | COMMUNITY
Start: 2020-04-10 | End: 2020-06-25

## 2020-04-10 RX ORDER — FUROSEMIDE 20 MG
20 TABLET ORAL DAILY PRN
Qty: 270 TABLET | Refills: 3 | COMMUNITY
Start: 2020-04-10 | End: 2020-09-11

## 2020-04-10 SDOH — HEALTH STABILITY: MENTAL HEALTH: HOW OFTEN DO YOU HAVE A DRINK CONTAINING ALCOHOL?: 2-4 TIMES A MONTH

## 2020-04-10 ASSESSMENT — ENCOUNTER SYMPTOMS
ABDOMINAL PAIN: 0
HEADACHES: 0
UNEXPECTED WEIGHT CHANGE: 0
DIZZINESS: 0
CHILLS: 0
NAUSEA: 0
MYALGIAS: 1
BRUISES/BLEEDS EASILY: 0
DIARRHEA: 0
ADENOPATHY: 0
SPEECH DIFFICULTY: 0
FLANK PAIN: 0
HEMATURIA: 0
FATIGUE: 0
EYE PAIN: 0
FREQUENCY: 0
DIFFICULTY URINATING: 0
SINUS PAIN: 0
NECK PAIN: 0
FEVER: 0
BLOOD IN STOOL: 0
VOICE CHANGE: 0
LIGHT-HEADEDNESS: 0
WEAKNESS: 0
BACK PAIN: 1
CONSTIPATION: 0
SEIZURES: 0
NECK STIFFNESS: 0
DYSURIA: 0
VOMITING: 0
CHEST TIGHTNESS: 0
SORE THROAT: 0
RHINORRHEA: 0
HALLUCINATIONS: 0
TREMORS: 0
NUMBNESS: 0
JOINT SWELLING: 0
WHEEZING: 0
TROUBLE SWALLOWING: 0
EYE REDNESS: 0
COUGH: 0
AGITATION: 0
CONFUSION: 0
SLEEP DISTURBANCE: 0
ABDOMINAL DISTENTION: 0
SINUS PRESSURE: 0
SHORTNESS OF BREATH: 0
WOUND: 0
NERVOUS/ANXIOUS: 0
APPETITE CHANGE: 0
PALPITATIONS: 0
DIAPHORESIS: 0

## 2020-04-10 ASSESSMENT — MIFFLIN-ST. JEOR: SCORE: 2253.4

## 2020-04-10 ASSESSMENT — PAIN SCALES - GENERAL: PAINLEVEL: EXTREME PAIN (9)

## 2020-04-10 NOTE — PROGRESS NOTES
Chief Complaint:  This patient is here for a preop consultation and clearance.    HPI:   Preoperative consultation and clearance is requested by Dr. Hidalgo.  This patient is scheduled for lumbar surgery at [facility] in [place].  This will be done on April 13.  This is being done because of lumbar disc disease with radiculopathy.    The patient is currently otherwise feeling quite well.  He has a history of polymyalgia rheumatica which is treated intermittently with methylprednisolone as well as chronic Plaquenil therapy.  He has not needed that treatment anytime recently.  When he does take the methylprednisolone, he will also take metformin as he has steroid-induced hyperglycemia.  He has a history of chronic peripheral edema.  He takes furosemide on a daily basis for control of that.    The patient has a history of obstructive sleep apnea.  He wears CPAP on a nightly basis with excellent results.  Unfortunately, the patient does continue to smoke cigarettes.  He knows that he needs to discontinue that habit.  He has a history of some palpitations and takes metoprolol for control and has excellent results with the metoprolol.    Currently he is feeling well.  He has not had any fevers or chills.  He has not had any recent travel or exposures to ill individuals.  He denies shortness of breath or cough.  He denies chest pain, palpitations or any cardiac difficulties.  He denies any GI or  issues.    Medications are reconciled.  Past medical history, past surgical history, family history and social histories are reviewed and updated.  He has never had an anesthesia complication.  He has never had a blood transfusion.  He has never had a bleeding diathesis.    Past Medical History:   Diagnosis Date     Cigarette smoker      Erectile dysfunction      GERD (gastroesophageal reflux disease)      Obesity      AILEEN on CPAP      Peripheral edema      PMR (polymyalgia rheumatica) (H)        Past Surgical History:    Procedure Laterality Date     APPENDECTOMY OPEN  2005     ARTHROPLASTY KNEE Left      ARTHROSCOPY KNEE Left 3/19/2018    Procedure: ARTHROSCOPY KNEE;  Left Knee Arthroscopy, Partial Meniscectomy, Chondroplasty;  Surgeon: Kwabena Patricia DO;  Location: [facility] OR     ATTEMPTED ARTHROSCOPY      knee ? left     COLONOSCOPY  01/29/2014 1/29/2014,Tubular adenoma of rectum - follow up 5 years     COLONOSCOPY  02/01/2019    normal with diverticula, follow up 10 years, 2/1/29     COLONOSCOPY N/A 2/1/2019    normal, follow up 2/1/29     JOINT REPLACEMENT Left 11/2018     RELEASE CARPAL TUNNEL Right 11/22/2017     RELEASE CARPAL TUNNEL Left     12/21/2017,[id number],OTHER,Left     RELEASE TRIGGER FINGER Right 9/7/2018     RELEASE ULNAR NERVE (ELBOW) Left 12/21/2017       Current Outpatient Medications   Medication Sig Dispense Refill     acetaminophen (TYLENOL) 650 MG CR tablet Take up to 4 tablet daily for pain as needed       furosemide (LASIX) 20 MG tablet Take 1 tablet (20 mg) by mouth daily 270 tablet 3     gabapentin (NEURONTIN) 100 MG capsule Take 1-2 capsules (100-200 mg) by mouth 4 times daily as needed For nerve pain 120 capsule 3     hydroxychloroquine (PLAQUENIL) 200 MG tablet Take 400 mg by mouth       metFORMIN (GLUCOPHAGE) 500 MG tablet Take 1-2 tablets (500-1,000 mg) by mouth daily (with dinner) While taking oral steroids 180 tablet 3     methylPREDNISolone (MEDROL) 2 MG tablet Take 1-4 tablets (2-8 mg) by mouth daily With a meal - wean dose every 2 to 4 weeks - for  tablet 3     methylPREDNISolone (MEDROL) 4 MG tablet        metoprolol succinate ER (TOPROL-XL) 25 MG 24 hr tablet Take 2 tablets (50 mg) by mouth every evening -- adjust dose as needed for heart palpitations 180 tablet 3     omeprazole (PRILOSEC) 40 MG DR capsule Take 1 capsule (40 mg) by mouth daily 90 capsule 3     potassium chloride ER (K-TAB/KLOR-CON) 10 MEQ CR tablet Take 2 tablets (20 mEq) by mouth daily - with food (take on days you use  Furosemide) 180 tablet 3     sildenafil (REVATIO) 20 MG tablet Take 1-5 tablets ( mg) by mouth daily as needed (For Erectile Dysfunction - take 30 min to 1 hour prior to [place]) 30 tablet 3     sildenafil (VIAGRA) 100 MG tablet Take 1/4 to 1 tablet 30 min to 4 hours prior to sex for ED 30 tablet 11     traMADol (ULTRAM) 50 MG tablet TAKE 1 TABLET BY MOUTH TWICE DAILY AS NEEDED FOR PAIN--NEED TO FILLEVERY 28 DAYS IF USING REGULARLY. 56 tablet 5     varenicline (CHANTIX) 1 MG tablet Take 1 tablet (1 mg) by mouth 2 times daily (with meals) Quit smoking 60 tablet 1       No Known Allergies    Family History   Problem Relation Age of Onset     Genetic Disorder Maternal Grandfather         Genetic,Polymyalgia Rheumatica     Heart Murmur Mother      Polymyalgia rheumatica Mother        Social History     Socioeconomic History     Marital status:      Spouse name: Not on file     Number of children: Not on file     Years of education: Not on file     Highest education level: Not on file   Occupational History     Not on file   Social Needs     Financial resource strain: Not on file     Food insecurity     Worry: Not on file     Inability: Not on file     Transportation needs     Medical: Not on file     Non-medical: Not on file   Tobacco Use     Smoking status: Current Every Day Smoker     Packs/day: 0.30     Types: Cigarettes     Smokeless tobacco: Never Used   Substance and Sexual Activity     Alcohol use: Yes     Alcohol/week: 0.0 standard drinks     Frequency: 2-4 times a month     Drug use: No     Sexual activity: Yes     Partners: Female   Lifestyle     Physical activity     Days per week: Not on file     Minutes per session: Not on file     Stress: Not on file   Relationships     Social connections     Talks on phone: Not on file     Gets together: Not on file     Attends [employer] service: Not on file     Active member of club or organization: Not on file     Attends meetings of clubs or  organizations: Not on file     Relationship status: Not on file     Intimate partner violence     Fear of current or ex partner: Not on file     Emotionally abused: Not on file     Physically abused: Not on file     Forced sexual activity: Not on file   Other Topics Concern     Parent/sibling w/ CABG, MI or angioplasty before 65F 55M? Not Asked   Social History Narrative    Tobacco-one half pack per day.  Ethanol-occasional.    and remarried.  Has two children from his first marriage.      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       Review of Systems   Constitutional: Negative for appetite change, chills, diaphoresis, fatigue, fever and unexpected weight change.   HENT: Negative for ear pain, rhinorrhea, sinus pressure, sinus pain, sore throat, trouble swallowing and voice change.    Eyes: Negative for pain, redness and visual disturbance.   Respiratory: Negative for cough, chest tightness, shortness of breath and wheezing.    Cardiovascular: Negative for chest pain, palpitations and leg swelling.   Gastrointestinal: Negative for abdominal distention, abdominal pain, blood in stool, constipation, diarrhea, nausea and vomiting.   Endocrine: Negative for cold intolerance and heat intolerance.   Genitourinary: Negative for difficulty urinating, dysuria, flank pain, frequency and hematuria.   Musculoskeletal: Positive for back pain and myalgias. Negative for joint swelling, neck pain and neck stiffness.   Skin: Negative for rash and wound.   Allergic/Immunologic: Negative for immunocompromised state.   Neurological: Negative for dizziness, tremors, seizures, syncope, speech difficulty, weakness, light-headedness, numbness and headaches.   Hematological: Negative for adenopathy. Does not bruise/bleed easily.   Psychiatric/Behavioral: Negative for agitation, behavioral problems, confusion, hallucinations and sleep disturbance. The patient is not nervous/anxious.         Physical Exam  Vitals signs and nursing note reviewed.   Constitutional:       General: He is not in acute distress.     Appearance: He is well-developed. He is obese. He is not diaphoretic.   HENT:      Head: Normocephalic.      Right Ear: Tympanic membrane, ear canal and external ear normal.      Left Ear: Tympanic membrane, ear canal and external ear normal.      Nose: Nose normal.      Mouth/Throat:      Pharynx: No oropharyngeal exudate.   Eyes:      Conjunctiva/sclera: Conjunctivae normal.      Pupils: Pupils are equal, round, and reactive to light.   Neck:      Musculoskeletal: Normal range of motion and neck supple.      Thyroid: No thyroid mass or thyromegaly.      Vascular: Normal carotid pulses. No carotid bruit or JVD.      Trachea: No tracheal deviation.   Cardiovascular:      Rate and Rhythm: Normal rate and regular rhythm.      Heart sounds: Normal heart sounds. No murmur. No friction rub. No gallop.    Pulmonary:      Effort: Pulmonary effort is normal. No respiratory distress.      Breath sounds: Normal breath sounds. No stridor. No decreased breath sounds, wheezing, rhonchi or rales.   Abdominal:      General: Bowel sounds are normal. There is no distension.      Palpations: Abdomen is soft. There is no mass.      Tenderness: There is no abdominal tenderness. There is no guarding or rebound.      Hernia: No hernia is present.   Musculoskeletal: Normal range of motion.      Right lower leg: Edema present.      Left lower leg: Edema present.      Comments: 1+ lower extremity edema   Lymphadenopathy:      Cervical: No cervical adenopathy.   Skin:     General: Skin is warm and dry.      Findings: No rash.   Neurological:      Mental Status: He is alert and oriented to person, place, and time.      Cranial Nerves: No cranial nerve deficit.      Sensory: No sensory deficit.      Motor: No abnormal muscle tone.      Coordination: Coordination normal.      Deep Tendon Reflexes: Reflexes normal.        Results for orders placed or performed during the hospital encounter of 04/10/20   XR Chest 2 Views     Status: None    Narrative    EXAM:XR CHEST 2 VW     CLINICAL HISTORY: Patient Age  54 years Additional clinical info:  Preoperative examination     COMPARISON: None      TECHNIQUE: 3 views      Impression    IMPRESSION: Aortic calcifications. Chest otherwise normal.    JINA KRAUSE MD   Results for orders placed or performed in visit on 04/10/20   Basic Metabolic Panel     Status: None   Result Value Ref Range    Sodium 138 134 - 144 mmol/L    Potassium 4.2 3.5 - 5.1 mmol/L    Chloride 103 98 - 107 mmol/L    Carbon Dioxide 30 21 - 31 mmol/L    Anion Gap 5 3 - 14 mmol/L    Glucose 105 70 - 105 mg/dL    Urea Nitrogen 16 7 - 25 mg/dL    Creatinine 0.90 0.70 - 1.30 mg/dL    GFR Estimate 87 >60 mL/min/[1.73_m2]    GFR Estimate If Black >90 >60 mL/min/[1.73_m2]    Calcium 9.5 8.6 - 10.3 mg/dL   CBC W PLT No Diff     Status: None   Result Value Ref Range    WBC 5.9 4.0 - 11.0 10e9/L    RBC Count 4.86 4.4 - 5.9 10e12/L    Hemoglobin 15.9 13.3 - 17.7 g/dL    Hematocrit 46.1 40.0 - 53.0 %    MCV 95 78 - 100 fl    MCH 32.7 26.5 - 33.0 pg    MCHC 34.5 31.5 - 36.5 g/dL    RDW 12.9 10.0 - 15.0 %    Platelet Count 184 150 - 450 10e9/L   EKG 12-lead complete w/read - Clinics     Status: None   Result Value Ref Range    Interpretation ECG Click View Image link to view waveform and result      'Assessment:      ICD-10-CM    1. AILEEN on CPAP - uses nightly, finds very helpful  G47.33     Z99.89    2. Polymyalgia rheumatica (H)  M35.3    3. Tobacco abuse  Z72.0    4. Steroid-induced hyperglycemia  R73.9     T38.0X5A    5. Bilateral leg edema  R60.0    6. Symptomatic premature ventricular contractions  I49.3    7. Lumbar pain with radiation down right leg  M54.5    8. Preoperative examination  Z01.818 EKG 12-lead complete w/read - Clinics     XR Chest 2 Views     CBC W PLT No Diff     Basic Metabolic Panel     Basic Metabolic  Panel     CBC W PLT No Diff        Plan: This patient presents for preoperative clearance for lumbar surgery.  He appears to be medically optimized.  His exam today is unremarkable and stable.  Chest x-ray does not show any acute infiltrate or disease.  EKG is normal.  Lab is acceptable.  He is okay for his planned surgical procedure without contraindication.  I did ask him to take his CPAP device with him.  He does not necessarily need to take any of his daily medications the morning prior to the surgery.  Usual medications can be reinstituted postoperatively unless he is instructed otherwise.

## 2020-06-25 DIAGNOSIS — I49.3 SYMPTOMATIC PREMATURE VENTRICULAR CONTRACTIONS: Primary | ICD-10-CM

## 2020-06-25 RX ORDER — METOPROLOL SUCCINATE 25 MG/1
50 TABLET, EXTENDED RELEASE ORAL EVERY EVENING
Qty: 180 TABLET | Refills: 3 | Status: SHIPPED | OUTPATIENT
Start: 2020-06-25 | End: 2021-07-15

## 2020-06-25 NOTE — TELEPHONE ENCOUNTER
Routing refill request to provider for review/approval because:  Medication is reported/historical    LOV: 4/10/2020  Katya Lloyd RN on 6/25/2020 at 3:00 PM

## 2020-07-04 ENCOUNTER — OFFICE VISIT (OUTPATIENT)
Dept: FAMILY MEDICINE | Facility: OTHER | Age: 55
End: 2020-07-04
Attending: NURSE PRACTITIONER
Payer: COMMERCIAL

## 2020-07-04 VITALS
OXYGEN SATURATION: 98 % | HEIGHT: 71 IN | RESPIRATION RATE: 20 BRPM | HEART RATE: 65 BPM | BODY MASS INDEX: 42.5 KG/M2 | WEIGHT: 303.6 LBS | TEMPERATURE: 97.6 F | SYSTOLIC BLOOD PRESSURE: 140 MMHG | DIASTOLIC BLOOD PRESSURE: 86 MMHG

## 2020-07-04 DIAGNOSIS — T63.301A SPIDER BITE WOUND, ACCIDENTAL OR UNINTENTIONAL, INITIAL ENCOUNTER: Primary | ICD-10-CM

## 2020-07-04 PROCEDURE — 90471 IMMUNIZATION ADMIN: CPT | Performed by: NURSE PRACTITIONER

## 2020-07-04 PROCEDURE — 99213 OFFICE O/P EST LOW 20 MIN: CPT | Mod: 25 | Performed by: NURSE PRACTITIONER

## 2020-07-04 PROCEDURE — 90715 TDAP VACCINE 7 YRS/> IM: CPT | Performed by: NURSE PRACTITIONER

## 2020-07-04 RX ORDER — CEPHALEXIN 500 MG/1
500 CAPSULE ORAL 3 TIMES DAILY
Qty: 21 CAPSULE | Refills: 0 | Status: SHIPPED | OUTPATIENT
Start: 2020-07-04 | End: 2020-07-11

## 2020-07-04 ASSESSMENT — PAIN SCALES - GENERAL: PAINLEVEL: NO PAIN (0)

## 2020-07-04 ASSESSMENT — MIFFLIN-ST. JEOR: SCORE: 2229.25

## 2020-07-04 NOTE — NURSING NOTE
Immunization Documentation    Prior to Immunization administration, verified patients identity using patient's name and date of birth. Please see IMMUNIZATIONS  and order for additional information.  Patient / Parent instructed to remain in clinic for 15 minutes and report any adverse reaction to staff immediately.    Was entire vial of medication used? Yes  Vial/Syringe: Single dose vial    Mandy Carlos LPN  7/4/2020   12:15 PM

## 2020-07-04 NOTE — PROGRESS NOTES
HPI:    Gregg Aguayo is a 54 year old male  who presents to [facility] today for spider bite.    States he got a spider bite on his left forearm 3 days ago.  He was out golfing, looking for some extra balls in the grass, felt something crawling on his arm, looked and seen a spider, went to smack it and felt it bite just as he smashed it.    States it was instantly painful with a burning hot poker sensation.  Continues to be minimally painful and itchy.  Oozing.  No numbness or tingling in left upper extremity.  No weakness in left upper extremity.    No topical treatments.  No benadryl.    On Plaquenil for polymyalgia rheumatica      Past Medical History:   Diagnosis Date     Cigarette smoker      Erectile dysfunction      GERD (gastroesophageal reflux disease)      Obesity      AILEEN on CPAP      Peripheral edema      PMR (polymyalgia rheumatica) (H)      Past Surgical History:   Procedure Laterality Date     APPENDECTOMY OPEN  2005     ARTHROPLASTY KNEE Left      ARTHROSCOPY KNEE Left 3/19/2018    Procedure: ARTHROSCOPY KNEE;  Left Knee Arthroscopy, Partial Meniscectomy, Chondroplasty;  Surgeon: Kwabena Patricia DO;  Location: [facility] OR     ATTEMPTED ARTHROSCOPY      knee ? left     COLONOSCOPY  01/29/2014 1/29/2014,Tubular adenoma of rectum - follow up 5 years     COLONOSCOPY  02/01/2019    normal with diverticula, follow up 10 years, 2/1/29     COLONOSCOPY N/A 2/1/2019    normal, follow up 2/1/29     JOINT REPLACEMENT Left 11/2018     RELEASE CARPAL TUNNEL Right 11/22/2017     RELEASE CARPAL TUNNEL Left     12/21/2017,[id number],OTHER,Left     RELEASE TRIGGER FINGER Right 9/7/2018     RELEASE ULNAR NERVE (ELBOW) Left 12/21/2017     Social History     Tobacco Use     Smoking status: Current Every Day Smoker     Packs/day: 0.30     Types: Cigarettes     Smokeless tobacco: Never Used   Substance Use Topics     Alcohol use: Yes     Alcohol/week: 0.0 standard drinks     Frequency: 2-4 times a month     Current Outpatient  Medications   Medication Sig Dispense Refill     acetaminophen (TYLENOL) 650 MG CR tablet Take up to 4 tablet daily for pain as needed       furosemide (LASIX) 20 MG tablet Take 1 tablet (20 mg) by mouth daily 270 tablet 3     gabapentin (NEURONTIN) 100 MG capsule Take 1-2 capsules (100-200 mg) by mouth 4 times daily as needed For nerve pain 120 capsule 3     hydroxychloroquine (PLAQUENIL) 200 MG tablet Take 400 mg by mouth       metFORMIN (GLUCOPHAGE) 500 MG tablet Take 1-2 tablets (500-1,000 mg) by mouth daily (with dinner) While taking oral steroids 180 tablet 3     methylPREDNISolone (MEDROL) 4 MG tablet TAKE 1/2-2 TABLETS BY MOUTH DAILY WITH A MEAL-WEAN DOSE EVERY 2-4 WEEKS FOR  tablet 1     metoprolol succinate ER (TOPROL-XL) 25 MG 24 hr tablet Take 2 tablets (50 mg) by mouth every evening -- adjust dose as needed for heart palpitations 180 tablet 3     omeprazole (PRILOSEC) 40 MG DR capsule Take 1 capsule (40 mg) by mouth daily 90 capsule 3     potassium chloride ER (K-TAB/KLOR-CON) 10 MEQ CR tablet Take 2 tablets (20 mEq) by mouth daily - with food (take on days you use Furosemide) 180 tablet 3     sildenafil (REVATIO) 20 MG tablet Take 1-5 tablets ( mg) by mouth daily as needed (For Erectile Dysfunction - take 30 min to 1 hour prior to [place]) 30 tablet 3     sildenafil (VIAGRA) 100 MG tablet Take 1/4 to 1 tablet 30 min to 4 hours prior to sex for ED 30 tablet 11     traMADol (ULTRAM) 50 MG tablet TAKE 1 TABLET BY MOUTH TWICE DAILY AS NEEDED FOR PAIN--NEED TO FILLEVERY 28 DAYS IF USING REGULARLY. 56 tablet 5     varenicline (CHANTIX) 1 MG tablet Take 1 tablet (1 mg) by mouth 2 times daily (with meals) Quit smoking (Patient not taking: Reported on 7/4/2020) 60 tablet 1     No Known Allergies      Past medical history, past surgical history, current medications and allergies reviewed and accurate to the best of my knowledge.        ROS:  Refer to HPI    BP (!) 140/86 (BP Location: Right arm,  "Patient Position: Sitting, Cuff Size: Adult Large)   Pulse 65   Temp 97.6  F (36.4  C) (Tympanic)   Resp 20   Ht 1.803 m (5' 11\")   Wt 137.7 kg (303 lb 9.6 oz)   SpO2 98%   BMI 42.34 kg/m      EXAM:  General Appearance: Well appearing adult male, non ill appearance, appropriate appearance for age. No acute distress  Eyes: conjunctivae normal without erythema or irritation, corneas clear, no drainage or crusting, no eyelid swelling, pupils equal   Orophayrnx: voice clear.    Respiratory: normal chest wall and respirations.  Normal effort.  No cough appreciated.   Cardiovascular:  CMS intact to left upper extremity, brisk capillary refill, strong radial pulse   Musculoskeletal:  Equal movement of bilateral upper extremities.  Equal movement of bilateral lower extremities.  Normal gait.    Dermatological: left distal dorsal forearm with 1.5 cm circular erythematous based partially de-roofed vesicle with scant clear drainage, no indications of necrosis, lesion is identified by patient as spider bite, see attached photo  Psychological: normal affect, alert, oriented, and pleasant.               ASSESSMENT/PLAN:  1. Spider bite wound, accidental or unintentional, initial encounter    - [facility]-  TDAP VACCINE (BOOSTRIX )    - cephALEXin (KEFLEX) 500 MG capsule; Take 1 capsule (500 mg) by mouth 3 times daily for 7 days  Dispense: 21 capsule; Refill: 0    See attached photo.  No clinical indications of necrosis.    Instructed to wash multiple times daily with soapy water  May use antibiotic ointment  Benadryl Q 6 hours PRN    Discussed warning signs/symptoms indicative of need to f/u - skin discoloration - especially dark colored, increased swelling or pain, etc   Follow up if symptoms worsen or concerns      I explained my diagnostic considerations and recommendations to the patient, who voiced understanding and agreement with the treatment plan. All questions were answered. We discussed potential side effects of any " prescribed or recommended therapies, as well as expectations for response to treatments.    Disclaimer:  This note consists of words and symbols derived from keyboarding, dictation, or using voice recognition software. As a result, there may be errors in the script that have gone undetected. Please consider this when interpreting information found in this note.

## 2020-07-04 NOTE — NURSING NOTE
"Chief Complaint   Patient presents with     Insect Bites     Patient presented tot he clinic with a spider bit on his left forearm that happened three days ago. Area is red and slightly warm to the touch. It has a light yellow drainage coming out of it.     Initial BP (!) 140/86 (BP Location: Right arm, Patient Position: Sitting, Cuff Size: Adult Large)   Pulse 65   Temp 97.6  F (36.4  C) (Tympanic)   Resp 20   Ht 1.803 m (5' 11\")   Wt 137.7 kg (303 lb 9.6 oz)   SpO2 98%   BMI 42.34 kg/m   Estimated body mass index is 42.34 kg/m  as calculated from the following:    Height as of this encounter: 1.803 m (5' 11\").    Weight as of this encounter: 137.7 kg (303 lb 9.6 oz).    Medication Reconciliation: complete      Tameka Oates LPN  "

## 2020-07-04 NOTE — PATIENT INSTRUCTIONS
Wash with soapy water frequently    Start antibiotic    Tetanus updated today     Follow up if skin becomes dark colored, swollen, increased pain, or any concerns

## 2020-09-01 DIAGNOSIS — R12 HEARTBURN: ICD-10-CM

## 2020-09-02 RX ORDER — OMEPRAZOLE 40 MG/1
40 CAPSULE, DELAYED RELEASE ORAL DAILY
Qty: 90 CAPSULE | Refills: 1 | Status: SHIPPED | OUTPATIENT
Start: 2020-09-02 | End: 2021-03-02

## 2020-09-11 ENCOUNTER — OFFICE VISIT (OUTPATIENT)
Dept: INTERNAL MEDICINE | Facility: OTHER | Age: 55
End: 2020-09-11
Attending: INTERNAL MEDICINE
Payer: COMMERCIAL

## 2020-09-11 ENCOUNTER — HOSPITAL ENCOUNTER (OUTPATIENT)
Dept: GENERAL RADIOLOGY | Facility: OTHER | Age: 55
End: 2020-09-11
Attending: INTERNAL MEDICINE
Payer: COMMERCIAL

## 2020-09-11 VITALS
WEIGHT: 303.38 LBS | SYSTOLIC BLOOD PRESSURE: 138 MMHG | RESPIRATION RATE: 20 BRPM | TEMPERATURE: 97.6 F | DIASTOLIC BLOOD PRESSURE: 86 MMHG | HEART RATE: 80 BPM | BODY MASS INDEX: 42.31 KG/M2

## 2020-09-11 DIAGNOSIS — M23.8X1 CREPITUS OF JOINT OF RIGHT KNEE: ICD-10-CM

## 2020-09-11 DIAGNOSIS — R60.0 BILATERAL LEG EDEMA: Primary | ICD-10-CM

## 2020-09-11 DIAGNOSIS — M35.3 POLYMYALGIA RHEUMATICA (H): ICD-10-CM

## 2020-09-11 DIAGNOSIS — R07.81 RIB PAIN ON RIGHT SIDE: ICD-10-CM

## 2020-09-11 DIAGNOSIS — I10 BENIGN ESSENTIAL HYPERTENSION: ICD-10-CM

## 2020-09-11 DIAGNOSIS — Z72.0 TOBACCO ABUSE: ICD-10-CM

## 2020-09-11 PROBLEM — M48.061 LUMBAR FORAMINAL STENOSIS: Status: ACTIVE | Noted: 2020-04-08

## 2020-09-11 PROBLEM — Z98.890 S/P LUMBAR DISCECTOMY: Status: ACTIVE | Noted: 2020-05-18

## 2020-09-11 PROCEDURE — 73560 X-RAY EXAM OF KNEE 1 OR 2: CPT | Mod: LT

## 2020-09-11 PROCEDURE — 99214 OFFICE O/P EST MOD 30 MIN: CPT | Performed by: INTERNAL MEDICINE

## 2020-09-11 RX ORDER — GABAPENTIN 100 MG/1
100-200 CAPSULE ORAL 4 TIMES DAILY PRN
Qty: 120 CAPSULE | Refills: 3 | Status: SHIPPED | OUTPATIENT
Start: 2020-09-11 | End: 2021-09-14

## 2020-09-11 RX ORDER — FUROSEMIDE 20 MG
40 TABLET ORAL DAILY
Qty: 180 TABLET | Refills: 3 | Status: SHIPPED | OUTPATIENT
Start: 2020-09-11 | End: 2021-09-14

## 2020-09-11 RX ORDER — METHYLPREDNISOLONE 4 MG/1
TABLET ORAL
Qty: 100 TABLET | Refills: 1 | Status: CANCELLED | OUTPATIENT
Start: 2020-09-11

## 2020-09-11 RX ORDER — POTASSIUM CHLORIDE 750 MG/1
20 TABLET, EXTENDED RELEASE ORAL DAILY
Qty: 180 TABLET | Refills: 3 | Status: SHIPPED | OUTPATIENT
Start: 2020-09-11 | End: 2021-10-14

## 2020-09-11 ASSESSMENT — ENCOUNTER SYMPTOMS
BACK PAIN: 0
SHORTNESS OF BREATH: 0
WOUND: 0
SLEEP DISTURBANCE: 1
ADENOPATHY: 0
ABDOMINAL PAIN: 0
PALPITATIONS: 1
MYALGIAS: 1
CONFUSION: 0
FEVER: 0
CHILLS: 0
JOINT SWELLING: 1
ARTHRALGIAS: 1
CHEST TIGHTNESS: 0
HEMATURIA: 0
BRUISES/BLEEDS EASILY: 0

## 2020-09-11 ASSESSMENT — PAIN SCALES - GENERAL: PAINLEVEL: MODERATE PAIN (5)

## 2020-09-11 ASSESSMENT — ANXIETY QUESTIONNAIRES
3. WORRYING TOO MUCH ABOUT DIFFERENT THINGS: NOT AT ALL
1. FEELING NERVOUS, ANXIOUS, OR ON EDGE: NOT AT ALL
2. NOT BEING ABLE TO STOP OR CONTROL WORRYING: NOT AT ALL
6. BECOMING EASILY ANNOYED OR IRRITABLE: NOT AT ALL
5. BEING SO RESTLESS THAT IT IS HARD TO SIT STILL: NOT AT ALL
IF YOU CHECKED OFF ANY PROBLEMS ON THIS QUESTIONNAIRE, HOW DIFFICULT HAVE THESE PROBLEMS MADE IT FOR YOU TO DO YOUR WORK, TAKE CARE OF THINGS AT HOME, OR GET ALONG WITH OTHER PEOPLE: NOT DIFFICULT AT ALL
7. FEELING AFRAID AS IF SOMETHING AWFUL MIGHT HAPPEN: NOT AT ALL
GAD7 TOTAL SCORE: 0

## 2020-09-11 ASSESSMENT — PATIENT HEALTH QUESTIONNAIRE - PHQ9
SUM OF ALL RESPONSES TO PHQ QUESTIONS 1-9: 0
5. POOR APPETITE OR OVEREATING: NOT AT ALL

## 2020-09-11 NOTE — NURSING NOTE
"Patient presents to the clinic for right knee pain and right rib pain.  Knee pain and swelling has been present for the past 2 months, patient denies trauma at this time.  Rib pain from hitting a table several weeks ago.      Chief Complaint   Patient presents with     Musculoskeletal Problem       Initial BP (!) 144/86 (BP Location: Right arm, Patient Position: Sitting, Cuff Size: Adult Regular)   Pulse 80   Temp 97.6  F (36.4  C) (Tympanic)   Resp 20   Wt 137.6 kg (303 lb 6 oz)   BMI 42.31 kg/m   Estimated body mass index is 42.31 kg/m  as calculated from the following:    Height as of 7/4/20: 1.803 m (5' 11\").    Weight as of this encounter: 137.6 kg (303 lb 6 oz).  Medication Reconciliation: complete    Mandy Carlos LPN    "
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
## 2020-09-11 NOTE — PATIENT INSTRUCTIONS
1. Bilateral leg edema    -- Dose Increase 9/11/2020:  - potassium chloride ER (K-TAB/KLOR-CON) 10 MEQ CR tablet; Take 2 tablets (20 mEq) by mouth daily - with food (take with Furosemide)  Dispense: 180 tablet; Refill: 3  - furosemide (LASIX) 20 MG tablet; Take 2 tablets (40 mg) by mouth daily -- Dose Increase 9/11/2020  Dispense: 180 tablet; Refill: 3    2. Tobacco abuse  QUIT SMOKING!!     -- Choose a quit date (within 1 month). Quitting smoking abruptly is more successful than gradually cutting back.   -- Tell everyone about it (friends, family, coworkers)   -- Think about when you smoke the most, and what you'll do during those times (eg when in the car, work breaks, etc)     Consider:   -- Start varenicline (Chantix) 1 week before your quit date   -- Start bupropion (Wellbutrin/Zyban) 1 week before your quit date -- Welbutrin 1 pill daily for 1 week then 1 pill twice daily      -- Stop smoking on quit date   -- Starting with quit date, use nicotine lozenges/gum as needed for cravings     -- Quit Plan - Call them in the next 1-2 weeks to help you quit smoking.                        5-681-096-PLAN (6520)                        http://www.quitplan.com   -- http://smokefree.gov/       3. Polymyalgia rheumatica (H)  - gabapentin (NEURONTIN) 100 MG capsule; Take 1-2 capsules (100-200 mg) by mouth 4 times daily as needed For nerve pain  Dispense: 120 capsule; Refill: 3    4. Rib pain on right side  -- likely floating rib fracture. Conservative treatment.     5. Crepitus of joint of right knee  - XR Knee Standing 2v  Bilateral & 2v Right; Future    6. Benign essential hypertension  - furosemide (LASIX) 20 MG tablet; Take 2 tablets (40 mg) by mouth daily -- Dose Increase 9/11/2020  Dispense: 180 tablet; Refill: 3    Return as needed for follow-up with Dr. Ritchie.    Clinic : [phone]  Appointment line: [phone]

## 2020-09-12 ASSESSMENT — ANXIETY QUESTIONNAIRES: GAD7 TOTAL SCORE: 0

## 2020-10-02 ENCOUNTER — OFFICE VISIT (OUTPATIENT)
Dept: ORTHOPEDICS | Facility: OTHER | Age: 55
End: 2020-10-02
Attending: SPECIALIST
Payer: COMMERCIAL

## 2020-10-02 VITALS
WEIGHT: 302 LBS | HEART RATE: 52 BPM | BODY MASS INDEX: 42.12 KG/M2 | SYSTOLIC BLOOD PRESSURE: 138 MMHG | DIASTOLIC BLOOD PRESSURE: 92 MMHG

## 2020-10-02 DIAGNOSIS — M25.561 RIGHT KNEE PAIN, UNSPECIFIED CHRONICITY: Primary | ICD-10-CM

## 2020-10-02 PROCEDURE — 99213 OFFICE O/P EST LOW 20 MIN: CPT | Performed by: SPECIALIST

## 2020-10-02 PROCEDURE — G0463 HOSPITAL OUTPT CLINIC VISIT: HCPCS | Mod: 25

## 2020-10-02 NOTE — PROGRESS NOTES
Patient is here for consult on his right knee.   Lauryn Nolan LPN .....................10/2/2020 8:29 AM

## 2020-10-03 NOTE — PROGRESS NOTES
Visit Date:   10/02/2020      SUBJECTIVE:  Juve Aguayo is a 54-year-old male.  I performed a total knee arthroplasty on the left with good results.  He is here today about his right knee.  He has been having increasing right knee pain.  He has not had a significant injury that he is aware of.  He describes medial joint line tenderness.  He has had some swelling.      OBJECTIVE:  The patient's physical examination today reveals a 54-year-old male.  He is alert and oriented x 3, and appropriate.  Gait and station are appropriate.  He is well groomed and well kempt.  Examination of both lower extremities shows no evidence of obvious varus deformity.  His left knee shows a well-healed anterior incision.  His right knee shows a definite but small effusion.  Range of motion 0-120 degrees.  Probable palpable Baker's cyst.      IMAGING:  Plain film radiographs, standing views of both knees, slightly flexed PA views, lateral view of the right knee and bilateral merchant views reveal mild degenerative changes.      IMPRESSION:  Possible degenerative meniscal tear, right knee.        PLAN:  At this point we have advised consideration of an MRI study.  This will be obtained and I will contact the patient when the results become available.         DUYEN COLVIN MD             D: 10/02/2020   T: 10/02/2020   MT: KENDALL      Name:     RACHEL AGUAOY   MRN:      [id number]        Account:      [id number]   :      1965           Visit Date:   10/02/2020      Document: [id number]

## 2020-10-08 ENCOUNTER — HOSPITAL ENCOUNTER (OUTPATIENT)
Dept: MRI IMAGING | Facility: OTHER | Age: 55
Discharge: HOME | End: 2020-10-08
Attending: SPECIALIST | Admitting: SPECIALIST
Payer: COMMERCIAL

## 2020-10-08 DIAGNOSIS — M25.561 RIGHT KNEE PAIN, UNSPECIFIED CHRONICITY: ICD-10-CM

## 2020-10-08 PROCEDURE — 73721 MRI JNT OF LWR EXTRE W/O DYE: CPT | Mod: RT

## 2020-10-16 ENCOUNTER — TELEPHONE (OUTPATIENT)
Dept: ORTHOPEDICS | Facility: OTHER | Age: 55
End: 2020-10-16

## 2020-10-16 NOTE — TELEPHONE ENCOUNTER
Patient is wondering when he is going to be having surgery.   Please call patient back.  Thank You Sandra Coombs on 10/16/2020 at 2:23 PM

## 2020-10-20 ENCOUNTER — OFFICE VISIT (OUTPATIENT)
Dept: PEDIATRICS | Facility: OTHER | Age: 55
End: 2020-10-20
Attending: INTERNAL MEDICINE
Payer: COMMERCIAL

## 2020-10-20 ENCOUNTER — HOSPITAL ENCOUNTER (OUTPATIENT)
Dept: GENERAL RADIOLOGY | Facility: OTHER | Age: 55
End: 2020-10-20
Attending: INTERNAL MEDICINE
Payer: COMMERCIAL

## 2020-10-20 VITALS
WEIGHT: 303.6 LBS | OXYGEN SATURATION: 97 % | BODY MASS INDEX: 42.34 KG/M2 | TEMPERATURE: 97.3 F | RESPIRATION RATE: 18 BRPM | SYSTOLIC BLOOD PRESSURE: 134 MMHG | DIASTOLIC BLOOD PRESSURE: 80 MMHG | HEART RATE: 71 BPM

## 2020-10-20 DIAGNOSIS — M75.82 ROTATOR CUFF TENDONITIS, LEFT: ICD-10-CM

## 2020-10-20 DIAGNOSIS — M35.3 POLYMYALGIA RHEUMATICA (H): ICD-10-CM

## 2020-10-20 DIAGNOSIS — M75.82 ROTATOR CUFF TENDONITIS, LEFT: Primary | ICD-10-CM

## 2020-10-20 DIAGNOSIS — Z79.52 CURRENT CHRONIC USE OF SYSTEMIC STEROIDS: ICD-10-CM
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
## 2020-10-20 PROCEDURE — 73030 X-RAY EXAM OF SHOULDER: CPT | Mod: LT

## 2020-10-20 PROCEDURE — 99213 OFFICE O/P EST LOW 20 MIN: CPT | Performed by: INTERNAL MEDICINE

## 2020-10-20 ASSESSMENT — PAIN SCALES - GENERAL: PAINLEVEL: EXTREME PAIN (8)

## 2020-10-20 NOTE — PATIENT INSTRUCTIONS
-- XR shoulder today   -- PT consult   -- Consider ortho consult    Patient Education     Understanding Rotator Cuff Tendonitis    Tendons are tough tissues that connect muscles to bone. A group of 4 muscles and their tendons form a  cuff  around the head of the upper arm bone. This is called the rotator cuff. It connects the upper arm to the shoulder blade. It gives the shoulder joint stability and strength.  If tendons are injured or strained, they may become irritated and swollen (inflamed). This is called tendonitis. Rotator cuff tendonitis may cause shoulder pain and loss of function.  What causes rotator cuff tendonitis?  Tendonitis results when the rotator cuff tendons are injured or overworked. The most common cause of injury is repetitive overhead activities. These can be work-related activities such as reaching, pushing, or lifting. Or they can be sports-related activities such as throwing, swimming, or lifting weights.  Symptoms of rotator cuff tendonitis  Pain on the side of the upper arm is the most common symptom. Pain may get worse with overhead movements or when you raise the arm above shoulder level. It may also hurt to lie on the shoulder at night.  Treatment for rotator cuff tendonitis  Treatment may include the following:    Active rest. This lets the rotator cuff heal. Active rest means using your arm and shoulder, but avoiding activities that cause pain, such as reaching overhead or sleeping on the shoulder.    Cold packs. Putting ice packs on the shoulder helps reduce swelling and relieve pain.    Pain medicines. Prescription or over-the-counter pain medicines can help relieve pain and swelling.    Arm and shoulder exercises. These help keep the shoulder joint mobile as it heals. They also help improve the strength of muscles around the joint.  Possible complications  It might be tempting to stop using your shoulder completely to avoid pain. But doing so may lead to a condition called  frozen  shoulder.  To help prevent this, following instructions you are given for active rest and for doing exercises to help your shoulder heal.  When to call your healthcare provider  Call your healthcare provider right away if you have any of these:    Fever of 100.4 F (38 C) or higher, or as directed    Symptoms that don t get better, or get worse    New symptoms   Date Last Reviewed: 3/10/2016    4972-4141 [employer]. [address] [place] [address], [place] [address]. All rights reserved. This information is not intended as a substitute for professional medical care. Always follow your healthcare professional's instructions.

## 2020-10-20 NOTE — PROGRESS NOTES
Subjective  Gregg Aguayo is a 54 year old male who presents for left shoulder pain.  2 days ago he slipped and fell on the ice.  His entire weight impacted the ground on his left shoulder.  Since then he has been having pain on the shoulder.  He has an aching pain from the shoulder all the way to the elbow.  No neck pain.  Worse with lifting his arm up and better with working low.  No history of injury or surgery on that joint.  He does have a history of polymyalgia rheumatica and continues on chronic steroid use.    Problem List/PMH: reviewed in EMR, and made relevant updates today.  Medications: reviewed in EMR, and made relevant updates today.  Allergies: reviewed in EMR, and made relevant updates today.    Social Hx:  Social History     Tobacco Use     Smoking status: Current Every Day Smoker     Packs/day: 0.25     Types: Cigarettes     Smokeless tobacco: Never Used     Tobacco comment: Has Chantix but has not started   Substance Use Topics     Alcohol use: Yes     Alcohol/week: 0.0 standard drinks     Frequency: 2-4 times a month     Drug use: No     Social History     Social History Narrative    Tobacco-one half pack per day.  Ethanol-occasional.    and remarried.  Has two children from his first marriage.      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     I reviewed social history and made relevant updates today.    Family Hx:   Family History   Problem Relation Age of Onset     Genetic Disorder Maternal Grandfather         Genetic,Polymyalgia Rheumatica     Heart Murmur Mother      Polymyalgia rheumatica Mother        Objective  Vitals: reviewed in EMR.  /80 (BP Location: Right arm, Patient Position: Sitting, Cuff Size: Adult Large)   Pulse 71   Temp 97.3  F (36.3  C) (Tympanic)   Resp 18   Wt 137.7 kg (303 lb 9.6 oz)   SpO2 97%   BMI 42.34 kg/m      Gen: Pleasant male, NAD.  HEENT: MMM  Neck: Supple  Pulm: Breathing easily  Neuro: Grossly  intact  Skin: No concerning lesions.  Psychiatric: Normal affect and insight. Does not appear anxious or depressed.  Back: No midline tenderness to palpation or percussion throughout cervical to thoracic spine.  Musculoskeletal: No bony tenderness over the left shoulder.  Mild tenderness to palpation over the supraspinatus area.  No tenderness to palpation over the left clavicle or AC joint area.  Negative speeds sign.  Pain elicited with can test.  Negative Neer sign.      Assessment    ICD-10-CM    1. Rotator cuff tendonitis, left  M75.82 XR Shoulder Left G/E 3 Views     PHYSICAL THERAPY REFERRAL   2. Fall due to slipping on ice or snow, initial encounter  W00.9XXA XR Shoulder Left G/E 3 Views     PHYSICAL THERAPY REFERRAL   3. Polymyalgia rheumatica (H)  M35.3 XR Shoulder Left G/E 3 Views     PHYSICAL THERAPY REFERRAL   4. Current chronic use of systemic steroids  Z79.52 XR Shoulder Left G/E 3 Views     PHYSICAL THERAPY REFERRAL     Orders Placed This Encounter   Procedures     XR Shoulder Left G/E 3 Views     PHYSICAL THERAPY REFERRAL       I believe his syndrome is most consistent with an acute rotator cuff tendinitis.  He may also have some bone bruising with periosteal bruising.  Differential diagnosis also includes an occult fracture, rotator cuff tear, traumatic arthritis, others.    Plan   -- Expected clinical course discussed   -- Medications and their side effects discussed  Patient Instructions      -- XR shoulder today   -- PT consult   -- Consider ortho consult    Patient Education     Understanding Rotator Cuff Tendonitis    Tendons are tough tissues that connect muscles to bone. A group of 4 muscles and their tendons form a  cuff  around the head of the upper arm bone. This is called the rotator cuff. It connects the upper arm to the shoulder blade. It gives the shoulder joint stability and strength.  If tendons are injured or strained, they may become irritated and swollen (inflamed). This is  called tendonitis. Rotator cuff tendonitis may cause shoulder pain and loss of function.  What causes rotator cuff tendonitis?  Tendonitis results when the rotator cuff tendons are injured or overworked. The most common cause of injury is repetitive overhead activities. These can be work-related activities such as reaching, pushing, or lifting. Or they can be sports-related activities such as throwing, swimming, or lifting weights.  Symptoms of rotator cuff tendonitis  Pain on the side of the upper arm is the most common symptom. Pain may get worse with overhead movements or when you raise the arm above shoulder level. It may also hurt to lie on the shoulder at night.  Treatment for rotator cuff tendonitis  Treatment may include the following:    Active rest. This lets the rotator cuff heal. Active rest means using your arm and shoulder, but avoiding activities that cause pain, such as reaching overhead or sleeping on the shoulder.    Cold packs. Putting ice packs on the shoulder helps reduce swelling and relieve pain.    Pain medicines. Prescription or over-the-counter pain medicines can help relieve pain and swelling.    Arm and shoulder exercises. These help keep the shoulder joint mobile as it heals. They also help improve the strength of muscles around the joint.  Possible complications  It might be tempting to stop using your shoulder completely to avoid pain. But doing so may lead to a condition called  frozen shoulder.  To help prevent this, following instructions you are given for active rest and for doing exercises to help your shoulder heal.  When to call your healthcare provider  Call your healthcare provider right away if you have any of these:    Fever of 100.4 F (38 C) or higher, or as directed    Symptoms that don t get better, or get worse    New symptoms   Date Last Reviewed: 3/10/2016    7652-2392 [employer]. [address] [place] [address], [place] [address]. All rights reserved. This  information is not intended as a substitute for professional medical care. Always follow your healthcare professional's instructions.               Return if symptoms worsen or fail to improve.    Signed, Daron Bangura MD, FAAP, FACP  Internal Medicine & Pediatrics

## 2020-10-20 NOTE — NURSING NOTE
"Chief Complaint   Patient presents with     Shoulder Pain     Left      Fall     10/18/2020     Patient is here for left shoulder pain after falling 2 days ago. He slipped on ice and landed on left shoulder.     Initial /80 (BP Location: Right arm, Patient Position: Sitting, Cuff Size: Adult Large)   Pulse 71   Temp 97.3  F (36.3  C) (Tympanic)   Resp 18   Wt 137.7 kg (303 lb 9.6 oz)   SpO2 97%   BMI 42.34 kg/m   Estimated body mass index is 42.34 kg/m  as calculated from the following:    Height as of 7/4/20: 1.803 m (5' 11\").    Weight as of this encounter: 137.7 kg (303 lb 9.6 oz).  Medication Reconciliation: complete    Lili Salinas MA  "

## 2020-10-22 ENCOUNTER — TELEPHONE (OUTPATIENT)
Dept: PEDIATRICS | Facility: OTHER | Age: 55
End: 2020-10-22

## 2020-10-22 NOTE — TELEPHONE ENCOUNTER
"Left message on patient's home phone informing him of Dr. Bangura's response: \"He did not break it. Next step is physical therapy.\" Patient informed referral to physical therapy was sent. They will call him to scheduled an appointment.     Lili Salinas CMA on 10/22/2020 at 10:43 AM      "

## 2020-10-22 NOTE — TELEPHONE ENCOUNTER
See other encounter from same day in regards to results. Patient was informed of the same thing.     Lili Salinas CMA on 10/22/2020 at 4:00 PM

## 2020-10-22 NOTE — TELEPHONE ENCOUNTER
Reason for call: Request for results.    Name of test or procedure: x-ray    Date of test or procedure: 10/20/20    Location of test or procedure: [facility]    Preferred method for responding to this message: Telephone Call    Phone number patient can be reached at: Cell number on file:    Telephone Information:   Mobile [phone]       If we can't reach you directly, may we leave a detailed response at the number you provided?Yes

## 2020-10-22 NOTE — TELEPHONE ENCOUNTER
[facility]-ordering provider for shoulder pain. Pt has not received any results from this visit/xrays. Please call. Thank you.  Charlee Leahy

## 2020-11-23 ENCOUNTER — ALLIED HEALTH/NURSE VISIT (OUTPATIENT)
Dept: FAMILY MEDICINE | Facility: OTHER | Age: 55
End: 2020-11-23
Attending: FAMILY MEDICINE
Payer: COMMERCIAL

## 2020-11-23 DIAGNOSIS — R52 BODY ACHES: ICD-10-CM

## 2020-11-23 DIAGNOSIS — R51.9 HEADACHE: Primary | ICD-10-CM

## 2020-11-23 PROCEDURE — U0003 INFECTIOUS AGENT DETECTION BY NUCLEIC ACID (DNA OR RNA); SEVERE ACUTE RESPIRATORY SYNDROME CORONAVIRUS 2 (SARS-COV-2) (CORONAVIRUS DISEASE [COVID-19]), AMPLIFIED PROBE TECHNIQUE, MAKING USE OF HIGH THROUGHPUT TECHNOLOGIES AS DESCRIBED BY CMS-2020-01-R: HCPCS | Mod: ZL | Performed by: FAMILY MEDICINE

## 2020-11-23 PROCEDURE — 99207 PR NO CHARGE NURSE ONLY: CPT

## 2020-11-23 PROCEDURE — C9803 HOPD COVID-19 SPEC COLLECT: HCPCS

## 2020-11-23 NOTE — PROGRESS NOTES
Patient is here for covid testing for headache, body aches.   Brittany Myles LPN on 11/23/2020 at 11:55 AM

## 2020-11-24 LAB
SARS-COV-2 RNA SPEC QL NAA+PROBE: ABNORMAL
SPECIMEN SOURCE: ABNORMAL

## 2020-11-27 ENCOUNTER — HOSPITAL ENCOUNTER (EMERGENCY)
Facility: OTHER | Age: 55
Discharge: HOME | End: 2020-11-28
Attending: FAMILY MEDICINE | Admitting: FAMILY MEDICINE
Payer: COMMERCIAL

## 2020-11-27 VITALS
HEIGHT: 70 IN | BODY MASS INDEX: 42.23 KG/M2 | RESPIRATION RATE: 18 BRPM | TEMPERATURE: 98 F | WEIGHT: 295 LBS | OXYGEN SATURATION: 96 % | DIASTOLIC BLOOD PRESSURE: 92 MMHG | HEART RATE: 62 BPM | SYSTOLIC BLOOD PRESSURE: 149 MMHG

## 2020-11-27 DIAGNOSIS — G43.009 NONINTRACTABLE MIGRAINE, UNSPECIFIED MIGRAINE TYPE: ICD-10-CM

## 2020-11-27 PROCEDURE — 99284 EMERGENCY DEPT VISIT MOD MDM: CPT | Performed by: FAMILY MEDICINE

## 2020-11-27 PROCEDURE — 99283 EMERGENCY DEPT VISIT LOW MDM: CPT | Performed by: FAMILY MEDICINE

## 2020-11-27 PROCEDURE — 96375 TX/PRO/DX INJ NEW DRUG ADDON: CPT | Performed by: FAMILY MEDICINE

## 2020-11-27 PROCEDURE — 93010 ELECTROCARDIOGRAM REPORT: CPT | Performed by: INTERNAL MEDICINE

## 2020-11-27 PROCEDURE — 258N000003 HC RX IP 258 OP 636: Performed by: FAMILY MEDICINE

## 2020-11-27 PROCEDURE — 96374 THER/PROPH/DIAG INJ IV PUSH: CPT | Performed by: FAMILY MEDICINE

## 2020-11-27 PROCEDURE — 250N000011 HC RX IP 250 OP 636: Performed by: FAMILY MEDICINE

## 2020-11-27 PROCEDURE — 93005 ELECTROCARDIOGRAM TRACING: CPT | Performed by: FAMILY MEDICINE

## 2020-11-27 PROCEDURE — 99284 EMERGENCY DEPT VISIT MOD MDM: CPT | Mod: 25 | Performed by: FAMILY MEDICINE

## 2020-11-27 RX ORDER — SODIUM CHLORIDE 9 MG/ML
INJECTION, SOLUTION INTRAVENOUS CONTINUOUS
Status: DISCONTINUED | OUTPATIENT
Start: 2020-11-27 | End: 2020-11-28 | Stop reason: HOSPADM

## 2020-11-27 RX ORDER — KETOROLAC TROMETHAMINE 30 MG/ML
30 INJECTION, SOLUTION INTRAMUSCULAR; INTRAVENOUS ONCE
Status: COMPLETED | OUTPATIENT
Start: 2020-11-27 | End: 2020-11-27

## 2020-11-27 RX ORDER — DIPHENHYDRAMINE HYDROCHLORIDE 50 MG/ML
25 INJECTION INTRAMUSCULAR; INTRAVENOUS ONCE
Status: COMPLETED | OUTPATIENT
Start: 2020-11-27 | End: 2020-11-27

## 2020-11-27 RX ADMIN — DIPHENHYDRAMINE HYDROCHLORIDE 25 MG: 50 INJECTION, SOLUTION INTRAMUSCULAR; INTRAVENOUS at 23:20

## 2020-11-27 RX ADMIN — SODIUM CHLORIDE 1000 ML: 9 INJECTION, SOLUTION INTRAVENOUS at 23:20

## 2020-11-27 RX ADMIN — PROCHLORPERAZINE EDISYLATE 10 MG: 5 INJECTION INTRAMUSCULAR; INTRAVENOUS at 23:21

## 2020-11-27 RX ADMIN — KETOROLAC TROMETHAMINE 30 MG: 30 INJECTION, SOLUTION INTRAMUSCULAR at 23:21

## 2020-11-27 ASSESSMENT — MIFFLIN-ST. JEOR: SCORE: 2169.36

## 2020-11-27 NOTE — ED AVS SNAPSHOT
[facility]  [address]  [place] [address]  Phone: [phone]  Fax: [phone]                                    Gregg Aguayo   MRN: [id number]    Department: [facility]   Date of Visit: 11/27/2020           After Visit Summary Signature Page    I have received my discharge instructions, and my questions have been answered. I have discussed any challenges I see with this plan with the nurse or doctor.    ..........................................................................................................................................  Patient/Patient Representative Signature      ..........................................................................................................................................  Patient Representative Print Name and Relationship to Patient    ..................................................               ................................................  Date                                   Time    ..........................................................................................................................................  Reviewed by Signature/Title    ...................................................              ..............................................  Date                                               Time          22EPIC Rev 08/18

## 2020-11-28 NOTE — ED TRIAGE NOTES
Patient reports headache starting at 2045, pain is 10/10 going into his jaw. States that he has had this once before two years ago. Took a dose of nyquil but did not help. Covid positive states he has one day left of quarantine.

## 2020-11-28 NOTE — ED PROVIDER NOTES
History     Chief Complaint   Patient presents with     Headache     HPI  Gregg Aguayo is a 55 year old male who presents to the emergency department with headache.  Pain came on slowly, it was not thunderclap in nature.  He had had a similar headache about 2 years ago resolved with migraine type medications.  I did review that note.  Patient gets headaches like this occasionally but only has had coming to the emergency department twice.  Reviewed nurses notes below, similar history is related to me.  Patient reports headache starting at 2045, pain is 10/10 going into his jaw. States that he has had this once before two years ago. Took a dose of nyquil but did not help. Covid positive states he has one day left of quarantine.    Allergies:  No Known Allergies    Problem List:    Patient Active Problem List    Diagnosis Date Noted     Crepitus of joint of right knee 09/11/2020     Priority: Medium     S/P lumbar discectomy 05/18/2020     Priority: Medium     Lumbar pain with radiation down right leg 04/10/2020     Priority: Medium     Lumbar foraminal stenosis 04/08/2020     Priority: Medium     Symptomatic premature ventricular contractions 04/15/2019     Priority: Medium     Heartburn 07/25/2018     Priority: Medium     Steroid-induced hyperglycemia 04/10/2018     Priority: Medium     Psychosexual dysfunction with inhibited sexual excitement 01/29/2018     Priority: Medium     Tobacco abuse 01/29/2018     Priority: Medium     Overview:   IMO Update 10/11       AILEEN on CPAP - uses nightly, finds very helpful 11/20/2017     Priority: Medium     Bilateral leg edema 07/05/2017     Priority: Medium     Polymyalgia rheumatica (H) 11/11/2016     Priority: Medium     Arthralgia of multiple joints 10/26/2016     Priority: Medium     Hypertrophic and atrophic condition of skin 06/07/2012     Priority: Medium        Past Medical History:    Past Medical History:   Diagnosis Date     Cigarette smoker      Erectile  dysfunction      GERD (gastroesophageal reflux disease)      Obesity      AILEEN on CPAP      Peripheral edema      PMR (polymyalgia rheumatica) (H)        Past Surgical History:    Past Surgical History:   Procedure Laterality Date     APPENDECTOMY OPEN  2005     ARTHROPLASTY KNEE Left      ARTHROSCOPY KNEE Left 3/19/2018    Procedure: ARTHROSCOPY KNEE;  Left Knee Arthroscopy, Partial Meniscectomy, Chondroplasty;  Surgeon: Kwabena Patricia DO;  Location: [facility] OR     ATTEMPTED ARTHROSCOPY      knee ? left     COLONOSCOPY  01/29/2014 1/29/2014,Tubular adenoma of rectum - follow up 5 years     COLONOSCOPY  02/01/2019    normal with diverticula, follow up 10 years, 2/1/29     COLONOSCOPY N/A 2/1/2019    normal, follow up 2/1/29     JOINT REPLACEMENT Left 11/2018     RELEASE CARPAL TUNNEL Right 11/22/2017     RELEASE CARPAL TUNNEL Left     12/21/2017,[id number],OTHER,Left     RELEASE TRIGGER FINGER Right 9/7/2018     RELEASE ULNAR NERVE (ELBOW) Left 12/21/2017       Family History:    Family History   Problem Relation Age of Onset     Genetic Disorder Maternal Grandfather         Genetic,Polymyalgia Rheumatica     Heart Murmur Mother      Polymyalgia rheumatica Mother        Social History:  Marital Status:   [2]  Social History     Tobacco Use     Smoking status: Current Every Day Smoker     Packs/day: 0.25     Types: Cigarettes     Smokeless tobacco: Never Used     Tobacco comment: Has Chantix but has not started   Substance Use Topics     Alcohol use: Yes     Alcohol/week: 0.0 standard drinks     Frequency: 2-4 times a month     Drug use: No        Medications:         acetaminophen (TYLENOL) 650 MG CR tablet       furosemide (LASIX) 20 MG tablet       gabapentin (NEURONTIN) 100 MG capsule       traMADol (ULTRAM) 50 MG tablet       hydroxychloroquine (PLAQUENIL) 200 MG tablet       metFORMIN (GLUCOPHAGE) 500 MG tablet       methylPREDNISolone (MEDROL) 4 MG tablet       metoprolol succinate ER (TOPROL-XL) 25 MG 24 hr  "tablet       omeprazole (PRILOSEC) 40 MG DR capsule       potassium chloride ER (K-TAB/KLOR-CON) 10 MEQ CR tablet       sildenafil (REVATIO) 20 MG tablet       sildenafil (VIAGRA) 100 MG tablet          Review of Systems   Constitutional: Negative for fever.   HENT: Negative for congestion.    Eyes: Negative for photophobia and redness.   Respiratory: Negative for shortness of breath.    Cardiovascular: Negative for chest pain.   Gastrointestinal: Negative for abdominal pain.   Genitourinary: Negative for difficulty urinating.   Musculoskeletal: Negative for arthralgias and neck stiffness.   Skin: Negative for color change.   Neurological: Negative for headaches.   Psychiatric/Behavioral: Negative for confusion.       Physical Exam   BP: (!) 149/92  Pulse: 62  Temp: 98  F (36.7  C)  Resp: 18  Height: 177.8 cm (5' 10\")  Weight: 133.8 kg (295 lb)  SpO2: 96 %      Physical Exam  Vitals signs and nursing note reviewed.   Constitutional:       General: He is not in acute distress.     Appearance: He is not diaphoretic.   HENT:      Head: Atraumatic.   Eyes:      General: No scleral icterus.     Pupils: Pupils are equal, round, and reactive to light.   Cardiovascular:      Heart sounds: Normal heart sounds.   Pulmonary:      Effort: No respiratory distress.      Breath sounds: Normal breath sounds.   Abdominal:      General: Bowel sounds are normal.      Palpations: Abdomen is soft.      Tenderness: There is no abdominal tenderness.   Musculoskeletal:         General: No tenderness.   Skin:     General: Skin is warm.      Findings: No rash.         ED Course        Procedures  EKG: Sinus bradycardia rate 56 no significant ST-T changes.  QTc 445 ms.        No results found for this or any previous visit (from the past 24 hour(s)).    Medications   0.9% sodium chloride BOLUS (1,000 mLs Intravenous New Bag 11/27/20 0487)     Followed by   sodium chloride 0.9% infusion (has no administration in time range)   ketorolac " (TORADOL) injection 30 mg (30 mg Intravenous Given 11/27/20 2321)   prochlorperazine (COMPAZINE) injection 10 mg (10 mg Intravenous Given 11/27/20 2321)   diphenhydrAMINE (BENADRYL) injection 25 mg (25 mg Intravenous Given 11/27/20 2320)       Assessments & Plan (with Medical Decision Making)     I have reviewed the nursing notes.    I have reviewed the findings, diagnosis, plan and need for follow up with the patient.  Differential diagnosis includes ACS, CVA, migraine, cluster headache, tension type headache, subarachnoid hemorrhage, cavernous sinus thrombosis, trigeminal neuralgia, paroxysmal hemicrania among others.  Patient had improvement with conservative therapy that was similar to previous therapy 2 years ago.  Patient feels like he can go home.  This is reassuring to me and advanced neuro imaging was not ordered at this time.  Patient should return to the emergency department with worsening symptoms, localization headache chest pain shortness of breath fever or change in pattern of pain.  Patient verbalized understanding plan is agreement he left the ED in improved condition.    New Prescriptions    No medications on file       Final diagnoses:   Nonintractable migraine, unspecified migraine type       11/27/2020   [facility] Center[facility] MD  11/29/20 2690

## 2020-11-29 ASSESSMENT — ENCOUNTER SYMPTOMS
COLOR CHANGE: 0
FEVER: 0
NECK STIFFNESS: 0
DIFFICULTY URINATING: 0
ARTHRALGIAS: 0
CONFUSION: 0
ABDOMINAL PAIN: 0
PHOTOPHOBIA: 0
EYE REDNESS: 0
HEADACHES: 0
SHORTNESS OF BREATH: 0

## 2020-11-30 LAB — INTERPRETATION ECG - MUSE: NORMAL

## 2020-12-04 ENCOUNTER — OFFICE VISIT (OUTPATIENT)
Dept: FAMILY MEDICINE | Facility: OTHER | Age: 55
End: 2020-12-04
Attending: NURSE PRACTITIONER
Payer: COMMERCIAL

## 2020-12-04 VITALS
TEMPERATURE: 95.7 F | HEART RATE: 52 BPM | HEIGHT: 71 IN | OXYGEN SATURATION: 98 % | RESPIRATION RATE: 20 BRPM | WEIGHT: 303.6 LBS | BODY MASS INDEX: 42.5 KG/M2 | SYSTOLIC BLOOD PRESSURE: 132 MMHG | DIASTOLIC BLOOD PRESSURE: 86 MMHG

## 2020-12-04 DIAGNOSIS — E66.01 MORBID OBESITY (H): ICD-10-CM

## 2020-12-04 DIAGNOSIS — M35.3 POLYMYALGIA RHEUMATICA (H): ICD-10-CM

## 2020-12-04 DIAGNOSIS — G47.33 OSA ON CPAP: Primary | ICD-10-CM

## 2020-12-04 DIAGNOSIS — Z01.818 PREOP GENERAL PHYSICAL EXAM: ICD-10-CM

## 2020-12-04 DIAGNOSIS — M25.50 ARTHRALGIA OF MULTIPLE JOINTS: ICD-10-CM

## 2020-12-04 LAB
ANION GAP SERPL CALCULATED.3IONS-SCNC: 7 MMOL/L (ref 3–14)
BASOPHILS # BLD AUTO: 0.1 10E9/L (ref 0–0.2)
BASOPHILS NFR BLD AUTO: 1.3 %
BUN SERPL-MCNC: 16 MG/DL (ref 7–25)
CALCIUM SERPL-MCNC: 9.4 MG/DL (ref 8.6–10.3)
CHLORIDE SERPL-SCNC: 105 MMOL/L (ref 98–107)
CO2 SERPL-SCNC: 27 MMOL/L (ref 21–31)
CREAT SERPL-MCNC: 0.91 MG/DL (ref 0.7–1.3)
DIFFERENTIAL METHOD BLD: NORMAL
EOSINOPHIL # BLD AUTO: 0.3 10E9/L (ref 0–0.7)
EOSINOPHIL NFR BLD AUTO: 4.1 %
ERYTHROCYTE [DISTWIDTH] IN BLOOD BY AUTOMATED COUNT: 12.3 % (ref 10–15)
GFR SERPL CREATININE-BSD FRML MDRD: 86 ML/MIN/{1.73_M2}
GLUCOSE SERPL-MCNC: 105 MG/DL (ref 70–105)
HCT VFR BLD AUTO: 45.6 % (ref 40–53)
HGB BLD-MCNC: 15.8 G/DL (ref 13.3–17.7)
IMM GRANULOCYTES # BLD: 0 10E9/L (ref 0–0.4)
IMM GRANULOCYTES NFR BLD: 0.5 %
LYMPHOCYTES # BLD AUTO: 1.7 10E9/L (ref 0.8–5.3)
LYMPHOCYTES NFR BLD AUTO: 26.2 %
MCH RBC QN AUTO: 32 PG (ref 26.5–33)
MCHC RBC AUTO-ENTMCNC: 34.6 G/DL (ref 31.5–36.5)
MCV RBC AUTO: 93 FL (ref 78–100)
MONOCYTES # BLD AUTO: 0.6 10E9/L (ref 0–1.3)
MONOCYTES NFR BLD AUTO: 9.8 %
NEUTROPHILS # BLD AUTO: 3.7 10E9/L (ref 1.6–8.3)
NEUTROPHILS NFR BLD AUTO: 58.1 %
PLATELET # BLD AUTO: 210 10E9/L (ref 150–450)
POTASSIUM SERPL-SCNC: 4.5 MMOL/L (ref 3.5–5.1)
RBC # BLD AUTO: 4.93 10E12/L (ref 4.4–5.9)
SODIUM SERPL-SCNC: 139 MMOL/L (ref 134–144)
WBC # BLD AUTO: 6.3 10E9/L (ref 4–11)

## 2020-12-04 PROCEDURE — 85025 COMPLETE CBC W/AUTO DIFF WBC: CPT | Mod: ZL | Performed by: NURSE PRACTITIONER

## 2020-12-04 PROCEDURE — 36415 COLL VENOUS BLD VENIPUNCTURE: CPT | Mod: ZL | Performed by: NURSE PRACTITIONER

## 2020-12-04 PROCEDURE — 80048 BASIC METABOLIC PNL TOTAL CA: CPT | Mod: ZL | Performed by: NURSE PRACTITIONER

## 2020-12-04 PROCEDURE — 99214 OFFICE O/P EST MOD 30 MIN: CPT | Performed by: NURSE PRACTITIONER

## 2020-12-04 ASSESSMENT — PAIN SCALES - GENERAL: PAINLEVEL: MODERATE PAIN (4)

## 2020-12-04 ASSESSMENT — MIFFLIN-ST. JEOR: SCORE: 2224.25

## 2020-12-04 NOTE — H&P (VIEW-ONLY)
[facility]  [address]  [place] [address]  Phone: [phone]  Fax: [phone]  Primary Provider: Reinier Ritchie  Pre-op Performing Provider: DEANN COOK    PREOPERATIVE EVALUATION:  Today's date: 12/4/2020    Gregg Aguayo is a 55 year old male who presents for a preoperative evaluation.    Surgical Information:  Surgery/Procedure: right knee scope  Surgery Location: [facility]  Surgeon: Dr. Grissom  Surgery Date: 12/11/20  Time of Surgery:   Where patient plans to recover: At home with family  Fax number for surgical facility: [facility]    Type of Anesthesia Anticipated: General    Subjective     HPI related to upcoming procedure:     Patient has upcoming right knee arthroscopy for chronic right knee pain.  He does have a diagnosis of polymyalgia rheumatica.  Whenever he is taking prednisone he will take Metformin to prevent diabetes.  He states he has not needed the prednisone for quite some time.  Overall he is feeling well.  He did have COVID-19, tested positive on 1123.  Symptoms were body aches and loss of taste and smell.  He reports he is 11 days out and has been cleared by department of health.  He no longer is having any symptoms at this time.  He does have a repeat Covid test scheduled for 12/7/2020 for upcoming surgery.    Health Care Directive:  Patient does not have a Health Care Directive or Living Will: Discussed advance care planning with patient; information given to patient to review.    Preoperative Review of :  [place]  reviewed.  No concerning findings noted.      Review of Systems  Complete review of systems was obtained.  All pertinent positives and negatives are noted above.    Patient Active Problem List    Diagnosis Date Noted     Morbid obesity (H) 12/04/2020     Priority: Medium     Crepitus of joint of right knee 09/11/2020     Priority: Medium     S/P lumbar discectomy 05/18/2020     Priority: Medium     Lumbar pain with radiation  down right leg 04/10/2020     Priority: Medium     Lumbar foraminal stenosis 04/08/2020     Priority: Medium     Symptomatic premature ventricular contractions 04/15/2019     Priority: Medium     Heartburn 07/25/2018     Priority: Medium     Steroid-induced hyperglycemia 04/10/2018     Priority: Medium     Psychosexual dysfunction with inhibited sexual excitement 01/29/2018     Priority: Medium     Tobacco abuse 01/29/2018     Priority: Medium     Overview:   IMO Update 10/11       AILEEN on CPAP - uses nightly, finds very helpful 11/20/2017     Priority: Medium     Bilateral leg edema 07/05/2017     Priority: Medium     Polymyalgia rheumatica (H) 11/11/2016     Priority: Medium     Arthralgia of multiple joints 10/26/2016     Priority: Medium     Hypertrophic and atrophic condition of skin 06/07/2012     Priority: Medium      Past Medical History:   Diagnosis Date     Cigarette smoker      Erectile dysfunction      GERD (gastroesophageal reflux disease)      Obesity      AILEEN on CPAP      Peripheral edema      PMR (polymyalgia rheumatica) (H)      Past Surgical History:   Procedure Laterality Date     APPENDECTOMY OPEN  2005     ARTHROPLASTY KNEE Left      ARTHROSCOPY KNEE Left 3/19/2018    Procedure: ARTHROSCOPY KNEE;  Left Knee Arthroscopy, Partial Meniscectomy, Chondroplasty;  Surgeon: Kwabena Patricia DO;  Location: [facility] OR     ATTEMPTED ARTHROSCOPY      knee ? left     COLONOSCOPY  01/29/2014 1/29/2014,Tubular adenoma of rectum - follow up 5 years     COLONOSCOPY  02/01/2019    normal with diverticula, follow up 10 years, 2/1/29     COLONOSCOPY N/A 2/1/2019    normal, follow up 2/1/29     JOINT REPLACEMENT Left 11/2018     RELEASE CARPAL TUNNEL Right 11/22/2017     RELEASE CARPAL TUNNEL Left     12/21/2017,[id number],OTHER,Left     RELEASE TRIGGER FINGER Right 9/7/2018     RELEASE ULNAR NERVE (ELBOW) Left 12/21/2017     Current Outpatient Medications   Medication Sig Dispense Refill     acetaminophen (TYLENOL) 650 MG CR  tablet Take up to 4 tablet daily for pain as needed       furosemide (LASIX) 20 MG tablet Take 2 tablets (40 mg) by mouth daily -- Dose Increase 9/11/2020 180 tablet 3     hydroxychloroquine (PLAQUENIL) 200 MG tablet Take 400 mg by mouth       metoprolol succinate ER (TOPROL-XL) 25 MG 24 hr tablet Take 2 tablets (50 mg) by mouth every evening -- adjust dose as needed for heart palpitations 180 tablet 3     omeprazole (PRILOSEC) 40 MG DR capsule TAKE 1 CAPSULE (40 MG) BY MOUTH DAILY 90 capsule 1     traMADol (ULTRAM) 50 MG tablet TAKE 1 TABLET BY MOUTH TWICE DAILY AS NEEDED FOR PAIN--NEED TO FILLEVERY 28 DAYS IF USING REGULARLY. 56 tablet 5     gabapentin (NEURONTIN) 100 MG capsule Take 1-2 capsules (100-200 mg) by mouth 4 times daily as needed For nerve pain (Patient not taking: Reported on 12/4/2020) 120 capsule 3     metFORMIN (GLUCOPHAGE) 500 MG tablet Take 1-2 tablets (500-1,000 mg) by mouth daily (with dinner) While taking oral steroids (Patient not taking: Reported on 12/4/2020) 180 tablet 3     methylPREDNISolone (MEDROL) 4 MG tablet TAKE 1/2-2 TABLETS BY MOUTH DAILY WITH A MEAL-WEAN DOSE EVERY 2-4 WEEKS FOR PMR (Patient not taking: Reported on 12/4/2020) 100 tablet 1     potassium chloride ER (K-TAB/KLOR-CON) 10 MEQ CR tablet Take 2 tablets (20 mEq) by mouth daily - with food (take with Furosemide) (Patient not taking: Reported on 12/4/2020) 180 tablet 3     sildenafil (REVATIO) 20 MG tablet Take 1-5 tablets ( mg) by mouth daily as needed (For Erectile Dysfunction - take 30 min to 1 hour prior to [place]) (Patient not taking: Reported on 12/4/2020) 30 tablet 3     sildenafil (VIAGRA) 100 MG tablet Take 1/4 to 1 tablet 30 min to 4 hours prior to sex for ED (Patient not taking: Reported on 12/4/2020) 30 tablet 11       No Known Allergies     Social History     Tobacco Use     Smoking status: Current Every Day Smoker     Packs/day: 0.25     Types: Cigarettes     Smokeless tobacco: Never Used      "Tobacco comment: Has Chantix but has not started   Substance Use Topics     Alcohol use: Yes     Alcohol/week: 0.0 standard drinks     Frequency: 2-4 times a month     Family History   Problem Relation Age of Onset     Genetic Disorder Maternal Grandfather         Genetic,Polymyalgia Rheumatica     Heart Murmur Mother      Polymyalgia rheumatica Mother      History   Drug Use No         Objective     /86 (BP Location: Right arm, Patient Position: Sitting, Cuff Size: Adult Regular)   Pulse 52   Temp 95.7  F (35.4  C) (Temporal)   Resp 20   Ht 1.803 m (5' 11\")   Wt 137.7 kg (303 lb 9.6 oz)   SpO2 98%   BMI 42.34 kg/m      Physical Exam    GENERAL APPEARANCE: healthy, alert and no distress     EYES: EOMI,  PERRL     HENT: ear canals and TM's normal and nose and mouth without ulcers or lesions     NECK: no adenopathy, no asymmetry, masses, or scars and thyroid normal to palpation     RESP: lungs clear to auscultation - no rales, rhonchi or wheezes     CV: regular rates and rhythm, normal S1 S2, no S3 or S4 and no murmur, click or rub     ABDOMEN:  soft, nontender, no HSM or masses and bowel sounds normal     MS: extremities normal- no gross deformities noted, no evidence of inflammation in joints, FROM in all extremities.     SKIN: no suspicious lesions or rashes     NEURO: Normal strength and tone, sensory exam grossly normal, mentation intact and speech normal     PSYCH: mentation appears normal. and affect normal/bright     LYMPHATICS: No cervical adenopathy    Recent Labs   Lab Test 04/10/20  1033   HGB 15.9         POTASSIUM 4.2   CR 0.90        Diagnostics:  Results for orders placed or performed in visit on 12/04/20   Basic Metabolic Panel     Status: None   Result Value Ref Range    Sodium 139 134 - 144 mmol/L    Potassium 4.5 3.5 - 5.1 mmol/L    Chloride 105 98 - 107 mmol/L    Carbon Dioxide 27 21 - 31 mmol/L    Anion Gap 7 3 - 14 mmol/L    Glucose 105 70 - 105 mg/dL    Urea Nitrogen " 16 7 - 25 mg/dL    Creatinine 0.91 0.70 - 1.30 mg/dL    GFR Estimate 86 >60 mL/min/[1.73_m2]    GFR Estimate If Black >90 >60 mL/min/[1.73_m2]    Calcium 9.4 8.6 - 10.3 mg/dL   CBC and Differential     Status: None   Result Value Ref Range    WBC 6.3 4.0 - 11.0 10e9/L    RBC Count 4.93 4.4 - 5.9 10e12/L    Hemoglobin 15.8 13.3 - 17.7 g/dL    Hematocrit 45.6 40.0 - 53.0 %    MCV 93 78 - 100 fl    MCH 32.0 26.5 - 33.0 pg    MCHC 34.6 31.5 - 36.5 g/dL    RDW 12.3 10.0 - 15.0 %    Platelet Count 210 150 - 450 10e9/L    Diff Method Automated Method     % Neutrophils 58.1 %    % Lymphocytes 26.2 %    % Monocytes 9.8 %    % Eosinophils 4.1 %    % Basophils 1.3 %    % Immature Granulocytes 0.5 %    Absolute Neutrophil 3.7 1.6 - 8.3 10e9/L    Absolute Lymphocytes 1.7 0.8 - 5.3 10e9/L    Absolute Monocytes 0.6 0.0 - 1.3 10e9/L    Absolute Eosinophils 0.3 0.0 - 0.7 10e9/L    Absolute Basophils 0.1 0.0 - 0.2 10e9/L    Abs Immature Granulocytes 0.0 0 - 0.4 10e9/L        No EKG this visit, completed in the last 90 days.    Revised Cardiac Risk Index (RCRI):  The patient has the following serious cardiovascular risks for perioperative complications:   - No serious cardiac risks = 0 points     RCRI Interpretation: 1 point: Class II (low risk - 0.9% complication rate)         Assessment & Plan   The proposed surgical procedure is considered INTERMEDIATE risk.    Problem List Items Addressed This Visit        Nervous and Auditory    Arthralgia of multiple joints    Polymyalgia rheumatica (H)       Respiratory    AILEEN on CPAP - uses nightly, finds very helpful - Primary       Digestive    Morbid obesity (H)      Other Visit Diagnoses     Preop general physical exam        Relevant Orders    CBC and Differential (Completed)    Basic Metabolic Panel (Completed)             Risks and Recommendations:  The patient has the following additional risks and recommendations for perioperative complications:   - Consult Hospitalist / IM to assist  with post-op medical management   - Morbid obesity (BMI >40)  Obstructive Sleep Apnea:       Medication Instructions:  Patient is to take all scheduled medications on the day of surgery    RECOMMENDATION:  APPROVAL GIVEN to proceed with proposed procedure, without further diagnostic evaluation.    Signed Electronically by: TREV Bejarano CNP    Copy of this evaluation report is provided to requesting physician.    Preop [facility] Preop Guidelines    Revised Cardiac Risk Index

## 2020-12-10 ENCOUNTER — ANESTHESIA EVENT (OUTPATIENT)
Dept: SURGERY | Facility: OTHER | Age: 55
End: 2020-12-10
Payer: COMMERCIAL

## 2020-12-11 ENCOUNTER — HOSPITAL ENCOUNTER (OUTPATIENT)
Facility: OTHER | Age: 55
Discharge: HOME | End: 2020-12-11
Attending: SPECIALIST | Admitting: SPECIALIST
Payer: COMMERCIAL

## 2020-12-11 ENCOUNTER — ANESTHESIA (OUTPATIENT)
Dept: SURGERY | Facility: OTHER | Age: 55
End: 2020-12-11
Payer: COMMERCIAL

## 2020-12-11 VITALS
SYSTOLIC BLOOD PRESSURE: 134 MMHG | DIASTOLIC BLOOD PRESSURE: 103 MMHG | HEIGHT: 71 IN | HEART RATE: 52 BPM | TEMPERATURE: 97.3 F | WEIGHT: 303 LBS | BODY MASS INDEX: 42.42 KG/M2 | RESPIRATION RATE: 16 BRPM | OXYGEN SATURATION: 95 %

## 2020-12-11 DIAGNOSIS — S83.241D TEAR OF MEDIAL MENISCUS OF RIGHT KNEE, CURRENT, UNSPECIFIED TEAR TYPE, SUBSEQUENT ENCOUNTER: Primary | ICD-10-CM

## 2020-12-11 PROCEDURE — 999N000136 HC STATISTIC PRE PROC ASSESS II: Performed by: SPECIALIST

## 2020-12-11 PROCEDURE — 250N000011 HC RX IP 250 OP 636: Performed by: SPECIALIST

## 2020-12-11 PROCEDURE — 258N000001 HC RX 258: Performed by: SPECIALIST

## 2020-12-11 PROCEDURE — 761N000007 HC RECOVERY PHASE 2 EACH 15 MINS: Performed by: SPECIALIST

## 2020-12-11 PROCEDURE — 250N000009 HC RX 250: Performed by: NURSE ANESTHETIST, CERTIFIED REGISTERED

## 2020-12-11 PROCEDURE — 29881 ARTHRS KNE SRG MNISECTMY M/L: CPT | Performed by: NURSE ANESTHETIST, CERTIFIED REGISTERED

## 2020-12-11 PROCEDURE — 272N000001 HC OR GENERAL SUPPLY STERILE: Performed by: SPECIALIST

## 2020-12-11 PROCEDURE — 258N000003 HC RX IP 258 OP 636: Performed by: NURSE ANESTHETIST, CERTIFIED REGISTERED

## 2020-12-11 PROCEDURE — 360N000021 HC SURGERY LEVEL 3 EA 15 ADDTL MIN: Performed by: SPECIALIST

## 2020-12-11 PROCEDURE — 250N000011 HC RX IP 250 OP 636: Performed by: NURSE ANESTHETIST, CERTIFIED REGISTERED

## 2020-12-11 PROCEDURE — 360N000020 HC SURGERY LEVEL 3 1ST 30 MIN: Performed by: SPECIALIST

## 2020-12-11 PROCEDURE — 250N000003 HC SEVOFLURANE, EA 15 MIN: Performed by: SPECIALIST

## 2020-12-11 PROCEDURE — 370N000002 HC ANESTHESIA TECHNICAL FEE, EACH ADDTL 15 MIN: Performed by: SPECIALIST

## 2020-12-11 PROCEDURE — 761N000003 HC RECOVERY PHASE 1 LEVEL 2 FIRST HR: Performed by: SPECIALIST

## 2020-12-11 PROCEDURE — 370N000001 HC ANESTHESIA TECHNICAL FEE, 1ST 30 MIN: Performed by: SPECIALIST

## 2020-12-11 PROCEDURE — 29881 ARTHRS KNE SRG MNISECTMY M/L: CPT | Mod: RT | Performed by: SPECIALIST

## 2020-12-11 RX ORDER — ONDANSETRON 2 MG/ML
4 INJECTION INTRAMUSCULAR; INTRAVENOUS EVERY 30 MIN PRN
Status: DISCONTINUED | OUTPATIENT
Start: 2020-12-11 | End: 2020-12-11 | Stop reason: HOSPADM

## 2020-12-11 RX ORDER — FENTANYL CITRATE 50 UG/ML
25-50 INJECTION, SOLUTION INTRAMUSCULAR; INTRAVENOUS
Status: DISCONTINUED | OUTPATIENT
Start: 2020-12-11 | End: 2020-12-11 | Stop reason: HOSPADM

## 2020-12-11 RX ORDER — NALOXONE HYDROCHLORIDE 0.4 MG/ML
0.4 INJECTION, SOLUTION INTRAMUSCULAR; INTRAVENOUS; SUBCUTANEOUS
Status: DISCONTINUED | OUTPATIENT
Start: 2020-12-11 | End: 2020-12-11 | Stop reason: HOSPADM

## 2020-12-11 RX ORDER — KETAMINE HYDROCHLORIDE 10 MG/ML
INJECTION INTRAMUSCULAR; INTRAVENOUS PRN
Status: DISCONTINUED | OUTPATIENT
Start: 2020-12-11 | End: 2020-12-11

## 2020-12-11 RX ORDER — MEPERIDINE HYDROCHLORIDE 50 MG/ML
12.5 INJECTION INTRAMUSCULAR; INTRAVENOUS; SUBCUTANEOUS
Status: DISCONTINUED | OUTPATIENT
Start: 2020-12-11 | End: 2020-12-11 | Stop reason: HOSPADM

## 2020-12-11 RX ORDER — PROPOFOL 10 MG/ML
INJECTION, EMULSION INTRAVENOUS PRN
Status: DISCONTINUED | OUTPATIENT
Start: 2020-12-11 | End: 2020-12-11

## 2020-12-11 RX ORDER — ONDANSETRON 4 MG/1
4 TABLET, ORALLY DISINTEGRATING ORAL EVERY 30 MIN PRN
Status: DISCONTINUED | OUTPATIENT
Start: 2020-12-11 | End: 2020-12-11 | Stop reason: HOSPADM

## 2020-12-11 RX ORDER — DEXAMETHASONE SODIUM PHOSPHATE 4 MG/ML
INJECTION, SOLUTION INTRA-ARTICULAR; INTRALESIONAL; INTRAMUSCULAR; INTRAVENOUS; SOFT TISSUE PRN
Status: DISCONTINUED | OUTPATIENT
Start: 2020-12-11 | End: 2020-12-11

## 2020-12-11 RX ORDER — NALOXONE HYDROCHLORIDE 0.4 MG/ML
0.2 INJECTION, SOLUTION INTRAMUSCULAR; INTRAVENOUS; SUBCUTANEOUS
Status: DISCONTINUED | OUTPATIENT
Start: 2020-12-11 | End: 2020-12-11 | Stop reason: HOSPADM

## 2020-12-11 RX ORDER — SODIUM CHLORIDE, SODIUM LACTATE, POTASSIUM CHLORIDE, CALCIUM CHLORIDE 600; 310; 30; 20 MG/100ML; MG/100ML; MG/100ML; MG/100ML
INJECTION, SOLUTION INTRAVENOUS CONTINUOUS
Status: DISCONTINUED | OUTPATIENT
Start: 2020-12-11 | End: 2020-12-11 | Stop reason: HOSPADM

## 2020-12-11 RX ORDER — BUPIVACAINE HYDROCHLORIDE 2.5 MG/ML
INJECTION, SOLUTION EPIDURAL; INFILTRATION; INTRACAUDAL PRN
Status: DISCONTINUED | OUTPATIENT
Start: 2020-12-11 | End: 2020-12-11 | Stop reason: HOSPADM

## 2020-12-11 RX ORDER — CEFAZOLIN SODIUM IN 0.9 % NACL 3 G/100 ML
3 INTRAVENOUS SOLUTION, PIGGYBACK (ML) INTRAVENOUS
Status: COMPLETED | OUTPATIENT
Start: 2020-12-11 | End: 2020-12-11

## 2020-12-11 RX ORDER — PROPOFOL 10 MG/ML
INJECTION, EMULSION INTRAVENOUS CONTINUOUS PRN
Status: DISCONTINUED | OUTPATIENT
Start: 2020-12-11 | End: 2020-12-11

## 2020-12-11 RX ORDER — FENTANYL CITRATE 50 UG/ML
INJECTION, SOLUTION INTRAMUSCULAR; INTRAVENOUS PRN
Status: DISCONTINUED | OUTPATIENT
Start: 2020-12-11 | End: 2020-12-11

## 2020-12-11 RX ORDER — GLYCOPYRROLATE 0.2 MG/ML
INJECTION, SOLUTION INTRAMUSCULAR; INTRAVENOUS PRN
Status: DISCONTINUED | OUTPATIENT
Start: 2020-12-11 | End: 2020-12-11

## 2020-12-11 RX ORDER — ONDANSETRON 2 MG/ML
INJECTION INTRAMUSCULAR; INTRAVENOUS PRN
Status: DISCONTINUED | OUTPATIENT
Start: 2020-12-11 | End: 2020-12-11

## 2020-12-11 RX ORDER — KETOROLAC TROMETHAMINE 30 MG/ML
INJECTION, SOLUTION INTRAMUSCULAR; INTRAVENOUS PRN
Status: DISCONTINUED | OUTPATIENT
Start: 2020-12-11 | End: 2020-12-11

## 2020-12-11 RX ORDER — LIDOCAINE HYDROCHLORIDE 20 MG/ML
INJECTION, SOLUTION INFILTRATION; PERINEURAL PRN
Status: DISCONTINUED | OUTPATIENT
Start: 2020-12-11 | End: 2020-12-11

## 2020-12-11 RX ORDER — HYDROCODONE BITARTRATE AND ACETAMINOPHEN 5; 325 MG/1; MG/1
1 TABLET ORAL
Status: DISCONTINUED | OUTPATIENT
Start: 2020-12-11 | End: 2020-12-11 | Stop reason: HOSPADM

## 2020-12-11 RX ORDER — LIDOCAINE 40 MG/G
CREAM TOPICAL
Status: DISCONTINUED | OUTPATIENT
Start: 2020-12-11 | End: 2020-12-11 | Stop reason: HOSPADM

## 2020-12-11 RX ORDER — HYDROCODONE BITARTRATE AND ACETAMINOPHEN 5; 325 MG/1; MG/1
1-2 TABLET ORAL EVERY 4 HOURS PRN
Qty: 15 TABLET | Refills: 0 | Status: SHIPPED | OUTPATIENT
Start: 2020-12-11 | End: 2021-09-14

## 2020-12-11 RX ORDER — HYDROMORPHONE HYDROCHLORIDE 1 MG/ML
.3-.5 INJECTION, SOLUTION INTRAMUSCULAR; INTRAVENOUS; SUBCUTANEOUS EVERY 10 MIN PRN
Status: DISCONTINUED | OUTPATIENT
Start: 2020-12-11 | End: 2020-12-11 | Stop reason: HOSPADM

## 2020-12-11 RX ADMIN — Medication 50 MG: at 08:25

## 2020-12-11 RX ADMIN — FENTANYL CITRATE 50 MCG: 50 INJECTION, SOLUTION INTRAMUSCULAR; INTRAVENOUS at 08:23

## 2020-12-11 RX ADMIN — LIDOCAINE HYDROCHLORIDE 0.1 ML: 10 INJECTION, SOLUTION EPIDURAL; INFILTRATION; INTRACAUDAL; PERINEURAL at 07:45

## 2020-12-11 RX ADMIN — MIDAZOLAM 2 MG: 1 INJECTION INTRAMUSCULAR; INTRAVENOUS at 08:23

## 2020-12-11 RX ADMIN — LIDOCAINE HYDROCHLORIDE 80 MG: 20 INJECTION, SOLUTION INFILTRATION; PERINEURAL at 08:25

## 2020-12-11 RX ADMIN — GLYCOPYRROLATE 0.2 MG: 0.2 INJECTION, SOLUTION INTRAMUSCULAR; INTRAVENOUS at 09:16

## 2020-12-11 RX ADMIN — SODIUM CHLORIDE, POTASSIUM CHLORIDE, SODIUM LACTATE AND CALCIUM CHLORIDE 10 ML/HR: 600; 310; 30; 20 INJECTION, SOLUTION INTRAVENOUS at 07:44

## 2020-12-11 RX ADMIN — PROPOFOL 100 MCG/KG/MIN: 10 INJECTION, EMULSION INTRAVENOUS at 08:25

## 2020-12-11 RX ADMIN — FENTANYL CITRATE 50 MCG: 50 INJECTION, SOLUTION INTRAMUSCULAR; INTRAVENOUS at 08:58

## 2020-12-11 RX ADMIN — ONDANSETRON 4 MG: 2 INJECTION INTRAMUSCULAR; INTRAVENOUS at 08:28

## 2020-12-11 RX ADMIN — SODIUM CHLORIDE, POTASSIUM CHLORIDE, SODIUM LACTATE AND CALCIUM CHLORIDE: 600; 310; 30; 20 INJECTION, SOLUTION INTRAVENOUS at 09:12

## 2020-12-11 RX ADMIN — DEXAMETHASONE SODIUM PHOSPHATE 4 MG: 4 INJECTION, SOLUTION INTRA-ARTICULAR; INTRALESIONAL; INTRAMUSCULAR; INTRAVENOUS; SOFT TISSUE at 08:28

## 2020-12-11 RX ADMIN — PROPOFOL 200 MG: 10 INJECTION, EMULSION INTRAVENOUS at 08:25

## 2020-12-11 RX ADMIN — KETOROLAC TROMETHAMINE 30 MG: 30 INJECTION, SOLUTION INTRAMUSCULAR at 08:28

## 2020-12-11 RX ADMIN — Medication 3 G: at 08:28

## 2020-12-11 ASSESSMENT — LIFESTYLE VARIABLES: TOBACCO_USE: 1

## 2020-12-11 ASSESSMENT — MIFFLIN-ST. JEOR: SCORE: 2221.53

## 2020-12-11 NOTE — ANESTHESIA PROCEDURE NOTES
Airway   Date/Time: 12/11/2020 8:26 AM   Patient location during procedure: OR    Staff -   CRNA: Benjamin Miner APRN CRNA  Performed By: CRNA    Consent for Airway   Urgency: elective    Indications and Patient Condition  Indications for airway management: monica-procedural  Induction type:intravenousMask difficulty assessment: 0 - not attempted    Final Airway Details  Final airway type: supraglottic airway        Post intubation assessment   Placement verified by: capnometry, equal breath sounds and chest rise   Number of attempts at approach: 1  Number of other approaches attempted: 0  Ease of procedure: easy  Dentition: Intact and Unchanged

## 2020-12-11 NOTE — OP NOTE
Procedure Date: 12/11/2020      PREOPERATIVE DIAGNOSIS:  Right knee medial meniscal tear with degenerative change.      POSTOPERATIVE DIAGNOSIS:  Right knee medial meniscal tear with degenerative change.      PROCEDURE PERFORMED:  Right knee arthroscopy with partial medial meniscectomy and debridement of medial femoral condyle and patellofemoral joint.      SURGEON:  Monster Grissom MD      ASSISTANT:  Nathanael Lloyd MD      ANESTHESIA:  General.      INDICATIONS:  This is a 55-year-old male with ongoing mechanical symptoms of his right knee.  He ultimately failed nonoperative treatment and was offered surgical treatment as outlined above.  Risks, complications and benefits were reviewed and he elected to proceed.      DESCRIPTION OF PROCEDURE:  Following medical clearance, the patient was brought to the operating room and placed on the operating table.  General anesthesia was induced.  Pneumatic tourniquet was placed about the right lower extremity and the right lower extremity was then placed in an arthroscopy poole and prepped and draped in the normal sterile manner.  A timeout procedure was performed confirming the surgical site, patient and procedure.        An anterolateral portal was established and a diagnostic arthroscopy of the right knee was performed with following results.  ACL and PCL were intact.  Medial joint space showed grade 3 changes of the medial femoral condyle with fibrillation.  No full-thickness cartilage loss was noted.  The midbody and posterior horn of the medial meniscus had a displaced complex tear extending to the meniscal root.  The lateral meniscus was pristine.  The patellofemoral joint showed fibrillation.  An anteromedial portal was established using a combination of basket forceps and a full-radius shaver.  A partial medial meniscectomy was performed extending from the midbody to the posterior horn.  Following this, the full-radius shaver was used to smooth the medial  femoral condyle as well as the patellofemoral joint.  No loose body in either gutter.  Once completed, the scope was withdrawn and portals were closed with nylon suture.  A sterile dressing was applied.        The skilled first assistant was used for retraction and assisting with positioning and carrying out the procedure in a safe and effective manner.         DUYEN COLVIN MD             D: 2020   T: 2020   MT: CHRISTINA      Name:     RACHEL PLUMMER   MRN:      [id number]        Account:        [id number]   :      1965           Procedure Date: 2020      Document: [id number]

## 2020-12-11 NOTE — BRIEF OP NOTE
[facility]    Brief Operative Note    Pre-operative diagnosis: Knee pain [M25.569]  Post-operative diagnosis Same as pre-operative diagnosis    Procedure: Procedure(s):  Rt Knee Arthroscopy, partial Medial Meniscectomy Debridement.  Surgeon: Surgeon(s) and Role:     * Monster Grissom MD - Primary     * Nathanael Lloyd PA - Assisting  Anesthesia: General   Estimated blood loss: * No values recorded between 12/11/2020  8:56 AM and 12/11/2020  9:25 AM *  Drains: None  Specimens: * No specimens in log *  Findings:   None.  Complications: None.  Implants: * No implants in log *

## 2020-12-11 NOTE — DISCHARGE INSTRUCTIONS
[facility]  Adult Discharge Orders & Instructions      For 24 hours after surgery:  1. Get plenty of rest.  A responsible adult must stay with you for at least 24 hours after you leave the hospital.   2. You may feel lightheaded.  IF so, sit for a few minutes before standing.  Have someone help you get up.   3. You may have a slight fever. Call the doctor if your fever is over 101 F (38.3 C) (taken under the tongue) or lasts longer than 24 hours.  4. You may have a dry mouth, a sore throat, muscle aches or trouble sleeping.  These should go away after 24 hours.  5. Do not make important or legal decisions.  6.   Do not drive or use heavy equipment.  If you have weakness or tingling, don't drive or use heavy equipment until this feeling goes away.                                                                                                                                                                           To contact a doctor, call    [phone]______________    Surgeon Contact Information  If you have questions or concerns related to your procedure please contact your surgeon through [facility] at [phone].

## 2020-12-11 NOTE — ANESTHESIA PREPROCEDURE EVALUATION
Anesthesia Pre-Procedure Evaluation    Patient: Gregg Aguayo   MRN: [id number] : 1965          Preoperative Diagnosis: Knee pain [M25.569]    Procedure(s):  Rt Knee Arthroscopy, partial Medial Meniscectomy Debridement.    Past Medical History:   Diagnosis Date     Cigarette smoker      Erectile dysfunction      GERD (gastroesophageal reflux disease)      Obesity      AILEEN on CPAP      Peripheral edema      PMR (polymyalgia rheumatica) (H)      Past Surgical History:   Procedure Laterality Date     APPENDECTOMY OPEN       ARTHROPLASTY KNEE Left      ARTHROSCOPY KNEE Left 3/19/2018    Procedure: ARTHROSCOPY KNEE;  Left Knee Arthroscopy, Partial Meniscectomy, Chondroplasty;  Surgeon: Kwabena Patricia DO;  Location: [facility] OR     ATTEMPTED ARTHROSCOPY      knee ? left     COLONOSCOPY  2014,Tubular adenoma of rectum - follow up 5 years     COLONOSCOPY  2019    normal with diverticula, follow up 10 years, 27     COLONOSCOPY N/A 2019    normal, follow up 27     JOINT REPLACEMENT Left 2018     RELEASE CARPAL TUNNEL Right 2017     RELEASE CARPAL TUNNEL Left     2017,[id number],OTHER,Left     RELEASE TRIGGER FINGER Right 2018     RELEASE ULNAR NERVE (ELBOW) Left 2017       Anesthesia Evaluation     .             ROS/MED HX    ENT/Pulmonary:     (+)sleep apnea, tobacco use, uses CPAP , . .    Neurologic:  - neg neurologic ROS     Cardiovascular:     (+) Dyslipidemia, hypertension----. : . . . :. Irregular Heartbeat/Palpitations, .       METS/Exercise Tolerance:  >4 METS   Hematologic:  - neg hematologic  ROS       Musculoskeletal:   (+) arthritis,  -       GI/Hepatic:     (+) GERD Asymptomatic on medication,       Renal/Genitourinary:  - ROS Renal section negative       Endo: Comment: BMI> 42    (+) Obesity, .      Psychiatric:  - neg psychiatric ROS       Infectious Disease:  - neg infectious disease ROS       Malignancy:      - no malignancy   Other:    - neg  "other ROS                      Physical Exam  Normal systems: cardiovascular and pulmonary    Airway   Mallampati: IV  TM distance: >3 FB  Neck ROM: limited    Dental   (+) chipped and caps    Cardiovascular   Rhythm and rate: regular and normal      Pulmonary    breath sounds clear to auscultation            Lab Results   Component Value Date    WBC 6.3 12/04/2020    HGB 15.8 12/04/2020    HCT 45.6 12/04/2020     12/04/2020    CRP 0.0 07/25/2018    SED 12 (H) 11/06/2018     12/04/2020    POTASSIUM 4.5 12/04/2020    CHLORIDE 105 12/04/2020    CO2 27 12/04/2020    BUN 16 12/04/2020    CR 0.91 12/04/2020     12/04/2020    ARCHIE 9.4 12/04/2020    ALBUMIN 4.3 11/06/2018    PROTTOTAL 6.8 11/06/2018    ALT 30 11/06/2018    AST 24 11/06/2018    ALKPHOS 51 11/06/2018    BILITOTAL 0.7 11/06/2018       Preop Vitals  BP Readings from Last 3 Encounters:   12/04/20 132/86   11/27/20 (!) 149/92   10/20/20 134/80    Pulse Readings from Last 3 Encounters:   12/04/20 52   11/27/20 62   10/20/20 71      Resp Readings from Last 3 Encounters:   12/04/20 20   11/27/20 18   10/20/20 18    SpO2 Readings from Last 3 Encounters:   12/04/20 98%   11/27/20 96%   10/20/20 97%      Temp Readings from Last 1 Encounters:   12/04/20 95.7  F (35.4  C) (Temporal)    Ht Readings from Last 1 Encounters:   12/04/20 1.803 m (5' 11\")      Wt Readings from Last 1 Encounters:   12/04/20 137.7 kg (303 lb 9.6 oz)    Estimated body mass index is 42.34 kg/m  as calculated from the following:    Height as of 12/4/20: 1.803 m (5' 11\").    Weight as of 12/4/20: 137.7 kg (303 lb 9.6 oz).       Anesthesia Plan      History & Physical Review      ASA Status:  3 .    NPO Status:  > 6 hours    Plan for General            Postoperative Care      Consents  Anesthetic plan, risks, benefits and alternatives discussed with:  Patient..                 David Kellerman, APRN CRNA  "

## 2020-12-11 NOTE — ANESTHESIA CARE TRANSFER NOTE
Patient: Gregg Aguayo    Procedure(s):  Rt Knee Arthroscopy, partial Medial Meniscectomy Debridement.    Diagnosis: Knee pain [M25.569]  Diagnosis Additional Information: No value filed.    Anesthesia Type:   General     Note:  Airway :Face Mask  Patient transferred to:PACU  Handoff Report: Identifed the Patient, Identified the Reponsible Provider, Reviewed the pertinent medical history, Discussed the surgical course, Reviewed Intra-OP anesthesia mangement and issues during anesthesia, Set expectations for post-procedure period and Allowed opportunity for questions and acknowledgement of understanding      Vitals: (Last set prior to Anesthesia Care Transfer)    CRNA VITALS  12/11/2020 0855 - 12/11/2020 0929      12/11/2020             Resp Rate (set):  10                Electronically Signed By: TREV Lafleur CRNA  December 11, 2020  9:29 AM

## 2020-12-11 NOTE — ANESTHESIA POSTPROCEDURE EVALUATION
Patient: Gregg Aguayo    Procedure(s):  Rt Knee Arthroscopy, partial Medial Meniscectomy Debridement.    Diagnosis:Knee pain [M25.569]  Diagnosis Additional Information: No value filed.    Anesthesia Type:  General    Note:  Anesthesia Post Evaluation    Patient location during evaluation: PACU  Patient participation: Able to fully participate in evaluation  Level of consciousness: awake and alert  Pain management: adequate  Airway patency: patent  Cardiovascular status: acceptable  Respiratory status: acceptable  Hydration status: acceptable  PONV: none     Anesthetic complications: None          Last vitals:  Vitals:    12/11/20 0945 12/11/20 0948 12/11/20 1000   BP: 120/78 (!) 125/92 (!) 111/93   Pulse: (!) 47 50 56   Resp: 16     Temp:      SpO2: 96% 96% 96%         Electronically Signed By: TREV Lafleur CRNA  December 11, 2020  10:18 AM

## 2020-12-11 NOTE — OR NURSING
PACU Transfer Note    Gregg Aguayo was transferred to DSU via cart.  Equipment used for transport:  none.  Accompanied by:  RN  Prescriptions were: with patient    PACU Respiratory Event Documentation     1) Episodes of Apnea greater than or equal to 10 seconds: 0    2) Bradypnea - less than 8 breaths per minute: 0    3) Pain score on 0 to 10 scale: 0    4) Pain-sedation mismatch (yes or no): no    5) Repeated 02 desaturation less than 90% (yes or no): no    Anesthesia notified? (yes or no): no    Any of the above events occuring repeatedly in separate 30 minute intervals may be considered recurrent PACU respiratory events.    Patient stable and meets phase 1 discharge criteria for transport from PACU.

## 2020-12-11 NOTE — OR NURSING
Patient has been discharged to home at 1100 via wheelchair accompanied by RN    Written discharge instructions were provided to pateint.  Prescriptions were sent to [facility] to be picked up. He states pain is tolerable, and denies any dizziness or nausea upon discharge.      Patient and adult caring for them verbalize understanding of discharge instructions including no driving until tomorrow and no longer taking narcotic pain medications - no operating mechanical equipment and no making any important decisions.They understand reason for discharge, and necessary follow-up appointments.      Flor Jenkins RN

## 2020-12-18 ENCOUNTER — OFFICE VISIT (OUTPATIENT)
Dept: ORTHOPEDICS | Facility: OTHER | Age: 55
End: 2020-12-18
Attending: SPECIALIST
Payer: COMMERCIAL

## 2020-12-18 DIAGNOSIS — M25.561 RIGHT KNEE PAIN, UNSPECIFIED CHRONICITY: Primary | ICD-10-CM

## 2020-12-18 PROCEDURE — 99024 POSTOP FOLLOW-UP VISIT: CPT | Performed by: SPECIALIST

## 2020-12-18 NOTE — PROGRESS NOTES
Visit Date:   2020      Rachel returns for followup status post right knee arthroscopy with partial medial meniscectomy.  Date of surgery was 2020.  He is back today, overall doing well.  His pain has improved significantly.      PHYSICAL EXAMINATION:  Today, confirms his arthroscopy portals to be healing nicely.  There is no evidence of infection.  He has a small effusion.  The arthroscopy report was reviewed with the patient.      IMPRESSION:  Status post right knee arthroscopy with partial medial meniscectomy and debridement of patellofemoral joint.  At this point I think the patient is doing well.  We will add anti-inflammatories, ice, elevation.  We will reassess him in about 4 weeks, if he has any persistent symptoms, we would consider a single corticosteroid injection.         DUYEN COLVIN MD             D: 2020   T: 2020   MT: MODE      Name:     RACHEL PLUMMER   MRN:      [id number]        Account:      [id number]   :      1965           Visit Date:   2020      Document: [id number]

## 2020-12-18 NOTE — PROGRESS NOTES
Patient is here for follow up on his right knee scope.  Lauryn Nolan LPN .....................12/18/2020 9:43 AM

## 2020-12-27 ENCOUNTER — HEALTH MAINTENANCE LETTER (OUTPATIENT)
Age: 55
End: 2020-12-27

## 2021-02-19 ENCOUNTER — OFFICE VISIT (OUTPATIENT)
Dept: ORTHOPEDICS | Facility: OTHER | Age: 56
End: 2021-02-19
Attending: PODIATRIST
Payer: COMMERCIAL

## 2021-02-19 DIAGNOSIS — M25.561 RIGHT KNEE PAIN, UNSPECIFIED CHRONICITY: Primary | ICD-10-CM

## 2021-02-19 PROCEDURE — 250N000009 HC RX 250: Performed by: SPECIALIST

## 2021-02-19 PROCEDURE — 99024 POSTOP FOLLOW-UP VISIT: CPT | Performed by: PHYSICIAN ASSISTANT

## 2021-02-19 PROCEDURE — 20610 DRAIN/INJ JOINT/BURSA W/O US: CPT | Mod: RT | Performed by: PHYSICIAN ASSISTANT

## 2021-02-19 PROCEDURE — 250N000011 HC RX IP 250 OP 636: Performed by: SPECIALIST

## 2021-02-19 RX ORDER — LIDOCAINE HYDROCHLORIDE 10 MG/ML
1 INJECTION, SOLUTION EPIDURAL; INFILTRATION; INTRACAUDAL; PERINEURAL ONCE
Status: COMPLETED | OUTPATIENT
Start: 2021-02-19 | End: 2021-02-19

## 2021-02-19 RX ORDER — BUPIVACAINE HYDROCHLORIDE 5 MG/ML
1 INJECTION, SOLUTION EPIDURAL; INTRACAUDAL ONCE
Status: DISCONTINUED | OUTPATIENT
Start: 2021-02-19 | End: 2021-02-19 | Stop reason: CLARIF

## 2021-02-19 RX ORDER — TRIAMCINOLONE ACETONIDE 40 MG/ML
40 INJECTION, SUSPENSION INTRA-ARTICULAR; INTRAMUSCULAR ONCE
Status: COMPLETED | OUTPATIENT
Start: 2021-02-19 | End: 2021-02-19

## 2021-02-19 RX ADMIN — TRIAMCINOLONE ACETONIDE 40 MG: 40 INJECTION, SUSPENSION INTRA-ARTICULAR; INTRAMUSCULAR at 10:03

## 2021-02-19 RX ADMIN — LIDOCAINE HYDROCHLORIDE 1 ML: 10 INJECTION, SOLUTION EPIDURAL; INFILTRATION; INTRACAUDAL; PERINEURAL at 10:03

## 2021-02-19 NOTE — PROGRESS NOTES
Patient is here for follow up on his right knee.   Lauryn Nolan LPN .....................2/19/2021 9:14 AM

## 2021-02-19 NOTE — PROGRESS NOTES
Visit Date:   2021      HISTORY OF PRESENT ILLNESS:  Gregg returns for followup status post right knee arthroscopy with partial medial meniscectomy.  Date of surgery was 2020.  He is back today complaining of continued pain in the right knee.  He does not feel like he had a significant improvement since the procedure.  He is coming in today requesting some options.      PHYSICAL EXAMINATION:  Today reveals well-healed arthroscopic portals.  There is no evidence of infection.  He has about 0 degrees of extension and 120 degrees of flexion today.  There is a prepatellar bursal swelling.  He is otherwise neurovascularly intact.      ASSESSMENT:  Status post right knee arthroscopy with partial medial meniscectomy.  Date of surgery was 2020.      PLAN:  Our plan today is to proceed with a right knee injection.  We are going to wrap his leg with an Ace wrap and see if this will get rid of his prepatellar bursitis.  He will up with us on an as-needed basis.  His previous cortisone injections left knee offered him little relief, so we will see if this works out for him.  Ultimately, he will go onto a total knee.  This was all discussed with Dr. Colvin.  One mL of Kenalog and 3 mL of 0.5% Marcaine were drop injected into the right knee under sterile conditions through a medial approach.         DUYEN COLVIN MD       As dictated by RUTH ANN RETANA PA-C            D: 2021   T: 2021   MT:       Name:     GREGG PLUMMER   MRN:      [id number]        Account:      [id number]   :      1965           Visit Date:   2021      Document: [id number]

## 2021-02-28 DIAGNOSIS — T38.0X5A STEROID-INDUCED HYPERGLYCEMIA: ICD-10-CM

## 2021-02-28 DIAGNOSIS — R12 HEARTBURN: ICD-10-CM

## 2021-02-28 DIAGNOSIS — R73.9 STEROID-INDUCED HYPERGLYCEMIA: ICD-10-CM

## 2021-03-02 RX ORDER — OMEPRAZOLE 40 MG/1
40 CAPSULE, DELAYED RELEASE ORAL DAILY
Qty: 90 CAPSULE | Refills: 1 | Status: SHIPPED | OUTPATIENT
Start: 2021-03-02 | End: 2021-08-27

## 2021-03-11 ENCOUNTER — OFFICE VISIT (OUTPATIENT)
Dept: FAMILY MEDICINE | Facility: OTHER | Age: 56
End: 2021-03-11
Attending: FAMILY MEDICINE
Payer: COMMERCIAL

## 2021-03-11 VITALS
RESPIRATION RATE: 20 BRPM | OXYGEN SATURATION: 98 % | DIASTOLIC BLOOD PRESSURE: 74 MMHG | SYSTOLIC BLOOD PRESSURE: 138 MMHG | TEMPERATURE: 96.3 F | WEIGHT: 298 LBS | BODY MASS INDEX: 41.56 KG/M2 | HEART RATE: 75 BPM

## 2021-03-11 DIAGNOSIS — L91.8 SKIN TAG: Primary | ICD-10-CM

## 2021-03-11 PROCEDURE — 11201 RMVL SKIN TAGS EA ADDL 10: CPT | Performed by: FAMILY MEDICINE

## 2021-03-11 PROCEDURE — 0001A PR COVID VAC PFIZER DIL RECON 30 MCG/0.3 ML IM: CPT

## 2021-03-11 PROCEDURE — 91300 PR COVID VAC PFIZER DIL RECON 30 MCG/0.3 ML IM: CPT

## 2021-03-11 PROCEDURE — 11200 RMVL SKIN TAGS UP TO&INC 15: CPT | Performed by: FAMILY MEDICINE

## 2021-03-11 ASSESSMENT — PAIN SCALES - GENERAL: PAINLEVEL: MODERATE PAIN (5)

## 2021-03-11 NOTE — PROGRESS NOTES
SUBJECTIVE:   Gregg Aguayo is a 55 year old male who presents to clinic today for the following health issues: Skin tags    Patient arrives here for skin tags.  He would like one removed on each nipple.  And he has 13 in the groin 6 on one side 7 on the other that he would like removed.  Patient reports a long history of skin tags that come and go.        Patient Active Problem List    Diagnosis Date Noted     Morbid obesity (H) 12/04/2020     Priority: Medium     Crepitus of joint of right knee 09/11/2020     Priority: Medium     S/P lumbar discectomy 05/18/2020     Priority: Medium     Lumbar pain with radiation down right leg 04/10/2020     Priority: Medium     Lumbar foraminal stenosis 04/08/2020     Priority: Medium     Symptomatic premature ventricular contractions 04/15/2019     Priority: Medium     Heartburn 07/25/2018     Priority: Medium     Steroid-induced hyperglycemia 04/10/2018     Priority: Medium     Psychosexual dysfunction with inhibited sexual excitement 01/29/2018     Priority: Medium     Tobacco abuse 01/29/2018     Priority: Medium     Overview:   IMO Update 10/11       AILEEN on CPAP - uses nightly, finds very helpful 11/20/2017     Priority: Medium     Bilateral leg edema 07/05/2017     Priority: Medium     Polymyalgia rheumatica (H) 11/11/2016     Priority: Medium     Arthralgia of multiple joints 10/26/2016     Priority: Medium     Hypertrophic and atrophic condition of skin 06/07/2012     Priority: Medium     Past Medical History:   Diagnosis Date     Cigarette smoker      Erectile dysfunction      GERD (gastroesophageal reflux disease)      Obesity      AILEEN on CPAP      Peripheral edema      PMR (polymyalgia rheumatica) (H)         Review of Systems     OBJECTIVE:     /74   Pulse 75   Temp 96.3  F (35.7  C)   Resp 20   Wt 135.2 kg (298 lb)   SpO2 98%   BMI 41.56 kg/m    Body mass index is 41.56 kg/m .  Physical Exam  Constitutional:       Appearance: Normal appearance.    Skin:     Comments: He has 2 small skin tags one on each nipple.  And as noted 6 on the right groin and 8 on the left   Neurological:      Mental Status: He is alert.         Diagnostic Test Results:  none     ASSESSMENT/PLAN:         1. Skin tag  Areas were prepped with alcohol.  Anesthesia obtained with lidocaine without epi.  Each tag was injected with lidocaine.  Anesthesia obtained.  Removed with an iris scissors.  Patient tolerated the procedure well  - REMOVAL OF SKIN TAGS, FIRST 15      Dariel Ann MD  [facility]

## 2021-03-11 NOTE — NURSING NOTE
Patient here for skin tags that are irritated one on each nipple and noted ones when he sits down. Medication Reconciliation: complete.    July Edwards LPN  3/11/2021 8:56 AM

## 2021-03-31 ENCOUNTER — TELEPHONE (OUTPATIENT)
Dept: INTERNAL MEDICINE | Facility: OTHER | Age: 56
End: 2021-03-31

## 2021-03-31 ENCOUNTER — OFFICE VISIT (OUTPATIENT)
Dept: INTERNAL MEDICINE | Facility: OTHER | Age: 56
End: 2021-03-31
Attending: INTERNAL MEDICINE
Payer: COMMERCIAL

## 2021-03-31 VITALS
SYSTOLIC BLOOD PRESSURE: 132 MMHG | DIASTOLIC BLOOD PRESSURE: 78 MMHG | RESPIRATION RATE: 16 BRPM | BODY MASS INDEX: 41.98 KG/M2 | HEART RATE: 80 BPM | OXYGEN SATURATION: 97 % | WEIGHT: 301 LBS | TEMPERATURE: 96.9 F

## 2021-03-31 DIAGNOSIS — E66.01 MORBID OBESITY (H): ICD-10-CM

## 2021-03-31 DIAGNOSIS — Z72.0 TOBACCO ABUSE: ICD-10-CM

## 2021-03-31 DIAGNOSIS — Z71.85 VACCINE COUNSELING: ICD-10-CM

## 2021-03-31 DIAGNOSIS — K62.5 RECTAL BLEEDING: ICD-10-CM

## 2021-03-31 DIAGNOSIS — K62.89 RECTAL PAIN: ICD-10-CM

## 2021-03-31 DIAGNOSIS — M35.3 POLYMYALGIA RHEUMATICA (H): ICD-10-CM

## 2021-03-31 DIAGNOSIS — Z02.89 PAIN MEDICATION AGREEMENT: ICD-10-CM

## 2021-03-31 DIAGNOSIS — Z12.5 ENCOUNTER FOR SCREENING FOR MALIGNANT NEOPLASM OF PROSTATE: ICD-10-CM

## 2021-03-31 DIAGNOSIS — Z00.00 ANNUAL PHYSICAL EXAM: Primary | ICD-10-CM

## 2021-03-31 DIAGNOSIS — M54.50 INTERMITTENT LOW BACK PAIN: ICD-10-CM

## 2021-03-31 DIAGNOSIS — Z98.890 S/P LUMBAR DISCECTOMY: ICD-10-CM

## 2021-03-31 DIAGNOSIS — G47.33 OSA ON CPAP: ICD-10-CM

## 2021-03-31 DIAGNOSIS — E78.2 MIXED HYPERLIPIDEMIA: ICD-10-CM

## 2021-03-31 PROBLEM — M48.061 LUMBAR FORAMINAL STENOSIS: Status: RESOLVED | Noted: 2020-04-08 | Resolved: 2021-03-31

## 2021-03-31 PROBLEM — M79.604 LUMBAR PAIN WITH RADIATION DOWN RIGHT LEG: Status: RESOLVED | Noted: 2020-04-10 | Resolved: 2021-03-31

## 2021-03-31 LAB
CHOLEST SERPL-MCNC: 197 MG/DL
HDLC SERPL-MCNC: 38 MG/DL (ref 23–92)
LDLC SERPL CALC-MCNC: 104 MG/DL
NONHDLC SERPL-MCNC: 159 MG/DL
PSA SERPL-ACNC: 0.31 NG/ML
TRIGL SERPL-MCNC: 275 MG/DL

## 2021-03-31 PROCEDURE — 99396 PREV VISIT EST AGE 40-64: CPT | Performed by: INTERNAL MEDICINE

## 2021-03-31 PROCEDURE — G0103 PSA SCREENING: HCPCS | Mod: ZL | Performed by: INTERNAL MEDICINE

## 2021-03-31 PROCEDURE — 80061 LIPID PANEL: CPT | Mod: ZL | Performed by: INTERNAL MEDICINE

## 2021-03-31 PROCEDURE — 36415 COLL VENOUS BLD VENIPUNCTURE: CPT | Mod: ZL | Performed by: INTERNAL MEDICINE

## 2021-03-31 ASSESSMENT — ENCOUNTER SYMPTOMS
CONFUSION: 0
SHORTNESS OF BREATH: 0
PALPITATIONS: 1
JOINT SWELLING: 1
HEMATURIA: 0
BLOOD IN STOOL: 1
WOUND: 0
SLEEP DISTURBANCE: 1
ARTHRALGIAS: 1
BRUISES/BLEEDS EASILY: 0
ADENOPATHY: 0
MYALGIAS: 1
BACK PAIN: 0
CHEST TIGHTNESS: 0
CHILLS: 0
ABDOMINAL PAIN: 0
FEVER: 0

## 2021-03-31 ASSESSMENT — PAIN SCALES - GENERAL
PAINLEVEL: MODERATE PAIN (5)
PAINLEVEL: SEVERE PAIN (7)

## 2021-03-31 NOTE — PROGRESS NOTES
Mr. Aguayo is a 55 year old male who presents to the clinic for evaluation of the following problems:      ICD-10-CM    1. Annual physical exam  Z00.00    2. Rectal pain  K62.89    3. Rectal bleeding  K62.5    4. Pain medication agreement-opioid/nonopiod agreement 3-31-21  Z02.89    5. Morbid obesity (H)  E66.01    6. Tobacco abuse  Z72.0    7. AILEEN on CPAP - uses nightly, finds very helpful  G47.33     Z99.89    8. Polymyalgia rheumatica (H)  M35.3    9. Intermittent low back pain  M54.5    10. S/P lumbar discectomy  Z98.890    11. Encounter for screening for malignant neoplasm of prostate  Z12.5 PSA Screen [facility]     PSA Screen [facility]   12. Mixed hyperlipidemia  E78.2 Lipid Panel     Lipid Panel     HPI  Patient presents for annual physical exam as well as follow-up of multiple issues.    Rectal bleeding.  Reports about a month ago had 2 episodes of some blood in his stool.  It occurred again one time a couple days later and has not noted any new blood since that time.  He had a normal colonoscopy in 2019, 2 years ago.  Other than some diverticulosis.    He states that he has been having some rectal bleeding starting about a month ago as well.  This happens intermittently.  Most recent was yesterday.    I spoke with surgery clinic, they advised since the bleeding is stopped he had a negative colonoscopy 2 years ago, if he develops any more bleeding he should get evaluated with anoscopy/hemorrhoid evaluation.  He likely had a fissure causing some rectal pain but also could have had hemorrhoids.  Patient expresses understanding and will follow up with return of symptoms.    Controlled substance agreement updated.  He intermittently uses hydrocodone or tramadol for his low back pain or polymyalgia or multiple joint pain.    Obesity, discussed need for reduced oral caloric intake.  Regular exercise.  Reduce carbohydrate intake.  Information printed and reviewed.    Tobacco abuse, ongoing.  He wants to quit smoking.  He has  Chantix at home but has not yet started using it.    Sleep apnea, still using CPAP nightly.  Finds helpful and beneficial.  Encouraged continued use.    Polymyalgia rheumatica, has been better recently.  Has not needed to use methylprednisolone for quite a while.    Intermittent low back pain, history of lumbar discectomy.  States that his back pain has been doing better since surgery.  Has not used much for pain medications recently.    Check PSA today.    Screening lipid panel today.  He is currently diet controlled.    Vaccine counseling.  Plans to get his second Covid vaccine in the next couple days.    Functional Capacity: > 4 METS.   Review of Systems   Constitutional: Negative for chills and fever.   HENT: Negative for congestion.    Eyes: Negative for visual disturbance.   Respiratory: Negative for chest tightness and shortness of breath.    Cardiovascular: Positive for palpitations (if forgets his metoprolol) and leg swelling. Negative for chest pain.   Gastrointestinal: Positive for blood in stool (about 3x, started early March, now resolved x3 weeks). Negative for abdominal pain.        + some occasional rectal pain   Genitourinary: Negative for hematuria.   Musculoskeletal: Positive for arthralgias, gait problem (right knee pain, crepitus), joint swelling and myalgias. Negative for back pain.   Skin: Negative for rash and wound.   Allergic/Immunologic: Positive for immunocompromised state.   Neurological: Negative for syncope.   Hematological: Negative for adenopathy. Does not bruise/bleed easily.   Psychiatric/Behavioral: Positive for sleep disturbance. Negative for confusion.      Reviewed and updated as needed this visit by Provider   Tobacco  Allergies  Meds  Problems  Med Hx  Surg Hx  Fam Hx          EXAM:   Vitals:    03/31/21 1140   BP: 132/78   BP Location: Right arm   Patient Position: Sitting   Cuff Size: Adult Large   Pulse: 80   Resp: 16   Temp: 96.9  F (36.1  C)   TempSrc: Tympanic  "  SpO2: 97%   Weight: 136.5 kg (301 lb)       Current Pain Score: Severe Pain (7)     BP Readings from Last 3 Encounters:   03/31/21 132/78   03/11/21 138/74   12/11/20 (!) 134/103      Wt Readings from Last 3 Encounters:   03/31/21 136.5 kg (301 lb)   03/11/21 135.2 kg (298 lb)   12/11/20 137.4 kg (303 lb)      Estimated body mass index is 41.98 kg/m  as calculated from the following:    Height as of 12/11/20: 1.803 m (5' 11\").    Weight as of this encounter: 136.5 kg (301 lb).     Physical Exam  Constitutional:       General: He is not in acute distress.     Appearance: He is well-developed. He is not diaphoretic.   HENT:      Head: Normocephalic and atraumatic.   Eyes:      General: No scleral icterus.     Conjunctiva/sclera: Conjunctivae normal.   Neck:      Musculoskeletal: Neck supple.      Vascular: No carotid bruit.   Cardiovascular:      Rate and Rhythm: Normal rate and regular rhythm.      Pulses: Normal pulses.   Pulmonary:      Effort: Pulmonary effort is normal.      Breath sounds: Normal breath sounds.   Abdominal:      Palpations: Abdomen is soft.      Tenderness: There is no abdominal tenderness.   Musculoskeletal:         General: Tenderness and deformity present.      Right lower leg: Edema present.      Left lower leg: Edema present.      Comments: Mild right knee effusion + crepitus with ROM   Lymphadenopathy:      Cervical: No cervical adenopathy.   Skin:     General: Skin is warm and dry.      Findings: No rash.   Neurological:      Mental Status: He is alert and oriented to person, place, and time. Mental status is at baseline.   Psychiatric:         Mood and Affect: Mood normal.         Behavior: Behavior normal.       Procedures   INVESTIGATIONS:  - Labs reviewed in [facility]   Recent Labs   Lab Test 03/31/21  1226 12/04/20  0925 04/10/20  1033 11/06/18  0945 07/25/18  1025   *  --   --   --   --    HDL 38  --   --   --   --    TRIG 275*  --   --   --   --    ALT  --   --   --  30 32   CR  " --  0.91 0.90 0.81 0.82   GFRESTIMATED  --  86 87 >90 >90   GFRESTBLACK  --  >90 >90 >90 >90   POTASSIUM  --  4.5 4.2 4.0 4.0        ASSESSMENT AND PLAN:  Problem List Items Addressed This Visit        Nervous and Auditory    Polymyalgia rheumatica (H)       Respiratory    AILEEN on CPAP - uses nightly, finds very helpful       Digestive    Morbid obesity (H)       Behavioral    Tobacco abuse       Other    S/P lumbar discectomy    Pain medication agreement-opioid/nonopiod agreement 3-31-21      Other Visit Diagnoses     Annual physical exam    -  Primary    Rectal pain        Rectal bleeding        Intermittent low back pain        Encounter for screening for malignant neoplasm of prostate        Relevant Orders    PSA Screen [facility] (Completed)    Mixed hyperlipidemia        Relevant Orders    Lipid Panel (Completed)        reviewed diet, exercise and weight control, recommended sodium restriction, cardiovascular risk and specific lipid/LDL goals reviewed  -- Expected clinical course discussed    -- Medications and their side effects discussed    Patient Instructions   Will talk with surgery about possible need for follow-up with the rectal bleeding.   -- May need to consider hemorrhoid banding / examination.     Pain is usually from a rectal fissure. Hemorrhoid bleeding is usually painless.    -- Eat adequate fiber and drink plenty of liquids. Keep stools soft and easy to pass.     Consider trial of BEANO -- to help reduce gas production.     Colonoscopy in February 2019 -- was normal, other than diverticulosis.     COVID-19 2nd vaccine on 4/1.     Consider Pneumococcal 23 vaccine --- in 3 to 4 weeks.    -- then 2nd dose at age 63.     Labs today.     Return in approximately 1 year, or sooner as needed for follow-up with Dr. Ritchie.  - Annual Follow-up / Physical    Clinic : [phone]  Appointment line: [phone]     Reinier Ritchie MD   Internal Medicine  [facility]     Portions of  this note were dictated using speech recognition software. The note has been proofread but errors in the text may have been overlooked. Please contact me if there are any concerns regarding the accuracy of the dictation.

## 2021-03-31 NOTE — PATIENT INSTRUCTIONS
Will talk with surgery about possible need for follow-up with the rectal bleeding.   -- May need to consider hemorrhoid banding / examination.     Pain is usually from a rectal fissure. Hemorrhoid bleeding is usually painless.    -- Eat adequate fiber and drink plenty of liquids. Keep stools soft and easy to pass.     Consider trial of BEANO -- to help reduce gas production.     Colonoscopy in February 2019 -- was normal, other than diverticulosis.     COVID-19 2nd vaccine on 4/1.     Consider Pneumococcal 23 vaccine --- in 3 to 4 weeks.    -- then 2nd dose at age 63.     Labs today.     Return in approximately 1 year, or sooner as needed for follow-up with Dr. Ritchie.  - Annual Follow-up / Physical    Clinic : [phone]  Appointment line: [phone]

## 2021-03-31 NOTE — NURSING NOTE
Chief Complaint   Patient presents with     Pain       Medication Reconciliation complete.   Annmarie Swanson LPN  ..................3/31/2021   11:42 AM

## 2021-03-31 NOTE — LETTER
March 31, 2021      Gregg Aguayo  [address] [facility] [address]        Dear ,    We are writing to inform you of your test results.    Triglyceride level and cholesterol levels are slightly elevated.    PSA is normal.    If interested, we could consider starting medication to help with your cholesterol called: Statins.  Would consider starting one of the weaker ones called lovastatin, pravastatin, or fluvastatin.    Let me know if you would like to try this.    Reinier Ritchie MD     Resulted Orders   PSA Screen GH   Result Value Ref Range    PSA Screen 0.308 <3.100 ng/mL      Comment:      The DXI Access PSAS WHO assay is a two site immunoenzymatic assay. Assay   values obtained with different assay methods cannot be used interchangeably   due to differences in assay methods and reagent specificity.     Lipid Panel   Result Value Ref Range    Cholesterol 197 <200 mg/dL    Triglycerides 275 (H) <150 mg/dL      Comment:      Borderline high:  150-199 mg/dl  High:             200-499 mg/dl  Very high:       >499 mg/dl      HDL Cholesterol 38 23 - 92 mg/dL    LDL Cholesterol Calculated 104 (H) <100 mg/dL      Comment:      Above desirable:  100-129 mg/dl  Borderline High:  130-159 mg/dL  High:             160-189 mg/dL  Very high:       >189 mg/dl      Non HDL Cholesterol 159 (H) <130 mg/dL      Comment:      Above Desirable:  130-159 mg/dl  Borderline high:  160-189 mg/dl  High:             190-219 mg/dl  Very high:       >219 mg/dl         If you have any questions or concerns, please call the clinic at the number listed above.       Sincerely,      Reinier Ritchie MD

## 2021-04-01 ENCOUNTER — IMMUNIZATION (OUTPATIENT)
Dept: FAMILY MEDICINE | Facility: OTHER | Age: 56
End: 2021-04-01
Attending: FAMILY MEDICINE
Payer: COMMERCIAL

## 2021-04-01 PROCEDURE — 0002A PR COVID VAC PFIZER DIL RECON 30 MCG/0.3 ML IM: CPT

## 2021-04-01 PROCEDURE — 91300 PR COVID VAC PFIZER DIL RECON 30 MCG/0.3 ML IM: CPT

## 2021-07-15 ENCOUNTER — TELEPHONE (OUTPATIENT)
Dept: INTERNAL MEDICINE | Facility: OTHER | Age: 56
End: 2021-07-15

## 2021-07-15 DIAGNOSIS — I49.3 SYMPTOMATIC PREMATURE VENTRICULAR CONTRACTIONS: Primary | ICD-10-CM

## 2021-07-15 RX ORDER — METOPROLOL SUCCINATE 25 MG/1
50 TABLET, EXTENDED RELEASE ORAL EVERY EVENING
Qty: 180 TABLET | Refills: 3 | Status: SHIPPED | OUTPATIENT
Start: 2021-07-15 | End: 2022-07-11

## 2021-07-15 NOTE — TELEPHONE ENCOUNTER
RILEY Ritchie-pt requesting [facility] refill. Please call to advise. Thank you.  Charlee Leahy

## 2021-08-27 DIAGNOSIS — R12 HEARTBURN: ICD-10-CM

## 2021-08-27 RX ORDER — OMEPRAZOLE 40 MG/1
40 CAPSULE, DELAYED RELEASE ORAL DAILY
Qty: 90 CAPSULE | Refills: 0 | Status: SHIPPED | OUTPATIENT
Start: 2021-08-27 | End: 2021-09-14

## 2021-08-27 NOTE — TELEPHONE ENCOUNTER
Prescription refilled per RN Medication Refill Policy..................Paris Gilbert RN 8/27/2021 7:56 AM

## 2021-09-03 ENCOUNTER — OFFICE VISIT (OUTPATIENT)
Dept: ORTHOPEDICS | Facility: OTHER | Age: 56
End: 2021-09-03
Attending: SPECIALIST
Payer: COMMERCIAL

## 2021-09-03 DIAGNOSIS — M25.562 CHRONIC PAIN OF LEFT KNEE: Primary | ICD-10-CM

## 2021-09-03 DIAGNOSIS — M25.561 RIGHT KNEE PAIN, UNSPECIFIED CHRONICITY: ICD-10-CM

## 2021-09-03 DIAGNOSIS — G89.29 CHRONIC PAIN OF LEFT KNEE: Primary | ICD-10-CM

## 2021-09-03 PROCEDURE — 99214 OFFICE O/P EST MOD 30 MIN: CPT | Performed by: SPECIALIST

## 2021-09-03 RX ORDER — TRIAMCINOLONE ACETONIDE 40 MG/ML
40 INJECTION, SUSPENSION INTRA-ARTICULAR; INTRAMUSCULAR ONCE
Status: DISCONTINUED | OUTPATIENT
Start: 2021-09-03 | End: 2021-09-03 | Stop reason: CLARIF

## 2021-09-03 RX ORDER — LIDOCAINE HYDROCHLORIDE 10 MG/ML
4 INJECTION, SOLUTION EPIDURAL; INFILTRATION; INTRACAUDAL; PERINEURAL ONCE
Status: DISCONTINUED | OUTPATIENT
Start: 2021-09-03 | End: 2021-09-03 | Stop reason: CLARIF

## 2021-09-03 NOTE — PROGRESS NOTES
Visit Date: 2021    Juve Aguayo returns for followup with respect to his left knee.  He is status post left total knee arthroplasty and he would like to proceed with right total knee arthroplasty.    PHYSICAL EXAMINATION:  Exam today of the left knee reveals his incision to have healed nicely.  He has 0-120 degree arc of motion with no tenderness.  Examination of the right knee reveals medial joint line tenderness with pain with range of motion and crepitus.    IMPRESSION:    1.  Status post left total knee arthroplasty, doing well.  2.  Degenerative arthrosis, right knee.      We discussed proceeding with right total knee arthroplasty.  Risks, complications, and benefits were reviewed and we will schedule this at his convenience.  We discussed robotic surgery and he would like to proceed with this.    Monster Grissom MD        D: 2021   T: 2021   MT: LILI    Name:     RACHEL AGUAYO  MRN:      [id number]        Account:    [id number]   :      1965           Visit Date: 2021     Document: [id number]

## 2021-09-10 DIAGNOSIS — Z96.651 S/P TOTAL KNEE ARTHROPLASTY, RIGHT: Primary | ICD-10-CM

## 2021-09-14 ENCOUNTER — OFFICE VISIT (OUTPATIENT)
Dept: INTERNAL MEDICINE | Facility: OTHER | Age: 56
End: 2021-09-14
Attending: INTERNAL MEDICINE
Payer: COMMERCIAL

## 2021-09-14 VITALS
SYSTOLIC BLOOD PRESSURE: 130 MMHG | TEMPERATURE: 97.5 F | WEIGHT: 302 LBS | DIASTOLIC BLOOD PRESSURE: 84 MMHG | RESPIRATION RATE: 20 BRPM | OXYGEN SATURATION: 98 % | HEART RATE: 56 BPM | BODY MASS INDEX: 42.12 KG/M2

## 2021-09-14 DIAGNOSIS — D17.1 LIPOMA OF TORSO: ICD-10-CM

## 2021-09-14 DIAGNOSIS — Z71.85 VACCINE COUNSELING: ICD-10-CM

## 2021-09-14 DIAGNOSIS — Z87.891 PERSONAL HISTORY OF NICOTINE DEPENDENCE: ICD-10-CM

## 2021-09-14 DIAGNOSIS — G47.33 OSA ON CPAP: ICD-10-CM

## 2021-09-14 DIAGNOSIS — R60.0 BILATERAL LEG EDEMA: ICD-10-CM

## 2021-09-14 DIAGNOSIS — H91.91 CHANGE IN HEARING OF RIGHT EAR: ICD-10-CM

## 2021-09-14 DIAGNOSIS — E66.01 MORBID OBESITY (H): ICD-10-CM

## 2021-09-14 DIAGNOSIS — D84.9 IMMUNOSUPPRESSION (H): ICD-10-CM

## 2021-09-14 DIAGNOSIS — I10 BENIGN ESSENTIAL HYPERTENSION: ICD-10-CM

## 2021-09-14 DIAGNOSIS — Z72.0 TOBACCO ABUSE: ICD-10-CM

## 2021-09-14 DIAGNOSIS — M35.3 POLYMYALGIA RHEUMATICA (H): ICD-10-CM

## 2021-09-14 DIAGNOSIS — Z79.899 OTHER LONG TERM (CURRENT) DRUG THERAPY: ICD-10-CM

## 2021-09-14 DIAGNOSIS — H93.8X1 CONGESTION OF RIGHT EAR: ICD-10-CM

## 2021-09-14 DIAGNOSIS — R12 HEARTBURN: ICD-10-CM

## 2021-09-14 DIAGNOSIS — M65.341 TRIGGER RING FINGER OF RIGHT HAND: Primary | ICD-10-CM

## 2021-09-14 PROBLEM — T38.0X5A STEROID-INDUCED HYPERGLYCEMIA: Status: RESOLVED | Noted: 2018-04-10 | Resolved: 2021-09-14

## 2021-09-14 PROBLEM — R73.9 STEROID-INDUCED HYPERGLYCEMIA: Status: RESOLVED | Noted: 2018-04-10 | Resolved: 2021-09-14

## 2021-09-14 PROCEDURE — 99214 OFFICE O/P EST MOD 30 MIN: CPT | Performed by: INTERNAL MEDICINE

## 2021-09-14 RX ORDER — GABAPENTIN 100 MG/1
100-200 CAPSULE ORAL 4 TIMES DAILY PRN
Qty: 120 CAPSULE | Refills: 3 | Status: SHIPPED | OUTPATIENT
Start: 2021-09-14 | End: 2023-03-21

## 2021-09-14 RX ORDER — OMEPRAZOLE 40 MG/1
40 CAPSULE, DELAYED RELEASE ORAL DAILY
Qty: 90 CAPSULE | Refills: 0 | Status: SHIPPED | OUTPATIENT
Start: 2021-09-14 | End: 2022-03-25

## 2021-09-14 RX ORDER — FEXOFENADINE HCL 180 MG/1
180 TABLET ORAL EVERY EVENING
Qty: 30 TABLET | Refills: 1 | Status: SHIPPED | OUTPATIENT
Start: 2021-09-14 | End: 2023-01-04

## 2021-09-14 RX ORDER — FUROSEMIDE 20 MG
40 TABLET ORAL DAILY
Qty: 180 TABLET | Refills: 1 | Status: SHIPPED | OUTPATIENT
Start: 2021-09-14 | End: 2021-10-14

## 2021-09-14 ASSESSMENT — ENCOUNTER SYMPTOMS
SLEEP DISTURBANCE: 1
FEVER: 0
PALPITATIONS: 1
MYALGIAS: 1
BLOOD IN STOOL: 0
SHORTNESS OF BREATH: 0
WOUND: 0
HEMATURIA: 0
ABDOMINAL PAIN: 0
JOINT SWELLING: 1
BRUISES/BLEEDS EASILY: 0
ARTHRALGIAS: 1
CHILLS: 0
CONFUSION: 0
BACK PAIN: 0
ADENOPATHY: 0
CHEST TIGHTNESS: 0

## 2021-09-14 ASSESSMENT — PATIENT HEALTH QUESTIONNAIRE - PHQ9: SUM OF ALL RESPONSES TO PHQ QUESTIONS 1-9: 0

## 2021-09-14 NOTE — PROGRESS NOTES
Mr. Aguayo is a 55 year old male who presents to the clinic for evaluation of the following problems:      ICD-10-CM    1. Trigger ring finger of right hand  M65.341    2. Change in hearing of right ear  H91.91 Otolaryngology Referral     Adult Audiology Referral     fexofenadine (ALLEGRA) 180 MG tablet   3. Congestion of right ear  H93.8X1 Otolaryngology Referral     Adult Audiology Referral     fexofenadine (ALLEGRA) 180 MG tablet   4. Lipoma of torso  D17.1    5. Bilateral leg edema  R60.0 furosemide (LASIX) 20 MG tablet   6. Benign essential hypertension  I10 furosemide (LASIX) 20 MG tablet   7. Heartburn  R12 omeprazole (PRILOSEC) 40 MG DR capsule   8. Other long term (current) drug therapy - 3/31/2021 - Gabapentin  Z79.899 gabapentin (NEURONTIN) 100 MG capsule   9. AILEEN on CPAP - uses nightly, finds very helpful  G47.33     Z99.89    10. Morbid obesity (H)  E66.01    11. Tobacco abuse  Z72.0 SMOKING CESSATION COUNSELING 3-10 MIN   12. Polymyalgia rheumatica (H)  M35.3 gabapentin (NEURONTIN) 100 MG capsule   13. Immunosuppression (H)  D84.9    14. Vaccine counseling  Z71.89    15. Personal history of nicotine dependence   Z87.891 SMOKING CESSATION COUNSELING 3-10 MIN     HPI  Patient presents to clinic for follow-up of multiple issues.    History of trigger fingers.  States that he did have surgery on them in the past.  His right hand, fourth finger has been triggering again more the last number of months.  Gets stuck especially in the morning.  He would like to see hand surgery and consider getting a steroid finger injection.  Referral placed.    Has been having some congestion of the right ear.  If he sleeps with his right ear down on the pillow he can hear everything going on in the house if he sleeps with his right ear up he can hear hardly anything.  Occasionally his ear will pop or snap and he will have very good hearing in the right ear for a while.  He does have some allergy type symptoms.  At this point  seems like it would be worthwhile trying some allergy medication/antihistamine.  Prescription for Allegra sent to pharmacy.  If this resolve the symptoms then no need for further testing otherwise audiology and ENT referrals were placed.    Has a couple lipomas that he would like looked at today which are benign and reassurance was provided.    Edema and hypertension.  Blood pressure is currently well controlled.  Doing well with current dosing.  Needs refills of Lasix.  Only rarely taking potassium lately because his swelling has been doing better.    Heartburn, chronic and ongoing.  Gets bad symptoms if he forgets his Prilosec.  Needs refills.    Encouraged discontinuation of opioid use.  He had been only rarely using Ultram for pain.  Plaquenil has been very helpful for his chronic pain.  No longer taking prednisone for the last month as well.  Only using gabapentin and Tylenol for now.  Advised Tylenol, ibuprofen and gabapentin for pain management.  Controlled substance agreement was completed on March 31.  States that he needs refills of gabapentin today.    Obesity, discussed need for reduced oral caloric intake.  Regular exercise.  Reduce carbohydrate intake.  Information printed and reviewed.    Obstructive sleep apnea.  Chronic.  Uses CPAP nightly.  Finds helpful and beneficial.  Encouraged continued use.    Tobacco abuse, ongoing.  About half pack per day.  Discussed tobacco cessation options.  He does have Chantix at home.  He is not interested in any prescriptions for nicotine replacement at this time.  Discussed with the call it quits tobacco cessation program.  Information provided.  -3 minutes spent on tobacco cessation counseling.    Immunosuppressed status   + Plaquenil use - for PMR. Follows with Rheumatology in [place].     Vaccine counseling completed.  He is due for COVID-19 booster shot anytime.     Functional Capacity: > or about 4 METS.   Review of Systems   Constitutional: Negative for chills  "and fever.   HENT: Positive for congestion, hearing loss and tinnitus.    Eyes: Negative for visual disturbance.   Respiratory: Negative for chest tightness and shortness of breath.    Cardiovascular: Positive for palpitations (if forgets his metoprolol) and leg swelling. Negative for chest pain.   Gastrointestinal: Negative for abdominal pain and blood in stool.        + some occasional rectal pain   Genitourinary: Negative for hematuria.   Musculoskeletal: Positive for arthralgias, gait problem (right knee pain, crepitus), joint swelling and myalgias. Negative for back pain.        Right 4th finger, trigger finger again... hx of surgery   Skin: Negative for rash and wound.   Allergic/Immunologic: Positive for immunocompromised state.   Neurological: Negative for syncope.   Hematological: Negative for adenopathy. Does not bruise/bleed easily.   Psychiatric/Behavioral: Positive for sleep disturbance. Negative for confusion.      Reviewed and updated as needed this visit by Provider   Tobacco  Allergies  Meds  Problems  Med Hx  Surg Hx  Fam Hx          EXAM:   Vitals:    09/14/21 0859   BP: 130/84   BP Location: Left arm   Patient Position: Chair   Cuff Size: Adult Large   Pulse: 56   Resp: 20   Temp: 97.5  F (36.4  C)   TempSrc: Tympanic   SpO2: 98%   Weight: 137 kg (302 lb)       Current Pain Score: Data Unavailable     BP Readings from Last 3 Encounters:   09/14/21 130/84   03/31/21 132/78   03/11/21 138/74      Wt Readings from Last 3 Encounters:   09/14/21 137 kg (302 lb)   03/31/21 136.5 kg (301 lb)   03/11/21 135.2 kg (298 lb)      Estimated body mass index is 42.12 kg/m  as calculated from the following:    Height as of 12/11/20: 1.803 m (5' 11\").    Weight as of this encounter: 137 kg (302 lb).     Physical Exam  Constitutional:       General: He is not in acute distress.     Appearance: He is well-developed. He is not diaphoretic.   HENT:      Head: Normocephalic and atraumatic.   Eyes:      General: " No scleral icterus.     Conjunctiva/sclera: Conjunctivae normal.   Neck:      Vascular: No carotid bruit.   Cardiovascular:      Rate and Rhythm: Normal rate and regular rhythm.      Pulses: Normal pulses.   Pulmonary:      Effort: Pulmonary effort is normal.      Breath sounds: Normal breath sounds.   Abdominal:      Palpations: Abdomen is soft.      Tenderness: There is no abdominal tenderness.   Musculoskeletal:         General: Tenderness and deformity present.      Cervical back: Neck supple.      Right lower leg: Edema present.      Left lower leg: Edema present.      Comments: Mild right knee effusion + crepitus with ROM   Lymphadenopathy:      Cervical: No cervical adenopathy.   Skin:     General: Skin is warm and dry.      Findings: No rash.   Neurological:      Mental Status: He is alert and oriented to person, place, and time. Mental status is at baseline.   Psychiatric:         Mood and Affect: Mood normal.         Behavior: Behavior normal.          Procedures   INVESTIGATIONS:  - Labs reviewed in [facility]     ASSESSMENT AND PLAN:  Problem List Items Addressed This Visit        Nervous and Auditory    Polymyalgia rheumatica (H)    Relevant Medications    gabapentin (NEURONTIN) 100 MG capsule    Change in hearing of right ear    Relevant Medications    fexofenadine (ALLEGRA) 180 MG tablet    Other Relevant Orders    Otolaryngology Referral    Adult Audiology Referral    Congestion of right ear    Relevant Medications    fexofenadine (ALLEGRA) 180 MG tablet    Other Relevant Orders    Otolaryngology Referral    Adult Audiology Referral       Respiratory    AILEEN on CPAP - uses nightly, finds very helpful       Digestive    Heartburn    Relevant Medications    omeprazole (PRILOSEC) 40 MG DR capsule    Morbid obesity (H)       Musculoskeletal and Integumentary    Bilateral leg edema    Relevant Medications    fexofenadine (ALLEGRA) 180 MG tablet    furosemide (LASIX) 20 MG tablet    Trigger ring finger of right  hand - Primary       Immune    Immunosuppression (H)       Behavioral    Tobacco abuse    Relevant Orders    SMOKING CESSATION COUNSELING 3-10 MIN       Other    Lipoma of torso    Other long term (current) drug therapy - 3/31/2021 - Gabapentin    Relevant Medications    gabapentin (NEURONTIN) 100 MG capsule      Other Visit Diagnoses     Benign essential hypertension        Relevant Medications    furosemide (LASIX) 20 MG tablet    Vaccine counseling        Personal history of nicotine dependence         Relevant Orders    SMOKING CESSATION COUNSELING 3-10 MIN        reviewed diet, exercise and weight control, recommended sodium restriction, cardiovascular risk and specific lipid/LDL goals reviewed  -- Expected clinical course discussed    -- Medications and their side effects discussed    The 10-year ASCVD risk score (Bertha ESCOBAR JrMiki, et al., 2013) is: 16.9%    Values used to calculate the score:      Age: 55 years      Sex: Male      Is Non- : No      Diabetic: No      Tobacco smoker: Yes      Systolic Blood Pressure: 130 mmHg      Is BP treated: Yes      HDL Cholesterol: 38 mg/dL      Total Cholesterol: 197 mg/dL    Patient Instructions     Call -- [facility] - Dr. Grissom   Phone: [phone]     -- to schedule about the right 4th finger, possible injection for trigger finger.       Ultram removed from med list.... Use tylenol and ibuprofen... with Gabapentin as needed for pain.         QUIT SMOKING!!     -- Choose a quit date (within 1 month). Quitting smoking abruptly is more successful than gradually cutting back.   -- Tell everyone about it (friends, family, coworkers)   -- Think about when you smoke the most, and what you'll do during those times (eg when in the car, work breaks, etc)     Consider:   -- Start varenicline (Chantix) 1 week before your quit date   -- Start bupropion (Wellbutrin/Zyban) 1 week before your quit date -- Welbutrin 1 pill daily for 1 week then  1 pill twice daily      -- Stop smoking on quit date   -- Starting with quit date, use nicotine lozenges/gum as needed for cravings     -- Quit Plan - Call them in the next 1-2 weeks to help you quit smoking.                        [phone]                        http://www.quitplan.[employer]   -- http://smokefree.gov/       Return as needed for follow-up with Dr. Ritchie.    Clinic : [phone]  Appointment line: [phone]           Immunization History   Administered Date(s) Administered     COVID-19,PF,Pfizer 03/11/2021, 04/01/2021     TDAP Vaccine (Boostrix) 01/15/2010, 07/04/2020      -- Booster shots for COVID-19 are coming.    -- Moderna and Pfizer booster shot   - If immunocompromised, can get 3rd shot (booster shot) 28+ days from date of 2nd shot.   - Otherwise - (Tentative) vaccine schedule is to get 3rd shot (booster shot) 8+ months from date of 2nd shot.       -- Consider Annual Flu / Influenza vaccination - Every Fall (Starting about October 1st)      -- Get the new shingles shot (2 in series) (Shingrix) - from one of the local pharmacies, at your convenience. -- Check cost/coverage.   -- or -- If these are very expensive, go to the Local Public Health Department     [facility] [place] [address]    ---- Shot clinic is the FIRST Thursday of Each Month -- from 2 to 6 PM, by Appointment only.     Call for an appointment -- [phone]       Get your Pneumonia shot..... Ask at the pharmacy.     Pneumococcal Pneumonia vaccines (PCV 23) -- Pneumococcal 23 - Valent Vaccine -- Possibly Two doses (One before age 65 and One After)    -- repeat every 5 years in certain patient populations.      Electronically signed by:  Reinier Ritchie MD on 9/14/2021  Internal Medicine  [facility]

## 2021-09-14 NOTE — Clinical Note
Wondering about getting finger steroid injection with Jaciel asa, has knee replacement with Jaciel 10/25.   Can you help arrange?

## 2021-09-14 NOTE — PATIENT INSTRUCTIONS
Call -- [facility] - Dr. Grissom   Phone: [phone]     -- to schedule about the right 4th finger, possible injection for trigger finger.       Ultram removed from med list.... Use tylenol and ibuprofen... with Gabapentin as needed for pain.         QUIT SMOKING!!     -- Choose a quit date (within 1 month). Quitting smoking abruptly is more successful than gradually cutting back.   -- Tell everyone about it (friends, family, coworkers)   -- Think about when you smoke the most, and what you'll do during those times (eg when in the car, work breaks, etc)     Consider:   -- Start varenicline (Chantix) 1 week before your quit date   -- Start bupropion (Wellbutrin/Zyban) 1 week before your quit date -- Welbutrin 1 pill daily for 1 week then 1 pill twice daily      -- Stop smoking on quit date   -- Starting with quit date, use nicotine lozenges/gum as needed for cravings     -- Quit Plan - Call them in the next 1-2 weeks to help you quit smoking.                        [phone]                        http://www.[employer]   -- http://smokefree.gov/       Return as needed for follow-up with Dr. Ritchie.    Clinic : [phone]  Appointment line: [phone]           Immunization History   Administered Date(s) Administered     COVID-19,PF,Pfizer 03/11/2021, 04/01/2021     TDAP Vaccine (Boostrix) 01/15/2010, 07/04/2020      -- Booster shots for COVID-19 are coming.    -- Moderna and Pfizer booster shot   - If immunocompromised, can get 3rd shot (booster shot) 28+ days from date of 2nd shot.   - Otherwise - (Tentative) vaccine schedule is to get 3rd shot (booster shot) 8+ months from date of 2nd shot.       -- Consider Annual Flu / Influenza vaccination - Every Fall (Starting about October 1st)      -- Get the new shingles shot (2 in series) (Shingrix) - from one of the local pharmacies, at your convenience. -- Check cost/coverage.   -- or -- If these are very expensive, go to the  [facility] Department     [facility] -- [address] [address]    ---- Shot clinic is the FIRST Thursday of Each Month -- from 2 to 6 PM, by Appointment only.     Call for an appointment -- [phone]       Get your Pneumonia shot..... Ask at the pharmacy.     Pneumococcal Pneumonia vaccines (PCV 23) -- Pneumococcal 23 - Valent Vaccine -- Possibly Two doses (One before age 65 and One After)    -- repeat every 5 years in certain patient populations.

## 2021-09-14 NOTE — NURSING NOTE
"Chief Complaint   Patient presents with     Finger     Right Hand, ring finger     Referral     Mass       Initial /84 (BP Location: Left arm, Patient Position: Chair, Cuff Size: Adult Large)   Pulse 56   Temp 97.5  F (36.4  C) (Tympanic)   Resp 20   Wt 137 kg (302 lb)   SpO2 98%   BMI 42.12 kg/m   Estimated body mass index is 42.12 kg/m  as calculated from the following:    Height as of 12/11/20: 1.803 m (5' 11\").    Weight as of this encounter: 137 kg (302 lb).  Medication Reconciliation: complete    FOOD SECURITY SCREENING QUESTIONS  Hunger Vital Signs:  Within the past 12 months we worried whether our food would run out before we got money to buy more. Never  Within the past 12 months the food we bought just didn't last and we didn't have money to get more. Never        Brittany Myles LPN on 9/14/2021 at 9:02 AM   "

## 2021-09-16 RX ORDER — METHYLPREDNISOLONE ACETATE 40 MG/ML
40 INJECTION, SUSPENSION INTRA-ARTICULAR; INTRALESIONAL; INTRAMUSCULAR; SOFT TISSUE ONCE
Status: CANCELLED | OUTPATIENT
Start: 2021-09-17

## 2021-09-16 RX ORDER — BUPIVACAINE HYDROCHLORIDE 5 MG/ML
1 INJECTION, SOLUTION EPIDURAL; INTRACAUDAL ONCE
Status: CANCELLED | OUTPATIENT
Start: 2021-09-17

## 2021-09-17 ENCOUNTER — OFFICE VISIT (OUTPATIENT)
Dept: ORTHOPEDICS | Facility: OTHER | Age: 56
End: 2021-09-17
Attending: SPECIALIST
Payer: COMMERCIAL

## 2021-09-17 DIAGNOSIS — M65.30 TRIGGER FINGER, ACQUIRED: Primary | ICD-10-CM

## 2021-09-17 PROCEDURE — 99212 OFFICE O/P EST SF 10 MIN: CPT | Performed by: SPECIALIST

## 2021-09-17 NOTE — PROGRESS NOTES
Patient is here for consult on his right ring finger.   Lauryn Nolan LPN .....................9/17/2021 1:51 PM

## 2021-09-20 ENCOUNTER — IMMUNIZATION (OUTPATIENT)
Dept: FAMILY MEDICINE | Facility: OTHER | Age: 56
End: 2021-09-20
Attending: INTERNAL MEDICINE
Payer: COMMERCIAL

## 2021-09-20 PROCEDURE — 91300 PR COVID VAC PFIZER DIL RECON 30 MCG/0.3 ML IM: CPT

## 2021-09-20 PROCEDURE — 0003A PR COVID VAC PFIZER DIL RECON 30 MCG/0.3 ML IM: CPT

## 2021-09-23 ENCOUNTER — TRANSFERRED RECORDS (OUTPATIENT)
Dept: HEALTH INFORMATION MANAGEMENT | Facility: OTHER | Age: 56
End: 2021-09-23

## 2021-10-04 NOTE — PROGRESS NOTES
Visit Date: 2021    Juve Aguayo returns for followup.  He is scheduled for a total knee arthroplasty on the right on 10/25/2001.  He is here today because he has begun to notice triggering of the right ring finger.  He is wondering if this could be addressed at the same time.    PHYSICAL EXAMINATION:  Confirms triggering of the right ring finger.  Remaining digits are quiet.    ASSESSMENT:  Right ring finger trigger digit.    PLAN:  Will be to proceed with trigger digit release at the same time as his total knee arthroplasty.    Monster Grissom MD        D: 2021   T: 2021   MT: [id number]    Name:     RACHEL AGUAYO  MRN:      [id number]        Account:    [id number]   :      1965           Visit Date: 2021     Document: [id number]

## 2021-10-09 ENCOUNTER — HEALTH MAINTENANCE LETTER (OUTPATIENT)
Age: 56
End: 2021-10-09

## 2021-10-14 ENCOUNTER — OFFICE VISIT (OUTPATIENT)
Dept: INTERNAL MEDICINE | Facility: OTHER | Age: 56
End: 2021-10-14
Attending: INTERNAL MEDICINE
Payer: COMMERCIAL

## 2021-10-14 VITALS
OXYGEN SATURATION: 99 % | HEART RATE: 55 BPM | DIASTOLIC BLOOD PRESSURE: 82 MMHG | BODY MASS INDEX: 42.29 KG/M2 | SYSTOLIC BLOOD PRESSURE: 136 MMHG | RESPIRATION RATE: 16 BRPM | TEMPERATURE: 97.5 F | WEIGHT: 303.2 LBS

## 2021-10-14 DIAGNOSIS — R60.0 BILATERAL LEG EDEMA: ICD-10-CM

## 2021-10-14 DIAGNOSIS — E66.01 MORBID OBESITY (H): ICD-10-CM

## 2021-10-14 DIAGNOSIS — D84.9 IMMUNOSUPPRESSION (H): ICD-10-CM

## 2021-10-14 DIAGNOSIS — Z72.0 TOBACCO ABUSE: ICD-10-CM

## 2021-10-14 DIAGNOSIS — M65.341 TRIGGER FINGER, RIGHT RING FINGER: ICD-10-CM

## 2021-10-14 DIAGNOSIS — I10 BENIGN ESSENTIAL HYPERTENSION: ICD-10-CM

## 2021-10-14 DIAGNOSIS — M17.11 ARTHRITIS OF RIGHT KNEE: ICD-10-CM

## 2021-10-14 DIAGNOSIS — Z01.818 PREOP GENERAL PHYSICAL EXAM: Primary | ICD-10-CM

## 2021-10-14 DIAGNOSIS — G47.33 OSA ON CPAP: ICD-10-CM

## 2021-10-14 DIAGNOSIS — Z23 NEEDS FLU SHOT: ICD-10-CM

## 2021-10-14 DIAGNOSIS — M35.3 POLYMYALGIA RHEUMATICA (H): ICD-10-CM

## 2021-10-14 LAB
ALBUMIN SERPL-MCNC: 4.4 G/DL (ref 3.5–5.7)
ALBUMIN UR-MCNC: NEGATIVE MG/DL
ALP SERPL-CCNC: 43 U/L (ref 34–104)
ALT SERPL W P-5'-P-CCNC: 23 U/L (ref 7–52)
ANION GAP SERPL CALCULATED.3IONS-SCNC: 5 MMOL/L (ref 3–14)
APPEARANCE UR: CLEAR
AST SERPL W P-5'-P-CCNC: 20 U/L (ref 13–39)
BASOPHILS # BLD AUTO: 0.1 10E3/UL (ref 0–0.2)
BASOPHILS NFR BLD AUTO: 2 %
BILIRUB SERPL-MCNC: 0.6 MG/DL (ref 0.3–1)
BILIRUB UR QL STRIP: NEGATIVE
BUN SERPL-MCNC: 17 MG/DL (ref 7–25)
CALCIUM SERPL-MCNC: 9.6 MG/DL (ref 8.6–10.3)
CHLORIDE BLD-SCNC: 103 MMOL/L (ref 98–107)
CO2 SERPL-SCNC: 31 MMOL/L (ref 21–31)
COLOR UR AUTO: ABNORMAL
CREAT SERPL-MCNC: 0.91 MG/DL (ref 0.7–1.3)
EOSINOPHIL # BLD AUTO: 0.4 10E3/UL (ref 0–0.7)
EOSINOPHIL NFR BLD AUTO: 5 %
ERYTHROCYTE [DISTWIDTH] IN BLOOD BY AUTOMATED COUNT: 12.3 % (ref 10–15)
GFR SERPL CREATININE-BSD FRML MDRD: >90 ML/MIN/1.73M2
GLUCOSE BLD-MCNC: 100 MG/DL (ref 70–105)
GLUCOSE UR STRIP-MCNC: NEGATIVE MG/DL
HCT VFR BLD AUTO: 44.8 % (ref 40–53)
HGB BLD-MCNC: 15.8 G/DL (ref 13.3–17.7)
HGB UR QL STRIP: NEGATIVE
IMM GRANULOCYTES # BLD: 0 10E3/UL
IMM GRANULOCYTES NFR BLD: 0 %
KETONES UR STRIP-MCNC: NEGATIVE MG/DL
LEUKOCYTE ESTERASE UR QL STRIP: NEGATIVE
LYMPHOCYTES # BLD AUTO: 1.7 10E3/UL (ref 0.8–5.3)
LYMPHOCYTES NFR BLD AUTO: 24 %
MCH RBC QN AUTO: 33.2 PG (ref 26.5–33)
MCHC RBC AUTO-ENTMCNC: 35.3 G/DL (ref 31.5–36.5)
MCV RBC AUTO: 94 FL (ref 78–100)
MONOCYTES # BLD AUTO: 0.8 10E3/UL (ref 0–1.3)
MONOCYTES NFR BLD AUTO: 12 %
MUCOUS THREADS #/AREA URNS LPF: PRESENT /LPF
NEUTROPHILS # BLD AUTO: 4.1 10E3/UL (ref 1.6–8.3)
NEUTROPHILS NFR BLD AUTO: 57 %
NITRATE UR QL: NEGATIVE
NRBC # BLD AUTO: 0 10E3/UL
NRBC BLD AUTO-RTO: 0 /100
PH UR STRIP: 5.5 [PH] (ref 5–9)
PLATELET # BLD AUTO: 191 10E3/UL (ref 150–450)
POTASSIUM BLD-SCNC: 4.7 MMOL/L (ref 3.5–5.1)
PROT SERPL-MCNC: 6.6 G/DL (ref 6.4–8.9)
RBC # BLD AUTO: 4.76 10E6/UL (ref 4.4–5.9)
RBC URINE: 1 /HPF
SODIUM SERPL-SCNC: 139 MMOL/L (ref 134–144)
SP GR UR STRIP: 1.02 (ref 1–1.03)
UROBILINOGEN UR STRIP-MCNC: NORMAL MG/DL
WBC # BLD AUTO: 7.1 10E3/UL (ref 4–11)
WBC URINE: 0 /HPF

## 2021-10-14 PROCEDURE — 90471 IMMUNIZATION ADMIN: CPT | Performed by: INTERNAL MEDICINE

## 2021-10-14 PROCEDURE — 36415 COLL VENOUS BLD VENIPUNCTURE: CPT | Mod: ZL | Performed by: INTERNAL MEDICINE

## 2021-10-14 PROCEDURE — 85025 COMPLETE CBC W/AUTO DIFF WBC: CPT | Mod: ZL | Performed by: INTERNAL MEDICINE

## 2021-10-14 PROCEDURE — 90682 RIV4 VACC RECOMBINANT DNA IM: CPT | Performed by: INTERNAL MEDICINE

## 2021-10-14 PROCEDURE — 80053 COMPREHEN METABOLIC PANEL: CPT | Mod: ZL | Performed by: INTERNAL MEDICINE

## 2021-10-14 PROCEDURE — 81001 URINALYSIS AUTO W/SCOPE: CPT | Mod: ZL | Performed by: INTERNAL MEDICINE

## 2021-10-14 PROCEDURE — 93000 ELECTROCARDIOGRAM COMPLETE: CPT | Performed by: INTERNAL MEDICINE

## 2021-10-14 PROCEDURE — 99214 OFFICE O/P EST MOD 30 MIN: CPT | Mod: 25 | Performed by: INTERNAL MEDICINE

## 2021-10-14 RX ORDER — POTASSIUM CHLORIDE 750 MG/1
20 TABLET, EXTENDED RELEASE ORAL DAILY
Qty: 180 TABLET | Refills: 3 | Status: SHIPPED | OUTPATIENT
Start: 2021-10-14 | End: 2023-03-21

## 2021-10-14 RX ORDER — FUROSEMIDE 20 MG
40 TABLET ORAL DAILY
Qty: 180 TABLET | Refills: 1 | Status: SHIPPED | OUTPATIENT
Start: 2021-10-14 | End: 2023-10-05

## 2021-10-14 ASSESSMENT — PATIENT HEALTH QUESTIONNAIRE - PHQ9: SUM OF ALL RESPONSES TO PHQ QUESTIONS 1-9: 0

## 2021-10-14 ASSESSMENT — ENCOUNTER SYMPTOMS
BRUISES/BLEEDS EASILY: 0
WOUND: 0
SLEEP DISTURBANCE: 1
JOINT SWELLING: 1
CHEST TIGHTNESS: 0
PALPITATIONS: 1
ARTHRALGIAS: 1
FEVER: 0
ADENOPATHY: 0
CONFUSION: 0
CHILLS: 0
MYALGIAS: 1
BACK PAIN: 0
BLOOD IN STOOL: 0
HEMATURIA: 0
ABDOMINAL PAIN: 0
SHORTNESS OF BREATH: 0

## 2021-10-14 ASSESSMENT — PAIN SCALES - GENERAL: PAINLEVEL: SEVERE PAIN (7)

## 2021-10-14 NOTE — PATIENT INSTRUCTIONS
"  Before Your Procedure or Hospital Admission  Testing for COVID-19 (Coronavirus)  Thank you for choosing [facility] for your health care needs. The COVID-19 pandemic is a very challenging time for everyone.   Our goal is to keep you and our team here at [facility] safe and healthy. We've taken many steps to make this happen. For example:    We test and screen our staff, care teams and patients for COVID-19.    Everyone at [facility] must wear a mask and stay 6 feet apart.    We are limiting hospital and clinic visitors.  Before you come in  All patients must get tested for COVID-19. Your test needs to happen 2 to 4 days before you check in to the hospital or surgery site.   A clinic scheduler will call about a week in advance to set up a testing time at one of our labs. We'll take a swab of your nose or throat.  Note: If you go to a clinic or pharmacy like [facility] or [facility] for your test, make sure you get a test inside the nose. Tests inside the nose are:    A naso-pharyngeal (NP) RT-PCR test    An anterior nares RT-PCR test  Do NOT get a \"rapid\" test or a saliva (spit) test.  After the test, please stay at home and stay out of contact with other people. It will be harder for you to recover if you get COVID-19 before your treatment.  Please follow all current safety guidelines, including:    Limit trips outside your home.    Limit the number of people you see.    Always wear a mask outside your home.    Use social distancing. Stay 6 feet away from others whenever you can.    Wash your hands often.  If your test shows you have COVID-19  If your test is positive, we'll let you know. A positive test means that you have the virus.   We'll probably have to postpone your admission, surgery or procedure. Your doctor will discuss this with you. After that, we'll let you know what to do and when you can re-schedule.   We may need to cancel your treatment on short notice for other reasons, too.  If your " test shows you DON'T have COVID-19  Even if your test is negative, you can still get COVID-19. It's rare but, sometimes, the test result is wrong. You could also catch the virus after taking the test.   There's a very small chance that you could catch COVID-19 in the hospital or surgery center. [facility] has taken many steps to prevent this from happening.   Day of your surgery or procedure    Please come wearing a face covering that covers both your nose and mouth.    When you arrive, we'll ask you some questions to find out if you have any signs or symptoms of COVID-19.    Ask your care team if you can have visitors. All visitors must wear face coverings and will be screened for signs of COVID-19.  ? Even if no visitors are allowed, you can still have with you:    Your legal guardian or legal decision maker    A parent and one other visitor, if you are younger than 18 years old    A partner and a , if you are in labor  ? We might need to teach you about taking care of yourself after surgery. If so, a visitor can come into the hospital to learn about it, too.  ? The rules for visitors change often, depending on how much the virus is spreading. To learn more, see Visiting a Loved One in the Hospital during the COVID-19 Outbreak.  Please call your care team, hospital or surgery center if you have any questions. We thank you for your understanding and for choosing [facility] for your care.   Questions and answers  Does it matter where I get tested for COVID-19?  Yes. We urge you to get tested at one of our [facility] COVID-19 testing sites. We process these tests in our lab and can get the results quickly. Your [facility] care team needs to get your results before you check in.  What should I do if I can't get tested at [facility]?  You can get tested somewhere else, but you'll need to take these extra steps:   1. Contact your family doctor or clinic to arrange your  "test.  2. Take the test within 4 days of your surgery or procedure. We can't accept tests older than 4 days.  3. Make sure you're getting a test inside the nose. Tests inside the nose are:  ? A naso-pharyngeal (NP) RT-PCR test  ? An anterior nares RT-PCR test  Many pharmacies use \"rapid\" tests or saliva (spit) tests. We do NOT accept those tests before surgery or procedures. Tests from inside the nose are the most accurate tests.  1. Make sure your doctor or clinic faxes your results to [facility] at [phone].  If we don't get your results in time, we may have to delay or cancel your treatment.  For informational purposes only. Not to replace the advice of your health care provider. Copyright   2020 [facility]. All rights reserved. Clinically reviewed by Infection [facility] and the [facility] COVID-19 Clinical Team. [employer] 840795 - Rev 09/09/21.    How to Take Your Medication Before Surgery  - Take all of your medications before surgery as usual    "

## 2021-10-14 NOTE — NURSING NOTE
Immunization Documentation    Prior to Immunization administration, verified patients identity using patient's name and date of birth. Please see IMMUNIZATIONS  and order for additional information.  Patient / Parent instructed to remain in clinic for 15 minutes and report any adverse reaction to staff immediately.    Was the entire amount of vaccines given used? Yes    Marge Herrera LPN  10/14/2021   9:22 AM

## 2021-10-14 NOTE — NURSING NOTE
"Chief Complaint   Patient presents with     Pre-Op Exam     10/25/21 Right knee        FOOD SECURITY SCREENING QUESTIONS  Hunger Vital Signs:  Within the past 12 months we worried whether our food would run out before we got money to buy more. Never  Within the past 12 months the food we bought just didn't last and we didn't have money to get more. Never  Marge Herrera LPN 10/14/2021 8:33 AM      Initial /82 (BP Location: Right arm, Patient Position: Sitting, Cuff Size: Adult Large)   Pulse 55   Temp 97.5  F (36.4  C) (Tympanic)   Resp 16   Wt 137.5 kg (303 lb 3.2 oz)   SpO2 99%   BMI 42.29 kg/m   Estimated body mass index is 42.29 kg/m  as calculated from the following:    Height as of 12/11/20: 1.803 m (5' 11\").    Weight as of this encounter: 137.5 kg (303 lb 3.2 oz).  Medication Reconciliation: complete    Marge Herrera LPN  "

## 2021-10-14 NOTE — PROGRESS NOTES
[facility]  [address]  [place] [address]  Phone: [phone]  Primary Provider: Juan C Pritchard  Pre-op Performing Provider: JUAN C PRITCHARD    PREOPERATIVE EVALUATION:  Today's date: 10/14/2021    Gregg Aguayo is a 55 year old male who presents for a preoperative evaluation.    Surgical Information:  Surgery/Procedure: Right Knee Replacement   Surgery Location: [facility]  Surgeon: Dr Dykes  Surgery Date: 10/25/21  Time of Surgery: unknown  Where patient plans to recover: At home with family  Fax number for surgical facility: [phone]    Type of Anesthesia Anticipated: Choice    Assessment & Plan     The proposed surgical procedure is considered INTERMEDIATE risk.      ICD-10-CM    1. Preop general physical exam  Z01.818    2. Arthritis of right knee  M17.11    3. Trigger finger, right ring finger  M65.341    4. Polymyalgia rheumatica (H)  M35.3    5. Immunosuppression (H)  D84.9 EKG 12-lead, tracing only     CBC and Differential     Comprehensive Metabolic Panel     UA with Microscopic reflex to Culture     Comprehensive Metabolic Panel     CBC and Differential     UA with Microscopic reflex to Culture   6. Morbid obesity (H)  E66.01    7. AILEEN on CPAP - uses nightly, finds very helpful  G47.33     Z99.89    8. Tobacco abuse  Z72.0    9. Needs flu shot  Z23 FLU SHOT 50 - 64 YEARS (FLUBLOK)   10. Bilateral leg edema  R60.0 furosemide (LASIX) 20 MG tablet     potassium chloride ER (K-TAB/KLOR-CON) 10 MEQ CR tablet   11. Benign essential hypertension  I10 furosemide (LASIX) 20 MG tablet   HPI related to upcoming procedure: Patient has right knee osteoarthritis and is now scheduled for right total knee replacement.    Also has right finger, fourth finger (ring finger) trigger finger and was was planning on having his trigger finger surgery done at the same time.    Polymyalgia rheumatica.  Chronic immunosuppressed, currently using hydroxychloroquine.  This has been quite  effective.  Based on guidelines no need to hold dosing prior to surgery.  Given his chronic immunosuppression, labs and EKG were obtained today.    Obesity, discussed need for reduced oral caloric intake.  Regular exercise.  Reduce carbohydrate intake.  Information printed and reviewed.    Obstructive sleep apnea, chronic.  Uses CPAP nightly.  Finds helpful and beneficial.  Encouraged continued use.    Tobacco abuse.  Ongoing.  He plans to quit smoking prior to surgery.  Has nicotine replacement products and other quit smoking products at home if needed.    Flu shot today.    Pneumococcal vaccine and shingles vaccinations are due.  Encouraged him to get these at his convenience.  Third Covid vaccination when available.    Hypertension is well controlled.  Doing well with current medications.  Creatinine and potassium levels are stable.  Needs refills of Lasix and potassium supplement.    Risks and Recommendations:  The patient has the following additional risks and recommendations for perioperative complications:   - Morbid obesity (BMI >40)    Medication Instructions:  Patient is to take all scheduled medications on the day of surgery    RECOMMENDATION:  APPROVAL GIVEN to proceed with proposed procedure, without further diagnostic evaluation.    Subjective     Preop Questions 10/14/2021   1. Have you ever had a heart attack or stroke? No   2. Have you ever had surgery on your heart or blood vessels, such as a stent placement, a coronary artery bypass, or surgery on an artery in your head, neck, heart, or legs? No   3. Do you have chest pain with activity? No   4. Do you have a history of  heart failure? No   5. Do you currently have a cold, bronchitis or symptoms of other infection? No   6. Do you have a cough, shortness of breath, or wheezing? No   7. Do you or anyone in your family have previous history of blood clots? No   8. Do you or does anyone in your family have a serious bleeding problem such as prolonged  bleeding following surgeries or cuts? No   9. Have you ever had problems with anemia or been told to take iron pills? No   10. Have you had any abnormal blood loss such as black, tarry or bloody stools? No   11. Have you ever had a blood transfusion? No   12. Are you willing to have a blood transfusion if it is medically needed before, during, or after your surgery? Yes   13. Have you or any of your relatives ever had problems with anesthesia? No   14. Do you have sleep apnea, excessive snoring or daytime drowsiness? YES - Uses CPAP   14a. Do you have a CPAP machine? Yes   15. Do you have any artifical heart valves or other implanted medical devices like a pacemaker, defibrillator, or continuous glucose monitor? No   16. Do you have artificial joints? YES - s/p Left total knee   17. Are you allergic to latex? No       Health Care Directive:  Patient does not have a Health Care Directive or Living Will: Patient states has Advance Directive and will bring in a copy to clinic.    Preoperative Review of :   reviewed - controlled substances reflected in medication list.     Review of Systems   Constitutional: Negative for chills and fever.   HENT: Positive for congestion, hearing loss and tinnitus.    Eyes: Negative for visual disturbance.   Respiratory: Negative for chest tightness and shortness of breath.    Cardiovascular: Positive for palpitations (if forgets his metoprolol). Negative for chest pain.   Gastrointestinal: Negative for abdominal pain and blood in stool.   Genitourinary: Negative for hematuria.   Musculoskeletal: Positive for arthralgias, gait problem (right knee pain, crepitus), joint swelling and myalgias. Negative for back pain.        + Right 4th finger, trigger finger   Skin: Negative for rash and wound.   Allergic/Immunologic: Positive for immunocompromised state.   Neurological: Negative for syncope.   Hematological: Negative for adenopathy. Does not bruise/bleed easily.    Psychiatric/Behavioral: Positive for sleep disturbance. Negative for confusion.       Patient Active Problem List    Diagnosis Date Noted     Benign essential hypertension 10/14/2021     Priority: Medium     Lipoma of torso 09/14/2021     Priority: Medium     Change in hearing of right ear 09/14/2021     Priority: Medium     Congestion of right ear 09/14/2021     Priority: Medium     Other long term (current) drug therapy - 3/31/2021 - Gabapentin 09/14/2021     Priority: Medium     Immunosuppression (H) 09/14/2021     Priority: Medium     Morbid obesity (H) 12/04/2020     Priority: Medium     Crepitus of joint of right knee 09/11/2020     Priority: Medium     S/P lumbar discectomy 05/18/2020     Priority: Medium     Symptomatic premature ventricular contractions 04/15/2019     Priority: Medium     Trigger ring finger of right hand 08/30/2018     Priority: Medium     Heartburn 07/25/2018     Priority: Medium     Psychosexual dysfunction with inhibited sexual excitement 01/29/2018     Priority: Medium     Tobacco abuse 01/29/2018     Priority: Medium     AILEEN on CPAP - uses nightly, finds very helpful 11/20/2017     Priority: Medium     Bilateral leg edema 07/05/2017     Priority: Medium     Polymyalgia rheumatica (H) 11/11/2016     Priority: Medium     Arthralgia of multiple joints 10/26/2016     Priority: Medium      Past Medical History:   Diagnosis Date     Cigarette smoker      Erectile dysfunction      GERD (gastroesophageal reflux disease)      AILEEN on CPAP      PMR (polymyalgia rheumatica) (H)      Past Surgical History:   Procedure Laterality Date     APPENDECTOMY OPEN  2005     ARTHROPLASTY KNEE Left      ARTHROSCOPY KNEE Left 3/19/2018    Procedure: ARTHROSCOPY KNEE;  Left Knee Arthroscopy, Partial Meniscectomy, Chondroplasty;  Surgeon: Kwabena Patricia DO;  Location: [facility] OR     ARTHROSCOPY KNEE WITH MENISCAL REPAIR Right 12/11/2020    Procedure: Rt Knee Arthroscopy, partial Medial Meniscectomy Debridement.;   Surgeon: Monster Grissom MD;  Location: [facility] OR     ATTEMPTED ARTHROSCOPY      knee ? left     COLONOSCOPY  01/29/2014 1/29/2014,Tubular adenoma of rectum - follow up 5 years     COLONOSCOPY  02/01/2019    normal with diverticula, follow up 10 years, 2/1/29     COLONOSCOPY N/A 2/1/2019    normal, follow up 2/1/29     JOINT REPLACEMENT Left 11/2018     RELEASE CARPAL TUNNEL Right 11/22/2017     RELEASE CARPAL TUNNEL Left     12/21/2017,[id number],OTHER,Left     RELEASE TRIGGER FINGER Right 9/7/2018     RELEASE ULNAR NERVE (ELBOW) Left 12/21/2017     Current Outpatient Medications   Medication Sig Dispense Refill     acetaminophen (TYLENOL) 650 MG CR tablet Take up to 4 tablet daily for pain as needed       fexofenadine (ALLEGRA) 180 MG tablet Take 1 tablet (180 mg) by mouth every evening - for ears / congestion 30 tablet 1     furosemide (LASIX) 20 MG tablet Take 2 tablets (40 mg) by mouth daily 180 tablet 1     gabapentin (NEURONTIN) 100 MG capsule Take 1-2 capsules (100-200 mg) by mouth 4 times daily as needed For nerve pain 120 capsule 3     hydroxychloroquine (PLAQUENIL) 200 MG tablet Take 400 mg by mouth       metFORMIN (GLUCOPHAGE) 500 MG tablet TAKE 1-2 TABLETS (500-1,000 MG) BY MOUTH DAILY (WITH DINNER) WHILE TAKING ORAL STEROIDS 180 tablet 3     methylPREDNISolone (MEDROL) 4 MG tablet TAKE 1/2-2 TABLETS BY MOUTH DAILY WITH A MEAL-WEAN DOSE EVERY 2-4 WEEKS FOR  tablet 1     metoprolol succinate ER (TOPROL-XL) 25 MG 24 hr tablet Take 2 tablets (50 mg) by mouth every evening -- adjust dose as needed for heart palpitations 180 tablet 3     omeprazole (PRILOSEC) 40 MG DR capsule Take 1 capsule (40 mg) by mouth daily 90 capsule 0     potassium chloride ER (K-TAB/KLOR-CON) 10 MEQ CR tablet Take 2 tablets (20 mEq) by mouth daily - with food (take with Furosemide) 180 tablet 3     sildenafil (REVATIO) 20 MG tablet Take 1-5 tablets ( mg) by mouth daily as needed (For Erectile Dysfunction - take 30 min to  1 hour prior to [place]) 30 tablet 3     sildenafil (VIAGRA) 100 MG tablet Take 1/4 to 1 tablet 30 min to 4 hours prior to sex for ED 30 tablet 11       No Known Allergies     Social History     Tobacco Use     Smoking status: Current Every Day Smoker     Packs/day: 0.50     Types: Cigarettes     Smokeless tobacco: Never Used     Tobacco comment: Has Chantix but has not started   Substance Use Topics     Alcohol use: Yes     Alcohol/week: 0.0 standard drinks     Family History   Problem Relation Age of Onset     Genetic Disorder Maternal Grandfather         Genetic,Polymyalgia Rheumatica     Heart Murmur Mother      Polymyalgia rheumatica Mother      History   Drug Use No         Objective     /82 (BP Location: Right arm, Patient Position: Sitting, Cuff Size: Adult Large)   Pulse 55   Temp 97.5  F (36.4  C) (Tympanic)   Resp 16   Wt 137.5 kg (303 lb 3.2 oz)   SpO2 99%   BMI 42.29 kg/m      Physical Exam  Constitutional:       General: He is not in acute distress.     Appearance: He is well-developed. He is not diaphoretic.   HENT:      Head: Normocephalic and atraumatic.   Eyes:      General: No scleral icterus.     Conjunctiva/sclera: Conjunctivae normal.   Neck:      Vascular: No carotid bruit.   Cardiovascular:      Rate and Rhythm: Normal rate and regular rhythm.      Pulses: Normal pulses.   Pulmonary:      Effort: Pulmonary effort is normal.      Breath sounds: Normal breath sounds.   Abdominal:      Palpations: Abdomen is soft.      Tenderness: There is no abdominal tenderness.   Musculoskeletal:         General: Tenderness and deformity present.      Cervical back: Neck supple.      Right lower leg: Edema present.      Left lower leg: Edema present.      Comments: Mild right knee effusion + crepitus with ROM   Lymphadenopathy:      Cervical: No cervical adenopathy.   Skin:     General: Skin is warm and dry.      Findings: No rash.   Neurological:      Mental Status: He is alert and oriented  to person, place, and time. Mental status is at baseline.   Psychiatric:         Mood and Affect: Mood normal.         Behavior: Behavior normal.       Recent Labs   Lab Test 12/04/20  0925 04/10/20  1033   HGB 15.8 15.9    184    138   POTASSIUM 4.5 4.2   CR 0.91 0.90        Diagnostics:  Results for orders placed or performed in visit on 10/14/21   Comprehensive Metabolic Panel     Status: Normal   Result Value Ref Range    Sodium 139 134 - 144 mmol/L    Potassium 4.7 3.5 - 5.1 mmol/L    Chloride 103 98 - 107 mmol/L    Carbon Dioxide (CO2) 31 21 - 31 mmol/L    Anion Gap 5 3 - 14 mmol/L    Urea Nitrogen 17 7 - 25 mg/dL    Creatinine 0.91 0.70 - 1.30 mg/dL    Calcium 9.6 8.6 - 10.3 mg/dL    Glucose 100 70 - 105 mg/dL    Alkaline Phosphatase 43 34 - 104 U/L    AST 20 13 - 39 U/L    ALT 23 7 - 52 U/L    Protein Total 6.6 6.4 - 8.9 g/dL    Albumin 4.4 3.5 - 5.7 g/dL    Bilirubin Total 0.6 0.3 - 1.0 mg/dL    GFR Estimate >90 >60 mL/min/1.73m2   CBC with platelets and differential     Status: Abnormal   Result Value Ref Range    WBC Count 7.1 4.0 - 11.0 10e3/uL    RBC Count 4.76 4.40 - 5.90 10e6/uL    Hemoglobin 15.8 13.3 - 17.7 g/dL    Hematocrit 44.8 40.0 - 53.0 %    MCV 94 78 - 100 fL    MCH 33.2 (H) 26.5 - 33.0 pg    MCHC 35.3 31.5 - 36.5 g/dL    RDW 12.3 10.0 - 15.0 %    Platelet Count 191 150 - 450 10e3/uL    % Neutrophils 57 %    % Lymphocytes 24 %    % Monocytes 12 %    % Eosinophils 5 %    % Basophils 2 %    % Immature Granulocytes 0 %    NRBCs per 100 WBC 0 <1 /100    Absolute Neutrophils 4.1 1.6 - 8.3 10e3/uL    Absolute Lymphocytes 1.7 0.8 - 5.3 10e3/uL    Absolute Monocytes 0.8 0.0 - 1.3 10e3/uL    Absolute Eosinophils 0.4 0.0 - 0.7 10e3/uL    Absolute Basophils 0.1 0.0 - 0.2 10e3/uL    Absolute Immature Granulocytes 0.0 <=0.0 10e3/uL    Absolute NRBCs 0.0 10e3/uL   UA with Microscopic reflex to Culture     Status: Abnormal    Specimen: Urine, Midstream   Result Value Ref Range    Color Urine  Light Yellow Colorless, Straw, Light Yellow, Yellow    Appearance Urine Clear Clear    Glucose Urine Negative Negative mg/dL    Bilirubin Urine Negative Negative    Ketones Urine Negative Negative mg/dL    Specific Gravity Urine 1.018 1.000 - 1.030    Blood Urine Negative Negative    pH Urine 5.5 5.0 - 9.0    Protein Albumin Urine Negative Negative mg/dL    Urobilinogen Urine Normal Normal, 2.0 mg/dL    Nitrite Urine Negative Negative    Leukocyte Esterase Urine Negative Negative    Mucus Urine Present (A) None Seen /LPF    RBC Urine 1 <=2 /HPF    WBC Urine 0 <=5 /HPF    Narrative    Urine Culture not indicated   EKG 12-lead, tracing only     Status: None (Preliminary result)   Result Value Ref Range    Systolic Blood Pressure  mmHg    Diastolic Blood Pressure  mmHg    Ventricular Rate 50 BPM    Atrial Rate 50 BPM    WV Interval 198 ms    QRS Duration 102 ms     ms    QTc 419 ms    P Axis 31 degrees    R AXIS 23 degrees    T Axis 18 degrees    Interpretation ECG       Sinus bradycardia  Otherwise normal ECG  When compared with ECG of 27-NOV-2020 23:16,  No significant change was found     CBC and Differential     Status: Abnormal    Narrative    The following orders were created for panel order CBC and Differential.  Procedure                               Abnormality         Status                     ---------                               -----------         ------                     CBC with platelets and d...[[id number]]  Abnormal            Final result                 Please view results for these tests on the individual orders.      All of his labs are normal.  EKG is normal.  EKG 10/14/2021 -sinus bradycardia with rate of 50.  Otherwise normal EKG.    Revised Cardiac Risk Index (RCRI):  The patient has the following serious cardiovascular risks for perioperative complications:   - No serious cardiac risks = 0 points     RCRI Interpretation: 0 points: Class I (very low risk - 0.4% complication  rate)           Signed Electronically by: Reinier Ritchie MD  Copy of this evaluation report is provided to requesting physician.

## 2021-10-14 NOTE — LETTER
October 17, 2021      Gregg Aguayo  [address]   [place] [address]        Dear ,    We are writing to inform you of your test results.    Labs are stable.   Continue current medications.     Electronically signed by:  Reinier Ritchie MD  on October 17, 2021     Resulted Orders   Comprehensive Metabolic Panel   Result Value Ref Range    Sodium 139 134 - 144 mmol/L    Potassium 4.7 3.5 - 5.1 mmol/L    Chloride 103 98 - 107 mmol/L    Carbon Dioxide (CO2) 31 21 - 31 mmol/L    Anion Gap 5 3 - 14 mmol/L    Urea Nitrogen 17 7 - 25 mg/dL    Creatinine 0.91 0.70 - 1.30 mg/dL    Calcium 9.6 8.6 - 10.3 mg/dL    Glucose 100 70 - 105 mg/dL    Alkaline Phosphatase 43 34 - 104 U/L    AST 20 13 - 39 U/L    ALT 23 7 - 52 U/L    Protein Total 6.6 6.4 - 8.9 g/dL    Albumin 4.4 3.5 - 5.7 g/dL    Bilirubin Total 0.6 0.3 - 1.0 mg/dL    GFR Estimate >90 >60 mL/min/1.73m2      Comment:      As of July 11, 2021, eGFR is calculated by the CKD-EPI creatinine equation, without race adjustment. eGFR can be influenced by muscle mass, exercise, and diet. The reported eGFR is an estimation only and is only applicable if the renal function is stable.   CBC with platelets and differential   Result Value Ref Range    WBC Count 7.1 4.0 - 11.0 10e3/uL    RBC Count 4.76 4.40 - 5.90 10e6/uL    Hemoglobin 15.8 13.3 - 17.7 g/dL    Hematocrit 44.8 40.0 - 53.0 %    MCV 94 78 - 100 fL    MCH 33.2 (H) 26.5 - 33.0 pg    MCHC 35.3 31.5 - 36.5 g/dL    RDW 12.3 10.0 - 15.0 %    Platelet Count 191 150 - 450 10e3/uL    % Neutrophils 57 %    % Lymphocytes 24 %    % Monocytes 12 %    % Eosinophils 5 %    % Basophils 2 %    % Immature Granulocytes 0 %    NRBCs per 100 WBC 0 <1 /100    Absolute Neutrophils 4.1 1.6 - 8.3 10e3/uL    Absolute Lymphocytes 1.7 0.8 - 5.3 10e3/uL    Absolute Monocytes 0.8 0.0 - 1.3 10e3/uL    Absolute Eosinophils 0.4 0.0 - 0.7 10e3/uL    Absolute Basophils 0.1 0.0 - 0.2 10e3/uL    Absolute Immature Granulocytes 0.0  <=0.0 10e3/uL    Absolute NRBCs 0.0 10e3/uL   UA with Microscopic reflex to Culture   Result Value Ref Range    Color Urine Light Yellow Colorless, Straw, Light Yellow, Yellow    Appearance Urine Clear Clear    Glucose Urine Negative Negative mg/dL    Bilirubin Urine Negative Negative    Ketones Urine Negative Negative mg/dL    Specific Gravity Urine 1.018 1.000 - 1.030    Blood Urine Negative Negative    pH Urine 5.5 5.0 - 9.0    Protein Albumin Urine Negative Negative mg/dL    Urobilinogen Urine Normal Normal, 2.0 mg/dL    Nitrite Urine Negative Negative    Leukocyte Esterase Urine Negative Negative    Mucus Urine Present (A) None Seen /LPF    RBC Urine 1 <=2 /HPF    WBC Urine 0 <=5 /HPF    Narrative    Urine Culture not indicated       If you have any questions or concerns, please call the clinic at the number listed above.       Sincerely,      Reinier Ritchie MD

## 2021-10-17 LAB
ATRIAL RATE - MUSE: 50 BPM
DIASTOLIC BLOOD PRESSURE - MUSE: NORMAL MMHG
INTERPRETATION ECG - MUSE: NORMAL
P AXIS - MUSE: 31 DEGREES
PR INTERVAL - MUSE: 198 MS
QRS DURATION - MUSE: 102 MS
QT - MUSE: 460 MS
QTC - MUSE: 419 MS
R AXIS - MUSE: 23 DEGREES
SYSTOLIC BLOOD PRESSURE - MUSE: NORMAL MMHG
T AXIS - MUSE: 18 DEGREES
VENTRICULAR RATE- MUSE: 50 BPM

## 2021-10-28 ENCOUNTER — HOSPITAL ENCOUNTER (OUTPATIENT)
Dept: PHYSICAL THERAPY | Facility: OTHER | Age: 56
Setting detail: THERAPIES SERIES
End: 2021-10-28
Attending: SPECIALIST
Payer: COMMERCIAL

## 2021-10-28 DIAGNOSIS — Z96.651 S/P TOTAL KNEE ARTHROPLASTY, RIGHT: ICD-10-CM

## 2021-10-28 PROCEDURE — 97016 VASOPNEUMATIC DEVICE THERAPY: CPT | Mod: GP

## 2021-10-28 PROCEDURE — 97161 PT EVAL LOW COMPLEX 20 MIN: CPT | Mod: GP

## 2021-10-28 PROCEDURE — 97110 THERAPEUTIC EXERCISES: CPT | Mod: GP

## 2021-10-28 NOTE — PROGRESS NOTES
[facility]          OUTPATIENT PHYSICAL THERAPY ORTHOPEDIC EVALUATION  PLAN OF TREATMENT FOR OUTPATIENT REHABILITATION  (COMPLETE FOR INITIAL CLAIMS ONLY)  Patient's Last Name, First Name, M.I.  YOB: 1965  Gregg Aguayo    Provider s Name:  [facility]   Medical Record No.  [id number]   Start of Care Date:  10/28/21   Onset Date:  10/25/21   Type:     _X__PT   ___OT   ___SLP Medical Diagnosis:  S/P total knee arthroplasty, right Z96.651      PT Diagnosis:  Impaired mobility, decreased strength and endurance, impaired gait and balanace, s/p right TKA   Visits from SOC:  1      _________________________________________________________________________________  Plan of Treatment/Functional Goals:  balance training, gait training, joint mobilization, manual therapy, neuromuscular re-education, ROM, strengthening, stretching     Cryotherapy, Electrical stimulation, Hot packs, Ultrasound     Goals  Goal Identifier: Ambulation  Goal Description: Patient will be able to ambulate for longer than 10 minutes on even and uneven ground with no AD and pain no greater than 3/10 in order to improve his overall mobility in the community  Target Date: 12/23/21    Goal Identifier: ADL's  Goal Description: Patient will be able to complete all dressing, bathing, and grooming activities with no AD, no loss of balance, and pain greater than 4/10 in order to improve his ability to complete ADL's  Target Date: 12/23/21    Goal Identifier: Housework  Goal Description: Patient will be able to complete all indoor housework such as dishes, laundry, and sweeping, with no AD, no loss of balance, and pain no greater than 3/10 in order to improve his function at home  Target Date: 12/23/21    Goal Identifier: Lifting  Goal Description: Patient will be able to lift and carry his bags of groceries from his car and into his home with no AD and no loss of balance in order  to improve his overall function at home  Target Date: 12/23/21        Therapy Frequency:  other (see comments) (1-2 times per week)  Predicted Duration of Therapy Intervention:  8 weeks    Omid Carson PT                 I CERTIFY THE NEED FOR THESE SERVICES FURNISHED UNDER        THIS PLAN OF TREATMENT AND WHILE UNDER MY CARE     (Physician co-signature of this document indicates review and certification of the therapy plan).                       Certification Date From:  10/28/21   Certification Date To:  12/23/21    Referring Provider:  Monster Grissom MD    Initial Assessment        See Epic Evaluation Start of Care Date: 10/28/21

## 2021-10-28 NOTE — PROGRESS NOTES
10/28/21 1000   General Information   Type of Visit Initial OP Ortho PT Evaluation   Start of Care Date 10/28/21   Referring Physician Monster Grissom MD   Patient/Family Goals Statement To reduce his pain and improve his function   Orders Evaluate and Treat   Orders Comment Post TKA Protocol   Date of Order 09/10/21   Certification Required? Yes   Medical Diagnosis S/P total knee arthroplasty, right Z96.651    Surgical/Medical history reviewed Yes   Precautions/Limitations no known precautions/limitations       Present No   Body Part(s)   Body Part(s) Knee   Presentation and Etiology   Pertinent history of current problem (include personal factors and/or comorbidities that impact the POC) Patient is a 55 year old male referred to physical therapy with s/p right TKA. He had surgery on 10/25/2021 and was discharged right after surgery. Slowly getting better at home but notes that it is really swollen and hot. He notes the pain is in the 8-10 area all the time. He gets a burning sensation near the top of his knee. He notes that the aquacell dressing is saturated and has an appointment tomorrow to get that changed with nursing. Overall doing ok. He has had his other knee replaced so he is familiar of what needs to be done for rehab   Impairments A. Pain;C. Swelling;D. Decreased ROM;E. Decreased flexibility;F. Decreased strength and endurance;G. Impaired balance;H. Impaired gait;J. Burning   Functional Limitations perform activities of daily living;perform desired leisure / sports activities   Symptom Location Right knee   How/Where did it occur Other  (surgery)   Onset date of current episode/exacerbation 10/25/21   Chronicity New   Pain rating (0-10 point scale) Best (/10);Worst (/10)   Best (/10) 5   Worst (/10) 8   Pain quality A. Sharp;C. Aching;D. Burning;E. Shooting   Frequency of pain/symptoms A. Constant   Pain/symptoms are: Worse during the day   Pain/symptoms exacerbated by A.  Sitting;B. Walking;E. Rest   Pain/symptoms eased by A. Sitting;B. Walking;C. Rest;E. Changing positions;H. Cold   Progression of symptoms since onset: Improved   Prior Level of Function   Prior Level of Function-Mobility Independent   Prior Level of Function-ADLs Independent   Current Level of Function   Current Community Support Family/friend caregiver   Patient role/employment history F. Retired   Living environment House/townhome   Home/community accessibility Uses the front wheeled walker around home. Crutches in the area   Current equipment-Gait/Locomotion Walker;Front wheeled walker;Axillary crutches   Current equipment-ADL None   Fall Risk Screen   Fall screen completed by PT   Have you fallen 2 or more times in the past year? No   Have you fallen and had an injury in the past year? No   Is patient a fall risk? Yes   Fall screen comments Due to surgery, patient is considered a fall risk   Abuse Screen (yes response referral indicated)   Feels Unsafe at Home or Work/School no   Feels Threatened by Someone no   Does Anyone Try to Keep You From Having Contact with Others or Doing Things Outside Your Home? no   Physical Signs of Abuse Present no   Knee Objective Findings   Side (if bilateral, select both right and left) Right   Observation Patient resting comfortably in chair. No apparent distress   Integumentary  Patient has aquacell dressing on the skin. Above this dressing he has an ace bandage wrap that is still intact. There is a spot of blood that has leaked through the ace wrap. When he tries to look uner the ace wrap, he reports that the ace and the aquacell dressing are stuck together. The blood spot hasn't grown at all and is dried on this date. He has an appointment with the Surgeon tomorrow to get this addressed and changed   Posture Slight protraction of shoulders   Gait/Locomotion Slower gait speed using axillary crutches. He does use a step to pattern when taking his time. Lack of heel strike and  push off. Lack of knee flexion and extension throughout gait   Lachmans Test N/a   Anterior Drawer Test N/a   Posterior Drawer Test N/a   Varus Stress Test N/a   Valgus Stress Test N/a   Josey's Test N/a   Palpation Mild discomforf throughout the entire knee. No specific palpation to the area done today   Right Knee Extension AROM -14 degrees short of full extension   Right Knee Flexion PROM 60 degrees   Right Knee Flexion Strength Not tested   Right Knee Extension Strength Not tested   Right Hip Abduction Strength Not tested   Right Hamstring Flexibility Mod limited   Right Hip Flexor Flexibility min limited   Right Quadricep Flexibility Mod limited   Planned Therapy Interventions   Planned Therapy Interventions balance training;gait training;joint mobilization;manual therapy;neuromuscular re-education;ROM;strengthening;stretching   Planned Modality Interventions   Planned Modality Interventions Cryotherapy;Electrical stimulation;Hot packs;Ultrasound   Clinical Impression   Criteria for Skilled Therapeutic Interventions Met yes, treatment indicated   PT Diagnosis Impaired mobility, decreased strength and endurance, impaired gait and balanace, s/p right TKA   Influenced by the following impairments pain, stiffness, swelling, weakness   Functional limitations due to impairments ambulation, transfers, ADLs   Clinical Presentation Stable/Uncomplicated   Clinical Presentation Rationale Clinical judgement   Clinical Decision Making (Complexity) Low complexity   Therapy Frequency other (see comments)  (1-2 times per week)   Predicted Duration of Therapy Intervention (days/wks) 8 weeks   Risk & Benefits of therapy have been explained Yes   Patient, Family & other staff in agreement with plan of care Yes   Clinical Impression Comments Signs and symptoms consistent with s/p TKA. No major concerns since returning home. Pain has been relatively higher but notes it has been getting better. Did have some leaking out of his  daleell dressing and into his ace bandage. He is getting this looked at tomorrow by the surgeon. It has been dry for a few days. Reports that he has been getting around in his home with his walker and with crutches when he leaves the home. Has been doing his exercises and moving his knee around when  he can. Icing quite a bit. He would benefit from skilled PT services in order to reduce his pain and improve his mobility, strength, endurance, gait, and balance   Education Assessment   Barriers to Learning No barriers   ORTHO GOALS   PT Ortho Eval Goals 1;2;3;4   Ortho Goal 1   Goal Identifier Ambulation   Goal Description Patient will be able to ambulate for longer than 10 minutes on even and uneven ground with no AD and pain no greater than 3/10 in order to improve his overall mobility in the community   Target Date 12/23/21   Ortho Goal 2   Goal Identifier ADL's   Goal Description Patient will be able to complete all dressing, bathing, and grooming activities with no AD, no loss of balance, and pain greater than 4/10 in order to improve his ability to complete ADL's   Target Date 12/23/21   Ortho Goal 3   Goal Identifier Housework   Goal Description Patient will be able to complete all indoor housework such as dishes, laundry, and sweeping, with no AD, no loss of balance, and pain no greater than 3/10 in order to improve his function at home   Target Date 12/23/21   Ortho Goal 4   Goal Identifier Lifting   Goal Description Patient will be able to lift and carry his bags of groceries from his car and into his home with no AD and no loss of balance in order to improve his overall function at home   Target Date 12/23/21   Total Evaluation Time   PT Eval, Low Complexity Minutes (20571) 20   Therapy Certification   Certification date from 10/28/21   Certification date to 12/23/21   Medical Diagnosis S/P total knee arthroplasty, right Z96.651

## 2021-10-29 ENCOUNTER — OFFICE VISIT (OUTPATIENT)
Dept: ORTHOPEDICS | Facility: OTHER | Age: 56
End: 2021-10-29
Attending: SPECIALIST
Payer: COMMERCIAL

## 2021-10-29 DIAGNOSIS — Z96.651 STATUS POST TOTAL RIGHT KNEE REPLACEMENT: ICD-10-CM

## 2021-10-29 DIAGNOSIS — M23.8X1 CREPITUS OF JOINT OF RIGHT KNEE: Primary | ICD-10-CM

## 2021-10-29 PROCEDURE — 99024 POSTOP FOLLOW-UP VISIT: CPT | Performed by: SPECIALIST

## 2021-10-29 NOTE — PROGRESS NOTES
Patient is here for follow up on his right total knee.   Lauryn Nolan LPN .....................10/29/2021 2:45 PM

## 2021-10-30 NOTE — PROGRESS NOTES
Visit Date: 10/29/2021    Gregg Aguayo returns for followup.  He is status post total knee arthroplasty Wednesday.  He is here today for dressing change.      PHYSICAL EXAMINATION:  Today shows maceration of the wound secondary to the dressing being moist.  This is removed.  There is no evidence of infection.    PLAN:  At this point, I think he is doing well.  We reapplied a new Aquacel and will see him back as previously scheduled.    Monster Grissom MD        D: 10/29/2021   T: 10/29/2021   MT: [id number]    Name:     GREGG AGUAYO  MRN:      [id number]        Account:    [id number]   :      1965           Visit Date: 10/29/2021     Document: [id number]

## 2021-11-02 ENCOUNTER — HOSPITAL ENCOUNTER (OUTPATIENT)
Dept: PHYSICAL THERAPY | Facility: OTHER | Age: 56
Setting detail: THERAPIES SERIES
End: 2021-11-02
Attending: SPECIALIST
Payer: COMMERCIAL

## 2021-11-02 PROCEDURE — 97110 THERAPEUTIC EXERCISES: CPT | Mod: GP

## 2021-11-02 PROCEDURE — 97016 VASOPNEUMATIC DEVICE THERAPY: CPT | Mod: GP

## 2021-11-04 ENCOUNTER — HOSPITAL ENCOUNTER (OUTPATIENT)
Dept: PHYSICAL THERAPY | Facility: OTHER | Age: 56
Setting detail: THERAPIES SERIES
End: 2021-11-04
Attending: SPECIALIST
Payer: COMMERCIAL

## 2021-11-04 PROCEDURE — 97110 THERAPEUTIC EXERCISES: CPT | Mod: GP

## 2021-11-04 PROCEDURE — 97016 VASOPNEUMATIC DEVICE THERAPY: CPT | Mod: GP

## 2021-11-09 ENCOUNTER — HOSPITAL ENCOUNTER (OUTPATIENT)
Dept: PHYSICAL THERAPY | Facility: OTHER | Age: 56
Setting detail: THERAPIES SERIES
End: 2021-11-09
Attending: SPECIALIST
Payer: COMMERCIAL

## 2021-11-09 PROCEDURE — 97016 VASOPNEUMATIC DEVICE THERAPY: CPT | Mod: GP

## 2021-11-09 PROCEDURE — 97110 THERAPEUTIC EXERCISES: CPT | Mod: GP

## 2021-11-11 ENCOUNTER — HOSPITAL ENCOUNTER (OUTPATIENT)
Dept: PHYSICAL THERAPY | Facility: OTHER | Age: 56
Setting detail: THERAPIES SERIES
End: 2021-11-11
Attending: SPECIALIST
Payer: COMMERCIAL

## 2021-11-11 PROCEDURE — 97110 THERAPEUTIC EXERCISES: CPT | Mod: GP

## 2021-11-11 PROCEDURE — 97016 VASOPNEUMATIC DEVICE THERAPY: CPT | Mod: GP

## 2021-11-12 ENCOUNTER — OFFICE VISIT (OUTPATIENT)
Dept: ORTHOPEDICS | Facility: OTHER | Age: 56
End: 2021-11-12
Attending: SPECIALIST
Payer: COMMERCIAL

## 2021-11-12 DIAGNOSIS — Z96.651 STATUS POST TOTAL RIGHT KNEE REPLACEMENT: Primary | ICD-10-CM

## 2021-11-12 PROCEDURE — 99024 POSTOP FOLLOW-UP VISIT: CPT | Performed by: SPECIALIST

## 2021-11-12 NOTE — PROGRESS NOTES
Patient is here for follow up on his right total knee.   Lauryn Nolan LPN .....................11/12/2021 12:28 PM

## 2021-11-13 NOTE — PROGRESS NOTES
Visit Date: 2021    Grgeg returns for followup 2-1/2 weeks status post right total knee arthroplasty.  He also had a right ring finger trigger digit release.  He is doing well with this.  He feels his range of motion is taking longer this time.    PHYSICAL EXAMINATION:  Physical examination today shows his incision to have healed nicely.  There is no evidence of infection.  Knee range of motion 0-80.  Right hand shows a nicely healing incision, consistent with a trigger digit.    IMPRESSION AND PLAN:  Two and 1/2 weeks status post right total knee arthroplasty.  Range of motion lagging slightly.  He reports he has had a 10-degree increase in the last week.  We will continue to work with this.  I want him to call me in 2 weeks to let know how he is doing.  If there are any questions, will reassess him in 2 weeks.  Otherwise, we will see him in 6 weeks if things are going well.    Monster Grissom MD        D: 2021   T: 2021   MT: [id number]    Name:     GREGG PLUMMER  MRN:      [id number]        Account:    [id number]   :      1965           Visit Date: 2021     Document: [id number]

## 2021-11-16 ENCOUNTER — HOSPITAL ENCOUNTER (OUTPATIENT)
Dept: PHYSICAL THERAPY | Facility: OTHER | Age: 56
Setting detail: THERAPIES SERIES
End: 2021-11-16
Attending: SPECIALIST
Payer: COMMERCIAL

## 2021-11-16 PROCEDURE — 97016 VASOPNEUMATIC DEVICE THERAPY: CPT | Mod: GP

## 2021-11-16 PROCEDURE — 97110 THERAPEUTIC EXERCISES: CPT | Mod: GP

## 2021-11-18 ENCOUNTER — HOSPITAL ENCOUNTER (OUTPATIENT)
Dept: PHYSICAL THERAPY | Facility: OTHER | Age: 56
Setting detail: THERAPIES SERIES
End: 2021-11-18
Attending: SPECIALIST
Payer: COMMERCIAL

## 2021-11-18 PROCEDURE — 97016 VASOPNEUMATIC DEVICE THERAPY: CPT | Mod: GP

## 2021-11-18 PROCEDURE — 97110 THERAPEUTIC EXERCISES: CPT | Mod: GP

## 2021-11-22 ENCOUNTER — HOSPITAL ENCOUNTER (OUTPATIENT)
Dept: PHYSICAL THERAPY | Facility: OTHER | Age: 56
Setting detail: THERAPIES SERIES
End: 2021-11-22
Attending: SPECIALIST
Payer: COMMERCIAL

## 2021-11-22 PROCEDURE — 97016 VASOPNEUMATIC DEVICE THERAPY: CPT | Mod: GP

## 2021-11-22 PROCEDURE — 97110 THERAPEUTIC EXERCISES: CPT | Mod: GP

## 2021-12-02 ENCOUNTER — HOSPITAL ENCOUNTER (OUTPATIENT)
Dept: PHYSICAL THERAPY | Facility: OTHER | Age: 56
Setting detail: THERAPIES SERIES
End: 2021-12-02
Attending: SPECIALIST
Payer: COMMERCIAL

## 2021-12-02 PROCEDURE — 97016 VASOPNEUMATIC DEVICE THERAPY: CPT | Mod: GP

## 2021-12-02 PROCEDURE — 97110 THERAPEUTIC EXERCISES: CPT | Mod: GP

## 2021-12-07 ENCOUNTER — HOSPITAL ENCOUNTER (OUTPATIENT)
Dept: PHYSICAL THERAPY | Facility: OTHER | Age: 56
Setting detail: THERAPIES SERIES
End: 2021-12-07
Attending: SPECIALIST
Payer: COMMERCIAL

## 2021-12-07 PROCEDURE — 97110 THERAPEUTIC EXERCISES: CPT | Mod: GP

## 2021-12-07 NOTE — PROGRESS NOTES
[facility]    Outpatient Physical Therapy Progress Note  Patient: Gregg Aguayo  : 1965    Beginning/End Dates of Reporting Period:  10/28/2021 to 2021    Referring Provider: Monster Grissom MD    Therapy Diagnosis: Impaired mobility, decreased strength and endurance, impaired gait and balanace, s/p right TKA     Client Self Report: Still doing well at home. Used his  yesterday and notes that he was a little sore afterwards.     Objective Measurements:  Objective Measure: Subjective pain rating  Details: 6-7/10  Objective Measure: Knee ROM  Details: 5-      Goals:  Goal Identifier Ambulation   Goal Description Patient will be able to ambulate for longer than 10 minutes on even and uneven ground with no AD and pain no greater than 3/10 in order to improve his overall mobility in the community   Target Date 21   Date Met      Progress (detail required for progress note): Still getting higher levels of pain.      Goal Identifier ADL's   Goal Description Patient will be able to complete all dressing, bathing, and grooming activities with no AD, no loss of balance, and pain greater than 4/10 in order to improve his ability to complete ADL's   Target Date 21   Date Met  21   Progress (detail required for progress note): GOAL MET     Goal Identifier Housework   Goal Description Patient will be able to complete all indoor housework such as dishes, laundry, and sweeping, with no AD, no loss of balance, and pain no greater than 3/10 in order to improve his function at home   Target Date 21   Date Met      Progress (detail required for progress note): Does note that at times his pain can get a little higher with these activities. Significantly improving.      Goal Identifier Lifting   Goal Description Patient will be able to lift and carry his bags of groceries from  his car and into his home with no AD and no loss of balance in order to improve his overall function at home   Target Date 12/23/21   Date Met  12/07/21   Progress (detail required for progress note): GOAL MET     Plan:  Continue therapy per current plan of care.    Discharge:  No

## 2021-12-14 ENCOUNTER — HOSPITAL ENCOUNTER (OUTPATIENT)
Dept: PHYSICAL THERAPY | Facility: OTHER | Age: 56
Setting detail: THERAPIES SERIES
End: 2021-12-14
Attending: SPECIALIST
Payer: COMMERCIAL

## 2021-12-14 PROCEDURE — 97110 THERAPEUTIC EXERCISES: CPT | Mod: GP

## 2021-12-16 ENCOUNTER — HOSPITAL ENCOUNTER (OUTPATIENT)
Dept: PHYSICAL THERAPY | Facility: OTHER | Age: 56
Setting detail: THERAPIES SERIES
End: 2021-12-16
Attending: SPECIALIST
Payer: COMMERCIAL

## 2021-12-16 ENCOUNTER — OFFICE VISIT (OUTPATIENT)
Dept: FAMILY MEDICINE | Facility: OTHER | Age: 56
End: 2021-12-16
Attending: PHYSICIAN ASSISTANT
Payer: COMMERCIAL

## 2021-12-16 VITALS
OXYGEN SATURATION: 100 % | DIASTOLIC BLOOD PRESSURE: 98 MMHG | BODY MASS INDEX: 41.9 KG/M2 | WEIGHT: 300.4 LBS | RESPIRATION RATE: 18 BRPM | HEART RATE: 75 BPM | TEMPERATURE: 97 F | SYSTOLIC BLOOD PRESSURE: 160 MMHG

## 2021-12-16 DIAGNOSIS — Z85.828 HISTORY OF BASAL CELL CARCINOMA: ICD-10-CM

## 2021-12-16 DIAGNOSIS — Z96.651 STATUS POST TOTAL RIGHT KNEE REPLACEMENT: Primary | ICD-10-CM

## 2021-12-16 DIAGNOSIS — L91.8 SKIN TAG: Primary | ICD-10-CM

## 2021-12-16 PROCEDURE — 97110 THERAPEUTIC EXERCISES: CPT | Mod: GP

## 2021-12-16 PROCEDURE — 99213 OFFICE O/P EST LOW 20 MIN: CPT | Performed by: PHYSICIAN ASSISTANT

## 2021-12-16 RX ORDER — CELECOXIB 200 MG/1
200 CAPSULE ORAL
COMMUNITY
Start: 2021-11-11 | End: 2021-12-16

## 2021-12-16 ASSESSMENT — ANXIETY QUESTIONNAIRES
4. TROUBLE RELAXING: NOT AT ALL
3. WORRYING TOO MUCH ABOUT DIFFERENT THINGS: NOT AT ALL
GAD7 TOTAL SCORE: 0
5. BEING SO RESTLESS THAT IT IS HARD TO SIT STILL: NOT AT ALL
GAD7 TOTAL SCORE: 0
1. FEELING NERVOUS, ANXIOUS, OR ON EDGE: NOT AT ALL
2. NOT BEING ABLE TO STOP OR CONTROL WORRYING: NOT AT ALL
GAD7 TOTAL SCORE: 0
7. FEELING AFRAID AS IF SOMETHING AWFUL MIGHT HAPPEN: NOT AT ALL
6. BECOMING EASILY ANNOYED OR IRRITABLE: NOT AT ALL
7. FEELING AFRAID AS IF SOMETHING AWFUL MIGHT HAPPEN: NOT AT ALL

## 2021-12-16 ASSESSMENT — PATIENT HEALTH QUESTIONNAIRE - PHQ9
SUM OF ALL RESPONSES TO PHQ QUESTIONS 1-9: 1
SUM OF ALL RESPONSES TO PHQ QUESTIONS 1-9: 1
10. IF YOU CHECKED OFF ANY PROBLEMS, HOW DIFFICULT HAVE THESE PROBLEMS MADE IT FOR YOU TO DO YOUR WORK, TAKE CARE OF THINGS AT HOME, OR GET ALONG WITH OTHER PEOPLE: SOMEWHAT DIFFICULT

## 2021-12-16 ASSESSMENT — PAIN SCALES - GENERAL: PAINLEVEL: SEVERE PAIN (7)

## 2021-12-16 NOTE — PROGRESS NOTES
"Nursing Notes:   Mihai Alvarez LPN  12/16/2021  2:28 PM  Signed  Chief Complaint   Patient presents with     Derm Problem   Patient reports marks under both of his arms and they are very irritated according to the patient.     Initial BP (!) 158/100   Pulse 75   Temp 97  F (36.1  C) (Tympanic)   Resp 18   Wt 136.3 kg (300 lb 6.4 oz)   SpO2 100%   BMI 41.90 kg/m   Estimated body mass index is 41.9 kg/m  as calculated from the following:    Height as of 12/11/20: 1.803 m (5' 11\").    Weight as of this encounter: 136.3 kg (300 lb 6.4 oz).  Medication Reconciliation: complete    FOOD SECURITY SCREENING QUESTIONS  Hunger Vital Signs:  Within the past 12 months we worried whether our food would run out before we got money to buy more. Never  Within the past 12 months the food we bought just didn't last and we didn't have money to get more. Never    Advanced Care Directive Reviewed    Mihai Alvarez LPN        HPI:    Gregg Aguayo is a 56 year old male who presents for skin concerns.  Patient has multiple skin tags in his armpits bilaterally.  He also has a few skin tags scattered throughout his neck region.  History of having a skin tag that was basal cell carcinoma on the right side several years ago that was removed.  To the skin tags on his left lateral neck have changed color.  Wondering about getting them removed.  His skin tags in his armpits cause irritation.    Past Medical History:   Diagnosis Date     Cigarette smoker      Erectile dysfunction      GERD (gastroesophageal reflux disease)      AILEEN on CPAP      PMR (polymyalgia rheumatica) (H)        Past Surgical History:   Procedure Laterality Date     APPENDECTOMY OPEN  2005     ARTHROPLASTY KNEE Left      ARTHROSCOPY KNEE Left 3/19/2018    Procedure: ARTHROSCOPY KNEE;  Left Knee Arthroscopy, Partial Meniscectomy, Chondroplasty;  Surgeon: Kwabena Patricia DO;  Location: [facility] OR     ARTHROSCOPY KNEE WITH MENISCAL REPAIR Right 12/11/2020    Procedure: " Rt Knee Arthroscopy, partial Medial Meniscectomy Debridement.;  Surgeon: Monster Grissom MD;  Location: [facility] OR     ATTEMPTED ARTHROSCOPY      knee ? left     COLONOSCOPY  01/29/2014 1/29/2014,Tubular adenoma of rectum - follow up 5 years     COLONOSCOPY  02/01/2019    normal with diverticula, follow up 10 years, 2/1/29     COLONOSCOPY N/A 2/1/2019    normal, follow up 2/1/29     JOINT REPLACEMENT Left 11/2018     RELEASE CARPAL TUNNEL Right 11/22/2017     RELEASE CARPAL TUNNEL Left     12/21/2017,[id number],OTHER,Left     RELEASE TRIGGER FINGER Right 9/7/2018     RELEASE ULNAR NERVE (ELBOW) Left 12/21/2017       Family History   Problem Relation Age of Onset     Genetic Disorder Maternal Grandfather         Genetic,Polymyalgia Rheumatica     Heart Murmur Mother      Polymyalgia rheumatica Mother        Social History     Tobacco Use     Smoking status: Current Every Day Smoker     Packs/day: 0.50     Types: Cigarettes     Smokeless tobacco: Never Used     Tobacco comment: Has Chantix but has not started   Substance Use Topics     Alcohol use: Yes     Alcohol/week: 0.0 standard drinks     Comment: occasional       Current Outpatient Medications   Medication Sig Dispense Refill     acetaminophen (TYLENOL) 650 MG CR tablet Take up to 4 tablet daily for pain as needed       fexofenadine (ALLEGRA) 180 MG tablet Take 1 tablet (180 mg) by mouth every evening - for ears / congestion 30 tablet 1     furosemide (LASIX) 20 MG tablet Take 2 tablets (40 mg) by mouth daily 180 tablet 1     gabapentin (NEURONTIN) 100 MG capsule Take 1-2 capsules (100-200 mg) by mouth 4 times daily as needed For nerve pain 120 capsule 3     hydroxychloroquine (PLAQUENIL) 200 MG tablet Take 400 mg by mouth       metFORMIN (GLUCOPHAGE) 500 MG tablet TAKE 1-2 TABLETS (500-1,000 MG) BY MOUTH DAILY (WITH DINNER) WHILE TAKING ORAL STEROIDS 180 tablet 3     methylPREDNISolone (MEDROL) 4 MG tablet TAKE 1/2-2 TABLETS BY MOUTH DAILY WITH A MEAL-WEAN DOSE  EVERY 2-4 WEEKS FOR  tablet 1     metoprolol succinate ER (TOPROL-XL) 25 MG 24 hr tablet Take 2 tablets (50 mg) by mouth every evening -- adjust dose as needed for heart palpitations 180 tablet 3     omeprazole (PRILOSEC) 40 MG DR capsule Take 1 capsule (40 mg) by mouth daily 90 capsule 0     potassium chloride ER (K-TAB/KLOR-CON) 10 MEQ CR tablet Take 2 tablets (20 mEq) by mouth daily - with food (take with Furosemide) 180 tablet 3     sildenafil (VIAGRA) 100 MG tablet Take 1/4 to 1 tablet 30 min to 4 hours prior to sex for ED 30 tablet 11       No Known Allergies    REVIEW OF SYSTEMS:  Refer to HPI.    EXAM:   Vitals:    BP (!) 160/98   Pulse 75   Temp 97  F (36.1  C) (Tympanic)   Resp 18   Wt 136.3 kg (300 lb 6.4 oz)   SpO2 100%   BMI 41.90 kg/m      General Appearance: Pleasant, alert, appropriate appearance for age. No acute distress  Skin: Multiple skin tags appreciated scattered throughout the armpits bilaterally that are skin colored with a wider base.  Patient has 2 small medium in color brown skin tags on his left lateral neck.  Psychiatric Exam: Alert and oriented - appropriate affect.    PHQ Depression Screen  PHQ-9 SCORE 9/14/2021 10/14/2021 12/16/2021   PHQ-9 Total Score MyChart - - 1 (Minimal depression)   PHQ-9 Total Score 0 0 1       Answers for HPI/ROS submitted by the patient on 12/16/2021  If you checked off any problems, how difficult have these problems made it for you to do your work, take care of things at home, or get along with other people?: Somewhat difficult  PHQ9 TOTAL SCORE: 1  TRESSA 7 TOTAL SCORE: 0    ASSESSMENT AND PLAN:      ICD-10-CM    1. Skin tag  L91.8 Adult General Surg Referral   2. History of basal cell carcinoma  Z85.828 Adult General Surg Referral     Referred to general surgery for consult for skin tag removal due to the multiple amount of skin tags, larger bases, and history of basal cell carcinoma.    Ju Lechuga PA-C ..................12/16/2021 2:29 PM

## 2021-12-16 NOTE — NURSING NOTE
"Chief Complaint   Patient presents with     Derm Problem   Patient reports marks under both of his arms and they are very irritated according to the patient.     Initial BP (!) 158/100   Pulse 75   Temp 97  F (36.1  C) (Tympanic)   Resp 18   Wt 136.3 kg (300 lb 6.4 oz)   SpO2 100%   BMI 41.90 kg/m   Estimated body mass index is 41.9 kg/m  as calculated from the following:    Height as of 12/11/20: 1.803 m (5' 11\").    Weight as of this encounter: 136.3 kg (300 lb 6.4 oz).  Medication Reconciliation: complete    FOOD SECURITY SCREENING QUESTIONS  Hunger Vital Signs:  Within the past 12 months we worried whether our food would run out before we got money to buy more. Never  Within the past 12 months the food we bought just didn't last and we didn't have money to get more. Never    Advanced Care Directive Reviewed    Mihai Alvarez LPN    "

## 2021-12-17 ENCOUNTER — HOSPITAL ENCOUNTER (OUTPATIENT)
Dept: GENERAL RADIOLOGY | Facility: OTHER | Age: 56
End: 2021-12-17
Attending: SPECIALIST
Payer: COMMERCIAL

## 2021-12-17 ENCOUNTER — OFFICE VISIT (OUTPATIENT)
Dept: ORTHOPEDICS | Facility: OTHER | Age: 56
End: 2021-12-17
Attending: SPECIALIST
Payer: COMMERCIAL

## 2021-12-17 DIAGNOSIS — Z96.651 STATUS POST TOTAL RIGHT KNEE REPLACEMENT: ICD-10-CM

## 2021-12-17 DIAGNOSIS — Z96.651 STATUS POST TOTAL RIGHT KNEE REPLACEMENT: Primary | ICD-10-CM

## 2021-12-17 PROCEDURE — 99024 POSTOP FOLLOW-UP VISIT: CPT | Performed by: SPECIALIST

## 2021-12-17 PROCEDURE — 73562 X-RAY EXAM OF KNEE 3: CPT | Mod: RT

## 2021-12-17 ASSESSMENT — PATIENT HEALTH QUESTIONNAIRE - PHQ9: SUM OF ALL RESPONSES TO PHQ QUESTIONS 1-9: 1

## 2021-12-17 ASSESSMENT — ANXIETY QUESTIONNAIRES: GAD7 TOTAL SCORE: 0

## 2021-12-17 NOTE — PROGRESS NOTES
Patient is here for follow up on his right total knee.   Lauryn Nolan LPN .....................12/17/2021 1:47 PM

## 2021-12-18 NOTE — PROGRESS NOTES
Visit Date: 2021    Juve returns for followup.  He is 7-1/2 weeks status post right total knee arthroplasty.  He is very pleased with his results.  His range of motion is now 115 degrees.    PHYSICAL EXAMINATION:  Confirms this incision to be healing nicely.  Range of motion 0-115 degrees.  Collateral ligaments are stable.  Pulses are brisk and symmetric.    IMAGING:  Plain film radiographs obtained in the office today, standing AP views of both knees, a lateral view of the right knee and merchant views on the right, show the patient to be status post press-fit total hip arthroplasty, components well aligned, well fixed.  No evidence of abnormality.    IMPRESSION:  Seven and a half weeks status post a total knee arthroplasty, right, doing well.    PLAN:  We will see him on an annual basis for plain film radiographs, sooner if any problems occur.    Monster Grissom MD        D: 2021   T: 2021   MT: [id number]    Name:     RACHEL PLUMMER  MRN:      [id number]        Account:    [id number]   :      1965           Visit Date: 2021     Document: [id number]

## 2021-12-21 ENCOUNTER — HOSPITAL ENCOUNTER (OUTPATIENT)
Dept: PHYSICAL THERAPY | Facility: OTHER | Age: 56
Setting detail: THERAPIES SERIES
End: 2021-12-21
Attending: SPECIALIST
Payer: COMMERCIAL

## 2021-12-21 PROCEDURE — 97110 THERAPEUTIC EXERCISES: CPT | Mod: GP

## 2021-12-29 ENCOUNTER — OFFICE VISIT (OUTPATIENT)
Dept: SURGERY | Facility: OTHER | Age: 56
End: 2021-12-29
Attending: SURGERY
Payer: COMMERCIAL

## 2021-12-29 VITALS
RESPIRATION RATE: 20 BRPM | BODY MASS INDEX: 42.31 KG/M2 | HEART RATE: 76 BPM | OXYGEN SATURATION: 97 % | DIASTOLIC BLOOD PRESSURE: 89 MMHG | WEIGHT: 302.2 LBS | HEIGHT: 71 IN | TEMPERATURE: 96.9 F | SYSTOLIC BLOOD PRESSURE: 136 MMHG

## 2021-12-29 DIAGNOSIS — L91.8 SKIN TAG: Primary | ICD-10-CM

## 2021-12-29 DIAGNOSIS — Z85.828 HISTORY OF BASAL CELL CARCINOMA: ICD-10-CM

## 2021-12-29 PROCEDURE — 11200 RMVL SKIN TAGS UP TO&INC 15: CPT | Performed by: SURGERY

## 2021-12-29 PROCEDURE — 11201 RMVL SKIN TAGS EA ADDL 10: CPT | Performed by: SURGERY

## 2021-12-29 ASSESSMENT — PAIN SCALES - GENERAL: PAINLEVEL: EXTREME PAIN (8)

## 2021-12-29 ASSESSMENT — MIFFLIN-ST. JEOR: SCORE: 2204.96

## 2021-12-29 NOTE — PROGRESS NOTES
Procedure Note  Pre/Post Operative Diagnosis:   Skin tags bilateral axillae (# 25)    Procedure:    Skin tag removal    Surgeon: Giovanni Dhillon MD FACS    Local Anesthesia: 0.25%Marcaine     Indication for the procedure:    This is a 56 year old male patient  with multiple skin tags both axillae   After explaining the risks to include bleeding, infection, recurrence or need for re-excision,and scarring the patient wished to proceed.    Procedure:   Each area was prepped and draped in usual sterile fashion with ChloraPrep . After, adequate local anesthesia,the skin tags were cut with scissors. Bleeding areas cauterized with battery-operated cautery.  A clean dry dressing was applied.    Plan:   Patient will followup if there any problems with the wound including redness or drainage.

## 2021-12-29 NOTE — NURSING NOTE
Pt presents to clinic today for skin tag removal      FOOD SECURITY SCREENING QUESTIONS:    The next two questions are to help us understand your food security.  If you are feeling you need any assistance in this area, we have resources available to support you today.    Hunger Vital Signs:  Within the past 12 months we worried whether our food would run out before we got money to buy more. Never  Within the past 12 months the food we bought just didn't last and we didn't have money to get more. Never    Medication Reconciliation: complete  Myrna Tomlinson LPN,LPN on 12/29/2021 at 9:00 AM

## 2021-12-29 NOTE — NURSING NOTE
Prior to the start of the procedure and with procedural staff participation, I verbally confirmed the patient s identity using two indicators, relevant allergies, that the procedure was appropriate and matched the consent or emergent situation, and that the correct equipment/implants were available. Immediately prior to starting the procedure I conducted the Time Out with the procedural staff and re-confirmed the patient s name, procedure, and site/side. (The Joint Commission universal protocol was followed.)  Yes    Sedation (Moderate or Deep): None  Myrna Tomlinson LPN on 12/29/2021 at 9:09 AM

## 2022-01-12 NOTE — PROGRESS NOTES
--[facility]  [address]  [place] [address]  [phone]    PRE-OP EVALUATION:  Today's date: 2018    Gregg Aguayo (: 1965) presents for pre-operative evaluation assessment as requested by Dr. Grissom.  He requires evaluation and anesthesia risk assessment prior to undergoing surgery/procedure for treatment of Right Trigger Finger .    Proposed Surgery/ Procedure: Right Trigger Finger Release- 1st and 3rd fingers  Date of Surgery/ Procedure: 18  Time of Surgery/ Procedure: unknown  Hospital/Surgical Facility: [facility]  Fax number for surgical facility: n/a  Primary Physician: Reinier Ritchie  Type of Anesthesia Anticipated: General    Patient has a Health Care Directive or Living Will:  NO    1. NO - Do you have a history of heart attack, stroke, stent, bypass or surgery on an artery in the head, neck, heart or legs?  2. NO - Do you ever have any pain or discomfort in your chest?  3. NO - Do you have a history of  Heart Failure?  4. NO - Are you troubled by shortness of breath when: walking on the level, up a slight hill or at night?  5. NO - Do you currently have a cold, bronchitis or other respiratory infection?  6. NO - Do you have a cough, shortness of breath or wheezing?  7. NO - Do you sometimes get pains in the calves of your legs when you walk?  8. NO - Do you or anyone in your family have previous history of blood clots?  9. NO - Do you or does anyone in your family have a serious bleeding problem such as prolonged bleeding following surgeries or cuts?  10. NO - Have you ever had problems with anemia or been told to take iron pills?  11. NO - Have you had any abnormal blood loss such as black, tarry or bloody stools, or abnormal vaginal bleeding?  12. NO - Have you ever had a blood transfusion?  13. NO - Have you or any of your relatives ever had problems with anesthesia?  14. YES - Do you have sleep apnea, excessive snoring or daytime  Wait 10 minutes in between glaucoma medications and ATs. drowsiness?  15. NO - Do you have any prosthetic heart valves?  16. NO - Do you have prosthetic joints?  17. NO - Is there any chance that you may be pregnant?      HPI:       ICD-10-CM    1. Preop general physical exam Z01.818    2. Trigger finger, acquired - Right hand - 1st and 3rd fingers M65.30    3. Arthralgia of multiple joints M25.50    4. Dermatomyositis (H) M33.90    5. Polymyalgia rheumatica (H) M35.3    6. AILEEN on CPAP - uses nightly, finds very helpful G47.33     Z99.89    7. Tobacco abuse Z72.0      HPI related to upcoming procedure: Patient has been having progressive problems with triggering of his right hand, specifically first and third fingers.  States that his second finger is actually starting to get affected because the other fingers are triggering so often.  Quite painful.  Now scheduled for surgery.    Has multiple joint arthritis.  Has some aches and pains.  Rarely uses tramadol at this time.    History of dermatomyositis/polymyalgia rheumatica.  At one point was on fairly moderate dose of prednisone with prednisone taper.  At this time he is no longer taking prednisone.  Not currently taking metformin.  He was advised to take metformin if he is back on the methylprednisolone.  Not taking either of these at this time.    Tobacco abuse, ongoing.  Down to about half pack per day.  He has gone as far as 1 full day without smoking.  He continues to reduce his tobacco use.    Sleep apnea, uses CPAP daily.  Finds very helpful.  Cannot sleep without it.    Heartburn, doing much better.  Continues on omeprazole.    MEDICAL HISTORY:     Patient Active Problem List    Diagnosis Date Noted     Trigger finger, acquired - Right hand - 1st and 3rd fingers 08/30/2018     Priority: Medium     Heartburn 07/25/2018     Priority: Medium     Steroid-induced hyperglycemia 04/10/2018     Priority: Medium     Psychosexual dysfunction with inhibited sexual excitement 01/29/2018     Priority: Medium     Tobacco abuse  01/29/2018     Priority: Medium     Overview:   IMO Update 10/11       Acute pain of left knee 01/11/2018     Priority: Medium     Fall at home, initial encounter 01/11/2018     Priority: Medium     History of arthroscopic knee surgery 01/11/2018     Priority: Medium     Knee effusion, left 01/11/2018     Priority: Medium     Left medial knee pain 01/11/2018     Priority: Medium     Left carpal tunnel syndrome 12/21/2017     Priority: Medium     History of carpal tunnel surgery of left wrist 12/21/2017     Priority: Medium     S/P cubital tunnel release 12/21/2017     Priority: Medium     History of carpal tunnel surgery of right wrist 11/22/2017     Priority: Medium     AILEEN on CPAP - uses nightly, finds very helpful 11/20/2017     Priority: Medium     Dermatomyositis (H) 10/12/2017     Priority: Medium     Cubital tunnel syndrome on left 09/25/2017     Priority: Medium     Bilateral leg edema 07/05/2017     Priority: Medium     Polymyalgia rheumatica (H) 11/11/2016     Priority: Medium     Arthralgia of multiple joints 10/26/2016     Priority: Medium     Hypertrophic and atrophic condition of skin 06/07/2012     Priority: Medium     Contact dermatitis and eczema 09/20/2011     Priority: Medium      Past Medical History:   Diagnosis Date     Bilateral carpal tunnel syndrome     9/21/2017     Carpal tunnel syndrome of left wrist     12/21/2017     Condition influencing health status     No Comments Provided     Lesion of left ulnar nerve     9/25/2017     Other specified postprocedural states     11/22/2017     Other specified postprocedural states     12/21/2017     Other specified postprocedural states     12/21/2017     Past Surgical History:   Procedure Laterality Date     APPENDECTOMY OPEN      2005,Laparoscopic for acute appendicitis     ARTHROSCOPY KNEE Left 3/19/2018    Procedure: ARTHROSCOPY KNEE;  Left Knee Arthroscopy, Partial Meniscectomy, Chondroplasty;  Surgeon: Kwabena Patricia DO;  Location:  OR      ATTEMPTED ARTHROSCOPY      knee ? left     COLONOSCOPY      1/29/2014,Tubular adenoma of rectum - follow up 5 years     RELEASE CARPAL TUNNEL Right     11/22/2017,[id number],OTHER,Right     RELEASE CARPAL TUNNEL Left     12/21/2017,[id number],OTHER,Left     RELEASE ULNAR NERVE (ELBOW)      12/21/2017,[id number],OTHER,Left     Current Outpatient Prescriptions   Medication Sig Dispense Refill     acetaminophen (TYLENOL) 650 MG CR tablet Take up to 4 tablet daily for pain as needed       furosemide (LASIX) 20 MG tablet Take 1 tablet (20 mg) by mouth daily as needed For leg swelling-limit use. Salt restriction. 90 tablet 3     gabapentin (NEURONTIN) 100 MG capsule Take 1-2 capsules (100-200 mg) by mouth 4 times daily as needed 360 capsule 3     metFORMIN (GLUCOPHAGE) 500 MG tablet Take 1-2 tablets (500-1,000 mg) by mouth daily (with dinner) 180 tablet 3     methylPREDNISolone (MEDROL) 4 MG tablet Take 2-8 mg by mouth daily With a meal - wean dose every 2 to 4 weeks - for PMR       omeprazole (PRILOSEC) 40 MG capsule Take 1 capsule (40 mg) by mouth daily 90 capsule 3     traMADol (ULTRAM) 50 MG tablet Take 1 tablet (50 mg) by mouth 2 times daily as needed for pain 60 tablet 5     varenicline (CHANTIX) 1 MG tablet Take 1 mg by mouth 2 times daily (with meals) Quit smoking       OTC products: no recent use of OTC ASA, NSAIDS or Steroids    No Known Allergies   Latex Allergy: NO    Social History   Substance Use Topics     Smoking status: Current Every Day Smoker     Types: Cigarettes     Smokeless tobacco: Never Used     Alcohol use 0.0 oz/week      Comment: Alcoholic Drinks/day: 1-2 drinks every other day     History   Drug Use No       REVIEW OF SYSTEMS:   CONSTITUTIONAL: NEGATIVE for fever, chills, change in weight  INTEGUMENTARY/SKIN: NEGATIVE for worrisome rashes, moles or lesions  EYES: NEGATIVE for vision changes or irritation  ENT/MOUTH: NEGATIVE for ear, mouth and throat problems  RESP: NEGATIVE for significant cough or  SOB  CV: NEGATIVE for chest pain, palpitations or peripheral edema  GI: NEGATIVE for nausea, abdominal pain, heartburn, or change in bowel habits  : NEGATIVE for frequency, dysuria, or hematuria  MUSCULOSKELETAL: This much less musculoskeletal pain and discomfort.  No longer on prednisone for polymyalgia/dermatomyositis.  Triggering of right hand first and third fingers.  Now getting some problems with right second finger as well.  More pain.  NEURO: NEGATIVE for weakness, dizziness or paresthesias  ENDOCRINE: NEGATIVE for temperature intolerance, skin/hair changes  HEME: NEGATIVE for bleeding problems  PSYCHIATRIC: NEGATIVE for changes in mood or affect    EXAM:   /86 (BP Location: Right arm, Patient Position: Sitting, Cuff Size: Adult Regular)  Pulse 80  Temp 97.8  F (36.6  C) (Tympanic)  Resp 20  Wt 299 lb 4 oz (135.7 kg)  SpO2 98%  BMI 42.33 kg/m2    GENERAL APPEARANCE: healthy, alert and no distress     EYES: EOMI,  PERRL     HENT: ear canals and TM's normal and nose and mouth without ulcers or lesions     NECK: no adenopathy, no asymmetry, masses, or scars and thyroid normal to palpation     RESP: lungs clear to auscultation - no rales, rhonchi or wheezes     CV: regular rates and rhythm, normal S1 S2, no S3 or S4 and no murmur, click or rub     ABDOMEN: soft, non-tender.      MS: Triggering of right hand first and third fingers.     SKIN: no suspicious lesions or rashes     NEURO: Normal strength and tone, sensory exam grossly normal, mentation intact and speech normal     PSYCH: mentation appears normal. and affect normal/bright     LYMPHATICS: No cervical adenopathy    DIAGNOSTICS:   11/20/2017 - EKG - normal sinus rhythm, rate of 65.     Recent Labs   Lab Test  07/25/18   1025  06/29/17   0851  10/07/16   1059   HGB  16.0  16.1   --    PLT  186  215   --    NA  139   --   139   POTASSIUM  4.0   --   4.1   CR  0.82   --   0.83        IMPRESSION:   Reason for surgery/procedure: Trigger finger  release, right hand, first and third fingers.  Diagnosis/reason for consult: Preoperative cardiopulmonary risk stratification.    The proposed surgical procedure is considered Low to INTERMEDIATE risk.    REVISED CARDIAC RISK INDEX  The patient has the following serious cardiovascular risks for perioperative complications such as (MI, PE, VFib and 3  AV Block):  No serious cardiac risks  INTERPRETATION: 0 risks: Class I (very low risk - 0.4% complication rate)    The patient has the following additional risks for perioperative complications:  - Sleep apnea - uses CPAP.       ICD-10-CM    1. Preop general physical exam Z01.818    2. Trigger finger, acquired - Right hand - 1st and 3rd fingers M65.30    3. Arthralgia of multiple joints M25.50    4. Dermatomyositis (H) M33.90    5. Polymyalgia rheumatica (H) M35.3    6. AILEEN on CPAP - uses nightly, finds very helpful G47.33     Z99.89    7. Tobacco abuse Z72.0        RECOMMENDATIONS:       Obstructive Sleep Apnea (or suspected sleep apnea)  Patient is to bring their home CPAP with them on the day of surgery    --Patient is to take all scheduled medications on the day of surgery EXCEPT for modifications listed below.    APPROVAL GIVEN to proceed with proposed procedure, without further diagnostic evaluation       Signed Electronically by: Reinier Ritchie MD    Copy of this evaluation report is provided to requesting physician.    [facility] Preop Guidelines    Revised Cardiac Risk Index

## 2022-03-25 DIAGNOSIS — R12 HEARTBURN: ICD-10-CM

## 2022-03-25 RX ORDER — OMEPRAZOLE 40 MG/1
40 CAPSULE, DELAYED RELEASE ORAL DAILY
Qty: 90 CAPSULE | Refills: 0 | Status: SHIPPED | OUTPATIENT
Start: 2022-03-25 | End: 2022-06-27

## 2022-03-25 NOTE — TELEPHONE ENCOUNTER
"Requested Prescriptions   Pending Prescriptions Disp Refills     omeprazole (PRILOSEC) 40 MG DR capsule [Pharmacy Med Name: OMEPRAZOLE 40MG DR CAPSULE] 90 capsule 0     Sig: TAKE 1 CAPSULE (40 MG) BY MOUTH DAILY       PPI Protocol Passed - 3/25/2022 12:18 PM        Passed - Not on Clopidogrel (unless Pantoprazole ordered)        Passed - No diagnosis of osteoporosis on record        Passed - Recent (12 mo) or future (30 days) visit within the authorizing provider's specialty     Patient has had an office visit with the authorizing provider or a provider within the authorizing providers department within the previous 12 mos or has a future within next 30 days. See \"Patient Info\" tab in inbasket, or \"Choose Columns\" in Meds & Orders section of the refill encounter.              Passed - Medication is active on med list        Passed - Patient is age 18 or older     Last Written Prescription Date:  9/14/21  Last Fill Quantity: 90,   # refills: 0  Last Office Visit: 12/16/21 Oja  Future Office visit: none      Routing refill request to provider for review/approval because:  Routed to provider for review and consideration.  Brenda J. Goodell, RN on 3/25/2022 at 3:12 PM        "

## 2022-05-03 ENCOUNTER — ALLIED HEALTH/NURSE VISIT (OUTPATIENT)
Dept: FAMILY MEDICINE | Facility: OTHER | Age: 57
End: 2022-05-03
Attending: INTERNAL MEDICINE
Payer: MEDICARE

## 2022-05-03 DIAGNOSIS — Z23 HIGH PRIORITY FOR 2019-NCOV VACCINE: Primary | ICD-10-CM

## 2022-05-03 PROCEDURE — 0054A COVID-19,PF,PFIZER (12+ YRS): CPT

## 2022-05-21 ENCOUNTER — HEALTH MAINTENANCE LETTER (OUTPATIENT)
Age: 57
End: 2022-05-21

## 2022-06-09 NOTE — PROGRESS NOTES
"Patient Information     Patient Name MRN Sex Gregg Spencer [id number] Male 1965      Progress Notes by Jairo Huang DO at 2017 10:30 AM     Author:  Jairo Huang DO  Service:  (none) Author Type:  Physician     Filed:  2017 11:18 AM  Encounter Date:  2017 Status:  Addendum     :  Jairo Huang DO (Physician)        Related Notes: Original Note by Jairo Huang DO (Physician) filed at 2017 10:56 AM            PROGRESS NOTE    SUBJECTIVE:  Gregg Aguayo is here for evaluation in regards to her carpal tunnel release and also left carpal tunnel syndrome. He states the right hand seems to be getting better sensation in to it already. His left is flared up and is worse at night now especially with having to overuse it. As noted before his EMG was positive for both hands.     OBJECTIVE:  /86  Pulse 76  Temp 97.3  F (36.3  C) (Tympanic)   Ht 1.791 m (5' 10.5\")  Wt (!) 137 kg (302 lb)  BMI 42.72 kg/m2 Body mass index is 42.72 kg/(m^2).    General Appearance: Pleasant male in good appearance, mood and affect.  Alert and orientated times three ( time, date and location).    Incision:  Clean and dry, closed, no infection    Wrist:  negative palpable pain  Motion: full.  Positive Tinel's, Phalen's and compression test of the left wrist improvement noted on the right.    Shoulder:  Motion: full    Elbow:  Flexion: Normal  Extension: Normal  Deep tendon reflexes: Normal.  He does have a positive Tinel's and provocative testing of the ulnar nerve on the left side.    Hand:  Sensation: Abnormal, into the median nerve distribution both hands right has improved left is worsened.  Radial and ulnar blood flow:  Normal    Eyes: Pupils are round.    Ears: Hearing: Intact    Heart: He has good capillary refill into his hands. Pulses regular.    Lungs: Coarse breath sounds.    EMG:    EMG results reviewed with the patient showing him to have " delayed times in the median nerve distribution right and left hands. Right is worse. He also has mild compression of the ulnar nerve left elbow.    Radiographic images from 9/25 where independently reviewed and discussed with the patient.      Xray:     X-rays demonstrate maintained joint spaces. No fractures noted. Small cyst in the right lunate.    PROCEDURE: XR WRIST 3 VIEWS RIGHT, XR WRIST 3 VIEWS LEFT  HISTORY: Bilateral wrist pain.  COMPARISON: None.  TECHNIQUE: 4 views right wrist, 5 views left wrist.  FINDINGS: Images of each wrist demonstrate no evidence of fracture, dislocation or focal erosion. No significant osteoarthritis is present. The carpal arcs are maintained. There is a minimal ulnar minus variant on the right. The carpal tunnels demonstrate no substantial bony narrowing or abnormal calcification.  IMPRESSION: Essentially negative radiographs of both wrists.  Electronically Signed By: Nash Osorio on 9/25/2017 1:42 PM    ASSESSMENT:     Left cubital tunnel syndrome.  Left carpal tunnel syndromel    POSTOPERATIVE DIAGNOSES:  1.  Bilateral carpal tunnel syndrome, right greater than left.     PROCEDURE:  1. Release of the right transverse carpal ligament (DOS 11/22/2017).  2. Left Carpal tunnel syndrome.    PLAN:    Suture removal and steri strip.  Will get a wrist splint to wear for next 2 weeks and can remove to shower, then ok to leave off for normal activities.  He would like to set up left carpal tunnel release.  He understands risks and benefits of left carpal tunnel release.  Questions and concerns were answered.  Follow-up after above.    I have discussed options with Gregg Aguayo the treatment for carpal tunnel, which have included observation, physical therapy, corticosteroid injection versus surgical release. I discussed pros and cons of each approach, and at this point, Gregg Aguayo would like to proceed with carpal tunnel surgery. We discussed that surgery would be an outpatient  surgery, you would be able to go home following the surgery. We will plan on open release of the transverse carpal ligament with anything else that needs to be done.  Surgical anesthesia would be general anesthesia versus block and anesthesia will discuss options.  I discussed following surgery Gregg Aguayo would be in a splint until seen in the office and they can work on gentle motion of the elbow and fingers after surgery.  At four weeks following surgery occupational therapy may be needed.   Full recovery from carpal tunnel surgery may take a year. The goal will be pain relief. Complications were discussed including continued pain, stiffness in the wrist, rare chance of neurovascular damage, potential chance of infection. If deep infection were to occur, further surgery may be needed with repeat washout in the operating room with possible need for treatment with antibiotics.      I have discussed options with Gregg Aguayo the treatment for cubital tunnel syndrome, which have included observation, physical therapy, corticosteroid injection versus surgical release. I discussed pros and cons of each approach, and at this point, Gregg Aguayo would like to proceed with cubital tunnel syndrome surgery. We discussed that surgery would be an outpatient surgery, you would be able to go home following the surgery. We will plan on open release of the bands that are compressing the nerve with possible transposition and anything else that needs to be done.  Surgical anesthesia would be general anesthesia and anesthesia will discuss options.  I discussed following surgery Gregg Aguayo would be in a splint until seen in the office and they can work on gentle motion of the elbow and fingers after surgery.  At four weeks following surgery occupational therapy may be needed.   Full recovery from cubital tunnel syndrome surgery may take a year. The goal will be pain relief. Complications were discussed including  continued pain, stiffness in the wrist, rare chance of neurovascular damage, potential chance of infection. If deep infection were to occur, further surgery may be needed with repeat washout in the operating room with possible need for treatment with antibiotics.  Risks, benefits, conservative, surgical, and alternatives of treatment were thoroughly outlined. No guarantees were given. Risks which do include, but are not limited to:  Scar, infection, decreased motion, damage to blood vessels, nerves and tendons, failure or need for further treatment, reaction to medications and anesthesia, blood clots, and the possibility of death where discussed.  He did verbalize an understanding. All questions and concerns were addressed.    Patient is set up for open left carpal tunnel surgery and left cubital tunnel release possible transposition.    Gregg WALLY Andrewam will need pre op clearance for management of GERD.    Follow up after surgery will be 12-14 days    All questions where answered to the patients satisfaction.    Jairo Huang D.O.  Orthopaedic Surgeon    [facility]  [address] [place] [address]  Phone [phone] (KNEE)  Fax [phone]    This document was created using computer generated templates and voice activated software.    10:52 AM 12/4/2017         show

## 2022-06-26 DIAGNOSIS — R12 HEARTBURN: ICD-10-CM

## 2022-06-27 RX ORDER — OMEPRAZOLE 40 MG/1
40 CAPSULE, DELAYED RELEASE ORAL DAILY
Qty: 90 CAPSULE | Refills: 0 | Status: SHIPPED | OUTPATIENT
Start: 2022-06-27 | End: 2022-10-07

## 2022-06-27 NOTE — TELEPHONE ENCOUNTER
"Routing to unit provider, pcp out of office  Kanwal Mcgrath RN on 6/27/2022 at 2:22 PM    Last Prescription Date: 3/25/22  Last Qty/Refills: 90 / 0  Last Office Visit: 10/14/21  Future Office Visit: None     Requested Prescriptions   Pending Prescriptions Disp Refills     omeprazole (PRILOSEC) 40 MG DR capsule [Pharmacy Med Name: OMEPRAZOLE 40MG DR CAPSULE] 90 capsule 0     Sig: TAKE 1 CAPSULE (40 MG) BY MOUTH DAILY       PPI Protocol Passed - 6/26/2022  5:03 AM        Passed - Not on Clopidogrel (unless Pantoprazole ordered)        Passed - No diagnosis of osteoporosis on record        Passed - Recent (12 mo) or future (30 days) visit within the authorizing provider's specialty     Patient has had an office visit with the authorizing provider or a provider within the authorizing providers department within the previous 12 mos or has a future within next 30 days. See \"Patient Info\" tab in inbasket, or \"Choose Columns\" in Meds & Orders section of the refill encounter.              Passed - Medication is active on med list        Passed - Patient is age 18 or older             "

## 2022-07-01 DIAGNOSIS — R12 HEARTBURN: ICD-10-CM

## 2022-07-01 RX ORDER — OMEPRAZOLE 40 MG/1
40 CAPSULE, DELAYED RELEASE ORAL DAILY
Qty: 90 CAPSULE | Refills: 0 | OUTPATIENT
Start: 2022-07-01

## 2022-07-01 NOTE — TELEPHONE ENCOUNTER
omeprazole (PRILOSEC) 40 MG DR capsule 90 capsule 0 6/27/2022  No   Sig - Route: TAKE 1 CAPSULE (40 MG) BY MOUTH DAILY - Oral   Sent to pharmacy as: Omeprazole 40 MG Oral Capsule Delayed Release (priLOSEC)   Class: E-Prescribe   Notes to Pharmacy: Patient enrolled in our Ready Refill service to improve adherence. We are requesting a refill authorization in advance to ensure an active prescription is on file.   Order: [id number]   E-Prescribing Status: Receipt confirmed by pharmacy (6/27/2022  2:42 PM CDT)     Redundant refill request refused: Too soon:    Unable to complete prescription refill per RN Medication Refill Policy.................... Marge De La Rosa RN ....................  7/1/2022   3:51 PM

## 2022-07-08 DIAGNOSIS — I49.3 SYMPTOMATIC PREMATURE VENTRICULAR CONTRACTIONS: ICD-10-CM

## 2022-07-11 RX ORDER — METOPROLOL SUCCINATE 25 MG/1
50 TABLET, EXTENDED RELEASE ORAL EVERY EVENING
Qty: 180 TABLET | Refills: 1 | Status: SHIPPED | OUTPATIENT
Start: 2022-07-11 | End: 2022-12-22

## 2022-07-11 NOTE — TELEPHONE ENCOUNTER
"Requested Prescriptions   Pending Prescriptions Disp Refills     metoprolol succinate ER (TOPROL XL) 25 MG 24 hr tablet [Pharmacy Med Name: METOPROLOL SUCC 25MG ER TABLET] 180 tablet 3     Sig: TAKE 2 TABLETS (50 MG) BY MOUTH EVERY EVENING -- ADJUST DOSE AS NEEDED FOR HEART PALPITATIONS       Beta-Blockers Protocol Passed - 7/8/2022  9:29 AM        Passed - Blood pressure under 140/90 in past 12 months     BP Readings from Last 3 Encounters:   12/29/21 136/89   12/16/21 (!) 160/98   10/14/21 136/82                 Passed - Patient is age 6 or older        Passed - Recent (12 mo) or future (30 days) visit within the authorizing provider's specialty     Patient has had an office visit with the authorizing provider or a provider within the authorizing providers department within the previous 12 mos or has a future within next 30 days. See \"Patient Info\" tab in inbasket, or \"Choose Columns\" in Meds & Orders section of the refill encounter.              Passed - Medication is active on med list     Last Written Prescription Date:  7/15/21  Last Fill Quantity: 180,   # refills: 3  Last Office Visit: 12/16/21 Oja  Future Office visit: none     Routing refill request to provider for review/approval   Brenda J. Goodell, RN on 7/11/2022 at 9:17 AM      "

## 2022-09-17 ENCOUNTER — HEALTH MAINTENANCE LETTER (OUTPATIENT)
Age: 57
End: 2022-09-17

## 2022-09-30 DIAGNOSIS — R12 HEARTBURN: ICD-10-CM

## 2022-09-30 NOTE — TELEPHONE ENCOUNTER
Patient enrolled in our Ready Refill service to improve adherence. We are requesting a refill authorization in advance to ensure an active prescription is on file.    Karla Ovalles RN .............. 9/30/2022  10:29 AM

## 2022-10-02 NOTE — TELEPHONE ENCOUNTER
[id number] of [place] sent Rx request for the following:      Requested Prescriptions   Pending Prescriptions Disp Refills     metFORMIN (GLUCOPHAGE) 500 MG tablet [Pharmacy Med Name: METFORMIN 500MG TABLET] 180 tablet 3     Sig: TAKE 1-2 TABLETS (500-1,000 MG) BY MOUTH DAILY (WITH DINNER) WHILE TAKING ORAL STEROIDS       Biguanide Agents Failed - 2/28/2021  5:06 AM        Failed - Patient has documented A1c within the specified period of time.     If HgbA1C is 8 or greater, it needs to be on file within the past 3 months.  If less than 8, must be on file within the past 6 months.     Recent Labs   Lab Test 10/12/17  1644   ILDH132 5.3          Last Prescription Date:   3/13/2020   Last Fill Qty/Refills:         180, R-3    Last Office Visit:              12/4/2020 ( Akshat )    Future Office visit:           3/11/2021 ( Seth)   Routing refill request to provider for review/approval because:  Medication failed protocol  Rachel Mcbride RN ....................  3/2/2021   10:31 AM    [id number] of [place] sent Rx request for the following:       omeprazole (PRILOSEC) 40 MG DR capsule [Pharmacy Med Name: OMEPRAZOLE 40MG DR CAPSULE] 90 capsule 1     Sig: TAKE 1 CAPSULE (40 MG) BY MOUTH DAILY       PPI Protocol Passed - 2/28/2021  5:06 AM      Last Prescription Date:   9/20/20  Last Fill Qty/Refills:         90, R-1    Last Office Visit:              12/4/2020 ( Akshat )    Future Office visit:           3/11/2021 ( Seth)   Prescription approved per [facility] Refill Protocol.  Rachel Mcbride RN ....................  3/2/2021   10:32 AM      
In my judgment no risk for PPH has been identified at this time.

## 2022-10-03 NOTE — TELEPHONE ENCOUNTER
"Routing to unit provider, also to outreach to contact pt and schedule his annual visit that is nearing its due date  Kanwal Mcgrath RN on 10/3/2022 at 11:20 AM    Last Prescription Date: 6/27/22  Last Qty/Refills: 90 / 0  Last Office Visit: 10/14/21  Future Office Visit: None     Requested Prescriptions   Pending Prescriptions Disp Refills     omeprazole (PRILOSEC) 40 MG DR capsule 90 capsule 0     Sig: Take 1 capsule (40 mg) by mouth daily       PPI Protocol Passed - 9/30/2022 10:30 AM        Passed - Not on Clopidogrel (unless Pantoprazole ordered)        Passed - No diagnosis of osteoporosis on record        Passed - Recent (12 mo) or future (30 days) visit within the authorizing provider's specialty     Patient has had an office visit with the authorizing provider or a provider within the authorizing providers department within the previous 12 mos or has a future within next 30 days. See \"Patient Info\" tab in inbasket, or \"Choose Columns\" in Meds & Orders section of the refill encounter.              Passed - Medication is active on med list        Passed - Patient is age 18 or older             "

## 2022-10-04 ENCOUNTER — OFFICE VISIT (OUTPATIENT)
Dept: INTERNAL MEDICINE | Facility: OTHER | Age: 57
End: 2022-10-04
Attending: INTERNAL MEDICINE
Payer: COMMERCIAL

## 2022-10-04 VITALS
SYSTOLIC BLOOD PRESSURE: 138 MMHG | HEART RATE: 63 BPM | TEMPERATURE: 98.2 F | DIASTOLIC BLOOD PRESSURE: 72 MMHG | OXYGEN SATURATION: 100 % | WEIGHT: 293 LBS | BODY MASS INDEX: 41.02 KG/M2 | HEIGHT: 71 IN

## 2022-10-04 DIAGNOSIS — I10 BENIGN ESSENTIAL HYPERTENSION: Primary | ICD-10-CM

## 2022-10-04 DIAGNOSIS — M35.3 POLYMYALGIA RHEUMATICA (H): ICD-10-CM

## 2022-10-04 DIAGNOSIS — M06.09 RHEUMATOID ARTHRITIS OF MULTIPLE SITES WITH NEGATIVE RHEUMATOID FACTOR (H): ICD-10-CM

## 2022-10-04 DIAGNOSIS — Z00.00 ENCOUNTER FOR MEDICARE ANNUAL WELLNESS EXAM: ICD-10-CM

## 2022-10-04 DIAGNOSIS — G89.29 CHRONIC LEFT SHOULDER PAIN: ICD-10-CM

## 2022-10-04 DIAGNOSIS — M54.16 LUMBAR BACK PAIN WITH RADICULOPATHY AFFECTING LEFT LOWER EXTREMITY: ICD-10-CM

## 2022-10-04 DIAGNOSIS — D84.9 IMMUNOSUPPRESSION (H): ICD-10-CM

## 2022-10-04 DIAGNOSIS — Z86.59 HISTORY OF CLAUSTROPHOBIA: ICD-10-CM

## 2022-10-04 DIAGNOSIS — Z87.891 PERSONAL HISTORY OF NICOTINE DEPENDENCE: ICD-10-CM

## 2022-10-04 DIAGNOSIS — Z71.85 VACCINE COUNSELING: ICD-10-CM

## 2022-10-04 DIAGNOSIS — Z79.899 OTHER LONG TERM (CURRENT) DRUG THERAPY: ICD-10-CM

## 2022-10-04 DIAGNOSIS — G47.33 OSA ON CPAP: ICD-10-CM

## 2022-10-04 DIAGNOSIS — Z72.0 TOBACCO ABUSE: ICD-10-CM

## 2022-10-04 DIAGNOSIS — Z87.891 PERSONAL HISTORY OF TOBACCO USE: ICD-10-CM

## 2022-10-04 DIAGNOSIS — E66.01 MORBID OBESITY (H): ICD-10-CM

## 2022-10-04 DIAGNOSIS — M25.512 CHRONIC LEFT SHOULDER PAIN: ICD-10-CM

## 2022-10-04 DIAGNOSIS — R12 HEARTBURN: ICD-10-CM

## 2022-10-04 DIAGNOSIS — Z23 NEED FOR COVID-19 VACCINE: ICD-10-CM

## 2022-10-04 DIAGNOSIS — Z98.890 S/P LUMBAR DISCECTOMY: ICD-10-CM

## 2022-10-04 PROBLEM — D17.1 LIPOMA OF TORSO: Status: RESOLVED | Noted: 2021-09-14 | Resolved: 2022-10-04

## 2022-10-04 PROBLEM — H93.8X1 CONGESTION OF RIGHT EAR: Status: RESOLVED | Noted: 2021-09-14 | Resolved: 2022-10-04

## 2022-10-04 PROCEDURE — 0124A COVID-19,PF,PFIZER BOOSTER BIVALENT: CPT | Performed by: INTERNAL MEDICINE

## 2022-10-04 PROCEDURE — G0439 PPPS, SUBSEQ VISIT: HCPCS | Performed by: INTERNAL MEDICINE

## 2022-10-04 PROCEDURE — G0296 VISIT TO DETERM LDCT ELIG: HCPCS | Mod: XU | Performed by: INTERNAL MEDICINE

## 2022-10-04 PROCEDURE — 99406 BEHAV CHNG SMOKING 3-10 MIN: CPT | Performed by: INTERNAL MEDICINE

## 2022-10-04 PROCEDURE — 99214 OFFICE O/P EST MOD 30 MIN: CPT | Mod: 25 | Performed by: INTERNAL MEDICINE

## 2022-10-04 PROCEDURE — 91312 COVID-19,PF,PFIZER BOOSTER BIVALENT: CPT | Performed by: INTERNAL MEDICINE

## 2022-10-04 RX ORDER — NICOTINE 21 MG/24HR
1 PATCH, TRANSDERMAL 24 HOURS TRANSDERMAL EVERY 24 HOURS
Qty: 28 PATCH | Refills: 4 | Status: SHIPPED | OUTPATIENT
Start: 2022-10-04 | End: 2023-10-05

## 2022-10-04 RX ORDER — OMEPRAZOLE 40 MG/1
40 CAPSULE, DELAYED RELEASE ORAL DAILY
Qty: 90 CAPSULE | Refills: 0 | OUTPATIENT
Start: 2022-10-04

## 2022-10-04 RX ORDER — ALPRAZOLAM 1 MG
1 TABLET ORAL ONCE
Qty: 4 TABLET | Refills: 0 | Status: SHIPPED | OUTPATIENT
Start: 2022-10-04 | End: 2022-10-04

## 2022-10-04 ASSESSMENT — ENCOUNTER SYMPTOMS
ABDOMINAL PAIN: 0
CONFUSION: 0
JOINT SWELLING: 1
HEMATURIA: 0
CHILLS: 0
SLEEP DISTURBANCE: 1
BACK PAIN: 1
FEVER: 0
WOUND: 0
CHEST TIGHTNESS: 0
PALPITATIONS: 1
MYALGIAS: 1
ADENOPATHY: 0
SHORTNESS OF BREATH: 0
BRUISES/BLEEDS EASILY: 0
ARTHRALGIAS: 1

## 2022-10-04 ASSESSMENT — PAIN SCALES - GENERAL: PAINLEVEL: WORST PAIN (10)

## 2022-10-04 NOTE — NURSING NOTE
"No chief complaint on file.        Initial /72 (BP Location: Right arm, Patient Position: Left side, Cuff Size: Adult Large)   Pulse 63   Temp 98.2  F (36.8  C) (Temporal)   Ht 1.803 m (5' 11\")   Wt 132.9 kg (293 lb)   SpO2 100%   BMI 40.87 kg/m   Estimated body mass index is 40.87 kg/m  as calculated from the following:    Height as of this encounter: 1.803 m (5' 11\").    Weight as of this encounter: 132.9 kg (293 lb).       FOOD SECURITY SCREENING QUESTIONS:    The next two questions are to help us understand your food security.  If you are feeling you need any assistance in this area, we have resources available to support you today.    Hunger Vital Signs:  Within the past 12 months we worried whether our food would run out before we got money to buy more. Never  Within the past 12 months the food we bought just didn't last and we didn't have money to get more. Never    Advance Care Directive on file? no      Medication reconciliation complete.      Soto Zamarripa,on 10/4/2022 at 11:31 AM          "

## 2022-10-04 NOTE — PATIENT INSTRUCTIONS
Patient Education   Personalized Prevention Plan  You are due for the preventive services outlined below.  Your care team is available to assist you in scheduling these services.  If you have already completed any of these items, please share that information with your care team to update in your medical record.  Health Maintenance Due   Topic Date Due    Talk to your care team about options to quit tobacco use.  Never done    Pneumococcal Vaccine (1 - PCV) Never done    LUNG CANCER SCREENING  Never done    URINE DRUG SCREEN  11/06/2019    COVID-19 Vaccine (5 - Booster for Pfizer series) 06/28/2022    Flu Vaccine (1) 09/01/2022     Preventive Health Recommendations  See your health care provider every year to  Review health changes.   Discuss preventive care.    Review your medicines if your doctor has prescribed any.  Talk with your health care provider about whether you should have a test to screen for prostate cancer (PSA).  Every 3 years, have a diabetes test (fasting glucose). If you are at risk for diabetes, you should have this test more often.  Every 5 years, have a cholesterol test. Have this test more often if you are at risk for high cholesterol or heart disease.   Every 10 years, have a colonoscopy. Or, have a yearly FIT test (stool test). These exams will check for colon cancer.  Talk to with your health care provider about screening for Abdominal Aortic Aneurysm if you have a family history of AAA or have a history of smoking.    Shots:   Get a flu shot each year.   Get a tetanus shot every 10 years.   Talk to your doctor about your pneumonia vaccines. There are now two you should receive - Pneumovax (PPSV 23) and Prevnar (PCV 13).  Talk to your pharmacist about a shingles vaccine.   Talk to your doctor about the hepatitis B vaccine.    Nutrition:   Eat at least 5 servings of fruits and vegetables each day.   Eat whole-grain bread, whole-wheat pasta and brown rice instead of white grains and rice.    Get adequate Calcium and Vitamin D.     Lifestyle  Exercise for at least 150 minutes a week (30 minutes a day, 5 days a week). This will help you control your weight and prevent disease.   Limit alcohol to one drink per day.   No smoking.   Wear sunscreen to prevent skin cancer.   See your dentist every six months for an exam and cleaning.   See your eye doctor every 1 to 2 years to screen for conditions such as glaucoma, macular degeneration and cataracts.    Personalized Prevention Plan  You are due for the preventive services outlined below.  Your care team is available to assist you in scheduling these services.  If you have already completed any of these items, please share that information with your care team to update in your medical record.  Health Maintenance   Topic Date Due    NICOTINE/TOBACCO CESSATION COUNSELING Q 1 YR  Never done    Pneumococcal Vaccine: Pediatrics (0 to 5 Years) and At-Risk Patients (6 to 64 Years) (1 - PCV) Never done    LUNG CANCER SCREENING  Never done    URINE DRUG SCREEN  11/06/2019    COVID-19 Vaccine (5 - Booster for Pfizer series) 06/28/2022    INFLUENZA VACCINE (1) 09/01/2022    MEDICARE ANNUAL WELLNESS VISIT  10/04/2023    ADVANCE CARE PLANNING  11/27/2023    LIPID  03/31/2026    COLORECTAL CANCER SCREENING  02/01/2029    DTAP/TDAP/TD IMMUNIZATION (3 - Td or Tdap) 07/04/2030    PHQ-2 (once per calendar year)  Completed    ZOSTER IMMUNIZATION  Completed    HEPATITIS C SCREENING  Addressed    HIV SCREENING  Addressed    IPV IMMUNIZATION  Aged Out    MENINGITIS IMMUNIZATION  Aged Out    HEPATITIS B IMMUNIZATION  Aged Out       Preventing Falls at Home  A person can fall for many reasons. Older adults may fall because reaction time slows down as we age. Your muscles and joints may get stiff, weak, or less flexible because of illness, medicines, or a physical condition.   Other health problems that make falls more likely include:   Arthritis  Dizziness or lightheadedness when  you stand up (orthostatic hypotension)  History of a stroke  Dizziness  Anemia  Certain medicines taken for mental illness or to control blood pressure.  Problems with balance or gait  Bladder or urinary problems  History of falling  Changes in vision (vision impairment)  Changes in thinking skills and memory (cognitive impairment)  Injuries from a fall can include serious injuries such as broken bones, dislocated joints, internal bleeding and cuts. Injuries like these can limit your independence.   Prevention tips  To help prevent falls and fall-related injuries, follow the tips below.    Floors  To make floors safer:   Put nonskid pads under area rugs.  Remove small rugs.  Replace worn floor coverings.  Tack carpets firmly to each step on carpeted stairs. Put nonskid strips on the edges of uncarpeted stairs.  Keep floors and stairs free of clutter and cords.  Arrange furniture so there are clear pathways.  Clean up any spills right away.  Bathrooms    To make bathrooms safer:   Install grab bars in the tub or shower.  Apply nonskid strips or put a nonskid rubber mat in the tub or shower.  Sit on a bath chair to bathe.  Use bathmats with nonskid backing.  Lighting  To improve visibility in your home:    Keep a flashlight in each room. Or put a lamp next to the bed within easy reach.  Put nightlights in the bedrooms, hallways, kitchen, and bathrooms.  Make sure all stairways have good lighting.  Take your time when going up and down stairs.  Put handrails on both sides of stairs and in walkways for more support. To prevent injury to your wrist or arm, don t use handrails to pull yourself up.  Install grab bars to pull yourself up.  Move or rearrange items that you use often. This will make them easier to find or reach.  Look at your home to find any safety hazards. Especially look at doorways, walkways, and the driveway. Remove or repair any safety problems that you find.  Other changes to make  Look around to find  any safety hazards. Look closely at doorways, walkways, and the driveway. Remove or repair any safety problems that you find.  Wear shoes that fit well.  Take your time when going up and down stairs.  Put handrails on both sides of stairs and in walkways for more support. To prevent injury to your wrist or arm, don t use handrails to pull yourself up.  Install grab bars wherever needed to pull yourself up.  Arrange items that you use often. This will make them easier to find or reach.    [facility] last reviewed this educational content on 3/1/2020    2178-9358 [employer]. All rights reserved. This information is not intended as a substitute for professional medical care. Always follow your healthcare professional's instructions.           QUIT SMOKING!!     -- Choose a quit date (within 1 month). Quitting smoking abruptly is more successful than gradually cutting back.   -- Tell everyone about it (friends, family, coworkers)   -- Think about when you smoke the most, and what you'll do during those times (eg when in the car, work breaks, etc)     Consider:   -- Start varenicline (Chantix) 1 week before your quit date   -- Start bupropion (Wellbutrin/Zyban) 1 week before your quit date -- Welbutrin 1 pill daily for 1 week then 1 pill twice daily      -- Stop smoking on quit date   -- Starting with quit date, use nicotine lozenges/gum as needed for cravings     -- Quit Plan - Call them in the next 1-2 weeks to help you quit smoking.                        [phone] ([phone])                        http://www.quitplan.com   -- http://smokefree.gov/       MRI of left shoulder and low back were ordered  - they will call with date/time of appointment.   -- No driving after using Xanax, prior to your MRI.     Immunization History   Administered Date(s) Administered    COVID-19,PF,Pfizer (12+ Yrs) 03/11/2021, 04/01/2021, 09/20/2021    COVID-19,PF,Pfizer 12+ Yrs (2022 and After) 05/03/2022    FLU 6-35 months  10/14/2021    Influenza Quad, Recombinant, pf(RIV4) (Flublok) 10/14/2021    TDAP Vaccine (Boostrix) 01/15/2010, 07/04/2020    Zoster vaccine recombinant adjuvanted (SHINGRIX) 12/06/2021, 02/08/2022      -- Yearly BIVALENT - COVID-19 Vaccination shot (Omicron 4 and 5/COVID) - today.     -- Consider Annual Flu / Influenza vaccination - Every Fall (Starting about October 1st)        Lung Cancer Screening   Frequently Asked Questions  If you are at high-risk for lung cancer, getting screened with low-dose computed tomography (LDCT) every year can help save your life. This handout offers answers to some of the most common questions about lung cancer screening. If you have other questions, please call [phone] ([phone]).     What is it?  Lung cancer screening uses special X-ray technology to create an image of your lung tissue. The exam is quick and easy and takes less than 10 seconds. We don t give you any medicine or use any needles. You can eat before and after the exam. You don t need to change your clothes as long as the clothing on your chest doesn t contain metal. But, you do need to be able to hold your breath for at least 6 seconds during the exam.    What is the goal of lung cancer screening?  The goal of lung cancer screening is to save lives. Many times, lung cancer is not found until a person starts having physical symptoms. Lung cancer screening can help detect lung cancer in the earliest stages when it may be easier to treat.    Who should be screened for lung cancer?  We suggest lung cancer screening for anyone who is at high-risk for lung cancer. You are in the high-risk group if you:     are between the ages of 55 and 79, and   have smoked at least 1 pack of cigarettes a day for 20 or more years, and   still smoke or have quit within the past 15 years.    However, if you have a new cough or shortness of breath, you should talk to your doctor before being screened.    Why does it matter if  I have symptoms?  Certain symptoms can be a sign that you have a condition in your lungs that should be checked and treated by your doctor. These symptoms include fever, chest pain, a new or changing cough, shortness of breath that you have never felt before, coughing up blood or unexplained weight loss. Having any of these symptoms can greatly affect the results of lung cancer screening.       Should all smokers get an LDCT lung cancer screening exam?  It depends. Lung cancer screening is for a very specific group of men and women who have a history of heavy smoking over a long period of time (see  Who should be screened for lung cancer  above).  I am in the high-risk group, but have been diagnosed with cancer in the past. Is LDCT lung cancer screening right for me?  In some cases, you should not have LDCT lung screening, such as when your doctor is already following your cancer with CT scan studies. Your doctor will help you decide if LDCT lung screening is right for you.  Do I need to have a screening exam every year?  Yes. If you are in the high-risk group described earlier, you should get an LDCT lung cancer screening exam every year until you are 79, or are no longer willing or able to undergo screening and possible procedures to diagnose and treat lung cancer.  How effective is LDCT at preventing death from lung cancer?  Studies have shown that LDCT lung cancer screening can lower the risk of death from lung cancer by 20 percent in people who are at high-risk.  What are the risks?  There are some risks and limitations of LDCT lung cancer screening. We want to make sure you understand the risks and benefits, so please let us know if you have any questions. Your doctor may want to talk with you more about these risks.   Radiation exposure: As with any exam that uses radiation, there is a very small increased risk of cancer. The amount of radiation in LDCT is small--about the same amount a person would get from  a mammogram. Your doctor orders the exam when he or she feels the potential benefits outweigh the risks.   False negatives: No test is perfect, including LDCT. It is possible that you may have a medical condition, including lung cancer, that is not found during your exam. This is called a false negative result.   False positives and more testing: LDCT very often finds something in the lung that could be cancer, but in fact is not. This is called a false positive result. False positive tests often cause anxiety. To make sure these findings are not cancer, you may need to have more tests. These tests will be done only if you give us permission. Sometimes patients need a treatment that can have side effects, such as a biopsy. For more information on false positives, see  What can I expect from the results?    Findings not related to lung cancer: Your LDCT exam also takes pictures of areas of your body next to your lungs. In a very small number of cases, the CT scan will show an abnormal finding in one of these areas, such as your kidneys, adrenal glands, liver or thyroid. This finding may not be serious, but you may need more tests. Your doctor can help you decide what other tests you may need, if any.  What can I expect from the results?  About 1 out of 4 LDCT exams will find something that may need more tests. Most of the time, these findings are lung nodules. Lung nodules are very small collections of tissue in the lung. These nodules are very common, and the vast majority--more than 97 percent--are not cancer (benign). Most are normal lymph nodes or small areas of scarring from past infections.  But, if a small lung nodule is found to be cancer, the cancer can be cured more than 90 percent of the time. To know if the nodule is cancer, we may need to get more images before your next yearly screening exam. If the nodule has suspicious features (for example, it is large, has an odd shape or grows over time), we will  refer you to a specialist for further testing.  Will my doctor also get the results?  Yes. Your doctor will get a copy of your results.  Is it okay to keep smoking now that there s a cancer screening exam?  No. Tobacco is one of the strongest cancer-causing agents. It causes not only lung cancer, but other cancers and cardiovascular (heart) diseases as well. The damage caused by smoking builds over time. This means that the longer you smoke, the higher your risk of disease. While it is never too late to quit, the sooner you quit, the better.  Where can I find help to quit smoking?  The best way to prevent lung cancer is to stop smoking. If you have already quit smoking, congratulations and keep it up! For help on quitting smoking, please call [place] at 4-882-QUITNOW (1-924.903.9452) or the American [employer] Society at 1-831.108.8081 to find local resources near you.  One-on-one health coaching:  If you d prefer to work individually with a health care provider on tobacco cessation, we offer:     Medication Therapy Management:  Our specially trained pharmacists work closely with you and your doctor to help you quit smoking.  Call [phone] or [phone] (toll free).        Consider sports medicine or orthopedic clinic evaluation, call and schedule at your convenience.     [facility]   Phone: [phone]  Or   Sports Medicine at [facility] - Dr. Gregorio Ko  Phone: [phone]

## 2022-10-04 NOTE — PROGRESS NOTES
Assessment & Plan   Gregg Aguayo is a 56 year old, presenting for the following health issues:      ICD-10-CM    1. Benign essential hypertension  I10    2. Immunosuppression (H)  D84.9    3. Rheumatoid arthritis of multiple sites with negative rheumatoid factor (H)  M06.09    4. Morbid obesity (H)  E66.01    5. Polymyalgia rheumatica (H)  M35.3    6. Heartburn  R12    7. S/P lumbar discectomy  Z98.890    8. AILEEN on CPAP - uses nightly, finds very helpful  G47.33     Z99.89    9. Other long term (current) drug therapy - 3/31/2021 - Gabapentin  Z79.899    10. Chronic left shoulder pain  M25.512 MR Shoulder Left w/o Contrast    G89.29 ALPRAZolam (XANAX) 1 MG tablet   11. Lumbar back pain with radiculopathy affecting left lower extremity  M54.16 MR Lumbar Spine w/o & w Contrast     ALPRAZolam (XANAX) 1 MG tablet     Spine  Referral   12. History of claustrophobia  Z86.59 ALPRAZolam (XANAX) 1 MG tablet   13. Tobacco abuse  Z72.0 nicotine (NICORETTE) 2 MG gum     nicotine (NICODERM CQ) 21 MG/24HR 24 hr patch     nicotine (NICODERM CQ) 14 MG/24HR 24 hr patch     SMOKING CESSATION COUNSELING 3-10 MIN   14. Personal history of nicotine dependence   Z87.891 SMOKING CESSATION COUNSELING 3-10 MIN   15. Need for COVID-19 vaccine  Z23    16. Vaccine counseling  Z71.85    17. Encounter for Medicare annual wellness exam  Z00.00    18. Personal history of tobacco use  Z87.891 Prof fee: Shared Decision Making for Lung Cancer Screening     CT Chest Lung Cancer Scrn Low Dose wo   Patient presents for Medicare annual wellness visit as well as follow-up multiple issues.    Hypertension, blood pressure is currently well controlled.  Denies syncope or presyncope.  Doing well with current medications.  No changes for now.    Chronic immunosuppression, ongoing.  Has polymyalgia rheumatica, rheumatoid arthritis.  Still following with rheumatology.  No changes for now.    Obesity, discussed need for reduced oral caloric intake.   Regular exercise.  Reduce carbohydrate intake.  Information printed and reviewed.    Heartburn, ongoing.  Has been trying to watch his diet which does make a difference.    Low back pain, history of lumbar discectomy in the past.  He has been having significant worsening of pain in the low back especially radiating down the left leg.  He has claustrophobia and would like to get updated MRI of his low back as well as spine surgery referral.  Orders placed.  Alprazolam sent to pharmacy for claustrophobia.    Chronic left shoulder pain.  Some range of motion issues.  Concerned with impingement syndrome.  Range of motion is limited due to pain.  MRI of the left shoulder is also ordered.  Xanax prescribed for claustrophobia.  Recommend sports medicine orthopedic follow-up appointment after his MRI to review MRI results and to discuss treatment options.    Tobacco abuse, ongoing.  He would like to quit smoking.  We discussed a few treatment options.  Had some problems with Chantix in the past.  He would prefer to try nicotine replacement products.  Did very well with nicotine patches previously.  Nicotine patches as well as gum sent to pharmacy.  3 minutes spent on tobacco cessation counseling.    COVID vaccination today.  Encouraged consideration of annual flu shot.  Vaccine counseling completed.  Encouraged consideration of pneumococcal vaccination.    Lung cancer screening CT, patient qualifies based on tobacco abuse history.  CT scan orders placed.  See separate documentation.    Encouraged weight loss and regular exercise.     Return in about 1 year (around 10/4/2023) for Annual Medicare Wellness Visit.    Reinier Ritchie MD  [facility]     Subjective   No chief complaint on file.      History of Present Illness       Back Pain:  He presents for follow up of back pain. Patient's back pain is a chronic problem.  Location of back pain:  Left lower back, right side of neck, left shoulder, left  buttock and right hip  Description of back pain: sharp and shooting  Back pain spreads: right buttocks, left buttocks, right thigh, left thigh, left shoulder and right side of neck    Since patient first noticed back pain, pain is: always present, but gets better and worse  Does back pain interfere with his job:  Yes      He eats 0-1 servings of fruits and vegetables daily.He consumes 2 sweetened beverage(s) daily.He exercises with enough effort to increase his heart rate 20 to 29 minutes per day.  He exercises with enough effort to increase his heart rate 3 or less days per week.   He is taking medications regularly.  Healthy Habits:     Taking medications regularly:  0    PHQ-2 Total Score: 0     Review current opioid prescriptions    For a patient with a current opioid prescription:    Reviewed potential Opioid Use Disorder (OUD) risk factors: Yes     Evaluate their pain severity and current treatment plan:     Provide information on non-opioid treatment options:    Refer to a specialist, as appropriate:    Get more information on pain management in the [facility] Pain Management Best Practices Inter-agency Task Force Report    Screen for potential Substance Use Disorders (SUDs)    Reviewed the patient s potential risk factors for SUDs: Yes     Refer to treatment or specialist, as appropriate:     A screening tool isn t required but you may use one:  Find more information in the [employer] on Drug Abuse Screening and Assessment Tools Chart      Annual Wellness Visit  Patient has been advised of split billing requirements and indicates understanding: Yes     Are you in the first 12 months of your Medicare Part B coverage?  No    Do you feel safe in your environment? Yes    Have you ever done Advance Care Planning? (For example, a Health Directive, POLST, or a discussion with a medical provider or your loved ones about your wishes)? No, advance care planning information given to patient to review.  Patient plans  to discuss their wishes with loved ones or provider.      Fall risk:  Fallen 2 or more times in the past year?: Yes  Any fall with injury in the past year?: No    Cognitive Screenin) Repeat 3 items (Leader, Season, Table)    2) Clock draw: NORMAL  3) 3 item recall: Recalls 3 objects  Results: 3 items recalled: COGNITIVE IMPAIRMENT LESS LIKELY    Mini-CogTM Copyright MAE Bowers. Licensed by the author for use in [facility]; reprinted with permission (catarino@[facility]). All rights reserved.      Do you have sleep apnea, excessive snoring or daytime drowsiness?: yes    Current providers sharing in care for this patient include:   Patient Care Team:  Reinier Ritchie MD as PCP - General (Internal Medicine)  Reinier Ritchie MD as Assigned PCP  Monster Grissom MD as Assigned Musculoskeletal Provider  Giovanni Dhillon MD as Assigned Surgical Provider    Patient has been advised of split billing requirements and indicates understanding: Yes    Review of Systems   Constitutional: Negative for chills and fever.   HENT: Positive for congestion, hearing loss and tinnitus.    Eyes: Negative for visual disturbance.   Respiratory: Negative for chest tightness and shortness of breath.    Cardiovascular: Positive for palpitations (if forgets his metoprolol). Negative for chest pain.   Gastrointestinal: Negative for abdominal pain.   Genitourinary: Negative for hematuria.   Musculoskeletal: Positive for arthralgias, back pain (Radicular left low back pain), gait problem (left shoulder pain), joint swelling and myalgias.        + Right 4th finger, trigger finger   Skin: Negative for rash and wound.   Allergic/Immunologic: Positive for environmental allergies and immunocompromised state.   Neurological: Negative for syncope.   Hematological: Negative for adenopathy. Does not bruise/bleed easily.   Psychiatric/Behavioral: Positive for sleep disturbance. Negative for confusion.          Objective    /72 (BP Location:  "Right arm, Patient Position: Left side, Cuff Size: Adult Large)   Pulse 63   Temp 98.2  F (36.8  C) (Temporal)   Ht 1.803 m (5' 11\")   Wt 132.9 kg (293 lb)   SpO2 100%   BMI 40.87 kg/m    Body mass index is 40.87 kg/m .  Physical Exam  Constitutional:       General: He is not in acute distress.     Appearance: He is well-developed. He is not diaphoretic.   HENT:      Head: Normocephalic and atraumatic.   Eyes:      General: No scleral icterus.     Conjunctiva/sclera: Conjunctivae normal.   Neck:      Vascular: No carotid bruit.   Cardiovascular:      Rate and Rhythm: Normal rate and regular rhythm.      Pulses: Normal pulses.   Pulmonary:      Effort: Pulmonary effort is normal.      Breath sounds: Normal breath sounds.   Abdominal:      Palpations: Abdomen is soft.      Tenderness: There is no abdominal tenderness.   Musculoskeletal:         General: Tenderness and deformity present.      Cervical back: Neck supple.      Right lower leg: Edema present.      Left lower leg: Edema present.      Comments: Left shoulder pain and ROM limitation.   Lymphadenopathy:      Cervical: No cervical adenopathy.   Skin:     General: Skin is warm and dry.      Findings: No rash.   Neurological:      Mental Status: He is alert and oriented to person, place, and time. Mental status is at baseline.   Psychiatric:         Mood and Affect: Mood normal.         Behavior: Behavior normal.          Recent Labs   Lab Test 10/14/21  0924 10/14/21  0923 03/31/21  1226 12/04/20  0925 04/10/20  1033 11/06/18  0945 07/25/18  1025   LDL  --   --  104*  --   --   --   --    HDL  --   --  38  --   --   --   --    TRIG  --   --  275*  --   --   --   --    ALT  --  23  --   --   --  30 32   CR  --  0.91  --  0.91   < > 0.81 0.82   GFRESTIMATED  --  >90  --  86   < > >90 >90   POTASSIUM  --  4.7  --  4.5   < > 4.0 4.0   WBC 7.1  --   --  6.3   < > 5.8 5.7   HGB 15.8  --   --  15.8   < > 15.7 16.0     --   --  210   < > 192 186   ALBUMIN  " --  4.4  --   --   --  4.3 4.2    < > = values in this interval not displayed.                    He is at risk for falling and has been provided with information to reduce the risk of falling at home.  Lung Cancer Screening Shared Decision Making Visit     Gregg Aguayo, a 56 year old male, is eligible for lung cancer screening    History   Smoking Status     Current Every Day Smoker     Packs/day: 0.75     Years: 37.00     Types: Cigarettes   Smokeless Tobacco     Never Used     Comment: Tried Chantix, didn't tolerate it.       I have discussed with patient the risks and benefits of screening for lung cancer with low-dose CT.     The risks include:    radiation exposure: one low dose chest CT has as much ionizing radiation as about 15 chest x-rays, or 6 months of background radiation living in [place]      false positives: most findings/nodules are NOT cancer, but some might still require additional diagnostic evaluation, including biopsy    over-diagnosis: some slow growing cancers that might never have been clinically significant will be detected and treated unnecessarily     The benefit of early detection of lung cancer is contingent upon adherence to annual screening or more frequent follow up if indicated.     Furthermore, to benefit from screening, Gregg must be willing and able to undergo diagnostic procedures, if indicated. Although no specific guide is available for determining severity of comorbidities, it is reasonable to withhold screening in patients who have greater mortality risk from other diseases.     We did discuss that the best way to prevent lung cancer is to not smoke.    Some patients may value a numeric estimation of lung cancer risk when evaluating if lung cancer screening is right for them, here is one calculator:    ShouldIScreen

## 2022-10-10 ENCOUNTER — HOSPITAL ENCOUNTER (OUTPATIENT)
Dept: MRI IMAGING | Facility: OTHER | Age: 57
Discharge: HOME | End: 2022-10-10
Attending: INTERNAL MEDICINE
Payer: COMMERCIAL

## 2022-10-10 DIAGNOSIS — M54.16 LUMBAR BACK PAIN WITH RADICULOPATHY AFFECTING LEFT LOWER EXTREMITY: ICD-10-CM

## 2022-10-10 DIAGNOSIS — G89.29 CHRONIC LEFT SHOULDER PAIN: ICD-10-CM

## 2022-10-10 DIAGNOSIS — M25.512 CHRONIC LEFT SHOULDER PAIN: ICD-10-CM

## 2022-10-10 PROCEDURE — G1010 CDSM STANSON: HCPCS

## 2022-10-10 PROCEDURE — A9575 INJ GADOTERATE MEGLUMI 0.1ML: HCPCS | Performed by: INTERNAL MEDICINE

## 2022-10-10 PROCEDURE — 255N000002 HC RX 255 OP 636: Performed by: INTERNAL MEDICINE

## 2022-10-10 PROCEDURE — 72158 MRI LUMBAR SPINE W/O & W/DYE: CPT | Mod: MF

## 2022-10-10 RX ADMIN — GADOTERATE MEGLUMINE 20 ML: 376.9 INJECTION INTRAVENOUS at 18:46

## 2022-10-24 ENCOUNTER — HOSPITAL ENCOUNTER (OUTPATIENT)
Dept: CT IMAGING | Facility: OTHER | Age: 57
Discharge: HOME | End: 2022-10-24
Attending: INTERNAL MEDICINE | Admitting: INTERNAL MEDICINE
Payer: COMMERCIAL

## 2022-10-24 DIAGNOSIS — Z87.891 PERSONAL HISTORY OF TOBACCO USE: ICD-10-CM

## 2022-10-24 PROCEDURE — 71271 CT THORAX LUNG CANCER SCR C-: CPT

## 2022-10-28 DIAGNOSIS — Z86.59 HISTORY OF CLAUSTROPHOBIA: ICD-10-CM

## 2022-10-28 DIAGNOSIS — G89.29 CHRONIC LEFT SHOULDER PAIN: ICD-10-CM

## 2022-10-28 DIAGNOSIS — M54.16 LUMBAR BACK PAIN WITH RADICULOPATHY AFFECTING LEFT LOWER EXTREMITY: ICD-10-CM

## 2022-10-28 DIAGNOSIS — M25.512 CHRONIC LEFT SHOULDER PAIN: ICD-10-CM

## 2022-11-01 RX ORDER — ALPRAZOLAM 1 MG
1 TABLET ORAL ONCE
Qty: 4 TABLET | Refills: 0 | OUTPATIENT
Start: 2022-11-01 | End: 2022-11-01

## 2022-11-01 NOTE — TELEPHONE ENCOUNTER
[id number] of [place] sent Rx request for the following:      Last Prescription Date:    ALPRAZolam (XANAX) 1 MG tablet 4 tablet 0 10/4/2022 10/4/2022 --   Sig - Route: Take 1 tablet (1 mg) by mouth once for 1 dose Take 30 to 60 minutes prior to MRI, may repeat dose x1 if needed - Oral     Not on active medication list.    Last Office Visit:              10/4/22   Future Office visit:           10/5/23     Per LOV note: Return in about 1 year (around 10/4/2023) for Annual Medicare Wellness Visit.    Karla Ovalles RN .............. 11/1/2022  9:57 AM

## 2022-11-28 ENCOUNTER — OFFICE VISIT (OUTPATIENT)
Dept: ORTHOPEDICS | Facility: OTHER | Age: 57
End: 2022-11-28
Attending: ORTHOPAEDIC SURGERY
Payer: COMMERCIAL

## 2022-11-28 VITALS
WEIGHT: 290 LBS | HEART RATE: 83 BPM | RESPIRATION RATE: 16 BRPM | BODY MASS INDEX: 40.45 KG/M2 | TEMPERATURE: 98 F | SYSTOLIC BLOOD PRESSURE: 138 MMHG | DIASTOLIC BLOOD PRESSURE: 86 MMHG | OXYGEN SATURATION: 97 %

## 2022-11-28 DIAGNOSIS — S43.432A SUPERIOR GLENOID LABRUM LESION OF LEFT SHOULDER, INITIAL ENCOUNTER: Primary | ICD-10-CM

## 2022-11-28 DIAGNOSIS — M51.369 DDD (DEGENERATIVE DISC DISEASE), LUMBAR: Primary | ICD-10-CM

## 2022-11-28 PROCEDURE — 99203 OFFICE O/P NEW LOW 30 MIN: CPT | Performed by: ORTHOPAEDIC SURGERY

## 2022-11-28 ASSESSMENT — PAIN SCALES - GENERAL: PAINLEVEL: MODERATE PAIN (5)

## 2022-11-28 NOTE — NURSING NOTE
"Chief Complaint   Patient presents with     Left Shoulder - Pain         Initial There were no vitals taken for this visit. Estimated body mass index is 40.87 kg/m  as calculated from the following:    Height as of 10/4/22: 1.803 m (5' 11\").    Weight as of 10/4/22: 132.9 kg (293 lb).       Medication Reconciliation: Bhargav Cole LPN .......  11/28/2022  2:13 PM   "

## 2022-11-28 NOTE — PROGRESS NOTES
Visit Date: 11/28/2022    CHIEF COMPLAINT:  A 57-year-old gentleman with left shoulder pain.    HISTORY OF PRESENT ILLNESS:  Gregg Aguayo is a 57-year-old gentleman with longstanding left shoulder pain.  It has been going on for years.  He was a  and has had ongoing pain in the left shoulder that has gotten worse and then better.  It has been intermittent, but fairly constant yet.  He was waxing and waning in severity for many, many years.  No real injury to it that he remembers.  He just started having pain in the left shoulder.  MRI was already done.  The pain is primarily on the outside of the shoulder and goes down the arm.    PHYSICAL EXAMINATION:    GENERAL:  Today, this is a 57-year-old gentleman in no acute distress, very pleasant on examination, morbidly obese.  He is a very large man.  He has full range of motion of his left shoulder without much difficulty.  Good strength in the rotator cuff in all planes some mild dyskinesis on that side.  Neuro and vascularly intact otherwise.  Good strength in the rotator cuff in all planes.  He is tender to palpation at Codman's point, nontender to palpation of the bicipital groove and mildly tender to palpation at the AC joint.    IMAGING:  Review of the MRI of the left shoulder shows significant acromioclavicular arthritis.  There is also what appears to be a SLAP tear and probably some degenerative tearing as well at the superior and posterior labrum with a paralabral cyst formation that is actually dissecting over towards the spinoglenoid notch.    IMPRESSION AND PLAN:  This is a 57-year-old gentleman who has acromioclavicular arthritis, significant rotator cuff tendinitis as well as a type 2 SLAP tear.  He would likely benefit from having all of this taken care of at this point.  All the risks and benefits of surgical intervention were discussed with him.  He wishes to proceed.  We will see him back at the time of surgery.    Richie Olivares,  MD        D: 2022   T: 2022   MT: MIGUE    Name:     RACHEL PLUMMER  MRN:      [id number]        Account:    [id number]   :      1965           Visit Date: 2022     Document: [id number]

## 2022-12-01 ENCOUNTER — HOSPITAL ENCOUNTER (OUTPATIENT)
Dept: GENERAL RADIOLOGY | Facility: OTHER | Age: 57
Discharge: HOME | End: 2022-12-01
Attending: STUDENT IN AN ORGANIZED HEALTH CARE EDUCATION/TRAINING PROGRAM
Payer: COMMERCIAL

## 2022-12-01 ENCOUNTER — OFFICE VISIT (OUTPATIENT)
Dept: ORTHOPEDICS | Facility: OTHER | Age: 57
End: 2022-12-01
Attending: INTERNAL MEDICINE
Payer: COMMERCIAL

## 2022-12-01 VITALS — OXYGEN SATURATION: 99 % | HEART RATE: 55 BPM

## 2022-12-01 DIAGNOSIS — M51.369 DDD (DEGENERATIVE DISC DISEASE), LUMBAR: ICD-10-CM

## 2022-12-01 DIAGNOSIS — M54.16 LUMBAR BACK PAIN WITH RADICULOPATHY AFFECTING LEFT LOWER EXTREMITY: ICD-10-CM

## 2022-12-01 DIAGNOSIS — M53.3 SI (SACROILIAC) JOINT DYSFUNCTION: Primary | ICD-10-CM

## 2022-12-01 PROCEDURE — 99214 OFFICE O/P EST MOD 30 MIN: CPT | Performed by: STUDENT IN AN ORGANIZED HEALTH CARE EDUCATION/TRAINING PROGRAM

## 2022-12-01 PROCEDURE — 72120 X-RAY BEND ONLY L-S SPINE: CPT

## 2022-12-01 ASSESSMENT — PAIN SCALES - GENERAL: PAINLEVEL: MODERATE PAIN (5)

## 2022-12-01 NOTE — PROGRESS NOTES
Visit Date: 12/01/2022    HISTORY OF PRESENT ILLNESS:  Juve is a 57-year-old male who presents for evaluation of predominantly left-sided low axial back pain.  He had a right-sided L4 to L5 hemilaminectomy and foraminotomy done in 2020 down at [facility] for right-sided disk herniation.  He reports that the pain that he is getting now is considerably different than his prior surgery, not so much radicular in nature, but left-sided low axial back pain.  He is able to localize the majority of his pain in the left posterior superior iliac spine via the Ary finger test with pain that occasionally radiates in the left hip and down into the left leg in a nondermatomal distribution.    PHYSICAL EXAMINATION:    GENERAL:  Well-appearing, well-nourished.  MUSCULOSKELETAL/NEUROLOGIC:  He has 5/5 strength in bilateral hip flexion, knee extension, flexion, ankle dorsiflexion, plantarflexion, EHL.  He has normal sensation to light touch throughout bilateral lower extremities.  He has exquisite tenderness over the left posterior superior iliac spine and is positive for 5/5 provocative SI joint testing procedures on the left hand side including AMAN, Gaenslen's, thigh thrust, distraction and compression.     IMAGING:  Imaging available for review today includes an MRI of the lumbar spine, which shows evidence of his prior right-sided L4 to L5 hemilaminectomy and foraminotomy with some residual scar tissue and neural foraminal compression at that level with only a mild amount of spinal canal and left-sided neural foraminal compression.  He has flexion/extension films of the lumbar spine obtained in clinic today, which shows no gross abnormal motion in flexed or extended position.    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  He is going to be referred for physical therapy at the [facility] and down to [facility] for x2 left-sided SI joint injections.  He will call us after the treatment plan is completed.    This clinic visit took 30-44 minutes.    Mihai Holden MD        D: 2022   T: 2022   MT: NARESH    Name:     RACHEL PLUMMER  MRN:      [id number]        Account:    [id number]   :      1965           Visit Date: 2022     Document: [id number]

## 2022-12-01 NOTE — PROGRESS NOTES
Patient is here for consult on his lumbar spine.  Lauryn Nolan LPN .....................12/1/2022 9:21 AM

## 2022-12-26 DIAGNOSIS — I49.3 SYMPTOMATIC PREMATURE VENTRICULAR CONTRACTIONS: ICD-10-CM

## 2022-12-27 RX ORDER — METOPROLOL SUCCINATE 25 MG/1
50 TABLET, EXTENDED RELEASE ORAL EVERY EVENING
Qty: 180 TABLET | Refills: 0 | Status: SHIPPED | OUTPATIENT
Start: 2022-12-27 | End: 2023-01-04

## 2023-01-04 ENCOUNTER — OFFICE VISIT (OUTPATIENT)
Dept: INTERNAL MEDICINE | Facility: OTHER | Age: 58
End: 2023-01-04
Attending: INTERNAL MEDICINE
Payer: COMMERCIAL

## 2023-01-04 ENCOUNTER — MYC MEDICAL ADVICE (OUTPATIENT)
Dept: INTERNAL MEDICINE | Facility: OTHER | Age: 58
End: 2023-01-04

## 2023-01-04 VITALS
SYSTOLIC BLOOD PRESSURE: 138 MMHG | OXYGEN SATURATION: 97 % | HEART RATE: 52 BPM | WEIGHT: 297.6 LBS | HEIGHT: 72 IN | TEMPERATURE: 98.3 F | BODY MASS INDEX: 40.31 KG/M2 | DIASTOLIC BLOOD PRESSURE: 74 MMHG | RESPIRATION RATE: 18 BRPM

## 2023-01-04 DIAGNOSIS — I49.3 SYMPTOMATIC PREMATURE VENTRICULAR CONTRACTIONS: ICD-10-CM

## 2023-01-04 DIAGNOSIS — Z72.0 TOBACCO ABUSE: ICD-10-CM

## 2023-01-04 DIAGNOSIS — I10 BENIGN ESSENTIAL HYPERTENSION: ICD-10-CM

## 2023-01-04 DIAGNOSIS — G47.33 OSA ON CPAP: ICD-10-CM

## 2023-01-04 DIAGNOSIS — Z01.818 PREOP GENERAL PHYSICAL EXAM: Primary | ICD-10-CM

## 2023-01-04 DIAGNOSIS — J30.2 SEASONAL ALLERGIES: ICD-10-CM

## 2023-01-04 DIAGNOSIS — D84.9 IMMUNOSUPPRESSION (H): ICD-10-CM

## 2023-01-04 DIAGNOSIS — M35.3 POLYMYALGIA RHEUMATICA (H): ICD-10-CM

## 2023-01-04 DIAGNOSIS — M25.512 CHRONIC LEFT SHOULDER PAIN: ICD-10-CM

## 2023-01-04 DIAGNOSIS — G89.29 CHRONIC LEFT SHOULDER PAIN: ICD-10-CM

## 2023-01-04 DIAGNOSIS — E66.01 MORBID OBESITY (H): ICD-10-CM

## 2023-01-04 DIAGNOSIS — R12 HEARTBURN: ICD-10-CM

## 2023-01-04 PROBLEM — M65.341 TRIGGER RING FINGER OF RIGHT HAND: Status: RESOLVED | Noted: 2018-08-30 | Resolved: 2023-01-04

## 2023-01-04 LAB
ALBUMIN SERPL BCG-MCNC: 4.4 G/DL (ref 3.5–5.2)
ALP SERPL-CCNC: 55 U/L (ref 40–129)
ALT SERPL W P-5'-P-CCNC: 24 U/L (ref 10–50)
ANION GAP SERPL CALCULATED.3IONS-SCNC: 10 MMOL/L (ref 7–15)
AST SERPL W P-5'-P-CCNC: 27 U/L (ref 10–50)
BASOPHILS # BLD AUTO: 0.1 10E3/UL (ref 0–0.2)
BASOPHILS NFR BLD AUTO: 2 %
BILIRUB SERPL-MCNC: 0.7 MG/DL
BUN SERPL-MCNC: 11.7 MG/DL (ref 8–23)
CALCIUM SERPL-MCNC: 9.4 MG/DL (ref 8.6–10)
CHLORIDE SERPL-SCNC: 102 MMOL/L (ref 98–107)
CREAT SERPL-MCNC: 0.82 MG/DL (ref 0.67–1.17)
DEPRECATED HCO3 PLAS-SCNC: 26 MMOL/L (ref 22–29)
EOSINOPHIL # BLD AUTO: 0.3 10E3/UL (ref 0–0.7)
EOSINOPHIL NFR BLD AUTO: 5 %
ERYTHROCYTE [DISTWIDTH] IN BLOOD BY AUTOMATED COUNT: 12.4 % (ref 10–15)
GFR SERPL CREATININE-BSD FRML MDRD: >90 ML/MIN/1.73M2
GLUCOSE SERPL-MCNC: 120 MG/DL (ref 70–99)
HCT VFR BLD AUTO: 43.7 % (ref 40–53)
HGB BLD-MCNC: 15.7 G/DL (ref 13.3–17.7)
IMM GRANULOCYTES # BLD: 0 10E3/UL
IMM GRANULOCYTES NFR BLD: 0 %
LYMPHOCYTES # BLD AUTO: 1.5 10E3/UL (ref 0.8–5.3)
LYMPHOCYTES NFR BLD AUTO: 24 %
MCH RBC QN AUTO: 34.2 PG (ref 26.5–33)
MCHC RBC AUTO-ENTMCNC: 35.9 G/DL (ref 31.5–36.5)
MCV RBC AUTO: 95 FL (ref 78–100)
MONOCYTES # BLD AUTO: 0.7 10E3/UL (ref 0–1.3)
MONOCYTES NFR BLD AUTO: 11 %
NEUTROPHILS # BLD AUTO: 3.7 10E3/UL (ref 1.6–8.3)
NEUTROPHILS NFR BLD AUTO: 58 %
NRBC # BLD AUTO: 0 10E3/UL
NRBC BLD AUTO-RTO: 0 /100
PLATELET # BLD AUTO: 165 10E3/UL (ref 150–450)
POTASSIUM SERPL-SCNC: 4.5 MMOL/L (ref 3.4–5.3)
PROT SERPL-MCNC: 6.8 G/DL (ref 6.4–8.3)
RBC # BLD AUTO: 4.59 10E6/UL (ref 4.4–5.9)
SODIUM SERPL-SCNC: 138 MMOL/L (ref 136–145)
WBC # BLD AUTO: 6.3 10E3/UL (ref 4–11)

## 2023-01-04 PROCEDURE — 36415 COLL VENOUS BLD VENIPUNCTURE: CPT | Mod: ZL | Performed by: INTERNAL MEDICINE

## 2023-01-04 PROCEDURE — 85025 COMPLETE CBC W/AUTO DIFF WBC: CPT | Mod: ZL | Performed by: INTERNAL MEDICINE

## 2023-01-04 PROCEDURE — 99214 OFFICE O/P EST MOD 30 MIN: CPT | Performed by: INTERNAL MEDICINE

## 2023-01-04 PROCEDURE — 93000 ELECTROCARDIOGRAM COMPLETE: CPT | Performed by: INTERNAL MEDICINE

## 2023-01-04 PROCEDURE — 80053 COMPREHEN METABOLIC PANEL: CPT | Mod: ZL | Performed by: INTERNAL MEDICINE

## 2023-01-04 RX ORDER — FEXOFENADINE HCL 180 MG/1
180 TABLET ORAL EVERY EVENING
Qty: 90 TABLET | Refills: 4 | Status: SHIPPED | OUTPATIENT
Start: 2023-01-04

## 2023-01-04 RX ORDER — OMEPRAZOLE 40 MG/1
40 CAPSULE, DELAYED RELEASE ORAL DAILY
Qty: 90 CAPSULE | Refills: 4 | Status: SHIPPED | OUTPATIENT
Start: 2023-01-04 | End: 2023-10-05

## 2023-01-04 RX ORDER — METOPROLOL SUCCINATE 25 MG/1
50 TABLET, EXTENDED RELEASE ORAL EVERY EVENING
Qty: 180 TABLET | Refills: 4 | Status: SHIPPED | OUTPATIENT
Start: 2023-01-04 | End: 2023-09-06

## 2023-01-04 ASSESSMENT — PAIN SCALES - GENERAL: PAINLEVEL: SEVERE PAIN (6)

## 2023-01-04 ASSESSMENT — ENCOUNTER SYMPTOMS
CONFUSION: 0
CHILLS: 0
CHEST TIGHTNESS: 0
MYALGIAS: 1
JOINT SWELLING: 1
HEMATURIA: 0
PALPITATIONS: 1
SLEEP DISTURBANCE: 1
BRUISES/BLEEDS EASILY: 0
WOUND: 0
BACK PAIN: 1
FEVER: 0
ABDOMINAL PAIN: 0
SHORTNESS OF BREATH: 0
ARTHRALGIAS: 1
ADENOPATHY: 0

## 2023-01-04 NOTE — PROGRESS NOTES
[facility]  [address]  [place] [address]  Phone: [phone]  Primary Provider: Juan C Pritchard  Pre-op Performing Provider: JUAN C PRITCHARD      PREOPERATIVE EVALUATION:  Today's date: 1/4/2023    Gregg Aguayo is a 57 year old male who presents for a preoperative evaluation.    Surgical Information:  Surgery/Procedure: Right Shoulder Surgery  Surgery Location: [facility]  Surgeon: Dr. Olivares  Surgery Date: 01/13/2023  Time of Surgery: TBA  Where patient plans to recover: At home with family  Fax number for surgical facility:     Type of Anesthesia Anticipated: Choice    Assessment & Plan     The proposed surgical procedure is considered INTERMEDIATE risk.      ICD-10-CM    1. Preop general physical exam  Z01.818 CBC and Differential     Comprehensive Metabolic Panel     EKG 12-lead, tracing only     Comprehensive Metabolic Panel     CBC and Differential      2. Chronic left shoulder pain  M25.512     G89.29       3. Benign essential hypertension  I10 CBC and Differential     Comprehensive Metabolic Panel     EKG 12-lead, tracing only     Comprehensive Metabolic Panel     CBC and Differential      4. AILEEN on CPAP - uses nightly, finds very helpful  G47.33     Z99.89       5. Morbid obesity (H)  E66.01       6. Immunosuppression (H)  D84.9       7. Polymyalgia rheumatica (H)  M35.3       8. Tobacco abuse  Z72.0       9. Symptomatic premature ventricular contractions  I49.3 metoprolol succinate ER (TOPROL XL) 25 MG 24 hr tablet      10. Heartburn  R12 omeprazole (PRILOSEC) 40 MG DR capsule      11. Seasonal allergies  J30.2 fexofenadine (ALLEGRA) 180 MG tablet        HPI related to upcoming procedure: Patient is scheduled for left shoulder surgery for chronic shoulder pain.  He is having this done by Dr. Olivares on 1-.  He has family at home to help him with recovery.  He has not had problems with previous surgeries or anesthesia in the past.  He does have  obstructive sleep apnea, tobacco use and immunosuppression due to polymyalgia rheumatica.  He states he has been feeling well and is as optimized as possible for surgery at this time.    Blood pressure hypertension, blood pressure is currently well controlled.  Doing well with current medication regimen.  Reports his heart palpitations have improved with use of metoprolol.  Needs refills of metoprolol.      Tobacco abuse.  Ongoing.  He is working to quit smoking but is not ready at this time.    Obesity, discussed need for reduced oral caloric intake.  Regular exercise.  Reduce carbohydrate intake.  Information printed and reviewed.  -Has sleep apnea.  Uses CPAP nightly.  Finds helpful and beneficial.  Encouraged regular use.    Patient is chronically immunosuppressed, currently getting treatment for polymyalgia rheumatica.  He takes Plaquenil.  He plans to continue with regular dosing of his medications prior to surgery.    Heartburn, ongoing.  Using omeprazole.  Needs refills.  Ongoing symptoms.    Seasonal allergies, ongoing but has found improvement with Allegra.  Needs refills.     Risks and Recommendations:  The patient has the following additional risks and recommendations for perioperative complications:   - No identified additional risk factors other than previously addressed    Medication Instructions:  Patient is to take all scheduled medications on the day of surgery    RECOMMENDATION:  APPROVAL GIVEN to proceed with proposed procedure, without further diagnostic evaluation.          Subjective         Preop Questions 1/4/2023   1. Have you ever had a heart attack or stroke? No   2. Have you ever had surgery on your heart or blood vessels, such as a stent placement, a coronary artery bypass, or surgery on an artery in your head, neck, heart, or legs? No   3. Do you have chest pain with activity? No   4. Do you have a history of  heart failure? No   5. Do you currently have a cold, bronchitis or symptoms  of other infection? No   6. Do you have a cough, shortness of breath, or wheezing? No   7. Do you or anyone in your family have previous history of blood clots? No   8. Do you or does anyone in your family have a serious bleeding problem such as prolonged bleeding following surgeries or cuts? No   9. Have you ever had problems with anemia or been told to take iron pills? No   10. Have you had any abnormal blood loss such as black, tarry or bloody stools? No   11. Have you ever had a blood transfusion? No   12. Are you willing to have a blood transfusion if it is medically needed before, during, or after your surgery? Yes   13. Have you or any of your relatives ever had problems with anesthesia? No   14. Do you have sleep apnea, excessive snoring or daytime drowsiness? YES - c-pap   14a. Do you have a CPAP machine? Yes   15. Do you have any artifical heart valves or other implanted medical devices like a pacemaker, defibrillator, or continuous glucose monitor? No   16. Do you have artificial joints? YES - bilateral knees   17. Are you allergic to latex? No       Health Care Directive:  Patient does not have a Health Care Directive or Living Will: Discussed advance care planning with patient; however, patient declined at this time.    Preoperative Review of :   reviewed - controlled substances reflected in medication list.    Review of Systems   Constitutional: Negative for chills and fever.   HENT: Negative for congestion, hearing loss and tinnitus.    Eyes: Negative for visual disturbance.   Respiratory: Negative for chest tightness and shortness of breath.    Cardiovascular: Positive for palpitations (if forgets his metoprolol ). Negative for chest pain.   Gastrointestinal: Negative for abdominal pain.   Genitourinary: Negative for hematuria.   Musculoskeletal: Positive for arthralgias, back pain (Radicular left low back pain), gait problem (left shoulder pain), joint swelling and myalgias.   Skin: Negative  for rash and wound.   Allergic/Immunologic: Positive for environmental allergies and immunocompromised state.   Neurological: Negative for syncope.   Hematological: Negative for adenopathy. Does not bruise/bleed easily.   Psychiatric/Behavioral: Positive for sleep disturbance. Negative for confusion.         Patient Active Problem List    Diagnosis Date Noted     Chronic left shoulder pain 10/04/2022     Priority: Medium     Lumbar back pain with radiculopathy affecting left lower extremity 10/04/2022     Priority: Medium     Benign essential hypertension 10/14/2021     Priority: Medium     Change in hearing of right ear 09/14/2021     Priority: Medium     Other long term (current) drug therapy - 3/31/2021 - Gabapentin 09/14/2021     Priority: Medium     Immunosuppression (H) 09/14/2021     Priority: Medium     Morbid obesity (H) 12/04/2020     Priority: Medium     Crepitus of joint of right knee 09/11/2020     Priority: Medium     S/P lumbar discectomy 05/18/2020     Priority: Medium     Symptomatic premature ventricular contractions 04/15/2019     Priority: Medium     Heartburn 07/25/2018     Priority: Medium     Psychosexual dysfunction with inhibited sexual excitement 01/29/2018     Priority: Medium     Tobacco abuse 01/29/2018     Priority: Medium     AILEEN on CPAP - uses nightly, finds very helpful 11/20/2017     Priority: Medium     Bilateral leg edema 07/05/2017     Priority: Medium     Polymyalgia rheumatica (H) 11/11/2016     Priority: Medium     Arthralgia of multiple joints 10/26/2016     Priority: Medium      Past Medical History:   Diagnosis Date     Cigarette smoker      Erectile dysfunction      GERD (gastroesophageal reflux disease)      Lipoma of torso 9/14/2021     AILEEN on CPAP      PMR (polymyalgia rheumatica) (H)      Past Surgical History:   Procedure Laterality Date     APPENDECTOMY OPEN  2005     ARTHROPLASTY KNEE Left      ARTHROSCOPY KNEE Left 3/19/2018    Procedure: ARTHROSCOPY KNEE;  Left  Knee Arthroscopy, Partial Meniscectomy, Chondroplasty;  Surgeon: Kwabena Patricia DO;  Location: [facility] OR     ARTHROSCOPY KNEE WITH MENISCAL REPAIR Right 12/11/2020    Procedure: Rt Knee Arthroscopy, partial Medial Meniscectomy Debridement.;  Surgeon: Monster Grissom MD;  Location: [facility] OR     ATTEMPTED ARTHROSCOPY      knee ? left     COLONOSCOPY  01/29/2014 1/29/2014,Tubular adenoma of rectum - follow up 5 years     COLONOSCOPY  02/01/2019    normal with diverticula, follow up 10 years, 2/1/29     COLONOSCOPY N/A 2/1/2019    normal, follow up 2/1/29     JOINT REPLACEMENT Left 11/2018     RELEASE CARPAL TUNNEL Right 11/22/2017     RELEASE CARPAL TUNNEL Left     12/21/2017,[id number],OTHER,Left     RELEASE TRIGGER FINGER Right 9/7/2018     RELEASE ULNAR NERVE (ELBOW) Left 12/21/2017     Current Outpatient Medications   Medication Sig Dispense Refill     fexofenadine (ALLEGRA) 180 MG tablet Take 1 tablet (180 mg) by mouth every evening - for ears / congestion 90 tablet 4     furosemide (LASIX) 20 MG tablet Take 2 tablets (40 mg) by mouth daily 180 tablet 1     hydroxychloroquine (PLAQUENIL) 200 MG tablet Take 400 mg by mouth       metoprolol succinate ER (TOPROL XL) 25 MG 24 hr tablet Take 2 tablets (50 mg) by mouth every evening -- adjust dose as needed for heart palpitations 180 tablet 4     omeprazole (PRILOSEC) 40 MG DR capsule Take 1 capsule (40 mg) by mouth daily 90 capsule 4     acetaminophen (TYLENOL) 650 MG CR tablet Take up to 4 tablet daily for pain as needed       gabapentin (NEURONTIN) 100 MG capsule Take 1-2 capsules (100-200 mg) by mouth 4 times daily as needed For nerve pain 120 capsule 3     metFORMIN (GLUCOPHAGE) 500 MG tablet TAKE 1-2 TABLETS (500-1,000 MG) BY MOUTH DAILY (WITH DINNER) WHILE TAKING ORAL STEROIDS 180 tablet 3     methylPREDNISolone (MEDROL) 4 MG tablet TAKE 1/2-2 TABLETS BY MOUTH DAILY WITH A MEAL-WEAN DOSE EVERY 2-4 WEEKS FOR  tablet 1     nicotine (NICODERM CQ) 14 MG/24HR 24 hr  "patch Place 1 patch onto the skin every 24 hours 28 patch 4     nicotine (NICODERM CQ) 21 MG/24HR 24 hr patch Place 1 patch onto the skin every 24 hours 28 patch 4     nicotine (NICORETTE) 2 MG gum Place 1 each (2 mg) inside cheek as needed for smoking cessation 240 each 11     potassium chloride ER (K-TAB/KLOR-CON) 10 MEQ CR tablet Take 2 tablets (20 mEq) by mouth daily - with food (take with Furosemide) (Patient taking differently: Take 20 mEq by mouth daily as needed - with food (take with Furosemide)) 180 tablet 3     sildenafil (VIAGRA) 100 MG tablet Take 1/4 to 1 tablet 30 min to 4 hours prior to sex for ED 30 tablet 11       Allergies   Allergen Reactions     Chantix [Varenicline] Other (See Comments)     Didn't tolerate        Social History     Tobacco Use     Smoking status: Every Day     Packs/day: 0.50     Years: 37.00     Pack years: 18.50     Types: Cigarettes     Smokeless tobacco: Never     Tobacco comments:     Tried Chantix, didn't tolerate it.   Substance Use Topics     Alcohol use: Yes     Comment: occasional - 5 a week     Family History   Problem Relation Age of Onset     Genetic Disorder Maternal Grandfather         Genetic,Polymyalgia Rheumatica     Heart Murmur Mother      Polymyalgia rheumatica Mother      History   Drug Use No         Objective     /74 (BP Location: Right arm, Patient Position: Right side, Cuff Size: Adult Large)   Pulse 52   Temp 98.3  F (36.8  C) (Temporal)   Resp 18   Ht 1.816 m (5' 11.5\")   Wt 135 kg (297 lb 9.6 oz)   SpO2 97%   BMI 40.93 kg/m      Physical Exam  Constitutional:       General: He is not in acute distress.     Appearance: He is well-developed. He is obese. He is not diaphoretic.   HENT:      Head: Normocephalic and atraumatic.   Eyes:      General: No scleral icterus.     Conjunctiva/sclera: Conjunctivae normal.   Neck:      Vascular: No carotid bruit.   Cardiovascular:      Rate and Rhythm: Normal rate and regular rhythm.      Pulses: " Normal pulses.   Pulmonary:      Effort: Pulmonary effort is normal.      Breath sounds: Normal breath sounds.   Abdominal:      Palpations: Abdomen is soft.      Tenderness: There is no abdominal tenderness.   Musculoskeletal:         General: Tenderness and deformity present.      Cervical back: Neck supple.      Right lower leg: Edema ( trace) present.      Left lower leg: Edema ( trace) present.      Comments: Left shoulder pain and ROM limitation.   Lymphadenopathy:      Cervical: No cervical adenopathy.   Skin:     General: Skin is warm and dry.      Findings: No rash.   Neurological:      Mental Status: He is alert and oriented to person, place, and time. Mental status is at baseline.   Psychiatric:         Mood and Affect: Mood normal.         Behavior: Behavior normal.         Results for orders placed or performed in visit on 01/04/23   Comprehensive Metabolic Panel     Status: Abnormal   Result Value Ref Range    Sodium 138 136 - 145 mmol/L    Potassium 4.5 3.4 - 5.3 mmol/L    Chloride 102 98 - 107 mmol/L    Carbon Dioxide (CO2) 26 22 - 29 mmol/L    Anion Gap 10 7 - 15 mmol/L    Urea Nitrogen 11.7 8.0 - 23.0 mg/dL    Creatinine 0.82 0.67 - 1.17 mg/dL    Calcium 9.4 8.6 - 10.0 mg/dL    Glucose 120 (H) 70 - 99 mg/dL    Alkaline Phosphatase 55 40 - 129 U/L    AST 27 10 - 50 U/L    ALT 24 10 - 50 U/L    Protein Total 6.8 6.4 - 8.3 g/dL    Albumin 4.4 3.5 - 5.2 g/dL    Bilirubin Total 0.7 <=1.2 mg/dL    GFR Estimate >90 >60 mL/min/1.73m2   CBC with platelets and differential     Status: Abnormal   Result Value Ref Range    WBC Count 6.3 4.0 - 11.0 10e3/uL    RBC Count 4.59 4.40 - 5.90 10e6/uL    Hemoglobin 15.7 13.3 - 17.7 g/dL    Hematocrit 43.7 40.0 - 53.0 %    MCV 95 78 - 100 fL    MCH 34.2 (H) 26.5 - 33.0 pg    MCHC 35.9 31.5 - 36.5 g/dL    RDW 12.4 10.0 - 15.0 %    Platelet Count 165 150 - 450 10e3/uL    % Neutrophils 58 %    % Lymphocytes 24 %    % Monocytes 11 %    % Eosinophils 5 %    % Basophils 2 %     % Immature Granulocytes 0 %    NRBCs per 100 WBC 0 <1 /100    Absolute Neutrophils 3.7 1.6 - 8.3 10e3/uL    Absolute Lymphocytes 1.5 0.8 - 5.3 10e3/uL    Absolute Monocytes 0.7 0.0 - 1.3 10e3/uL    Absolute Eosinophils 0.3 0.0 - 0.7 10e3/uL    Absolute Basophils 0.1 0.0 - 0.2 10e3/uL    Absolute Immature Granulocytes 0.0 <=0.4 10e3/uL    Absolute NRBCs 0.0 10e3/uL   EKG 12-lead, tracing only     Status: None   Result Value Ref Range    Systolic Blood Pressure  mmHg    Diastolic Blood Pressure  mmHg    Ventricular Rate 51 BPM    Atrial Rate 51 BPM    PA Interval 192 ms    QRS Duration 100 ms     ms    QTc 427 ms    P Axis 45 degrees    R AXIS 11 degrees    T Axis 23 degrees    Interpretation ECG       Sinus bradycardia  Otherwise normal ECG  When compared with ECG of 14-OCT-2021 09:26,  No significant change was found     CBC and Differential     Status: Abnormal    Narrative    The following orders were created for panel order CBC and Differential.  Procedure                               Abnormality         Status                     ---------                               -----------         ------                     CBC with platelets and d...[[id number]]  Abnormal            Final result                 Please view results for these tests on the individual orders.   CBC is normal.  CMP is normal with exception of mildly elevated glucose.     Diagnostics:  EKG: appears normal, NSR, sinus bradycardia, unchanged from previous tracings    Revised Cardiac Risk Index (RCRI):  The patient has the following serious cardiovascular risks for perioperative complications:   - No serious cardiac risks = 0 points     RCRI Interpretation: 0 points: Class I (very low risk - 0.4% complication rate)           Signed Electronically by: Reinier Ritchie MD  Copy of this evaluation report is provided to requesting physician.

## 2023-01-04 NOTE — NURSING NOTE
"Chief Complaint   Patient presents with     Pre-Op Exam     01/13/2023,  Dr. Olivares,  Left shoulder surgery. [facility] in [place]         Initial /74 (BP Location: Right arm, Patient Position: Right side, Cuff Size: Adult Large)   Pulse 52   Temp 98.3  F (36.8  C) (Temporal)   Resp 18   Ht 1.816 m (5' 11.5\")   Wt 135 kg (297 lb 9.6 oz)   SpO2 97%   BMI 40.93 kg/m   Estimated body mass index is 40.93 kg/m  as calculated from the following:    Height as of this encounter: 1.816 m (5' 11.5\").    Weight as of this encounter: 135 kg (297 lb 9.6 oz).       FOOD SECURITY SCREENING QUESTIONS:    The next two questions are to help us understand your food security.  If you are feeling you need any assistance in this area, we have resources available to support you today.    Hunger Vital Signs:  Within the past 12 months we worried whether our food would run out before we got money to buy more. Never  Within the past 12 months the food we bought just didn't last and we didn't have money to get more. Never    Advance Care Directive on file? no      Medication reconciliation complete.      Soto Zamarripa,on 1/4/2023 at 9:24 AM        "

## 2023-01-04 NOTE — PATIENT INSTRUCTIONS
For informational purposes only. Not to replace the advice of your health care provider. Copyright   2003,  [facility]. All rights reserved. Clinically reviewed by Kelsey Oliver MD. [employer] [id number] - REV .  Preparing for Your Surgery  Getting started  A nurse will call you to review your health history and instructions. They will give you an arrival time based on your scheduled surgery time. Please be ready to share:  Your doctor's clinic name and phone number  Your medical, surgical, and anesthesia history  A list of allergies and sensitivities  A list of medicines, including herbal treatments and over-the-counter drugs  Whether the patient has a legal guardian (ask how to send us the papers in advance)  Please tell us if you're pregnant--or if there's any chance you might be pregnant. Some surgeries may injure a fetus (unborn baby), so they require a pregnancy test. Surgeries that are safe for a fetus don't always need a test, and you can choose whether to have one.   If you have a child who's having surgery, please ask for a copy of Preparing for Your Child's Surgery.    Preparing for surgery  Within 10 to 30 days of surgery: Have a pre-op exam (sometimes called an H&P, or History and Physical). This can be done at a clinic or pre-operative center.  If you're having a , you may not need this exam. Talk to your care team.  At your pre-op exam, talk to your care team about all medicines you take. If you need to stop any medicines before surgery, ask when to start taking them again.  We do this for your safety. Many medicines can make you bleed too much during surgery. Some change how well surgery (anesthesia) drugs work.  Call your insurance company to let them know you're having surgery. (If you don't have insurance, call [phone].)  Call your clinic if there's any change in your health. This includes signs of a cold or flu (sore throat, runny nose, cough, rash, fever). It  also includes a scrape or scratch near the surgery site.  If you have questions on the day of surgery, call your hospital or surgery center.  Eating and drinking guidelines  For your safety: Unless your surgeon tells you otherwise, follow the guidelines below.  Eat and drink as usual until 8 hours before you arrive for surgery. After that, no food or milk.  Drink clear liquids until 2 hours before you arrive. These are liquids you can see through, like water, Gatorade, and Propel Water. They also include plain black coffee and tea (no cream or milk), candy, and breath mints. You can spit out gum when you arrive.  If you drink alcohol: Stop drinking it the night before surgery.  If your care team tells you to take medicine on the morning of surgery, it's okay to take it with a sip of water.  Preventing infection  Shower or bathe the night before and morning of your surgery. Follow the instructions your clinic gave you. (If no instructions, use regular soap.)  Don't shave or clip hair near your surgery site. We'll remove the hair if needed.  Don't smoke or vape the morning of surgery. You may chew nicotine gum up to 2 hours before surgery. A nicotine patch is okay.  Note: Some surgeries require you to completely quit smoking and nicotine. Check with your surgeon.  Your care team will make every effort to keep you safe from infection. We will:  Clean our hands often with soap and water (or an alcohol-based hand rub).  Clean the skin at your surgery site with a special soap that kills germs.  Give you a special gown to keep you warm. (Cold raises the risk of infection.)  Wear special hair covers, masks, gowns and gloves during surgery.  Give antibiotic medicine, if prescribed. Not all surgeries need antibiotics.  What to bring on the day of surgery  Photo ID and insurance card  Copy of your health care directive, if you have one  Glasses and hearing aids (bring cases)  You can't wear contacts during surgery  Inhaler and  eye drops, if you use them (tell us about these when you arrive)  CPAP machine or breathing device, if you use them  A few personal items, if spending the night  If you have . . .  A pacemaker, ICD (cardiac defibrillator) or other implant: Bring the ID card.  An implanted stimulator: Bring the remote control.  A legal guardian: Bring a copy of the certified (court-stamped) guardianship papers.  Please remove any jewelry, including body piercings. Leave jewelry and other valuables at home.  If you're going home the day of surgery  You must have a responsible adult drive you home. They should stay with you overnight as well.  If you don't have someone to stay with you, and you aren't safe to go home alone, we may keep you overnight. Insurance often won't pay for this.  After surgery  If it's hard to control your pain or you need more pain medicine, please call your surgeon's office.  Questions?   If you have any questions for your care team, list them here: _________________________________________________________________________________________________________________________________________________________________________ ____________________________________ ____________________________________ ____________________________________    How to Take Your Medication Before Surgery  - Take all of your medications before surgery as usual

## 2023-01-06 LAB
ATRIAL RATE - MUSE: 51 BPM
DIASTOLIC BLOOD PRESSURE - MUSE: NORMAL MMHG
INTERPRETATION ECG - MUSE: NORMAL
P AXIS - MUSE: 45 DEGREES
PR INTERVAL - MUSE: 192 MS
QRS DURATION - MUSE: 100 MS
QT - MUSE: 464 MS
QTC - MUSE: 427 MS
R AXIS - MUSE: 11 DEGREES
SYSTOLIC BLOOD PRESSURE - MUSE: NORMAL MMHG
T AXIS - MUSE: 23 DEGREES
VENTRICULAR RATE- MUSE: 51 BPM

## 2023-01-23 ENCOUNTER — OFFICE VISIT (OUTPATIENT)
Dept: ORTHOPEDICS | Facility: OTHER | Age: 58
End: 2023-01-23
Attending: ORTHOPAEDIC SURGERY
Payer: COMMERCIAL

## 2023-01-23 DIAGNOSIS — Z98.890 S/P ARTHROSCOPY OF LEFT SHOULDER: Primary | ICD-10-CM

## 2023-01-23 PROCEDURE — 99024 POSTOP FOLLOW-UP VISIT: CPT | Performed by: ORTHOPAEDIC SURGERY

## 2023-01-23 NOTE — PROGRESS NOTES
Patient is here for post op left shoulder arthroscopy with a subacromial decompression, distal clavicle excision, biceps tenodesis s/p 10 days.    Karyn Cole LPN .......  1/23/2023  12:54 PM

## 2023-01-23 NOTE — PROGRESS NOTES
Visit Date: 2023    SUBJECTIVE:  He is 10 days status post left shoulder arthroscopy with subacromial decompression, distal clavicle excision and a biceps tenodesis, doing really well at this point.  He is only 10 days out and having very little pain in the shoulder.  He says he gets a twinge here and there in the very front of his shoulder, but otherwise he is doing well.    PHYSICAL EXAMINATION:  On examination, his wounds are healed.  Sutures were removed.  Steri-Strips were applied.  He remains in a sling.  He is neuro and vascularly intact.     PLAN:  He starts physical therapy soon.  We are going to see him back in approximately 4 weeks.    Richie Olivares MD        D: 2023   T: 2023   MT: md    Name:     RACHEL PLUMMER  MRN:      [id number]        Account:    [id number]   :      1965           Visit Date: 2023     Document: [id number]

## 2023-01-26 ENCOUNTER — HOSPITAL ENCOUNTER (OUTPATIENT)
Dept: PHYSICAL THERAPY | Facility: OTHER | Age: 58
Setting detail: THERAPIES SERIES
Discharge: HOME | End: 2023-01-26
Attending: ORTHOPAEDIC SURGERY
Payer: COMMERCIAL

## 2023-01-26 DIAGNOSIS — Z98.890 S/P ARTHROSCOPY OF LEFT SHOULDER: ICD-10-CM

## 2023-01-26 PROCEDURE — 97110 THERAPEUTIC EXERCISES: CPT | Mod: GP,PO

## 2023-01-26 PROCEDURE — 97161 PT EVAL LOW COMPLEX 20 MIN: CPT | Mod: GP,PO

## 2023-01-26 NOTE — PROGRESS NOTES
01/26/23 0900   General Information   Type of Visit Initial OP Ortho PT Evaluation   Start of Care Date 01/26/23   Referring Physician Richie Olivares   Patient/Family Goals Statement To improve his function   Orders Evaluate and Treat   Orders Comment Physical therapy evaluation and treat status post left shoulder scope done on 1/13/23;  subacromial decompression, distal clavicle excision, biceps tenodesis & poss RCR - arthrex.  PT to start in 2nd week post surgery; 2 times per week for 8 weeks.   Date of Order 01/13/23   Certification Required? No   Medical Diagnosis S/P arthroscopy of left shoulder (Z98.890)   Surgical/Medical history reviewed Yes   Precautions/Limitations no known precautions/limitations       Present No   Body Part(s)   Body Part(s) Shoulder   Presentation and Etiology   Pertinent history of current problem (include personal factors and/or comorbidities that impact the POC) Patient is a 57 year old male referred to physical therapy s/p left shoulder scope. Surgery was done by Dr. Olivares in [place]. He had a SAD, DCE and biceps tenodesis. He reports that the pain has been prety good. There are times when the pain spikes due to movements. He has been in the sling. He hasn't been taking pain pills. Overall pleased with it to this point. No numbness or tingling. He notes Dr Olivares would like him to stay in the sling for a month. He needs to schedule another appointment with him in about a month.   Impairments A. Pain;B. Decreased WB tolerance;D. Decreased ROM;F. Decreased strength and endurance   Functional Limitations perform activities of daily living;perform required work activities   Symptom Location Left shoulder   How/Where did it occur From insidious onset   Onset date of current episode/exacerbation 01/13/23   Chronicity New   Pain rating (0-10 point scale) Best (/10);Worst (/10)   Best (/10) 2   Worst (/10) 10   Pain quality A. Sharp;F. Stabbing   Frequency of  pain/symptoms C. With activity   Pain/symptoms are: Worse during the day   Pain/symptoms exacerbated by C. Lifting;D. Carrying;E. Rest;H. Overhead reach;L. Work tasks   Pain/symptoms eased by A. Sitting;E. Changing positions   Progression of symptoms since onset: Improved   Prior Level of Function   Prior Level of Function-Mobility Independent   Prior Level of Function-ADLs Independent   Current Level of Function   Current Community Support Family/friend caregiver   Patient role/employment history F. Retired   Living environment House/townhome   Home/community accessibility Denies issues getting around his home   Current equipment-Gait/Locomotion None   Current equipment-ADL None   Fall Risk Screen   Fall screen completed by PT   Have you fallen 2 or more times in the past year? No   Have you fallen and had an injury in the past year? No   Is patient a fall risk? No   Fall screen comments Patient has slipped on the ice. He doensn't have concerns with his balance   Abuse Screen (yes response referral indicated)   Physical Signs of Abuse Present no   Shoulder Objective Findings   Side (if bilateral, select both right and left) Left   Neer's Test N/a   Relocation Test N/a   Observation Patient resting comfortably in chair. No apparent distress   Integumentary  Did not asses today. Patient had a follow up with surgeon earlier this week and has no complaints.   Posture Slight protraction of shoulders   Left Shoulder Flexion AROM Not tested   Left Shoulder Flexion PROM 145 degrees   Left Shoulder Abduction PROM Not tested on this date   Left Shoulder ER PROM Able to achieve neutral without major discomfort   Left Shoulder IR PROM Able to hold his arm with wrist near stomach   Left Shoulder Flexion Strength Not tested due to recency of surgery   Left Shoulder Abduction Strength Not tested due to recency of surgery   Left Shoulder ER Strength Not tested due to recency of surgery   Left Shoulder IR Strength Not tested due to  recency of surgery   Planned Therapy Interventions   Planned Therapy Interventions joint mobilization;manual therapy;neuromuscular re-education;ROM;strengthening;stretching   Planned Modality Interventions   Planned Modality Interventions Cryotherapy;Electrical stimulation;Hot packs;Ultrasound   Clinical Impression   Criteria for Skilled Therapeutic Interventions Met yes, treatment indicated   PT Diagnosis Impaired mobility, decreased strength and endurance, s/p shoulder arthroscopy.   Influenced by the following impairments pain, stiffness, weakness   Functional limitations due to impairments reaching, lifting, carrying   Clinical Presentation Stable/Uncomplicated   Clinical Presentation Rationale Clinical judgement   Clinical Decision Making (Complexity) Low complexity   Therapy Frequency other (see comments)  (1-2 times per week)   Predicted Duration of Therapy Intervention (days/wks) 8 weeks   Risk & Benefits of therapy have been explained Yes   Patient, Family & other staff in agreement with plan of care Yes   Clinical Impression Comments Signs and symptoms consistent with s/p shoulder arthroscopy. No adverse effects from surgery. Patient notes that his shoulder has responded well however still gets painful if he moves it the wrong way. He would benefit from skilled PT services in order to reduce his pain and improve his mobility, strength, and endurance.   Education Assessment   Barriers to Learning No barriers   ORTHO GOALS   PT Ortho Eval Goals 1;2;3   Ortho Goal 1   Goal Identifier ADL's   Goal Description Patient will be able to complete all dressing, bathing, and grooming activities with no increase in shoulder pain in order to improve his overall ability to complete ADL's   Target Date 03/23/23   Ortho Goal 2   Goal Identifier Reaching   Goal Description Patient will be able to reach actively to 140 degrees of shoulder flexion in order to improve his ability to reach for an item in an upper cupboard    Target Date 03/23/23   Ortho Goal 3   Goal Identifier Lifting   Goal Description Patient will be able to carry the groceries into the house from his car with his left UE with no increase in shoulder pain in order to improve his overall function at home.   Target Date 03/23/23   Total Evaluation Time   PT Steffi, Low Complexity Minutes ([id number]) 30

## 2023-01-31 ENCOUNTER — HOSPITAL ENCOUNTER (OUTPATIENT)
Dept: PHYSICAL THERAPY | Facility: OTHER | Age: 58
Setting detail: THERAPIES SERIES
Discharge: HOME | End: 2023-01-31
Attending: STUDENT IN AN ORGANIZED HEALTH CARE EDUCATION/TRAINING PROGRAM
Payer: COMMERCIAL

## 2023-01-31 PROCEDURE — 97016 VASOPNEUMATIC DEVICE THERAPY: CPT | Mod: GP,PO

## 2023-01-31 PROCEDURE — 97110 THERAPEUTIC EXERCISES: CPT | Mod: GP,PO

## 2023-02-02 ENCOUNTER — HOSPITAL ENCOUNTER (OUTPATIENT)
Dept: PHYSICAL THERAPY | Facility: OTHER | Age: 58
Setting detail: THERAPIES SERIES
Discharge: HOME | End: 2023-02-02
Attending: STUDENT IN AN ORGANIZED HEALTH CARE EDUCATION/TRAINING PROGRAM
Payer: COMMERCIAL

## 2023-02-02 PROCEDURE — 97110 THERAPEUTIC EXERCISES: CPT | Mod: GP,PO

## 2023-02-07 ENCOUNTER — HOSPITAL ENCOUNTER (OUTPATIENT)
Dept: PHYSICAL THERAPY | Facility: OTHER | Age: 58
Setting detail: THERAPIES SERIES
Discharge: HOME | End: 2023-02-07
Attending: STUDENT IN AN ORGANIZED HEALTH CARE EDUCATION/TRAINING PROGRAM
Payer: COMMERCIAL

## 2023-02-07 PROCEDURE — 97110 THERAPEUTIC EXERCISES: CPT | Mod: GP,PO

## 2023-02-09 ENCOUNTER — HOSPITAL ENCOUNTER (OUTPATIENT)
Dept: PHYSICAL THERAPY | Facility: OTHER | Age: 58
Setting detail: THERAPIES SERIES
Discharge: HOME | End: 2023-02-09
Attending: STUDENT IN AN ORGANIZED HEALTH CARE EDUCATION/TRAINING PROGRAM
Payer: COMMERCIAL

## 2023-02-09 PROCEDURE — 97110 THERAPEUTIC EXERCISES: CPT | Mod: GP,PO

## 2023-02-10 DIAGNOSIS — M53.3 SI (SACROILIAC) JOINT DYSFUNCTION: ICD-10-CM

## 2023-02-10 DIAGNOSIS — M51.369 DDD (DEGENERATIVE DISC DISEASE), LUMBAR: ICD-10-CM

## 2023-02-10 DIAGNOSIS — M54.16 LUMBAR BACK PAIN WITH RADICULOPATHY AFFECTING LEFT LOWER EXTREMITY: Primary | ICD-10-CM

## 2023-02-16 ENCOUNTER — HOSPITAL ENCOUNTER (OUTPATIENT)
Dept: PHYSICAL THERAPY | Facility: OTHER | Age: 58
Setting detail: THERAPIES SERIES
Discharge: HOME | End: 2023-02-16
Attending: STUDENT IN AN ORGANIZED HEALTH CARE EDUCATION/TRAINING PROGRAM
Payer: COMMERCIAL

## 2023-02-16 PROCEDURE — 97110 THERAPEUTIC EXERCISES: CPT | Mod: GP,CQ

## 2023-02-21 ENCOUNTER — HOSPITAL ENCOUNTER (OUTPATIENT)
Dept: PHYSICAL THERAPY | Facility: OTHER | Age: 58
Setting detail: THERAPIES SERIES
Discharge: HOME | End: 2023-02-21
Attending: STUDENT IN AN ORGANIZED HEALTH CARE EDUCATION/TRAINING PROGRAM
Payer: COMMERCIAL

## 2023-02-21 DIAGNOSIS — M51.369 DDD (DEGENERATIVE DISC DISEASE), LUMBAR: ICD-10-CM

## 2023-02-21 DIAGNOSIS — M54.16 LUMBAR BACK PAIN WITH RADICULOPATHY AFFECTING LEFT LOWER EXTREMITY: ICD-10-CM

## 2023-02-21 DIAGNOSIS — M53.3 SI (SACROILIAC) JOINT DYSFUNCTION: ICD-10-CM

## 2023-02-21 PROCEDURE — 97161 PT EVAL LOW COMPLEX 20 MIN: CPT | Mod: GP,PO

## 2023-02-21 PROCEDURE — 97110 THERAPEUTIC EXERCISES: CPT | Mod: GP,PO

## 2023-02-21 NOTE — PROGRESS NOTES
02/21/23 0900   General Information   Type of Visit Initial OP Ortho PT Evaluation   Start of Care Date 02/21/23   Referring Physician Mihai Holden MD   Patient/Family Goals Statement To improve his function   Orders Evaluate and Treat   Date of Order 02/10/23   Certification Required? No   Medical Diagnosis Lumbar back pain with radiculopathy affecting left lower extremity (M54.16), SI (sacroiliac) joint dysfunction (M53.3), DDD (degenerative disc disease), lumbar (M51.36)   Surgical/Medical history reviewed Yes   Precautions/Limitations no known precautions/limitations       Present No   Body Part(s)   Body Part(s) Lumbar Spine/SI   Presentation and Etiology   Pertinent history of current problem (include personal factors and/or comorbidities that impact the POC) Patient is a  57 year old male referred to physical therapy with SI joint pain. Reports that he has back surgery history of April of 2020. He has an L4-L5 foraminotomy and microdiscectomy. Notes that he had pretty good relief with this when it happened. Started having more issues with areas lower in his back. He has most trouble with walking longer in the stors and prolonged sitting. There are times he still gets numbness in the legs but this is not consistent. He has been pretty active and notes that heavier activities do aggravate his low back. Last week he had to SI joint injection and notes that there was only minimal relief. Dr. Holden wanted to know how successful these were. Patient is going to stay in touch with him.   Impairments A. Pain;D. Decreased ROM;E. Decreased flexibility;F. Decreased strength and endurance   Functional Limitations perform activities of daily living;perform required work activities;perform desired leisure / sports activities   Symptom Location Low back, left side>Right   How/Where did it occur From insidious onset;From Degenerative Joint Disease   Onset date of current episode/exacerbation  02/10/23   Chronicity Chronic   Pain quality A. Sharp;C. Aching;E. Shooting   Frequency of pain/symptoms A. Constant   Pain/symptoms are: Worse during the day   Pain/symptoms exacerbated by A. Sitting;B. Walking   Pain/symptoms eased by C. Rest   Progression of symptoms since onset: Unchanged   Prior Level of Function   Prior Level of Function-Mobility Independent   Prior Level of Function-ADLs Independent   Current Level of Function   Current Community Support Family/friend caregiver   Patient role/employment history F. Retired   Living environment House/townhome   Home/community accessibility Denies issues getting around his home.   Current equipment-Gait/Locomotion None   Current equipment-ADL None   Fall Risk Screen   Fall screen completed by PT   Have you fallen 2 or more times in the past year? No   Have you fallen and had an injury in the past year? No   Is patient a fall risk? No   Abuse Screen (yes response referral indicated)   Physical Signs of Abuse Present no   Lumbar Spine/SI Objective Findings   Gait/Locomotion No major deviations on this date   Hamstring Flexibility Min limited bilaterally   Hip Flexor Flexibility Min limited bilaterally   Piriformis Flexibility Min limited bilaterally   Flexion ROM Able to reach mid shin with his fingertips   Extension ROM Slight increase in pain in his low back   Right Side Bending ROM Able to reach the lateral knee joint line with fingertips   Left Side Bending ROM Able to reach the lateral knee joint line with fingertips   Lumbar ROM Comment Rotation: WFL   Pelvic Screen Thigh Thrust: discomfort in left lower back   Hip Screen Scour; negative, AMAN: discomfort noted in left low back, FADIR: negative   Hip Flexion (L2) Strength 5/5   Hip Abduction Strength 5/5   Hip Adduction Strength 5/5   Knee Flexion Strength 5/5   Knee Extension (L3) Strength 5/5   Ankle Dorsiflexion (L4) Strength 5/5   Palpation Discomfort noted in lumbar paraspinals and gluteus on left.    Slump Test Mild increase in leg tightness noted with sciatic tensioning   Observation Patient resting comfortably in chair. No apparent distress.   Integumentary No significant findings   Posture Slight protraction of shoulders   Sensation Testing Intact to light touch   Planned Therapy Interventions   Planned Therapy Interventions joint mobilization;manual therapy;neuromuscular re-education;ROM;strengthening;stretching   Planned Modality Interventions   Planned Modality Interventions Cryotherapy;Hot packs;Electrical stimulation;Ultrasound   Clinical Impression   Criteria for Skilled Therapeutic Interventions Met yes, treatment indicated   PT Diagnosis Impaired mobility, decreased strength and endurance, low back/SI joint pain   Influenced by the following impairments pain, stiffness, weakness.   Functional limitations due to impairments Prolonged ambulation, prolonged sitting   Clinical Presentation Stable/Uncomplicated   Clinical Presentation Rationale Clinical judgement   Clinical Decision Making (Complexity) Low complexity   Therapy Frequency other (see comments)  (1-2 times per weeks)   Predicted Duration of Therapy Intervention (days/wks) 8 weeks   Risk & Benefits of therapy have been explained Yes   Patient, Family & other staff in agreement with plan of care Yes   Clinical Impression Comments Signs and symptoms consistent with chronic low back and SI joint pain. He notes that most of his discomfort is with ambulating around grocery stores and prolonged sitting. Still gets intermittent tingling in the left LE which isn't always consistent. Tension noted with sciatic nerve testing. He would benefit from skilled PT services in order to reduce his pain and improve his mobility, strength, and endurance.   ORTHO GOALS   PT Ortho Eval Goals 1;2;3   Ortho Goal 1   Goal Identifier Sitting   Goal Description Patient will be able to sit for longer than 60 minutes with no increase in low back or SI joint pain in order  to improve his safety and efficiency with travel in a vehicle.   Target Date 04/18/23   Ortho Goal 2   Goal Identifier Ambulation   Goal Description Patient will be able to ambulate for longer than 15 minutes with no increase in low back or SI joint pain in order to improve his mobility in the community and in stores.   Target Date 04/18/23   Ortho Goal 3   Goal Identifier Housework   Goal Description Patient will be able to complete all indoor and outdoor housework without an increase in low back or SI joint pain in order to improve his function at home.   Target Date 04/18/23   Total Evaluation Time   PT Eval, Low Complexity Minutes (81459) 30

## 2023-02-23 ENCOUNTER — HOSPITAL ENCOUNTER (OUTPATIENT)
Dept: PHYSICAL THERAPY | Facility: OTHER | Age: 58
Setting detail: THERAPIES SERIES
Discharge: HOME | End: 2023-02-23
Attending: STUDENT IN AN ORGANIZED HEALTH CARE EDUCATION/TRAINING PROGRAM
Payer: COMMERCIAL

## 2023-02-23 PROCEDURE — 97110 THERAPEUTIC EXERCISES: CPT | Mod: GP,PO

## 2023-02-27 ENCOUNTER — OFFICE VISIT (OUTPATIENT)
Dept: ORTHOPEDICS | Facility: OTHER | Age: 58
End: 2023-02-27
Attending: ORTHOPAEDIC SURGERY
Payer: COMMERCIAL

## 2023-02-27 DIAGNOSIS — Z98.890 S/P ARTHROSCOPY OF LEFT SHOULDER: Primary | ICD-10-CM

## 2023-02-27 PROCEDURE — 99024 POSTOP FOLLOW-UP VISIT: CPT | Performed by: ORTHOPAEDIC SURGERY

## 2023-02-27 NOTE — PROGRESS NOTES
Patient is here for follow up on his left shoulder scope.  Lauryn Nolan LPN .....................2/27/2023 2:59 PM

## 2023-02-28 ENCOUNTER — HOSPITAL ENCOUNTER (OUTPATIENT)
Dept: PHYSICAL THERAPY | Facility: OTHER | Age: 58
Setting detail: THERAPIES SERIES
Discharge: HOME | End: 2023-02-28
Attending: STUDENT IN AN ORGANIZED HEALTH CARE EDUCATION/TRAINING PROGRAM
Payer: COMMERCIAL

## 2023-02-28 PROCEDURE — 97110 THERAPEUTIC EXERCISES: CPT | Mod: GP,PO

## 2023-02-28 PROCEDURE — 97140 MANUAL THERAPY 1/> REGIONS: CPT | Mod: GP,PO

## 2023-02-28 NOTE — PROGRESS NOTES
Visit Date: 2023    He is 6-1/2 weeks status post left shoulder arthroscopy with subacromial decompression, distal clavicle excision, biceps tenodesis.  He is doing really well at this point.  No real complaints with his shoulder whatsoever.  He actually wants to stop doing physical therapy at this point because his shoulder feels so good.  He has been living with his restrictions and tolerating them well and adhering to them.    We are going to see him back in approximately 6 more weeks for final followup.    Richie Olivares MD        D: 2023   T: 2023   MT: VENICE    Name:     RACHEL PLUMMER  MRN:      [id number]        Account:    [id number]   :      1965           Visit Date: 2023     Document: [id number]

## 2023-03-02 ENCOUNTER — HOSPITAL ENCOUNTER (OUTPATIENT)
Dept: PHYSICAL THERAPY | Facility: OTHER | Age: 58
Setting detail: THERAPIES SERIES
Discharge: HOME | End: 2023-03-02
Attending: STUDENT IN AN ORGANIZED HEALTH CARE EDUCATION/TRAINING PROGRAM
Payer: COMMERCIAL

## 2023-03-02 PROCEDURE — 97110 THERAPEUTIC EXERCISES: CPT | Mod: GP,PO

## 2023-03-02 PROCEDURE — 97140 MANUAL THERAPY 1/> REGIONS: CPT | Mod: GP,PO

## 2023-03-07 ENCOUNTER — HOSPITAL ENCOUNTER (OUTPATIENT)
Dept: PHYSICAL THERAPY | Facility: OTHER | Age: 58
Setting detail: THERAPIES SERIES
Discharge: HOME | End: 2023-03-07
Attending: STUDENT IN AN ORGANIZED HEALTH CARE EDUCATION/TRAINING PROGRAM
Payer: COMMERCIAL

## 2023-03-07 PROCEDURE — 97110 THERAPEUTIC EXERCISES: CPT | Mod: GP,CQ

## 2023-03-07 PROCEDURE — 97140 MANUAL THERAPY 1/> REGIONS: CPT | Mod: GP,CQ

## 2023-03-14 ENCOUNTER — HOSPITAL ENCOUNTER (OUTPATIENT)
Dept: PHYSICAL THERAPY | Facility: OTHER | Age: 58
Setting detail: THERAPIES SERIES
Discharge: HOME | End: 2023-03-14
Attending: STUDENT IN AN ORGANIZED HEALTH CARE EDUCATION/TRAINING PROGRAM
Payer: COMMERCIAL

## 2023-03-14 PROCEDURE — 97140 MANUAL THERAPY 1/> REGIONS: CPT | Mod: GP,CQ

## 2023-03-14 PROCEDURE — 97110 THERAPEUTIC EXERCISES: CPT | Mod: GP,CQ

## 2023-03-21 ENCOUNTER — HOSPITAL ENCOUNTER (OUTPATIENT)
Dept: PHYSICAL THERAPY | Facility: OTHER | Age: 58
Setting detail: THERAPIES SERIES
Discharge: HOME | End: 2023-03-21
Attending: STUDENT IN AN ORGANIZED HEALTH CARE EDUCATION/TRAINING PROGRAM
Payer: COMMERCIAL

## 2023-03-21 ENCOUNTER — OFFICE VISIT (OUTPATIENT)
Dept: FAMILY MEDICINE | Facility: OTHER | Age: 58
End: 2023-03-21
Attending: PHYSICIAN ASSISTANT
Payer: COMMERCIAL

## 2023-03-21 VITALS
WEIGHT: 300 LBS | RESPIRATION RATE: 22 BRPM | OXYGEN SATURATION: 98 % | BODY MASS INDEX: 41.26 KG/M2 | DIASTOLIC BLOOD PRESSURE: 105 MMHG | TEMPERATURE: 97.1 F | SYSTOLIC BLOOD PRESSURE: 142 MMHG | HEART RATE: 75 BPM

## 2023-03-21 DIAGNOSIS — L91.8 ACQUIRED SKIN TAG: Primary | ICD-10-CM

## 2023-03-21 PROCEDURE — 97110 THERAPEUTIC EXERCISES: CPT | Mod: GP,PO

## 2023-03-21 PROCEDURE — 11200 RMVL SKIN TAGS UP TO&INC 15: CPT | Performed by: PHYSICIAN ASSISTANT

## 2023-03-21 PROCEDURE — 250N000009 HC RX 250: Performed by: PHYSICIAN ASSISTANT

## 2023-03-21 PROCEDURE — 11201 RMVL SKIN TAGS EA ADDL 10: CPT | Performed by: PHYSICIAN ASSISTANT

## 2023-03-21 RX ORDER — LIDOCAINE HYDROCHLORIDE 10 MG/ML
5 INJECTION, SOLUTION INFILTRATION; PERINEURAL ONCE
Status: DISCONTINUED | OUTPATIENT
Start: 2023-03-21 | End: 2023-03-21

## 2023-03-21 RX ORDER — HYDROCODONE BITARTRATE AND ACETAMINOPHEN 7.5; 325 MG/1; MG/1
TABLET ORAL
COMMUNITY
Start: 2023-01-13 | End: 2023-03-21

## 2023-03-21 RX ORDER — LIDOCAINE HYDROCHLORIDE AND EPINEPHRINE 10; 10 MG/ML; UG/ML
1 INJECTION, SOLUTION INFILTRATION; PERINEURAL ONCE
Status: COMPLETED | OUTPATIENT
Start: 2023-03-21 | End: 2023-03-21

## 2023-03-21 RX ADMIN — LIDOCAINE HYDROCHLORIDE,EPINEPHRINE BITARTRATE 1 ML: 10; .01 INJECTION, SOLUTION INFILTRATION; PERINEURAL at 16:15

## 2023-03-21 ASSESSMENT — PAIN SCALES - GENERAL: PAINLEVEL: MILD PAIN (2)

## 2023-03-21 NOTE — PROGRESS NOTES
{PROVIDER CHARTING PREFERENCE:223511}    Subjective   Juve is a 57 year old{ACCOMPANIED BY STATEMENT (Optional):373813}, presenting for the following health issues:  Skin Tags      HPI     Skin tag removals   {additonal problems for provider to add (Optional):470312}    Review of Systems   {ROS COMP (Optional):695610}      Objective    BP (!) 137/100 (BP Location: Left arm, Patient Position: Sitting, Cuff Size: Adult Large)   Pulse 75   Temp 97.1  F (36.2  C) (Tympanic)   Resp 22   Wt 136.1 kg (300 lb)   SpO2 98%   BMI 41.26 kg/m    Body mass index is 41.26 kg/m .  Physical Exam   {Exam List (Optional):543570}    {Diagnostic Test Results (Optional):868754}    {AMBULATORY ATTESTATION (Optional):461293}

## 2023-03-21 NOTE — NURSING NOTE
Pt presents to clinic today for skin tags on his neck and armpits. Rubbing on his clothing irritating.       FOOD SECURITY SCREENING QUESTIONS:    The next two questions are to help us understand your food security.  If you are feeling you need any assistance in this area, we have resources available to support you today.    Hunger Vital Signs:  Within the past 12 months we worried whether our food would run out before we got money to buy more. Never  Within the past 12 months the food we bought just didn't last and we didn't have money to get more. Never          Medication Reconciliation: complete  Myrna Tomlinson LPN,LPN on 3/21/2023 at 1:29 PM

## 2023-03-21 NOTE — PROGRESS NOTES
ASSESSMENT:   1. Acquired skin tag  - lidocaine 1% with EPINEPHrine 1:100,000 injection 1 mL  - Removal of up to 15 skin tags  - Removal of each ADDITIONAL 10 skin tags (right click for each lot of 10)  3 skin tag(s) excision and cryotherapy to remaining 38 tags.     PLAN:   Wound care instructions given. Follow up as needed.    SUBJECTIVE:   Gregg Aguayo is a 57 year old male who presents today for skin tag excision. He has 41 skin tag(s) that he would like removed.  They have become irritated by rubbing from clothing.      OBJECTIVE:   Exam shows a 41 skin tags, approximately 18 on left axilla, 20 on right axilla and 3 to nape of neck. There is evidence of irritation with surrounding redness. It is ranging from 0.1 to 1.2 cm in diameter.      Discussed with Gregg regarding technique, risk of infection, and scarring. Betadine is used for cleansing. Lidocaine with epinephrine is used for anesthesia. Skin tags removed by shaved excision. Bandage applied. Gregg tolerated procedure well. No complications.

## 2023-03-29 ENCOUNTER — HOSPITAL ENCOUNTER (OUTPATIENT)
Dept: CT IMAGING | Facility: OTHER | Age: 58
Discharge: HOME | End: 2023-03-29
Attending: STUDENT IN AN ORGANIZED HEALTH CARE EDUCATION/TRAINING PROGRAM | Admitting: STUDENT IN AN ORGANIZED HEALTH CARE EDUCATION/TRAINING PROGRAM
Payer: COMMERCIAL

## 2023-03-29 DIAGNOSIS — M46.1 SACROILIITIS (H): ICD-10-CM

## 2023-03-29 DIAGNOSIS — M54.50 LOW BACK PAIN: ICD-10-CM

## 2023-03-29 PROCEDURE — 72192 CT PELVIS W/O DYE: CPT

## 2023-04-20 ENCOUNTER — TRANSFERRED RECORDS (OUTPATIENT)
Dept: HEALTH INFORMATION MANAGEMENT | Facility: OTHER | Age: 58
End: 2023-04-20
Payer: COMMERCIAL

## 2023-05-02 NOTE — PROGRESS NOTES
[facility]    Outpatient Physical Therapy Discharge Note  Patient: Gregg Aguayo  : 1965    Beginning/End Dates of Reporting Period:  23 to 23    Referring Provider: Mihai Holden MD    Therapy Diagnosis: Impaired mobility, decreased strength and endurance, low back/SI joint pain     Client Self Report: Patient reports that he is doing ok today but hasn't noticed any changes in his low back pain. He even had some numbness/tingling down both sides the other day. Has been trying to reach Dr. Holden for the past week or so but hasn't had any luck. He will keep trying. Due to the lack of progress at this time, we are going to hold off on more PT until he can talk to the surgery.    Objective Measurements:  Objective Measure: Subjective pain rating  Details: 5/10       Goals:  Goal Identifier Sitting   Goal Description Patient will be able to sit for longer than 60 minutes with no increase in low back or SI joint pain in order to improve his safety and efficiency with travel in a vehicle.   Target Date 23   Date Met      Progress (detail required for progress note):       Goal Identifier Ambulation   Goal Description Patient will be able to ambulate for longer than 15 minutes with no increase in low back or SI joint pain in order to improve his mobility in the community and in stores.   Target Date 23   Date Met      Progress (detail required for progress note):       Goal Identifier Housework   Goal Description Patient will be able to complete all indoor and outdoor housework without an increase in low back or SI joint pain in order to improve his function at home.   Target Date 23   Date Met      Progress (detail required for progress note):       Unable to assess progress towards goals as discharge is unplanned.     Plan:  Discharge from therapy.    Discharge:    Reason for  Discharge: No further visits were scheduled    Equipment Issued: none    Discharge Plan: Follow up with physician as needed

## 2023-05-02 NOTE — PROGRESS NOTES
[facility]    Outpatient Physical Therapy Discharge Note  Patient: Gregg Aguayo  : 1965    Beginning/End Dates of Reporting Period:  23 to 23    Referring Provider: Richie Olivares    Therapy Diagnosis: Impaired mobility, decreased strength and endurance, s/p shoulder arthroscopy.     Client Self Report: Juve was able to schedule a follow up with Dr. Olivares on . Hoping to get discharged from formal PT at this time so he can seek treatment for his back.    Objective Measurements:  Objective Measure: Subjective pain rating  Details: Not formally rated today     Goals:  Goal Identifier ADL's   Goal Description Patient will be able to complete all dressing, bathing, and grooming activities with no increase in shoulder pain in order to improve his overall ability to complete ADL's   Target Date 23   Date Met      Progress (detail required for progress note):       Goal Identifier Reaching   Goal Description Patient will be able to reach actively to 140 degrees of shoulder flexion in order to improve his ability to reach for an item in an upper cupboard   Target Date 23   Date Met      Progress (detail required for progress note):       Goal Identifier Lifting   Goal Description Patient will be able to carry the groceries into the house from his car with his left UE with no increase in shoulder pain in order to improve his overall function at home.   Target Date 23   Date Met      Progress (detail required for progress note):           Plan:  Discharge from therapy.    Discharge:    Reason for Discharge: No further visits were scheduled/needed for his shoulder    Equipment Issued: none    Discharge Plan: Patient to continue home program.

## 2023-08-29 ENCOUNTER — TELEPHONE (OUTPATIENT)
Dept: INTERNAL MEDICINE | Facility: OTHER | Age: 58
End: 2023-08-29
Payer: COMMERCIAL

## 2023-08-29 NOTE — TELEPHONE ENCOUNTER
Call placed and no answer. Message left to call back. Dr Ritchie will be out of the office to an extended time but he can see Shobha Noriega LPN on 8/29/2023 at 1:02 PM

## 2023-08-29 NOTE — TELEPHONE ENCOUNTER
Reason for call: Patient wanting a work in appointment.    Is the appointment for a Hospital Follow up?  No     (If yes - Unable to find an appointment with any provider during the time frame needed. Nurse/Provider - Can this patient be worked into a schedule with PCP or team member?)    Patient is having the following symptoms:  Heart medication; possible referral for shoulder     The patient is requesting an appointment with  D[facility]    Was an appointment offered for this call? Yes    If Yes, what is the date of the appointment?  11/19/23     Preferred method for responding to this message: Telephone Call    Phone number patient can be reached at? Cell number on file:    Telephone Information:   Mobile [phone]       If we can't reach you directly, may we leave a detailed response at the number you provided? Yes    Can this message wait until your PCP/provider returns if unavailable today? Yes    Natali Renee on 8/29/2023 at 8:51 AM

## 2023-08-29 NOTE — TELEPHONE ENCOUNTER
Juve returned call and is fine with seeing Shobha. Had a fall months ago and is having shoulder pain yet. Would like x-rays and possible referral. Refill and explanation on the Metoprolol directions. Transferred to schedule office visit.    Chanel Noriega LPN on 8/29/2023 at 2:24 PM

## 2023-09-06 ENCOUNTER — HOSPITAL ENCOUNTER (OUTPATIENT)
Dept: GENERAL RADIOLOGY | Facility: OTHER | Age: 58
Discharge: HOME | End: 2023-09-06
Payer: COMMERCIAL

## 2023-09-06 ENCOUNTER — OFFICE VISIT (OUTPATIENT)
Dept: INTERNAL MEDICINE | Facility: OTHER | Age: 58
End: 2023-09-06
Payer: COMMERCIAL

## 2023-09-06 VITALS
TEMPERATURE: 97.9 F | WEIGHT: 296 LBS | RESPIRATION RATE: 19 BRPM | HEART RATE: 75 BPM | OXYGEN SATURATION: 98 % | DIASTOLIC BLOOD PRESSURE: 90 MMHG | BODY MASS INDEX: 41.44 KG/M2 | HEIGHT: 71 IN | SYSTOLIC BLOOD PRESSURE: 135 MMHG

## 2023-09-06 DIAGNOSIS — M25.511 ACUTE PAIN OF RIGHT SHOULDER: ICD-10-CM
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
## 2023-09-06 DIAGNOSIS — Z71.85 VACCINE COUNSELING: ICD-10-CM

## 2023-09-06 DIAGNOSIS — I10 BENIGN ESSENTIAL HYPERTENSION: ICD-10-CM

## 2023-09-06 DIAGNOSIS — Z23 ENCOUNTER FOR IMMUNIZATION: ICD-10-CM

## 2023-09-06 DIAGNOSIS — I49.3 SYMPTOMATIC PREMATURE VENTRICULAR CONTRACTIONS: ICD-10-CM

## 2023-09-06 DIAGNOSIS — M24.351 SPASTIC HIP DISLOCATION, RIGHT: ICD-10-CM

## 2023-09-06 PROCEDURE — 73502 X-RAY EXAM HIP UNI 2-3 VIEWS: CPT

## 2023-09-06 PROCEDURE — 99214 OFFICE O/P EST MOD 30 MIN: CPT | Mod: 25

## 2023-09-06 PROCEDURE — 90732 PPSV23 VACC 2 YRS+ SUBQ/IM: CPT

## 2023-09-06 PROCEDURE — 90471 IMMUNIZATION ADMIN: CPT

## 2023-09-06 PROCEDURE — 73030 X-RAY EXAM OF SHOULDER: CPT | Mod: RT

## 2023-09-06 RX ORDER — METOPROLOL SUCCINATE 25 MG/1
50 TABLET, EXTENDED RELEASE ORAL EVERY EVENING
Qty: 180 TABLET | Refills: 4 | COMMUNITY
Start: 2023-09-06 | End: 2023-10-05

## 2023-09-06 RX ORDER — HYDROCHLOROTHIAZIDE 12.5 MG/1
12.5 TABLET ORAL DAILY
Qty: 90 TABLET | Refills: 0 | Status: SHIPPED | OUTPATIENT
Start: 2023-09-06 | End: 2023-10-05

## 2023-09-06 ASSESSMENT — PAIN SCALES - GENERAL: PAINLEVEL: MILD PAIN (3)

## 2023-09-06 NOTE — PROGRESS NOTES
Assessment & Plan   Gregg Aguayo is a 57 year old presenting for the following health issues:      ICD-10-CM    1. Fall, initial encounter  W19.XXXA XR Shoulder Right G/E 3 Views     XR Hip Right 2-3 Views      2. Acute pain of right shoulder  M25.511 XR Shoulder Right G/E 3 Views      3. Benign essential hypertension  I10 hydrochlorothiazide (HYDRODIURIL) 12.5 MG tablet      4. Spastic hip dislocation, right  M24.351 XR Hip Right 2-3 Views      5. Symptomatic premature ventricular contractions  I49.3 metoprolol succinate ER (TOPROL XL) 25 MG 24 hr tablet      6. Vaccine counseling  Z71.85 PNEUMOCOCCAL POLYSACCHARIDE PPV23 (PNEUMOVAX 23)      7. Encounter for immunization  Z23 PNEUMOCOCCAL POLYSACCHARIDE PPV23 (PNEUMOVAX 23)        X-ray of right shoulder and hip shows no sign of dislocation or fracture.  Referral to orthopedics for further recommendation and management of this was placed.    Patient is currently taking 50 mg of metoprolol at night for hypertension and palpitations.  He has not needed to increase this dose at all for palpitations.  He continues to be slightly hypertensive.  Blood pressure today is 142/100.  He also continues to have lower leg swelling, occasionally takes Lasix for this.  We will start patient on 12.5 mg of hydrochlorothiazide for improvement of blood pressure/assist with peripheral edema.  Instructed him that he may increase this to 25 mg daily if he does not have any lightheadedness or dizziness for 1 week at 12.5 mg.  Patient has annual physical in 1 month with plans to have lab work at that visit.  He will follow-up on his hypertension at that time.    Due for pneumonia vaccine-patient continues to smoke.-This was given in clinic today.        No follow-ups on file.    TREV Sigala [facility] AND [facility]      Osei An is a 57 year old, presenting for the following health issues:    Patient presents to clinic with concerns of ongoing right  shoulder pain-and occasional sensation that his right hip pops out of place when walking.  He had an injury 1-1/2-month ago after falling off of his pont[place] on his [place].  He has continued to have ongoing right shoulder pain, difficulty when raising his arm up, pain when laying on his arm at night.  He states that his hip does not hurt however, when he walks he sometimes feels like it suddenly pops out of place and then will return back to normal.  He does have a history of PMR.  He is interested in having x-rays and referral to orthopedics today.        Recheck Medication and Referral (Right shoulder and right hip pain from fall 1.5 months ago)      History of Present Illness       Reason for visit:  Med Check, Shoulder and Hip Pain  Symptom onset:  More than a month  Symptoms include:  Heart skipping beating completion (heart med)  Symptom intensity:  Moderate  Symptom progression:  Improving  Had these symptoms before:  No  Prior treatment description:  Icee hot on shoulder  What makes it worse:  Moving shoulder and hip  What makes it better:  Resting    He eats 2-3 servings of fruits and vegetables daily.He consumes 0 sweetened beverage(s) daily.He exercises with enough effort to increase his heart rate 30 to 60 minutes per day.  He exercises with enough effort to increase his heart rate 7 days per week. He is missing 1 dose(s) of medications per week.               Review of Systems   Cardiovascular:  Positive for palpitations (infrequent) and peripheral edema (chronic).   Musculoskeletal:  Positive for arthralgias (right shoulder) and myalgias (right shoulder). Negative for gait problem and joint swelling.   Skin:  Negative for color change, rash and wound.   All other systems reviewed and are negative.           Objective    BP (!) 135/90 (BP Location: Right arm, Patient Position: Sitting, Cuff Size: Adult Large)   Pulse 75   Temp 97.9  F (36.6  C) (Tympanic)   Resp 19   Ht 1.803 m (5'  "11\")   Wt 134.3 kg (296 lb)   SpO2 98%   BMI 41.28 kg/m    Body mass index is 41.28 kg/m .  Physical Exam  Vitals reviewed.   Constitutional:       General: He is not in acute distress.     Appearance: Normal appearance. He is not toxic-appearing.   Cardiovascular:      Rate and Rhythm: Normal rate and regular rhythm.      Pulses: Normal pulses.      Heart sounds: Normal heart sounds. No murmur heard.  Musculoskeletal:      Right shoulder: Tenderness present. No swelling or deformity. Decreased range of motion. Decreased strength.      Left shoulder: Normal.      Right hip: Normal. No tenderness or bony tenderness. Normal range of motion. Normal strength.   Skin:     General: Skin is warm and dry.   Neurological:      General: No focal deficit present.      Mental Status: He is alert. Mental status is at baseline.        Results for orders placed or performed during the hospital encounter of 09/06/23   XR Hip Right 2-3 Views     Status: None    Narrative    PROCEDURE:  XR HIP RIGHT 2-3 VIEWS    HISTORY: Fall, initial encounter; Spastic hip dislocation, right    COMPARISON:  None.    TECHNIQUE:  2 views of the right hip were obtained.    FINDINGS:  No fracture or dislocation is identified. The joint spaces  are preserved.        Impression    IMPRESSION: Normal right hip      HANDY OCONNOR MD         SYSTEM ID:  [id number]   Results for orders placed or performed during the hospital encounter of 09/06/23   XR Shoulder Right G/E 3 Views     Status: None    Narrative    PROCEDURE:  XR SHOULDER RIGHT G/E 3 VIEWS    HISTORY: Fall, initial encounter; Acute pain of right shoulder    COMPARISON:  None.    TECHNIQUE:  4 views of the right shoulder were obtained.    FINDINGS:  No fracture or dislocation is identified. The joint spaces  are preserved.        Impression    IMPRESSION: No acute fracture.      HANDY OCONNOR MD         SYSTEM ID:  [id number]             "

## 2023-09-06 NOTE — PATIENT INSTRUCTIONS
Start hydrochlorothiazide 12.5 mg daily.  If you tolerate this medication without any dizziness or lightheadedness you may take 2 tablets daily until your follow-up in October with Dr. Ritchie.     X-rays of right shoulder and right hip today-referral has been placed to orthopedics for further management of this.

## 2023-09-06 NOTE — NURSING NOTE
"Chief Complaint   Patient presents with    Recheck Medication    Referral     Right shoulder and right hip pain from fall 1.5 months ago         Initial BP (!) 164/104 (BP Location: Right arm, Patient Position: Sitting, Cuff Size: Adult Large)   Pulse 75   Temp 97.9  F (36.6  C) (Tympanic)   Resp 19   Ht 1.803 m (5' 11\")   Wt 134.3 kg (296 lb)   SpO2 98%   BMI 41.28 kg/m   Estimated body mass index is 41.28 kg/m  as calculated from the following:    Height as of this encounter: 1.803 m (5' 11\").    Weight as of this encounter: 134.3 kg (296 lb).       FOOD SECURITY SCREENING QUESTIONS:    The next two questions are to help us understand your food security.  If you are feeling you need any assistance in this area, we have resources available to support you today.    Hunger Vital Signs:  Within the past 12 months we worried whether our food would run out before we got money to buy more. Never  Within the past 12 months the food we bought just didn't last and we didn't have money to get more. Never  Tawana Randall LPN on 9/6/2023 at 2:22 PM     Tawana Randall   "

## 2023-09-07 ASSESSMENT — ENCOUNTER SYMPTOMS
WOUND: 0
JOINT SWELLING: 0
MYALGIAS: 1
ARTHRALGIAS: 1
COLOR CHANGE: 0
PALPITATIONS: 1

## 2023-09-27 DIAGNOSIS — N52.9 ERECTILE DYSFUNCTION, UNSPECIFIED ERECTILE DYSFUNCTION TYPE: Primary | ICD-10-CM

## 2023-10-03 NOTE — TELEPHONE ENCOUNTER
"Requested Prescriptions   Pending Prescriptions Disp Refills    sildenafil (REVATIO) 20 MG tablet [Pharmacy Med Name: SILDENAFIL 20MG TABLET] 30 tablet 3     Sig: TAKE 1-5 TABLETS ( MG) BY MOUTH DAILY AS NEEDED (FOR ERECTILE DYSFUNCTION - TAKE 30 MIN TO 1 HOUR PRIOR TO INTERCOURSE)       Erectile Dysfuction Protocol Failed - 9/27/2023 10:15 AM        Failed - Medication is active on med list        Passed - Absence of nitrates on medication list        Passed - Absence of Alpha Blockers on Med list        Passed - Recent (12 mo) or future (30 days) visit within the authorizing provider's specialty     Patient has had an office visit with the authorizing provider or a provider within the authorizing providers department within the previous 12 mos or has a future within next 30 days. See \"Patient Info\" tab in inbasket, or \"Choose Columns\" in Meds & Orders section of the refill encounter.              Passed - Patient is age 18 or older          Different Sig and dosage of tablets   Disp Refills Start End IVONNE   sildenafil (VIAGRA) 100 MG tablet 30 tablet 11 9/10/2019  --   Sig: Take 1/4 to 1 tablet 30 min to 4 hours prior to sex for ED   Class: Local Print   Order: [id number]       LOV: 9/6/2023  Future Office visit:    Next 5 appointments (look out 90 days)      Oct 05, 2023  8:40 AM  PHYSICAL with Reinier Ritchie MD  [facility] ([facility] ) [address]  [place] [address]  [phone]            Routing refill request to provider for review/approval.      Samaria White RN  ....................  10/3/2023   6:43 PM    "

## 2023-10-04 RX ORDER — SILDENAFIL CITRATE 20 MG/1
TABLET ORAL
Qty: 30 TABLET | Refills: 3 | Status: SHIPPED | OUTPATIENT
Start: 2023-10-04 | End: 2023-10-05

## 2023-10-05 ENCOUNTER — OFFICE VISIT (OUTPATIENT)
Dept: INTERNAL MEDICINE | Facility: OTHER | Age: 58
End: 2023-10-05
Attending: INTERNAL MEDICINE
Payer: COMMERCIAL

## 2023-10-05 VITALS
HEART RATE: 82 BPM | HEIGHT: 71 IN | DIASTOLIC BLOOD PRESSURE: 76 MMHG | OXYGEN SATURATION: 96 % | WEIGHT: 298 LBS | TEMPERATURE: 98.6 F | BODY MASS INDEX: 41.72 KG/M2 | RESPIRATION RATE: 20 BRPM | SYSTOLIC BLOOD PRESSURE: 138 MMHG

## 2023-10-05 DIAGNOSIS — M25.511 CHRONIC RIGHT SHOULDER PAIN: ICD-10-CM

## 2023-10-05 DIAGNOSIS — Z87.891 PERSONAL HISTORY OF TOBACCO USE: ICD-10-CM

## 2023-10-05 DIAGNOSIS — Z23 NEEDS FLU SHOT: ICD-10-CM

## 2023-10-05 DIAGNOSIS — Z71.85 VACCINE COUNSELING: ICD-10-CM

## 2023-10-05 DIAGNOSIS — M25.50 ARTHRALGIA OF MULTIPLE JOINTS: ICD-10-CM

## 2023-10-05 DIAGNOSIS — Z98.890 S/P LUMBAR DISCECTOMY: ICD-10-CM

## 2023-10-05 DIAGNOSIS — R12 HEARTBURN: ICD-10-CM

## 2023-10-05 DIAGNOSIS — D84.9 IMMUNOSUPPRESSION (H): ICD-10-CM

## 2023-10-05 DIAGNOSIS — M35.3 POLYMYALGIA RHEUMATICA (H): Primary | ICD-10-CM

## 2023-10-05 DIAGNOSIS — G89.29 CHRONIC RIGHT HIP PAIN: ICD-10-CM

## 2023-10-05 DIAGNOSIS — Z23 NEED FOR COVID-19 VACCINE: ICD-10-CM

## 2023-10-05 DIAGNOSIS — I49.3 SYMPTOMATIC PREMATURE VENTRICULAR CONTRACTIONS: ICD-10-CM

## 2023-10-05 DIAGNOSIS — R60.0 BILATERAL LEG EDEMA: ICD-10-CM

## 2023-10-05 DIAGNOSIS — I10 BENIGN ESSENTIAL HYPERTENSION: ICD-10-CM

## 2023-10-05 DIAGNOSIS — E66.01 MORBID OBESITY (H): ICD-10-CM

## 2023-10-05 DIAGNOSIS — G89.29 CHRONIC RIGHT SHOULDER PAIN: ICD-10-CM

## 2023-10-05 DIAGNOSIS — J30.2 SEASONAL ALLERGIES: ICD-10-CM

## 2023-10-05 DIAGNOSIS — Z72.0 TOBACCO ABUSE: ICD-10-CM

## 2023-10-05 DIAGNOSIS — G47.33 OSA ON CPAP: ICD-10-CM

## 2023-10-05 DIAGNOSIS — M25.551 CHRONIC RIGHT HIP PAIN: ICD-10-CM

## 2023-10-05 DIAGNOSIS — M25.611 DECREASED ROM OF RIGHT SHOULDER: ICD-10-CM

## 2023-10-05 DIAGNOSIS — Z00.00 ENCOUNTER FOR MEDICARE ANNUAL WELLNESS EXAM: ICD-10-CM

## 2023-10-05 PROBLEM — Z79.899 OTHER LONG TERM (CURRENT) DRUG THERAPY: Status: RESOLVED | Noted: 2021-09-14 | Resolved: 2023-10-05

## 2023-10-05 PROCEDURE — G0296 VISIT TO DETERM LDCT ELIG: HCPCS | Performed by: INTERNAL MEDICINE

## 2023-10-05 PROCEDURE — 99214 OFFICE O/P EST MOD 30 MIN: CPT | Mod: 25 | Performed by: INTERNAL MEDICINE

## 2023-10-05 PROCEDURE — 91320 SARSCV2 VAC 30MCG TRS-SUC IM: CPT | Performed by: INTERNAL MEDICINE

## 2023-10-05 PROCEDURE — 90480 ADMN SARSCOV2 VAC 1/ONLY CMP: CPT | Performed by: INTERNAL MEDICINE

## 2023-10-05 PROCEDURE — 90471 IMMUNIZATION ADMIN: CPT | Performed by: INTERNAL MEDICINE

## 2023-10-05 PROCEDURE — G0439 PPPS, SUBSEQ VISIT: HCPCS | Mod: 25 | Performed by: INTERNAL MEDICINE

## 2023-10-05 PROCEDURE — 90682 RIV4 VACC RECOMBINANT DNA IM: CPT | Performed by: INTERNAL MEDICINE

## 2023-10-05 RX ORDER — OMEPRAZOLE 40 MG/1
40 CAPSULE, DELAYED RELEASE ORAL DAILY
Qty: 90 CAPSULE | Refills: 4 | Status: SHIPPED | OUTPATIENT
Start: 2023-10-05 | End: 2024-09-26

## 2023-10-05 RX ORDER — METOPROLOL SUCCINATE 25 MG/1
50 TABLET, EXTENDED RELEASE ORAL EVERY EVENING
Qty: 210 TABLET | Refills: 4 | Status: SHIPPED | OUTPATIENT
Start: 2023-10-05 | End: 2024-02-09

## 2023-10-05 RX ORDER — FUROSEMIDE 20 MG
40 TABLET ORAL DAILY
Qty: 180 TABLET | Refills: 4 | Status: SHIPPED | OUTPATIENT
Start: 2023-10-05

## 2023-10-05 RX ORDER — HYDROCHLOROTHIAZIDE 12.5 MG/1
12.5 TABLET ORAL DAILY
Qty: 90 TABLET | Refills: 4 | Status: SHIPPED | OUTPATIENT
Start: 2023-10-05 | End: 2023-12-29

## 2023-10-05 ASSESSMENT — ANXIETY QUESTIONNAIRES
IF YOU CHECKED OFF ANY PROBLEMS ON THIS QUESTIONNAIRE, HOW DIFFICULT HAVE THESE PROBLEMS MADE IT FOR YOU TO DO YOUR WORK, TAKE CARE OF THINGS AT HOME, OR GET ALONG WITH OTHER PEOPLE: NOT DIFFICULT AT ALL
4. TROUBLE RELAXING: NOT AT ALL
5. BEING SO RESTLESS THAT IT IS HARD TO SIT STILL: NOT AT ALL
1. FEELING NERVOUS, ANXIOUS, OR ON EDGE: NOT AT ALL
6. BECOMING EASILY ANNOYED OR IRRITABLE: NOT AT ALL
3. WORRYING TOO MUCH ABOUT DIFFERENT THINGS: NOT AT ALL
GAD7 TOTAL SCORE: 0
GAD7 TOTAL SCORE: 0
2. NOT BEING ABLE TO STOP OR CONTROL WORRYING: NOT AT ALL
7. FEELING AFRAID AS IF SOMETHING AWFUL MIGHT HAPPEN: NOT AT ALL

## 2023-10-05 ASSESSMENT — ENCOUNTER SYMPTOMS
ARTHRALGIAS: 1
HEMATURIA: 0
WOUND: 0
SHORTNESS OF BREATH: 0
JOINT SWELLING: 0
ABDOMINAL PAIN: 0
CHEST TIGHTNESS: 0
MYALGIAS: 1
COLOR CHANGE: 0
BRUISES/BLEEDS EASILY: 0
ADENOPATHY: 0
PALPITATIONS: 1
FEVER: 0
CONFUSION: 0
BACK PAIN: 1
CHILLS: 0
SLEEP DISTURBANCE: 1

## 2023-10-05 ASSESSMENT — PATIENT HEALTH QUESTIONNAIRE - PHQ9
10. IF YOU CHECKED OFF ANY PROBLEMS, HOW DIFFICULT HAVE THESE PROBLEMS MADE IT FOR YOU TO DO YOUR WORK, TAKE CARE OF THINGS AT HOME, OR GET ALONG WITH OTHER PEOPLE: NOT DIFFICULT AT ALL
SUM OF ALL RESPONSES TO PHQ QUESTIONS 1-9: 1
SUM OF ALL RESPONSES TO PHQ QUESTIONS 1-9: 1

## 2023-10-05 ASSESSMENT — PAIN SCALES - GENERAL: PAINLEVEL: MODERATE PAIN (5)

## 2023-10-05 ASSESSMENT — ACTIVITIES OF DAILY LIVING (ADL): CURRENT_FUNCTION: NO ASSISTANCE NEEDED

## 2023-10-05 NOTE — NURSING NOTE
"Chief Complaint   Patient presents with    Medicare Wellness Visit      Diabetes         Initial /76 (BP Location: Right arm, Patient Position: Sitting, Cuff Size: Adult Large)   Pulse 82   Temp 98.6  F (37  C) (Temporal)   Resp 20   Ht 1.791 m (5' 10.5\")   Wt 135.2 kg (298 lb)   SpO2 96%   BMI 42.15 kg/m   Estimated body mass index is 42.15 kg/m  as calculated from the following:    Height as of this encounter: 1.791 m (5' 10.5\").    Weight as of this encounter: 135.2 kg (298 lb).       FOOD SECURITY SCREENING QUESTIONS:    The next two questions are to help us understand your food security.  If you are feeling you need any assistance in this area, we have resources available to support you today.    Hunger Vital Signs:  Within the past 12 months we worried whether our food would run out before we got money to buy more. Never  Within the past 12 months the food we bought just didn't last and we didn't have money to get more. Never    Advance Care Directive on file? no      Medication reconciliation complete.      Soto Zaragoza,on 10/5/2023 at 8:49 AM          "

## 2023-10-05 NOTE — PATIENT INSTRUCTIONS
Blood pressure is well controlled.     Medications refilled.   Last labs were stable.     MRI of right shoulder and hip ordered  - they will call with date/time of appointment.      Consider sports medicine or orthopedic clinic evaluation, call and schedule at your convenience.     [facility] -- Please call [phone] to schedule your appointment at [facility].     [facility] -- Main Phone Number in [place]: [phone]    Or     Sports Medicine at [facility] - Dr. Gregorio Ko  Phone: [phone]        Lung cancer screening CT -- Schedule for 11/1.     Return in approximately 1 year, or sooner as needed for follow-up with Dr. Ritchie.  - Annual Follow-up / Physical - Medicare Annual Wellness Visit     Clinic : [phone]  Appointment line: [phone]         Patient Education   Personalized Prevention Plan  You are due for the preventive services outlined below.  Your care team is available to assist you in scheduling these services.  If you have already completed any of these items, please share that information with your care team to update in your medical record.  Health Maintenance Due   Topic Date Due    Hepatitis B Vaccine (1 of 3 - 3-dose series) Never done    URINE DRUG SCREEN  11/06/2019    Annual Wellness Visit  10/04/2023    LUNG CANCER SCREENING  10/24/2023     Preventive Health Recommendations  See your health care provider every year to  Review health changes.   Discuss preventive care.    Review your medicines if your doctor has prescribed any.  Talk with your health care provider about whether you should have a test to screen for prostate cancer (PSA).  Every 3 years, have a diabetes test (fasting glucose). If you are at risk for diabetes, you should have this test more often.  Every 5 years, have a cholesterol test. Have this test more often if you are at risk for high cholesterol or heart disease.   Every 10  years, have a colonoscopy. Or, have a yearly FIT test (stool test). These exams will check for colon cancer.  Talk to with your health care provider about screening for Abdominal Aortic Aneurysm if you have a family history of AAA or have a history of smoking.    Shots:   Get a flu shot each year.   Get a tetanus shot every 10 years.   Talk to your doctor about your pneumonia vaccines. There are now two you should receive - Pneumovax (PPSV 23) and Prevnar (PCV 13).  Talk to your pharmacist about a shingles vaccine.   Talk to your doctor about the hepatitis B vaccine.    Nutrition:   Eat at least 5 servings of fruits and vegetables each day.   Eat whole-grain bread, whole-wheat pasta and brown rice instead of white grains and rice.   Get adequate Calcium and Vitamin D.     Lifestyle  Exercise for at least 150 minutes a week (30 minutes a day, 5 days a week). This will help you control your weight and prevent disease.   Limit alcohol to one drink per day.   No smoking.   Wear sunscreen to prevent skin cancer.   See your dentist every six months for an exam and cleaning.   See your eye doctor every 1 to 2 years to screen for conditions such as glaucoma, macular degeneration and cataracts.    Personalized Prevention Plan  You are due for the preventive services outlined below.  Your care team is available to assist you in scheduling these services.  If you have already completed any of these items, please share that information with your care team to update in your medical record.  Health Maintenance   Topic Date Due    HEPATITIS B IMMUNIZATION (1 of 3 - 3-dose series) Never done    URINE DRUG SCREEN  11/06/2019    MEDICARE ANNUAL WELLNESS VISIT  10/04/2023    LUNG CANCER SCREENING  10/24/2023    Pneumococcal Vaccine: Pediatrics (0 to 5 Years) and At-Risk Patients (6 to 64 Years) (2 - PCV) 09/06/2024    NICOTINE/TOBACCO CESSATION COUNSELING Q 1 YR  10/05/2024    LIPID  03/31/2026    ADVANCE CARE PLANNING  10/05/2028     COLORECTAL CANCER SCREENING  02/01/2029    DTAP/TDAP/TD IMMUNIZATION (3 - Td or Tdap) 07/04/2030    PHQ-2 (once per calendar year)  Completed    INFLUENZA VACCINE  Completed    ZOSTER IMMUNIZATION  Completed    COVID-19 Vaccine  Completed    HEPATITIS C SCREENING  Addressed    HIV SCREENING  Addressed    IPV IMMUNIZATION  Aged Out    HPV IMMUNIZATION  Aged Out    MENINGITIS IMMUNIZATION  Aged Out       Learning About Dietary Guidelines  What are the Dietary Guidelines for Americans?     Dietary Guidelines for Americans provide tips for eating well and staying healthy. This helps reduce the risk for long-term (chronic) diseases.  These guidelines recommend that you:  Eat and drink the right amount for you. The U.[place] government's food guide is called MyPlate. It can help you make your own well-balanced eating plan.  Try to balance your eating with your activity. This helps you stay at a healthy weight.  Drink alcohol in moderation, if at all.  Limit foods high in salt, saturated fat, trans fat, and added sugar.  These guidelines are from the U.S. Department of Agriculture and the U.S. Department of Health and Human Services. They are updated every 5 years.  What is MyPlate?  MyPlate is the U.S. government's food guide. It can help you make your own well-balanced eating plan. A balanced eating plan means that you eat enough, but not too much, and that your food gives you the nutrients you need to stay healthy.  MyPlate focuses on eating plenty of whole grains, fruits, and vegetables, and on limiting fat and sugar. It is available online at www.ChooseMyPlate.gov.  How can you get started?  If you're trying to eat healthier, you can slowly change your eating habits over time. You don't have to make big changes all at once. Start by adding one or two healthy foods to your meals each day.  Grains  Choose whole-grain breads and cereals and whole-wheat pasta and whole-grain crackers.  Vegetables  Eat a variety of vegetables  "every day. They have lots of nutrients and are part of a heart-healthy diet.  Fruits  Eat a variety of fruits every day. Fruits contain lots of nutrients. Choose fresh fruit instead of fruit juice.  Protein foods  Choose fish and lean poultry more often. Eat red meat and fried meats less often. Dried beans, tofu, and nuts are also good sources of protein.  Dairy  Choose low-fat or fat-free products from this food group. If you have problems digesting milk, try eating cheese or yogurt instead.  Fats and oils  Limit fats and oils if you're trying to cut calories. Choose healthy fats when you cook. These include canola oil and olive oil.  Where can you learn more?  Go to https://www.[facility].net/patiented  Enter D676 in the search box to learn more about \"Learning About Dietary Guidelines.\"  Current as of: March 1, 2023               Content Version: 13.7    9217-8921 [employer].   Care instructions adapted under license by your healthcare professional. If you have questions about a medical condition or this instruction, always ask your healthcare professional. [employer] disclaims any warranty or liability for your use of this information.         Lung Cancer Screening   Frequently Asked Questions  If you are at high-risk for lung cancer, getting screened with low-dose computed tomography (LDCT) every year can help save your life. This handout offers answers to some of the most common questions about lung cancer screening. If you have other questions, please call [phone] ([phone]).     What is it?  Lung cancer screening uses special X-ray technology to create an image of your lung tissue. The exam is quick and easy and takes less than 10 seconds. We don t give you any medicine or use any needles. You can eat before and after the exam. You don t need to change your clothes as long as the clothing on your chest doesn t contain metal. But, you do need to be able to hold your " breath for at least 6 seconds during the exam.    What is the goal of lung cancer screening?  The goal of lung cancer screening is to save lives. Many times, lung cancer is not found until a person starts having physical symptoms. Lung cancer screening can help detect lung cancer in the earliest stages when it may be easier to treat.    Who should be screened for lung cancer?  We suggest lung cancer screening for anyone who is at high-risk for lung cancer. You are in the high-risk group if you:     are between the ages of 55 and 79, and   have smoked at least 1 pack of cigarettes a day for 20 or more years, and   still smoke or have quit within the past 15 years.    However, if you have a new cough or shortness of breath, you should talk to your doctor before being screened.    Why does it matter if I have symptoms?  Certain symptoms can be a sign that you have a condition in your lungs that should be checked and treated by your doctor. These symptoms include fever, chest pain, a new or changing cough, shortness of breath that you have never felt before, coughing up blood or unexplained weight loss. Having any of these symptoms can greatly affect the results of lung cancer screening.       Should all smokers get an LDCT lung cancer screening exam?  It depends. Lung cancer screening is for a very specific group of men and women who have a history of heavy smoking over a long period of time (see  Who should be screened for lung cancer  above).  I am in the high-risk group, but have been diagnosed with cancer in the past. Is LDCT lung cancer screening right for me?  In some cases, you should not have LDCT lung screening, such as when your doctor is already following your cancer with CT scan studies. Your doctor will help you decide if LDCT lung screening is right for you.  Do I need to have a screening exam every year?  Yes. If you are in the high-risk group described earlier, you should get an LDCT lung cancer  screening exam every year until you are 79, or are no longer willing or able to undergo screening and possible procedures to diagnose and treat lung cancer.  How effective is LDCT at preventing death from lung cancer?  Studies have shown that LDCT lung cancer screening can lower the risk of death from lung cancer by 20 percent in people who are at high-risk.  What are the risks?  There are some risks and limitations of LDCT lung cancer screening. We want to make sure you understand the risks and benefits, so please let us know if you have any questions. Your doctor may want to talk with you more about these risks.   Radiation exposure: As with any exam that uses radiation, there is a very small increased risk of cancer. The amount of radiation in LDCT is small--about the same amount a person would get from a mammogram. Your doctor orders the exam when he or she feels the potential benefits outweigh the risks.   False negatives: No test is perfect, including LDCT. It is possible that you may have a medical condition, including lung cancer, that is not found during your exam. This is called a false negative result.   False positives and more testing: LDCT very often finds something in the lung that could be cancer, but in fact is not. This is called a false positive result. False positive tests often cause anxiety. To make sure these findings are not cancer, you may need to have more tests. These tests will be done only if you give us permission. Sometimes patients need a treatment that can have side effects, such as a biopsy. For more information on false positives, see  What can I expect from the results?    Findings not related to lung cancer: Your LDCT exam also takes pictures of areas of your body next to your lungs. In a very small number of cases, the CT scan will show an abnormal finding in one of these areas, such as your kidneys, adrenal glands, liver or thyroid. This finding may not be serious, but you may  need more tests. Your doctor can help you decide what other tests you may need, if any.  What can I expect from the results?  About 1 out of 4 LDCT exams will find something that may need more tests. Most of the time, these findings are lung nodules. Lung nodules are very small collections of tissue in the lung. These nodules are very common, and the vast majority--more than 97 percent--are not cancer (benign). Most are normal lymph nodes or small areas of scarring from past infections.  But, if a small lung nodule is found to be cancer, the cancer can be cured more than 90 percent of the time. To know if the nodule is cancer, we may need to get more images before your next yearly screening exam. If the nodule has suspicious features (for example, it is large, has an odd shape or grows over time), we will refer you to a specialist for further testing.  Will my doctor also get the results?  Yes. Your doctor will get a copy of your results.  Is it okay to keep smoking now that there s a cancer screening exam?  No. Tobacco is one of the strongest cancer-causing agents. It causes not only lung cancer, but other cancers and cardiovascular (heart) diseases as well. The damage caused by smoking builds over time. This means that the longer you smoke, the higher your risk of disease. While it is never too late to quit, the sooner you quit, the better.  Where can I find help to quit smoking?  The best way to prevent lung cancer is to stop smoking. If you have already quit smoking, congratulations and keep it up! For help on quitting smoking, please call [employer] at 3-720-QUITNOW (1-214.546.6745) or the American [employer] Society at 1-594.953.2732 to find local resources near you.  One-on-one health coaching:  If you d prefer to work individually with a health care provider on tobacco cessation, we offer:     Medication Therapy Management:  Our specially trained pharmacists work closely with you and your doctor to help you  quit smoking.  Call [phone] or [phone] (toll free).

## 2023-10-05 NOTE — PROGRESS NOTES
Assessment & Plan     ICD-10-CM    1. Polymyalgia rheumatica (H24)  M35.3       2. Immunosuppression (H24)  D84.9       3. Arthralgia of multiple joints  M25.50       4. Benign essential hypertension  I10 furosemide (LASIX) 20 MG tablet     hydrochlorothiazide (HYDRODIURIL) 12.5 MG tablet      5. Morbid obesity (H)  E66.01       6. AILEEN on CPAP - uses nightly, finds very helpful  G47.33       7. Tobacco abuse  Z72.0       8. S/P lumbar discectomy  Z98.890       9. Chronic right shoulder pain  M25.511 MR Shoulder Right w/o Contrast    G89.29       10. Chronic right hip pain  M25.551 MR Hip Right w/o Contrast    G89.29       11. Vaccine counseling  Z71.85       12. Encounter for Medicare annual wellness exam  Z00.00       13. Decreased ROM of right shoulder  M25.611 MR Shoulder Right w/o Contrast      14. Personal history of tobacco use  Z87.891 Prof fee: Shared Decision Making for Lung Cancer Screening     CT Chest Lung Cancer Scrn Low Dose wo      15. Seasonal allergies  J30.2       16. Bilateral leg edema  R60.0 furosemide (LASIX) 20 MG tablet      17. Symptomatic premature ventricular contractions  I49.3 metoprolol succinate ER (TOPROL XL) 25 MG 24 hr tablet      18. Heartburn  R12 omeprazole (PRILOSEC) 40 MG DR capsule      19. Need for COVID-19 vaccine  Z23       20. Needs flu shot  Z23         Patient presents for annual wellness visit as well as follow-up multiple issues.    Polymyalgia rheumatica.  Reports doing well.  Continues with hydroxychloroquine.  At 1 point was utilizing prednisone but has been doing much better.  Continues to follow with rheumatology.    Multiple joint pain, stable.  Using Tylenol as needed.    Tobacco abuse.  Ongoing.  Continues to work on smoking cessation.  He is not ready to quit at this time.    Chronic right shoulder pain and right hip pain.  Is been having more progressive issues with symptoms, reduced range of motion.  He would like to proceed with shoulder and hip MRI.   Orders placed.    Chronic low back pain, intermittent.  Has history of lumbar discectomy surgery in the past.    Patient qualifies for lung cancer screening CT given his tobacco use history.  See separate documentation below.    Lower extremity edema, ongoing but controlled with Lasix.  Needs refills.    Symptomatic PVCs.  Recently started on metoprolol and has been effective.  Tolerating well.  Needs refills    Heartburn, chronic, ongoing.  Using omeprazole regularly.  Needs refills.    Seasonal allergies, controlled, persistent.  Taking oral antihistamines as needed.    HYPERTENSION - Ongoing. Blood pressure is currently well controlled.  Medication side effects: None. Denies syncope or presyncope.  No changes for now. Continue - hydrochlorothiazide, lasix and metoprolol.   Medication list reviewed/updated. Refills completed as needed.      MIXED HYPERLIPIDEMIA.  Ongoing. LDL is at goal: No. Triglycerides are at goal: No.  Hopefully lifestyle modifications will improve cholesterol levels, otherwise we will need to consider additional medication dose adjustments or medication changes.  -- Diet controlled   Recent Labs   Lab Test 03/31/21  1226   CHOL 197   HDL 38   *   TRIG 275*        OBESITY - Ongoing. Discussed need for reduced oral caloric intake, weight loss, regular exercise and reduce carbohydrate/sugar intake.      Sleep Apnea, chronic, ongoing.  Uses CPAP nightly.  Finds helpful and beneficial.  Encouraged continued use.     Vaccine counseling completed.  Encouraged routine / annual vaccinations.     Return in about 1 year (around 10/5/2024) for Annual Medicare Wellness Visit.    Reinier Ritchie MD  [facility] AND [facility]     SUBJECTIVE:   Juve is a 57 year old who presents for Preventive Visit.      10/5/2023     8:43 AM   Additional Questions   Roomed by Soto PANDA / RAMAN   Accompanied by n/a       Are you in the first 12 months of your Medicare coverage?  No    Healthy Habits:     In  "general, how would you rate your overall health?  Good    Frequency of exercise:  6-7 days/week    Duration of exercise:  Greater than 60 minutes    Do you usually eat at least 4 servings of fruit and vegetables a day, include whole grains    & fiber and avoid regularly eating high fat or \"junk\" foods?  No    Taking medications regularly:  Yes    Barriers to taking medications:  Not applicable    Medication side effects:  Not applicable    Ability to successfully perform activities of daily living:  No assistance needed    Home Safety:  Lack of grab bars in the bathroom    Hearing Impairment:  No hearing concerns    In the past 6 months, have you been bothered by leaking of urine?  No    In general, how would you rate your overall mental or emotional health?  Very good    Additional concerns today:  No      Today's PHQ-9 Score:       10/5/2023     8:37 AM   PHQ-9 SCORE   PHQ-9 Total Score MyChart 1 (Minimal depression)   PHQ-9 Total Score 1     Have you ever done Advance Care Planning? (For example, a Health Directive, POLST, or a discussion with a medical provider or your loved ones about your wishes): No, advance care planning information given to patient to review.  Patient declined advance care planning discussion at this time.     Fall risk  Fallen 2 or more times in the past year?: No  Any fall with injury in the past year?: No    Cognitive Screening   1) Repeat 3 items (Leader, Season, Table)    2) Clock draw: NORMAL  3) 3 item recall: Recalls 1 object   Results: NORMAL clock, 1-2 items recalled: COGNITIVE IMPAIRMENT LESS LIKELY    Mini-CogTM Copyright MAE Bowers. Licensed by the author for use in [facility]; reprinted with permission ([email or web address]@.[email or web address]). All rights reserved.      Do you have sleep apnea, excessive snoring or daytime drowsiness? : yes    Reviewed and updated as needed this visit by clinical staff   Tobacco  Allergies  Meds  Problems  Med Hx  Surg Hx  Fam Hx  Soc   Hx    "     Reviewed and updated as needed this visit by Provider   Tobacco  Allergies  Meds  Problems  Med Hx  Surg Hx  Fam Hx         Social History     Tobacco Use    Smoking status: Every Day     Packs/day: 0.70     Years: 37.00     Pack years: 25.90     Types: Cigarettes    Smokeless tobacco: Never    Tobacco comments:     Tried Chantix, didn't tolerate it.   Substance Use Topics    Alcohol use: Yes     Comment: occasional - 5 a week              No data to display              Do you have a current opioid prescription? No  Do you use any other controlled substances or medications that are not prescribed by a provider? None    Current providers sharing in care for this patient include:   Patient Care Team:  Reinier Ritchie MD as PCP - General (Internal Medicine)  Reinier Ritchie MD as Assigned PCP  Mihai Holden MD as Assigned Surgical Provider  Richie Olivares MD as Assigned Musculoskeletal Provider    The following health maintenance items are reviewed in Epic and correct as of today:  Health Maintenance   Topic Date Due    HEPATITIS B IMMUNIZATION (1 of 3 - 3-dose series) Never done    URINE DRUG SCREEN  11/06/2019    LUNG CANCER SCREENING  10/24/2023    Pneumococcal Vaccine: Pediatrics (0 to 5 Years) and At-Risk Patients (6 to 64 Years) (2 - PCV) 09/06/2024    NICOTINE/TOBACCO CESSATION COUNSELING Q 1 YR  10/05/2024    MEDICARE ANNUAL WELLNESS VISIT  10/05/2024    LIPID  03/31/2026    ADVANCE CARE PLANNING  10/05/2028    COLORECTAL CANCER SCREENING  02/01/2029    DTAP/TDAP/TD IMMUNIZATION (3 - Td or Tdap) 07/04/2030    PHQ-2 (once per calendar year)  Completed    INFLUENZA VACCINE  Completed    ZOSTER IMMUNIZATION  Completed    COVID-19 Vaccine  Completed    HEPATITIS C SCREENING  Addressed    HIV SCREENING  Addressed    IPV IMMUNIZATION  Aged Out    HPV IMMUNIZATION  Aged Out    MENINGITIS IMMUNIZATION  Aged Out     Review of Systems   Constitutional:  Negative for chills and fever.   HENT:  Negative  "for congestion, hearing loss and tinnitus.    Eyes:  Negative for visual disturbance.   Respiratory:  Negative for chest tightness and shortness of breath.    Cardiovascular:  Positive for palpitations (infrequent) and peripheral edema (chronic). Negative for chest pain.   Gastrointestinal:  Negative for abdominal pain.   Genitourinary:  Negative for hematuria.   Musculoskeletal:  Positive for arthralgias (right shoulder), back pain (Radicular left low back pain) and myalgias (right shoulder). Negative for gait problem and joint swelling.   Skin:  Negative for color change, rash and wound.   Allergic/Immunologic: Positive for environmental allergies and immunocompromised state.   Neurological:  Negative for syncope.   Hematological:  Negative for adenopathy. Does not bruise/bleed easily.   Psychiatric/Behavioral:  Positive for sleep disturbance. Negative for confusion.    All other systems reviewed and are negative.      OBJECTIVE:   /76 (BP Location: Right arm, Patient Position: Sitting, Cuff Size: Adult Large)   Pulse 82   Temp 98.6  F (37  C) (Temporal)   Resp 20   Ht 1.791 m (5' 10.5\")   Wt 135.2 kg (298 lb)   SpO2 96%   BMI 42.15 kg/m   Estimated body mass index is 42.15 kg/m  as calculated from the following:    Height as of this encounter: 1.791 m (5' 10.5\").    Weight as of this encounter: 135.2 kg (298 lb).  Physical Exam  Vitals reviewed.   Constitutional:       General: He is not in acute distress.     Appearance: Normal appearance. He is well-developed. He is obese. He is not toxic-appearing or diaphoretic.   HENT:      Head: Normocephalic and atraumatic.   Eyes:      General: No scleral icterus.     Conjunctiva/sclera: Conjunctivae normal.   Neck:      Vascular: No carotid bruit.   Cardiovascular:      Rate and Rhythm: Normal rate and regular rhythm.      Pulses: Normal pulses.      Heart sounds: Normal heart sounds. No murmur heard.  Pulmonary:      Effort: Pulmonary effort is normal.      " Breath sounds: Normal breath sounds.   Abdominal:      Palpations: Abdomen is soft.      Tenderness: There is no abdominal tenderness.   Musculoskeletal:         General: Tenderness and deformity present.      Right shoulder: Tenderness present. No swelling or deformity. Decreased range of motion. Decreased strength.      Left shoulder: Normal.      Cervical back: Neck supple.      Right hip: Normal. No tenderness or bony tenderness. Normal range of motion. Normal strength.      Right lower leg: Edema ( trace) present.      Left lower leg: Edema ( trace) present.      Comments: Right shoulder pain and ROM limitation.   Lymphadenopathy:      Cervical: No cervical adenopathy.   Skin:     General: Skin is warm and dry.      Findings: No rash.   Neurological:      Mental Status: He is alert. Mental status is at baseline.   Psychiatric:         Mood and Affect: Mood normal.         Behavior: Behavior normal.       Recent Labs   Lab Test 01/04/23  1013 10/14/21  0924 10/14/21  0923 03/31/21  1226 04/10/20  1033 11/06/18  0945   LDL  --   --   --  104*  --   --    HDL  --   --   --  38  --   --    TRIG  --   --   --  275*  --   --    ALT 24  --  23  --   --  30   CR 0.82  --  0.91  --    < > 0.81   GFRESTIMATED >90  --  >90  --    < > >90   POTASSIUM 4.5  --  4.7  --    < > 4.0   WBC 6.3 7.1  --   --    < > 5.8   HGB 15.7 15.8  --   --    < > 15.7    191  --   --    < > 192   ALBUMIN 4.4  --  4.4  --   --  4.3    < > = values in this interval not displayed.     [facility] 9/13/2023 Labs  Cr .83 with eGFR 100       Patient has been advised of split billing requirements and indicates understanding: Yes      COUNSELING:  Reviewed preventive health counseling, as reflected in patient instructions  Special attention given to:       Regular exercise       Healthy diet/nutrition       Vision screening       Hearing screening       Dental care       Bladder control       Fall risk prevention       Immunizations    BMI:  "  Estimated body mass index is 42.15 kg/m  as calculated from the following:    Height as of this encounter: 1.791 m (5' 10.5\").    Weight as of this encounter: 135.2 kg (298 lb).   Weight management plan: Discussed healthy diet and exercise guidelines      He reports that he has been smoking cigarettes. He has a 25.90 pack-year smoking history. He has never used smokeless tobacco.  Nicotine/Tobacco Cessation Plan:   Information offered: Patient not interested at this time      Appropriate preventive services were discussed with this patient, including applicable screening as appropriate for fall prevention, nutrition, physical activity, Tobacco-use cessation, weight loss and cognition.  Checklist reviewing preventive services available has been given to the patient.    Reviewed patients plan of care and provided an AVS. The Complex Care Plan (for patients with higher acuity and needing more deliberate coordination of services) for Gregg meets the Care Plan requirement. This Care Plan has been established and reviewed with the Patient.          Reinier Ritchie MD  [facility] AND [facility]    Identified Health Risks:  I have reviewed Opioid Use Disorder and Substance Use Disorder risk factors and made any needed referrals.     Answers submitted by the patient for this visit:  Patient Health Questionnaire (Submitted on 10/5/2023)  If you checked off any problems, how difficult have these problems made it for you to do your work, take care of things at home, or get along with other people?: Not difficult at all  PHQ9 TOTAL SCORE: 1  TRESSA-7 (Submitted on 10/5/2023)  TRESSA 7 TOTAL SCORE: 0  The patient was counseled and encouraged to consider modifying their diet and eating habits. He was provided with information on recommended healthy diet options.Lung Cancer Screening Shared Decision Making Visit     Gregg Aguayo, a 57 year old male, is eligible for lung cancer screening    History   Smoking Status    Every " Day    Packs/day: 0.70    Years: 37.00    Types: Cigarettes   Smokeless Tobacco    Never       I have discussed with patient the risks and benefits of screening for lung cancer with low-dose CT.     The risks include:    radiation exposure: one low dose chest CT has as much ionizing radiation as about 15 chest x-rays, or 6 months of background radiation living in [place]      false positives: most findings/nodules are NOT cancer, but some might still require additional diagnostic evaluation, including biopsy    over-diagnosis: some slow growing cancers that might never have been clinically significant will be detected and treated unnecessarily     The benefit of early detection of lung cancer is contingent upon adherence to annual screening or more frequent follow up if indicated.     Furthermore, to benefit from screening, Gregg must be willing and able to undergo diagnostic procedures, if indicated. Although no specific guide is available for determining severity of comorbidities, it is reasonable to withhold screening in patients who have greater mortality risk from other diseases.     We did discuss that the best way to prevent lung cancer is to not smoke.    Some patients may value a numeric estimation of lung cancer risk when evaluating if lung cancer screening is right for them, here is one calculator:    ShouldIScreen

## 2023-10-24 ENCOUNTER — HOSPITAL ENCOUNTER (OUTPATIENT)
Dept: MRI IMAGING | Facility: OTHER | Age: 58
Discharge: HOME | End: 2023-10-24
Attending: INTERNAL MEDICINE
Payer: COMMERCIAL

## 2023-10-24 DIAGNOSIS — G89.29 CHRONIC RIGHT HIP PAIN: ICD-10-CM

## 2023-10-24 DIAGNOSIS — M25.611 DECREASED ROM OF RIGHT SHOULDER: ICD-10-CM

## 2023-10-24 DIAGNOSIS — M25.551 CHRONIC RIGHT HIP PAIN: ICD-10-CM

## 2023-10-24 DIAGNOSIS — G89.29 CHRONIC RIGHT SHOULDER PAIN: ICD-10-CM

## 2023-10-24 DIAGNOSIS — M25.511 CHRONIC RIGHT SHOULDER PAIN: ICD-10-CM

## 2023-10-24 PROCEDURE — 73221 MRI JOINT UPR EXTREM W/O DYE: CPT | Mod: RT,MF

## 2023-10-24 PROCEDURE — 73721 MRI JNT OF LWR EXTRE W/O DYE: CPT | Mod: RT,MF

## 2023-11-01 ENCOUNTER — HOSPITAL ENCOUNTER (OUTPATIENT)
Dept: CT IMAGING | Facility: OTHER | Age: 58
Discharge: HOME | End: 2023-11-01
Attending: INTERNAL MEDICINE | Admitting: INTERNAL MEDICINE
Payer: COMMERCIAL

## 2023-11-01 DIAGNOSIS — Z87.891 PERSONAL HISTORY OF TOBACCO USE: ICD-10-CM

## 2023-11-01 PROCEDURE — 71271 CT THORAX LUNG CANCER SCR C-: CPT

## 2023-12-12 DIAGNOSIS — I10 BENIGN ESSENTIAL HYPERTENSION: ICD-10-CM

## 2023-12-12 RX ORDER — HYDROCHLOROTHIAZIDE 12.5 MG/1
12.5 TABLET ORAL DAILY
Qty: 90 TABLET | Refills: 4 | Status: CANCELLED | OUTPATIENT
Start: 2023-12-12

## 2023-12-12 NOTE — TELEPHONE ENCOUNTER
hydrochlorothiazide (HYDRODIURIL) 12.5 MG tablet 90 tablet 4 10/5/2023  No   Sig - Route: Take 1 tablet (12.5 mg) by mouth daily - Oral   Sent to pharmacy as: hydroCHLOROthiazide 12.5 MG Oral Tablet (HYDRODIURIL)   Class: E-Prescribe   Order: [id number]   E-Prescribing Status: Receipt confirmed by pharmacy (10/5/2023  9:31 AM CDT)     [id number] - [place], [place] - [address]     Called and spoke to Patient after verifying last name and date of birth. Pt notified of prescription. Karla Ovalles RN .............. 12/12/2023  11:24 AM

## 2023-12-12 NOTE — TELEPHONE ENCOUNTER
"Called and spoke to Patient after verifying last name and date of birth. Pt states he has appointment with Dr. Ritchie on 1/3/24, to discuss trigger finger, etc. Pt would also like to discuss results of recent chest scan, that he states included a \"swollen aorta.\"    Per 11/1/23 Chest Lung Cancer Screening findings:  Additional findings: Cardiac size is normal. There are atherosclerotic  calcifications of the coronary arteries.  The mid ascending thoracic  aorta is dilated, measuring up to 4.3 cm.    FYI to Dr. Ritchie.    Karla Ovalles RN .............. 12/12/2023  11:28 AM             "

## 2023-12-12 NOTE — TELEPHONE ENCOUNTER
Reason for call: Medication or medication refill    Name of medication requested: hydrochlorothiazide    How many days of medication do you have left? out    What pharmacy do you use? [facility] (main)    Preferred method for responding to this message: Telephone Call    Phone number patient can be reached at: Cell number on file:    Telephone Information:   Mobile [phone]       If we cannot reach you directly, may we leave a detailed response at the number you provided?  zechariah Gardner on 12/12/2023 at 11:17 AM

## 2023-12-29 ENCOUNTER — OFFICE VISIT (OUTPATIENT)
Dept: INTERNAL MEDICINE | Facility: OTHER | Age: 58
End: 2023-12-29
Attending: INTERNAL MEDICINE
Payer: COMMERCIAL

## 2023-12-29 VITALS
TEMPERATURE: 98.1 F | DIASTOLIC BLOOD PRESSURE: 93 MMHG | SYSTOLIC BLOOD PRESSURE: 153 MMHG | HEIGHT: 71 IN | WEIGHT: 305 LBS | HEART RATE: 79 BPM | BODY MASS INDEX: 42.7 KG/M2 | OXYGEN SATURATION: 98 % | RESPIRATION RATE: 19 BRPM

## 2023-12-29 DIAGNOSIS — M65.342 TRIGGER FINGER, LEFT RING FINGER: ICD-10-CM

## 2023-12-29 DIAGNOSIS — E66.01 CLASS 3 SEVERE OBESITY DUE TO EXCESS CALORIES WITH SERIOUS COMORBIDITY AND BODY MASS INDEX (BMI) OF 40.0 TO 44.9 IN ADULT (H): ICD-10-CM

## 2023-12-29 DIAGNOSIS — M65.352 TRIGGER FINGER, LEFT LITTLE FINGER: ICD-10-CM

## 2023-12-29 DIAGNOSIS — M54.2 NECK PAIN ON LEFT SIDE: ICD-10-CM

## 2023-12-29 DIAGNOSIS — I77.810 ASCENDING AORTA DILATATION (H): Primary | ICD-10-CM

## 2023-12-29 DIAGNOSIS — M65.332 TRIGGER FINGER, LEFT MIDDLE FINGER: ICD-10-CM

## 2023-12-29 DIAGNOSIS — M53.3 SACROILIAC JOINT PAIN: ICD-10-CM

## 2023-12-29 DIAGNOSIS — I10 BENIGN ESSENTIAL HYPERTENSION: ICD-10-CM

## 2023-12-29 DIAGNOSIS — E66.813 CLASS 3 SEVERE OBESITY DUE TO EXCESS CALORIES WITH SERIOUS COMORBIDITY AND BODY MASS INDEX (BMI) OF 40.0 TO 44.9 IN ADULT (H): ICD-10-CM

## 2023-12-29 PROCEDURE — 99214 OFFICE O/P EST MOD 30 MIN: CPT | Performed by: INTERNAL MEDICINE

## 2023-12-29 RX ORDER — NAPROXEN SODIUM 220 MG
440 TABLET ORAL 2 TIMES DAILY WITH MEALS
Qty: 360 TABLET | Refills: 4 | Status: SHIPPED | OUTPATIENT
Start: 2023-12-29

## 2023-12-29 RX ORDER — HYDROCHLOROTHIAZIDE 12.5 MG/1
25 TABLET ORAL DAILY
Qty: 180 TABLET | Refills: 1 | Status: SHIPPED | OUTPATIENT
Start: 2023-12-29 | End: 2024-02-09

## 2023-12-29 RX ORDER — SENNOSIDES 8.6 MG
1300 CAPSULE ORAL 2 TIMES DAILY
Qty: 360 TABLET | Refills: 4 | Status: SHIPPED | OUTPATIENT
Start: 2023-12-29

## 2023-12-29 ASSESSMENT — ENCOUNTER SYMPTOMS
HEADACHES: 0
BACK PAIN: 1
DIZZINESS: 0
ARTHRALGIAS: 1
LIGHT-HEADEDNESS: 0

## 2023-12-29 ASSESSMENT — PATIENT HEALTH QUESTIONNAIRE - PHQ9
5. POOR APPETITE OR OVEREATING: NEARLY EVERY DAY
SUM OF ALL RESPONSES TO PHQ QUESTIONS 1-9: 6

## 2023-12-29 ASSESSMENT — ANXIETY QUESTIONNAIRES
7. FEELING AFRAID AS IF SOMETHING AWFUL MIGHT HAPPEN: NOT AT ALL
GAD7 TOTAL SCORE: 8
GAD7 TOTAL SCORE: 8
6. BECOMING EASILY ANNOYED OR IRRITABLE: NOT AT ALL
1. FEELING NERVOUS, ANXIOUS, OR ON EDGE: MORE THAN HALF THE DAYS
IF YOU CHECKED OFF ANY PROBLEMS ON THIS QUESTIONNAIRE, HOW DIFFICULT HAVE THESE PROBLEMS MADE IT FOR YOU TO DO YOUR WORK, TAKE CARE OF THINGS AT HOME, OR GET ALONG WITH OTHER PEOPLE: SOMEWHAT DIFFICULT
5. BEING SO RESTLESS THAT IT IS HARD TO SIT STILL: NEARLY EVERY DAY
3. WORRYING TOO MUCH ABOUT DIFFERENT THINGS: NOT AT ALL
2. NOT BEING ABLE TO STOP OR CONTROL WORRYING: NOT AT ALL

## 2023-12-29 ASSESSMENT — PAIN SCALES - GENERAL: PAINLEVEL: WORST PAIN (10)

## 2023-12-29 NOTE — PATIENT INSTRUCTIONS
Blood pressure is a bit elevated.  Increase hydrochlorothiazide up to 2 tablets or 25 mg daily.    Blood pressure checks at home - check some in AM, some in Afternoon, some in Evening and record   -- bring these with you to your next appointment.     Goal blood pressures -- less than 140 and less than 90.    -- Ideally would like the numbers about 110-130 and 70-80.  -- If running higher or lower than this on regular basis, will need to adjust your medications.          Ascending aorta was mildly enlarged or dilated.  Plan to recheck this with heart ultrasound/echo.  - they will call with date/time of appointment.     Orthopedic referral sent - [facility] - they will call with date/time of appointment.    - Dr. Grissom   Phone: [phone]       Consider for back pain:    [facility] [address] [address]   Spring 2017 Hours  Monday:        8:00 a.m. - 5:00 p.m.  Wednesday:   7:00 a.m. - 4:00 p.m.  Thursday:     8:00 a.m. - 5:00 p.m.   Friday:          8:00 a.m. - 4:00 p.m.        [phone]                    [email or web address]         Return as needed for follow-up for new / worsening symptoms.    Clinic : [phone]  Appointment line: [phone]

## 2023-12-29 NOTE — NURSING NOTE
"Chief Complaint   Patient presents with    Referral     Left Hand Trigger Fingers    Results         Initial BP (!) 153/93 (BP Location: Right arm, Patient Position: Sitting, Cuff Size: Adult Large)   Pulse 79   Temp 98.1  F (36.7  C) (Temporal)   Resp 19   Ht 1.803 m (5' 11\")   Wt 138.3 kg (305 lb)   SpO2 98%   BMI 42.54 kg/m   Estimated body mass index is 42.54 kg/m  as calculated from the following:    Height as of this encounter: 1.803 m (5' 11\").    Weight as of this encounter: 138.3 kg (305 lb).       FOOD SECURITY SCREENING QUESTIONS:    The next two questions are to help us understand your food security.  If you are feeling you need any assistance in this area, we have resources available to support you today.    Hunger Vital Signs:  Within the past 12 months we worried whether our food would run out before we got money to buy more. Never  Within the past 12 months the food we bought just didn't last and we didn't have money to get more. Never  Tawana Randall LPN on 12/29/2023 at 12:56 PM     Tawana Randall   "

## 2023-12-29 NOTE — PROGRESS NOTES
Assessment & Plan     ICD-10-CM    1. Ascending aorta dilatation (H24)  I77.810 Echocardiogram Complete      2. Benign essential hypertension  I10 hydrochlorothiazide (HYDRODIURIL) 12.5 MG tablet     Home Blood Pressure Monitor Order for DME - ONLY FOR DME      3. Trigger finger, left little finger  M65.352 Orthopedic  Referral      4. Trigger finger, left middle finger  M65.332 Orthopedic  Referral      5. Trigger finger, left ring finger  M65.342 Orthopedic  Referral      6. Sacroiliac joint pain  M53.3 acetaminophen (TYLENOL) 650 MG CR tablet     naproxen sodium (ANAPROX) 220 MG tablet     CANCELED: Physical Therapy Referral      7. Neck pain on left side  M54.2 acetaminophen (TYLENOL) 650 MG CR tablet     naproxen sodium (ANAPROX) 220 MG tablet      8. Class 3 severe obesity due to excess calories with serious comorbidity and body mass index (BMI) of 40.0 to 44.9 in adult (H)  E66.01     Z68.41         Patient presents for follow-up multiple issues.    Ascending aortic dilatation.  Noted on recent lung cancer screening CT.  Recommendation to proceed with echocardiogram.  Orders placed.    Hypertension, has some chronic lower extremity edema.  Blood pressures are elevated today.  Recently started on hydrochlorothiazide 12 and 5 mg daily.  Increase dose up to 25 mg or 2 tablets daily.  Prescription updated.  Recommend monitoring blood pressures at home, DME order entered for home blood pressure monitor.    Left hand has trigger fingers of the third fourth and fifth finger.  Hand surgery referral placed.    Neck pain and sacroiliac joint pain, initially ordered physical therapy referral, patient states it was not very helpful previously.  Recommend starting trial of Tylenol arthritis and naproxen.  Prescription updated and sent to pharmacy.    HYPERTENSION - Ongoing. Blood pressure is NOT currently well controlled.  Medication side effects: None. Denies syncope or presyncope.    --  Increase HCTZ.   Medication list reviewed/updated. Refills completed as needed.      OBESITY - Ongoing.  (See Encounter Diagnosis list above for obesity class / severity).  - Counseled on diet and exercise.   - Recommend Nutrition / Dietician appointment.  - Weight loss would improve Hypertension, Cholesterol.       Return for follow-up as needed for new or worsening symptoms, Appointments: [phone].    Reinier Ritchie MD  [facility] AND HOSPITAL     Subjective   Juve is a 58 year old, presenting for the following health issues:  Referral (Left Hand Trigger Fingers), Results, and Back Pain      History of Present Illness       Back Pain:  He presents for follow up of back pain. Patient's back pain is a chronic problem.  Location of back pain:  Right lower back, left lower back, left side of neck and right shoulder  Description of back pain: sharp, shooting and stabbing  Back pain spreads: right buttocks, left buttocks, right thigh, left thigh, right shoulder and left side of neck    Since patient first noticed back pain, pain is: rapidly worsening  Does back pain interfere with his job:  No       Hypertension: He presents for follow up of hypertension.  He does not check blood pressure  regularly outside of the clinic. Outside blood pressures have been over 140/90. He follows a low salt diet.     He eats 0-1 servings of fruits and vegetables daily.He consumes 2 sweetened beverage(s) daily.He exercises with enough effort to increase his heart rate 20 to 29 minutes per day.  He exercises with enough effort to increase his heart rate 5 days per week.   He is taking medications regularly.     Review of Systems   Musculoskeletal:  Positive for arthralgias (trigger fingers - left - 3,4,5th fingers), back pain and gait problem.   Neurological:  Negative for dizziness, light-headedness and headaches.          Objective    BP (!) 153/93   Pulse 79   Temp 98.1  F (36.7  C) (Temporal)   Resp 19   Ht 1.803 m  "(5' 11\")   Wt 138.3 kg (305 lb)   SpO2 98%   BMI 42.54 kg/m    Body mass index is 42.54 kg/m .  Physical Exam  Constitutional:       Appearance: He is obese.   Eyes:      Conjunctiva/sclera: Conjunctivae normal.   Cardiovascular:      Rate and Rhythm: Normal rate and regular rhythm.   Pulmonary:      Effort: Pulmonary effort is normal.   Abdominal:      Palpations: Abdomen is soft.   Musculoskeletal:         General: Tenderness (left neck muscle tenderness) and deformity (trigger finger x3 on left hand) present.   Skin:     General: Skin is warm and dry.      Findings: No rash.   Neurological:      Mental Status: He is alert. Mental status is at baseline.   Psychiatric:         Mood and Affect: Mood normal.                        "

## 2024-01-09 ENCOUNTER — HOSPITAL ENCOUNTER (OUTPATIENT)
Dept: CARDIOLOGY | Facility: OTHER | Age: 59
Discharge: HOME | End: 2024-01-09
Attending: INTERNAL MEDICINE | Admitting: INTERNAL MEDICINE
Payer: COMMERCIAL

## 2024-01-09 DIAGNOSIS — I77.810 ASCENDING AORTA DILATATION (H): ICD-10-CM

## 2024-01-09 LAB — LVEF ECHO: NORMAL

## 2024-01-09 PROCEDURE — 93306 TTE W/DOPPLER COMPLETE: CPT

## 2024-01-09 PROCEDURE — 93306 TTE W/DOPPLER COMPLETE: CPT | Mod: 26 | Performed by: INTERNAL MEDICINE

## 2024-01-25 ENCOUNTER — TRANSFERRED RECORDS (OUTPATIENT)
Dept: HEALTH INFORMATION MANAGEMENT | Facility: OTHER | Age: 59
End: 2024-01-25
Payer: COMMERCIAL

## 2024-02-09 ENCOUNTER — OFFICE VISIT (OUTPATIENT)
Dept: INTERNAL MEDICINE | Facility: OTHER | Age: 59
End: 2024-02-09
Attending: INTERNAL MEDICINE
Payer: COMMERCIAL

## 2024-02-09 VITALS
TEMPERATURE: 98 F | HEART RATE: 75 BPM | HEIGHT: 71 IN | SYSTOLIC BLOOD PRESSURE: 130 MMHG | BODY MASS INDEX: 41.77 KG/M2 | OXYGEN SATURATION: 96 % | RESPIRATION RATE: 16 BRPM | DIASTOLIC BLOOD PRESSURE: 78 MMHG | WEIGHT: 298.4 LBS

## 2024-02-09 DIAGNOSIS — M65.342 TRIGGER FINGER, LEFT RING FINGER: ICD-10-CM

## 2024-02-09 DIAGNOSIS — I10 BENIGN ESSENTIAL HYPERTENSION: ICD-10-CM

## 2024-02-09 DIAGNOSIS — Z01.818 PREOP GENERAL PHYSICAL EXAM: Primary | ICD-10-CM

## 2024-02-09 DIAGNOSIS — E66.01 CLASS 3 SEVERE OBESITY DUE TO EXCESS CALORIES WITH SERIOUS COMORBIDITY AND BODY MASS INDEX (BMI) OF 40.0 TO 44.9 IN ADULT (H): ICD-10-CM

## 2024-02-09 DIAGNOSIS — E78.2 MIXED HYPERLIPIDEMIA: ICD-10-CM

## 2024-02-09 DIAGNOSIS — M65.332 TRIGGER FINGER, LEFT MIDDLE FINGER: ICD-10-CM

## 2024-02-09 DIAGNOSIS — G47.33 OSA ON CPAP: ICD-10-CM

## 2024-02-09 DIAGNOSIS — D84.9 IMMUNOSUPPRESSION (H): ICD-10-CM

## 2024-02-09 DIAGNOSIS — I49.3 SYMPTOMATIC PREMATURE VENTRICULAR CONTRACTIONS: ICD-10-CM

## 2024-02-09 DIAGNOSIS — E66.813 CLASS 3 SEVERE OBESITY DUE TO EXCESS CALORIES WITH SERIOUS COMORBIDITY AND BODY MASS INDEX (BMI) OF 40.0 TO 44.9 IN ADULT (H): ICD-10-CM

## 2024-02-09 DIAGNOSIS — M35.3 POLYMYALGIA RHEUMATICA (H): ICD-10-CM

## 2024-02-09 DIAGNOSIS — M65.352 TRIGGER FINGER, LEFT LITTLE FINGER: ICD-10-CM

## 2024-02-09 DIAGNOSIS — Z72.0 TOBACCO ABUSE: ICD-10-CM

## 2024-02-09 LAB
ALBUMIN SERPL BCG-MCNC: 4.5 G/DL (ref 3.5–5.2)
ALP SERPL-CCNC: 57 U/L (ref 40–150)
ALT SERPL W P-5'-P-CCNC: 34 U/L (ref 0–70)
ANION GAP SERPL CALCULATED.3IONS-SCNC: 13 MMOL/L (ref 7–15)
AST SERPL W P-5'-P-CCNC: 40 U/L (ref 0–45)
BASOPHILS # BLD AUTO: 0.1 10E3/UL (ref 0–0.2)
BASOPHILS NFR BLD AUTO: 2 %
BILIRUB SERPL-MCNC: 0.6 MG/DL
BUN SERPL-MCNC: 11.7 MG/DL (ref 8–23)
CALCIUM SERPL-MCNC: 9.9 MG/DL (ref 8.8–10.2)
CHLORIDE SERPL-SCNC: 102 MMOL/L (ref 98–107)
CREAT SERPL-MCNC: 0.79 MG/DL (ref 0.67–1.17)
DEPRECATED HCO3 PLAS-SCNC: 24 MMOL/L (ref 22–29)
EGFRCR SERPLBLD CKD-EPI 2021: >90 ML/MIN/1.73M2
EOSINOPHIL # BLD AUTO: 0.4 10E3/UL (ref 0–0.7)
EOSINOPHIL NFR BLD AUTO: 7 %
ERYTHROCYTE [DISTWIDTH] IN BLOOD BY AUTOMATED COUNT: 12.5 % (ref 10–15)
GLUCOSE SERPL-MCNC: 112 MG/DL (ref 70–99)
HCT VFR BLD AUTO: 50.6 % (ref 40–53)
HGB BLD-MCNC: 17.6 G/DL (ref 13.3–17.7)
IMM GRANULOCYTES # BLD: 0 10E3/UL
IMM GRANULOCYTES NFR BLD: 1 %
LYMPHOCYTES # BLD AUTO: 1.5 10E3/UL (ref 0.8–5.3)
LYMPHOCYTES NFR BLD AUTO: 26 %
MCH RBC QN AUTO: 34 PG (ref 26.5–33)
MCHC RBC AUTO-ENTMCNC: 34.8 G/DL (ref 31.5–36.5)
MCV RBC AUTO: 98 FL (ref 78–100)
MONOCYTES # BLD AUTO: 0.6 10E3/UL (ref 0–1.3)
MONOCYTES NFR BLD AUTO: 10 %
NEUTROPHILS # BLD AUTO: 3.2 10E3/UL (ref 1.6–8.3)
NEUTROPHILS NFR BLD AUTO: 54 %
NRBC # BLD AUTO: 0 10E3/UL
NRBC BLD AUTO-RTO: 0 /100
PLATELET # BLD AUTO: 172 10E3/UL (ref 150–450)
POTASSIUM SERPL-SCNC: 4.7 MMOL/L (ref 3.4–5.3)
PROT SERPL-MCNC: 7.8 G/DL (ref 6.4–8.3)
RBC # BLD AUTO: 5.18 10E6/UL (ref 4.4–5.9)
SODIUM SERPL-SCNC: 139 MMOL/L (ref 135–145)
WBC # BLD AUTO: 5.8 10E3/UL (ref 4–11)

## 2024-02-09 PROCEDURE — 36415 COLL VENOUS BLD VENIPUNCTURE: CPT | Mod: ZL | Performed by: INTERNAL MEDICINE

## 2024-02-09 PROCEDURE — 93000 ELECTROCARDIOGRAM COMPLETE: CPT | Performed by: INTERNAL MEDICINE

## 2024-02-09 PROCEDURE — 85004 AUTOMATED DIFF WBC COUNT: CPT | Mod: ZL | Performed by: INTERNAL MEDICINE

## 2024-02-09 PROCEDURE — 80053 COMPREHEN METABOLIC PANEL: CPT | Mod: ZL | Performed by: INTERNAL MEDICINE

## 2024-02-09 PROCEDURE — 99214 OFFICE O/P EST MOD 30 MIN: CPT | Performed by: INTERNAL MEDICINE

## 2024-02-09 RX ORDER — METOPROLOL SUCCINATE 25 MG/1
TABLET, EXTENDED RELEASE ORAL
Qty: 360 TABLET | Refills: 4 | Status: SHIPPED | OUTPATIENT
Start: 2024-02-09

## 2024-02-09 RX ORDER — HYDROCHLOROTHIAZIDE 12.5 MG/1
25 TABLET ORAL DAILY
Qty: 180 TABLET | Refills: 4 | Status: SHIPPED | OUTPATIENT
Start: 2024-02-09

## 2024-02-09 ASSESSMENT — ENCOUNTER SYMPTOMS
JOINT SWELLING: 0
HEMATURIA: 0
MYALGIAS: 1
ABDOMINAL PAIN: 0
ADENOPATHY: 0
CHILLS: 0
WOUND: 0
ARTHRALGIAS: 1
CONFUSION: 0
FEVER: 0
SHORTNESS OF BREATH: 0
BACK PAIN: 1
CHEST TIGHTNESS: 0
BRUISES/BLEEDS EASILY: 0
COLOR CHANGE: 0
LIGHT-HEADEDNESS: 0
SLEEP DISTURBANCE: 1
HEADACHES: 0
PALPITATIONS: 1
DIZZINESS: 0

## 2024-02-09 ASSESSMENT — PAIN SCALES - GENERAL: PAINLEVEL: EXTREME PAIN (8)

## 2024-02-09 NOTE — PATIENT INSTRUCTIONS
Labs and EKG today.     Increase Metoprolol dose... see instructions.     Return as needed for follow-up with Dr. Ritchie.    Clinic : [phone]  Appointment line: [phone]

## 2024-02-09 NOTE — PROGRESS NOTES
Preoperative Evaluation  [facility]  [address]  [place] [address]  Phone: [phone]  Fax: [phone]  Primary Provider: Juan C Ritchie  Pre-op Performing Provider: JUAN C RITCHIE  Feb 9, 2024       Juve is a 58 year old, presenting for the following:  Pre-Op Exam (Dr. Grissom,  [facility], Hand surgery  02/28/2024  fax [phone])        2/9/2024     7:53 AM   Additional Questions   Roomed by Soto Meng LPN     Surgical Information  Surgery/Procedure: Hand surgery  Surgery Location: [facility]  Surgeon: Jaciel Suarez  Surgery Date: 02/28/2024  Time of Surgery: TBD  Where patient plans to recover: At home with family  Fax number for surgical facility: [phone]    Assessment & Plan     The proposed surgical procedure is considered LOW risk.      ICD-10-CM    1. Preop general physical exam  Z01.818       2. Trigger finger, left little finger  M65.352       3. Trigger finger, left middle finger  M65.332       4. Trigger finger, left ring finger  M65.342       5. Benign essential hypertension  I10 CBC with Platelets & Differential     Comprehensive metabolic panel     EKG 12-lead, tracing only     hydroCHLOROthiazide (HYDRODIURIL) 12.5 MG tablet     Comprehensive metabolic panel     CBC with Platelets & Differential      6. Class 3 severe obesity due to excess calories with serious comorbidity and body mass index (BMI) of 40.0 to 44.9 in adult (H)  E66.01     Z68.41       7. AILEEN on CPAP - uses nightly, finds very helpful  G47.33       8. Tobacco abuse  Z72.0       9. Polymyalgia rheumatica (H24)  M35.3       10. Immunosuppression (H24)  D84.9       11. Mixed hyperlipidemia  E78.2       12. Symptomatic premature ventricular contractions  I49.3 CBC with Platelets & Differential     Comprehensive metabolic panel     EKG 12-lead, tracing only     metoprolol succinate ER (TOPROL XL) 25 MG 24 hr tablet     Comprehensive metabolic panel     CBC with Platelets  & Differential      HPI related to upcoming procedure: Patient has trigger fingers of left third fourth and fifth fingers and is now scheduled for surgery.  Fifth finger has been especially locking up on him.    Check labs and EKG today.    HYPERTENSION - Ongoing. Blood pressure is currently well controlled.  Medication side effects: None. Denies syncope or presyncope.  Continue current medications.   Medication list reviewed/updated. Refills completed as needed.      OBESITY - Ongoing.  (See Encounter Diagnosis list above for obesity class / severity).  - Encourage continued maintenance / improvement in diet and exercise.   - Consider Nutrition / Dietician appointment.  - Weight loss would improve Hypertension, Cholesterol.      Sleep Apnea, chronic, ongoing.  Uses CPAP nightly.  Finds helpful and beneficial.  Encouraged continued use.    + PVCs - increase dose of metoprolol.  Currently taking 50 mg in the evening.  We will increase to 25 mg in the morning and 50 mg in the evening.    Tobacco abuse.  Ongoing.  Continues to work on smoking cessation.  He is not ready to fully quit at this time.    Polymyalgia rheumatica, chronic immunosuppression without Plaquenil.  No changes for now.    MIXED HYPERLIPIDEMIA.  Ongoing. LDL is at goal: No. Triglycerides are at goal: No.  Hopefully lifestyle modifications will improve cholesterol levels, otherwise will consider additional medication dose adjustments or medication changes.    Recent Labs   Lab Test 03/31/21  1226   CHOL 197   HDL 38   *   TRIG 275*           - No identified additional risk factors other than previously addressed    Antiplatelet or Anticoagulation Medication Instructions   - Patient is on no antiplatelet or anticoagulation medications.    Additional Medication Instructions  Patient is to take all scheduled medications on the day of surgery    Recommendation  APPROVAL GIVEN to proceed with proposed procedure, without further diagnostic  evaluation.    Subjective           2/9/2024     7:58 AM   Preop Questions   1. Have you ever had a heart attack or stroke? No   2. Have you ever had surgery on your heart or blood vessels, such as a stent placement, a coronary artery bypass, or surgery on an artery in your head, neck, heart, or legs? No   3. Do you have chest pain with activity? No   4. Do you have a history of  heart failure? No   5. Do you currently have a cold, bronchitis or symptoms of other infection? No   6. Do you have a cough, shortness of breath, or wheezing? No   7. Do you or anyone in your family have previous history of blood clots? No   8. Do you or does anyone in your family have a serious bleeding problem such as prolonged bleeding following surgeries or cuts? No   9. Have you ever had problems with anemia or been told to take iron pills? No   10. Have you had any abnormal blood loss such as black, tarry or bloody stools? No   11. Have you ever had a blood transfusion? No   12. Are you willing to have a blood transfusion if it is medically needed before, during, or after your surgery? Yes   13. Have you or any of your relatives ever had problems with anesthesia? No   14. Do you have sleep apnea, excessive snoring or daytime drowsiness? YES - Uses CPAP   14a. Do you have a CPAP machine? Yes   15. Do you have any artifical heart valves or other implanted medical devices like a pacemaker, defibrillator, or continuous glucose monitor? No   16. Do you have artificial joints? YES - Bilateral knees   17. Are you allergic to latex? No     Health Care Directive  Patient does not have a Health Care Directive or Living Will: Discussed advance care planning with patient; however, patient declined at this time.    Preoperative Review of    reviewed - no record of controlled substances prescribed.      Patient Active Problem List    Diagnosis Date Noted    Mixed hyperlipidemia 02/09/2024     Priority: Medium    Ascending aorta dilatation  (H24) 12/29/2023     Priority: Medium    Trigger finger, left middle finger 12/29/2023     Priority: Medium    Trigger finger, left ring finger 12/29/2023     Priority: Medium    Sacroiliac joint pain 12/29/2023     Priority: Medium    Chronic right shoulder pain 10/05/2023     Priority: Medium    Chronic right hip pain 10/05/2023     Priority: Medium    Chronic left shoulder pain 10/04/2022     Priority: Medium    Lumbar back pain with radiculopathy affecting left lower extremity 10/04/2022     Priority: Medium    Benign essential hypertension 10/14/2021     Priority: Medium    Change in hearing of right ear 09/14/2021     Priority: Medium    Immunosuppression (H24) 09/14/2021     Priority: Medium    Class 3 severe obesity due to excess calories with serious comorbidity and body mass index (BMI) of 40.0 to 44.9 in adult (H) 12/04/2020     Priority: Medium    Crepitus of joint of right knee 09/11/2020     Priority: Medium    S/P lumbar discectomy 05/18/2020     Priority: Medium    Symptomatic premature ventricular contractions 04/15/2019     Priority: Medium    Trigger finger, left little finger 08/30/2018     Priority: Medium    Heartburn 07/25/2018     Priority: Medium    Psychosexual dysfunction with inhibited sexual excitement 01/29/2018     Priority: Medium    Tobacco abuse 01/29/2018     Priority: Medium    AILEEN on CPAP - uses nightly, finds very helpful 11/20/2017     Priority: Medium    Bilateral leg edema 07/05/2017     Priority: Medium    Polymyalgia rheumatica (H24) 11/11/2016     Priority: Medium    Arthralgia of multiple joints 10/26/2016     Priority: Medium      Past Medical History:   Diagnosis Date    Cigarette smoker     Erectile dysfunction     GERD (gastroesophageal reflux disease)     Lipoma of torso 9/14/2021    AILEEN on CPAP     PMR (polymyalgia rheumatica) (H24)      Past Surgical History:   Procedure Laterality Date    APPENDECTOMY OPEN  2005    ARTHROPLASTY KNEE Left     ARTHROSCOPY KNEE Left  3/19/2018    Procedure: ARTHROSCOPY KNEE;  Left Knee Arthroscopy, Partial Meniscectomy, Chondroplasty;  Surgeon: Kwabena Patricia DO;  Location: [facility] OR    ARTHROSCOPY KNEE WITH MENISCAL REPAIR Right 12/11/2020    Procedure: Rt Knee Arthroscopy, partial Medial Meniscectomy Debridement.;  Surgeon: Monster Grissom MD;  Location: [facility] OR    ATTEMPTED ARTHROSCOPY      knee ? left    COLONOSCOPY  01/29/2014 1/29/2014,Tubular adenoma of rectum - follow up 5 years    COLONOSCOPY  02/01/2019    normal with diverticula, follow up 10 years, 2/1/29    COLONOSCOPY N/A 2/1/2019    normal, follow up 2/1/29    JOINT REPLACEMENT Left 11/2018    RELEASE CARPAL TUNNEL Right 11/22/2017    RELEASE CARPAL TUNNEL Left     12/21/2017,[id number],OTHER,Left    RELEASE TRIGGER FINGER Right 9/7/2018    RELEASE ULNAR NERVE (ELBOW) Left 12/21/2017     Current Outpatient Medications   Medication Sig Dispense Refill    acetaminophen (TYLENOL) 650 MG CR tablet Take 2 tablets (1,300 mg) by mouth 2 times daily 360 tablet 4    fexofenadine (ALLEGRA) 180 MG tablet Take 1 tablet (180 mg) by mouth every evening - for ears / congestion (Patient taking differently: Take 180 mg by mouth as needed - for ears / congestion) 90 tablet 4    furosemide (LASIX) 20 MG tablet Take 2 tablets (40 mg) by mouth daily 180 tablet 4    hydroCHLOROthiazide (HYDRODIURIL) 12.5 MG tablet Take 2 tablets (25 mg) by mouth daily 180 tablet 4    hydroxychloroquine (PLAQUENIL) 200 MG tablet Take 400 mg by mouth      metoprolol succinate ER (TOPROL XL) 25 MG 24 hr tablet Take 1-2 tablets (25-50 mg) by mouth daily before breakfast AND 2 tablets (50 mg) every evening. -- adjust dose as needed for heart palpitations 360 tablet 4    omeprazole (PRILOSEC) 40 MG DR capsule Take 1 capsule (40 mg) by mouth daily - Take 30 to 60 minutes prior to a meal 90 capsule 4    sildenafil (VIAGRA) 100 MG tablet Take 1/4 to 1 tablet 30 min to 4 hours prior to sex for ED 30 tablet 11    naproxen sodium  "(ANAPROX) 220 MG tablet Take 2 tablets (440 mg) by mouth 2 times daily (with meals) -- Take with food -- Limit use as able 360 tablet 4       Allergies   Allergen Reactions    Chantix [Varenicline] Other (See Comments)     Didn't tolerate        Social History     Tobacco Use    Smoking status: Every Day     Packs/day: 0.70     Years: 37.00     Additional pack years: 0.00     Total pack years: 25.90     Types: Cigarettes     Start date: 1999    Smokeless tobacco: Never    Tobacco comments:     Tried Chantix, didn't tolerate it.   Substance Use Topics    Alcohol use: Yes     Comment: occasional - 5 a week     History   Drug Use No         Review of Systems   Constitutional:  Negative for chills and fever.   HENT:  Negative for congestion, hearing loss and tinnitus.    Eyes:  Negative for visual disturbance.   Respiratory:  Negative for chest tightness and shortness of breath.    Cardiovascular:  Positive for palpitations (infrequent) and peripheral edema (chronic). Negative for chest pain.   Gastrointestinal:  Negative for abdominal pain.   Genitourinary:  Negative for hematuria.   Musculoskeletal:  Positive for arthralgias (trigger fingers - left - 3,4,5th fingers), back pain, gait problem and myalgias (right shoulder). Negative for joint swelling.   Skin:  Negative for color change, rash and wound.   Allergic/Immunologic: Positive for environmental allergies and immunocompromised state.   Neurological:  Negative for dizziness, syncope, light-headedness and headaches.   Hematological:  Negative for adenopathy. Does not bruise/bleed easily.   Psychiatric/Behavioral:  Positive for sleep disturbance. Negative for confusion.    All other systems reviewed and are negative.      Objective    /78 (BP Location: Right arm, Patient Position: Sitting, Cuff Size: Adult Large)   Pulse 75   Temp 98  F (36.7  C) (Temporal)   Resp 16   Ht 1.803 m (5' 11\")   Wt 135.4 kg (298 lb 6.4 oz)   SpO2 96%   BMI 41.62 kg/m   " "  Estimated body mass index is 41.62 kg/m  as calculated from the following:    Height as of this encounter: 1.803 m (5' 11\").    Weight as of this encounter: 135.4 kg (298 lb 6.4 oz).  Physical Exam  Vitals reviewed.   Constitutional:       General: He is not in acute distress.     Appearance: Normal appearance. He is well-developed. He is obese. He is not toxic-appearing or diaphoretic.   HENT:      Head: Normocephalic and atraumatic.   Eyes:      General: No scleral icterus.     Conjunctiva/sclera: Conjunctivae normal.   Neck:      Vascular: No carotid bruit.   Cardiovascular:      Rate and Rhythm: Normal rate and regular rhythm.      Pulses: Normal pulses.      Heart sounds: Normal heart sounds. No murmur heard.  Pulmonary:      Effort: Pulmonary effort is normal.      Breath sounds: Normal breath sounds.   Abdominal:      Palpations: Abdomen is soft.      Tenderness: There is no abdominal tenderness.   Musculoskeletal:         General: Tenderness (left neck muscle tenderness) and deformity (trigger finger x3 on left hand) present.      Right shoulder: Tenderness present. No swelling or deformity. Decreased range of motion. Decreased strength.      Left shoulder: Normal.      Cervical back: Neck supple.      Right hip: Normal. No tenderness or bony tenderness. Normal range of motion. Normal strength.      Right lower leg: Edema ( trace) present.      Left lower leg: Edema ( trace) present.      Comments: Right shoulder pain and ROM limitation.   Lymphadenopathy:      Cervical: No cervical adenopathy.   Skin:     General: Skin is warm and dry.      Findings: No rash.   Neurological:      Mental Status: He is alert. Mental status is at baseline.   Psychiatric:         Mood and Affect: Mood normal.         Behavior: Behavior normal.           Diagnostics  Results for orders placed or performed in visit on 02/09/24   Comprehensive metabolic panel     Status: Abnormal   Result Value Ref Range    Sodium 139 135 - 145 " mmol/L    Potassium 4.7 3.4 - 5.3 mmol/L    Carbon Dioxide (CO2) 24 22 - 29 mmol/L    Anion Gap 13 7 - 15 mmol/L    Urea Nitrogen 11.7 8.0 - 23.0 mg/dL    Creatinine 0.79 0.67 - 1.17 mg/dL    GFR Estimate >90 >60 mL/min/1.73m2    Calcium 9.9 8.8 - 10.2 mg/dL    Chloride 102 98 - 107 mmol/L    Glucose 112 (H) 70 - 99 mg/dL    Alkaline Phosphatase 57 40 - 150 U/L    AST 40 0 - 45 U/L    ALT 34 0 - 70 U/L    Protein Total 7.8 6.4 - 8.3 g/dL    Albumin 4.5 3.5 - 5.2 g/dL    Bilirubin Total 0.6 <=1.2 mg/dL   CBC with platelets and differential     Status: Abnormal   Result Value Ref Range    WBC Count 5.8 4.0 - 11.0 10e3/uL    RBC Count 5.18 4.40 - 5.90 10e6/uL    Hemoglobin 17.6 13.3 - 17.7 g/dL    Hematocrit 50.6 40.0 - 53.0 %    MCV 98 78 - 100 fL    MCH 34.0 (H) 26.5 - 33.0 pg    MCHC 34.8 31.5 - 36.5 g/dL    RDW 12.5 10.0 - 15.0 %    Platelet Count 172 150 - 450 10e3/uL    % Neutrophils 54 %    % Lymphocytes 26 %    % Monocytes 10 %    % Eosinophils 7 %    % Basophils 2 %    % Immature Granulocytes 1 %    NRBCs per 100 WBC 0 <1 /100    Absolute Neutrophils 3.2 1.6 - 8.3 10e3/uL    Absolute Lymphocytes 1.5 0.8 - 5.3 10e3/uL    Absolute Monocytes 0.6 0.0 - 1.3 10e3/uL    Absolute Eosinophils 0.4 0.0 - 0.7 10e3/uL    Absolute Basophils 0.1 0.0 - 0.2 10e3/uL    Absolute Immature Granulocytes 0.0 <=0.4 10e3/uL    Absolute NRBCs 0.0 10e3/uL   EKG 12-lead, tracing only     Status: None (Preliminary result)   Result Value Ref Range    Systolic Blood Pressure  mmHg    Diastolic Blood Pressure  mmHg    Ventricular Rate 64 BPM    Atrial Rate 64 BPM    WI Interval 190 ms    QRS Duration 98 ms     ms    QTc 435 ms    P Axis 0 degrees    R AXIS 52 degrees    T Axis 28 degrees    Interpretation ECG       Sinus rhythm  Normal ECG  When compared with ECG of 04-JAN-2023 10:21,  No significant change was found     CBC with Platelets & Differential     Status: Abnormal    Narrative    The following orders were created for panel  order CBC with Platelets & Differential.  Procedure                               Abnormality         Status                     ---------                               -----------         ------                     CBC with platelets and d...[[id number]]  Abnormal            Final result                 Please view results for these tests on the individual orders.      Random glucose elevated at 112, otherwise metabolic panel normal.  CBC normal.    EKG 2/9/2024 -normal EKG with a rate of 64.    Revised Cardiac Risk Index (RCRI)  The patient has the following serious cardiovascular risks for perioperative complications:   - No serious cardiac risks = 0 points     RCRI Interpretation: 0 points: Class I (very low risk - 0.4% complication rate)         Signed Electronically by: Reinier Ritchie MD  Copy of this evaluation report is provided to requesting physician.

## 2024-02-09 NOTE — NURSING NOTE
"Chief Complaint   Patient presents with    Pre-Op Exam     Dr. Grissom,  [facility], Hand surgery  02/28/2024  fax [phone]       Initial /78 (BP Location: Right arm, Patient Position: Sitting, Cuff Size: Adult Large)   Pulse 75   Temp 98  F (36.7  C) (Temporal)   Resp 16   Ht 1.803 m (5' 11\")   Wt 135.4 kg (298 lb 6.4 oz)   SpO2 96%   BMI 41.62 kg/m   Estimated body mass index is 41.62 kg/m  as calculated from the following:    Height as of this encounter: 1.803 m (5' 11\").    Weight as of this encounter: 135.4 kg (298 lb 6.4 oz).  Medication Review: complete    The next two questions are to help us understand your food security.  If you are feeling you need any assistance in this area, we have resources available to support you today.          12/29/2023   SDOH- Food Insecurity   Within the past 12 months, did you worry that your food would run out before you got money to buy more? N   Within the past 12 months, did the food you bought just not last and you didn t have money to get more? N         Health Care Directive:  Patient does not have a Health Care Directive or Living Will: Discussed advance care planning with patient; however, patient declined at this time.    Soto Zaragoza      "

## 2024-02-13 LAB
ATRIAL RATE - MUSE: 64 BPM
DIASTOLIC BLOOD PRESSURE - MUSE: NORMAL MMHG
INTERPRETATION ECG - MUSE: NORMAL
P AXIS - MUSE: 0 DEGREES
PR INTERVAL - MUSE: 190 MS
QRS DURATION - MUSE: 98 MS
QT - MUSE: 422 MS
QTC - MUSE: 435 MS
R AXIS - MUSE: 52 DEGREES
SYSTOLIC BLOOD PRESSURE - MUSE: NORMAL MMHG
T AXIS - MUSE: 28 DEGREES
VENTRICULAR RATE- MUSE: 64 BPM

## 2024-03-08 ENCOUNTER — OFFICE VISIT (OUTPATIENT)
Dept: ORTHOPEDICS | Facility: OTHER | Age: 59
End: 2024-03-08
Attending: SPECIALIST
Payer: COMMERCIAL

## 2024-03-08 DIAGNOSIS — M65.30 TRIGGER FINGER, ACQUIRED: Primary | ICD-10-CM

## 2024-03-08 PROCEDURE — 99207 PR NO CHARGE NURSE ONLY: CPT

## 2024-08-09 ENCOUNTER — OFFICE VISIT (OUTPATIENT)
Dept: FAMILY MEDICINE | Facility: OTHER | Age: 59
End: 2024-08-09
Attending: NURSE PRACTITIONER
Payer: COMMERCIAL

## 2024-08-09 VITALS
HEART RATE: 59 BPM | DIASTOLIC BLOOD PRESSURE: 86 MMHG | WEIGHT: 297 LBS | BODY MASS INDEX: 41.42 KG/M2 | TEMPERATURE: 97.1 F | OXYGEN SATURATION: 97 % | SYSTOLIC BLOOD PRESSURE: 142 MMHG

## 2024-08-09 DIAGNOSIS — H66.001 NON-RECURRENT ACUTE SUPPURATIVE OTITIS MEDIA OF RIGHT EAR WITHOUT SPONTANEOUS RUPTURE OF TYMPANIC MEMBRANE: ICD-10-CM

## 2024-08-09 DIAGNOSIS — L91.8 SKIN TAG: Primary | ICD-10-CM

## 2024-08-09 PROCEDURE — 99213 OFFICE O/P EST LOW 20 MIN: CPT | Performed by: NURSE PRACTITIONER

## 2024-08-09 NOTE — PROGRESS NOTES
"  Assessment & Plan   Problem List Items Addressed This Visit    None  Visit Diagnoses       Skin tag    -  Primary    Relevant Orders    Adult Dermatology  Referral    Non-recurrent acute suppurative otitis media of right ear without spontaneous rupture of tympanic membrane        Relevant Medications    amoxicillin-clavulanate (AUGMENTIN) 875-125 MG tablet             1. Skin tag  - Adult Dermatology  Referral; Future  Considered removal in office but limited on time. There are many skin tags, probably 15-20 in each armpit approximately. I have referred him to dermatology to have these removed. He is receptive to going to derm for this.     2. Non-recurrent acute suppurative otitis media of right ear without spontaneous rupture of tympanic membrane  Significant acute otitis media, treating with augmentin. No allergies to penicillin.   - amoxicillin-clavulanate (AUGMENTIN) 875-125 MG tablet; Take 1 tablet by mouth 2 times daily for 10 days  Dispense: 20 tablet; Refill: 0       BMI  Estimated body mass index is 41.42 kg/m  as calculated from the following:    Height as of 2/9/24: 1.803 m (5' 11\").    Weight as of this encounter: 134.7 kg (297 lb).   Weight management plan: Discussed healthy diet and exercise guidelines    Work on weight loss  Regular exercise      No follow-ups on file.      Osei An is a 58 year old, presenting for the following health issues:  Derm Problem and Ear Problem      8/9/2024     9:07 AM   Additional Questions   Roomed by Marlyn MEJÍA CMA     History of Present Illness       Reason for visit:  Ear ache tags to be removed  Symptom onset:  3-7 days ago  Symptom intensity:  Severe  Symptom progression:  Worsening  Had these symptoms before:  Yes  Has tried/received treatment for these symptoms:  No    He eats 2-3 servings of fruits and vegetables daily.He consumes 1 sweetened beverage(s) daily.He exercises with enough effort to increase his heart rate 30 to 60 minutes " "per day.  He exercises with enough effort to increase his heart rate 4 days per week.   He is taking medications regularly.       He went to [facility] and was sick whole time he was there starting around Sunday. The right ear is so painful, draining, swollen shut, and the face by the ear is even swollen. No fevers. No cough or other URI symptoms at this time, all in the ear. Lots of popping. No significant history of ear infections for many years.  He just started taking allegra. He got his hearing checked across the street a couple years ago and was told he had significant hearing loss and was near \"deaf.\" Does not wear hearing aids.     Lots of swimming as of late, but states he has \"never had swimmer's ear.\" The pain is deep inside his ear.     Also mentions his right armpit skin tags are bothersome, there are many on both armpits but the right is worse. They get all tangled / caught up in armpit hair and are painful at times for that reason. He has had them frozen off which was unsuccessful around a year ago. Historically has had them cut off and he would like that done either today or a referral to dermatology is acceptable for patient. He is most concerned about his ear infection today.       Review of Systems  Constitutional, HEENT, cardiovascular, pulmonary, GI, , musculoskeletal, neuro, skin, endocrine and psych systems are negative, except as otherwise noted.      Objective    BP (!) 142/86   Pulse 59   Temp 97.1  F (36.2  C) (Tympanic)   Wt 134.7 kg (297 lb)   SpO2 97%   BMI 41.42 kg/m    Body mass index is 41.42 kg/m .  Physical Exam  Vitals and nursing note reviewed.   Constitutional:       General: He is not in acute distress.     Appearance: Normal appearance. He is not ill-appearing or toxic-appearing.   HENT:      Head: Normocephalic and atraumatic.      Left Ear: Tympanic membrane, ear canal and external ear normal. There is no impacted cerumen.      Ears:      Comments: Right ear: visualization " is limited due to pain, swelling of the external ear canal, erythema and purulent drainage in the proximal canal. Tenderness on exam     Nose: Congestion present.      Mouth/Throat:      Mouth: Mucous membranes are moist.   Eyes:      Pupils: Pupils are equal, round, and reactive to light.   Cardiovascular:      Rate and Rhythm: Normal rate and regular rhythm.      Heart sounds: Normal heart sounds. No murmur heard.  Pulmonary:      Effort: Pulmonary effort is normal. No respiratory distress.      Breath sounds: Normal breath sounds. No wheezing.   Musculoskeletal:         General: Normal range of motion.      Cervical back: Normal range of motion and neck supple.   Skin:     General: Skin is warm and dry.   Neurological:      General: No focal deficit present.      Mental Status: He is alert and oriented to person, place, and time.   Psychiatric:         Mood and Affect: Mood normal.         Behavior: Behavior normal.            Signed Electronically by: Lili Doe NP

## 2024-08-09 NOTE — NURSING NOTE
"Chief Complaint   Patient presents with    Derm Problem    Ear Problem     Patient has skin tags that he has had frozen before, but seems like they multiplied. He also was in [place] recently and states he started having problems with his right ear while there. His right side of face is swollen and has pain in ear. He thinks it is infected.  Initial BP (!) 142/86   Pulse 59   Temp 97.1  F (36.2  C) (Tympanic)   Wt 134.7 kg (297 lb)   SpO2 97%   BMI 41.42 kg/m   Estimated body mass index is 41.42 kg/m  as calculated from the following:    Height as of 2/9/24: 1.803 m (5' 11\").    Weight as of this encounter: 134.7 kg (297 lb).  Medication Review: complete    The next two questions are to help us understand your food security.  If you are feeling you need any assistance in this area, we have resources available to support you today.          12/29/2023   SDOH- Food Insecurity   Within the past 12 months, did you worry that your food would run out before you got money to buy more? N   Within the past 12 months, did the food you bought just not last and you didn t have money to get more? N            Health Care Directive:  Patient does not have a Health Care Directive or Living Will: Discussed advance care planning with patient; however, patient declined at this time.    Marlyn Muhammad MA      "

## 2024-08-09 NOTE — PATIENT INSTRUCTIONS
Augmentin twice daily for 10 full days. Warm compresses and or alternate with ice to help with pain and swelling of the face.     Follow up if not fully improved after course of antibiotics. Difficult to visualize due to the severity of the swelling and drainage of the inner ear.     I have placed a referral for dermatology professionals to have skin tags removed.     [email or web address]  [facility]  [address] [place] [address]  [phone]

## 2024-08-16 ENCOUNTER — OFFICE VISIT (OUTPATIENT)
Dept: ORTHOPEDICS | Facility: OTHER | Age: 59
End: 2024-08-16
Attending: SPECIALIST
Payer: COMMERCIAL

## 2024-08-16 DIAGNOSIS — M65.30 TRIGGER FINGER, ACQUIRED: Primary | ICD-10-CM

## 2024-08-16 PROCEDURE — 99213 OFFICE O/P EST LOW 20 MIN: CPT | Performed by: SPECIALIST

## 2024-08-16 NOTE — PROGRESS NOTES
Subjective:    Patient returns for follow-up he had multiple trigger digit releases he presents with new triggering of the left index and left thumb digit.  He would like to proceed with release.    Objective:    The nation shows definite triggering of the left index and long finger with digital range of motion is otherwise well-preserved  Imaging:     No new imaging  Assessment:    Left index and thumb trigger digits    Plan:    We discussed proceeding with left thumb and index finger trigger digit releases risk complications and benefits reviewed and this will be scheduled at his convenience.    Monster Grissom MD

## 2024-08-19 ENCOUNTER — OFFICE VISIT (OUTPATIENT)
Dept: FAMILY MEDICINE | Facility: OTHER | Age: 59
End: 2024-08-19
Attending: STUDENT IN AN ORGANIZED HEALTH CARE EDUCATION/TRAINING PROGRAM
Payer: COMMERCIAL

## 2024-08-19 VITALS
RESPIRATION RATE: 19 BRPM | OXYGEN SATURATION: 98 % | TEMPERATURE: 98.8 F | BODY MASS INDEX: 41.79 KG/M2 | SYSTOLIC BLOOD PRESSURE: 140 MMHG | HEIGHT: 71 IN | HEART RATE: 73 BPM | WEIGHT: 298.5 LBS | DIASTOLIC BLOOD PRESSURE: 86 MMHG

## 2024-08-19 DIAGNOSIS — H60.391 INFECTIVE OTITIS EXTERNA, RIGHT: Primary | ICD-10-CM

## 2024-08-19 PROCEDURE — 96372 THER/PROPH/DIAG INJ SC/IM: CPT | Performed by: PHYSICIAN ASSISTANT

## 2024-08-19 PROCEDURE — 250N000011 HC RX IP 250 OP 636: Mod: JZ | Performed by: PHYSICIAN ASSISTANT

## 2024-08-19 PROCEDURE — 99213 OFFICE O/P EST LOW 20 MIN: CPT | Performed by: PHYSICIAN ASSISTANT

## 2024-08-19 RX ORDER — CEFTRIAXONE SODIUM 1 G
1 VIAL (EA) INJECTION ONCE
Status: COMPLETED | OUTPATIENT
Start: 2024-08-19 | End: 2024-08-19

## 2024-08-19 RX ORDER — CIPROFLOXACIN AND DEXAMETHASONE 3; 1 MG/ML; MG/ML
4 SUSPENSION/ DROPS AURICULAR (OTIC) 2 TIMES DAILY
Qty: 7.5 ML | Refills: 0 | Status: SHIPPED | OUTPATIENT
Start: 2024-08-19 | End: 2024-08-29

## 2024-08-19 RX ADMIN — CEFTRIAXONE SODIUM 1 G: 1 INJECTION, POWDER, FOR SOLUTION INTRAMUSCULAR; INTRAVENOUS at 09:57

## 2024-08-19 ASSESSMENT — PAIN SCALES - GENERAL: PAINLEVEL: WORST PAIN (10)

## 2024-08-19 NOTE — PROGRESS NOTES
"ASSESSMENT/PLAN:     I have reviewed the nursing notes.  I have reviewed the findings, diagnosis, plan and need for follow up with the patient.    Juve presents to clinic today for evaluation of right ear pain.  Patient was treated with Augmentin twice daily for 10 days he finished his last dose today.  He does report that initially symptoms were better and then on about day 6 his symptoms started to get worse.  Afebrile.  Vital signs stable.  Ear canal is completely swollen closed.  Ear wick applied in clinic.  Ceftriaxone 1 g intramuscular in clinic today.  Will treat with Ciprodex twice daily for 7 to 10 days.  Return to clinic if symptoms persist or worsen or no improvement after 3 to 4 days.    1. Infective otitis externa, right    - ciprofloxacin-dexAMETHasone (CIPRODEX) 0.3-0.1 % otic suspension; Place 4 drops into the right ear 2 times daily for 10 days  Dispense: 7.5 mL; Refill: 0  - cefTRIAXone (ROCEPHIN) in lidocaine 1% (PF) for IM administration 1 g             I explained my diagnostic considerations and recommendations to the patient, who voiced understanding and agreement with the treatment plan. All questions were answered. We discussed potential side effects of any prescribed or recommended therapies, as well as expectations for response to treatments.    Glory Colin PA-C  [facility]          Nursing Notes:   Kailee Blanchard LPN  8/19/2024  9:41 AM  Signed  Chief Complaint   Patient presents with    Otalgia     Right ear x1 week     Patient here for right ear pain. He recently finished a round of Augmentin for ear infection. Feels the infection has not went away. Hard to hear out of.    Initial BP (!) 140/86   Pulse 73   Temp 98.8  F (37.1  C) (Temporal)   Resp 19   Ht 1.803 m (5' 11\")   Wt 135.4 kg (298 lb 8 oz)   SpO2 98%   BMI 41.63 kg/m   Estimated body mass index is 41.63 kg/m  as calculated from the following:    Height as of this encounter: 1.803 m (5' " "11\").    Weight as of this encounter: 135.4 kg (298 lb 8 oz).  Medication Review: complete    The next two questions are to help us understand your food security.  If you are feeling you need any assistance in this area, we have resources available to support you today.          8/19/2024   SDOH- Food Insecurity   Within the past 12 months, did you worry that your food would run out before you got money to buy more? N   Within the past 12 months, did the food you bought just not last and you didn t have money to get more? N            Health Care Directive:  Patient does not have a Health Care Directive or Living Will: Discussed advance care planning with patient; however, patient declined at this time.    Kailee Blanchard LPN           SUBJECTIVE:   Gregg Aguayo is a 58 year old male who presents to clinic today for evaluation of right ear pain  Onset: 10 to 12 days ago  Course worsening  Associated symptoms right ear pain, was draining green purulent drainage with strong odor, runny nose  Treatments: He just completed a 10-day course of Augmentin twice daily.  Denies: Fever, cough  No prior history of otitis media  Denies water exposures    HPI          Past Medical History:   Diagnosis Date    Cigarette smoker     Erectile dysfunction     GERD (gastroesophageal reflux disease)     Lipoma of torso 9/14/2021    AILEEN on CPAP     PMR (polymyalgia rheumatica) (H24)      Past Surgical History:   Procedure Laterality Date    APPENDECTOMY OPEN  2005    ARTHROPLASTY KNEE Left     ARTHROSCOPY KNEE Left 3/19/2018    Procedure: ARTHROSCOPY KNEE;  Left Knee Arthroscopy, Partial Meniscectomy, Chondroplasty;  Surgeon: Kwabena Patricia DO;  Location: [facility] OR    ARTHROSCOPY KNEE WITH MENISCAL REPAIR Right 12/11/2020    Procedure: Rt Knee Arthroscopy, partial Medial Meniscectomy Debridement.;  Surgeon: Monster Grissom MD;  Location: [facility] OR    ATTEMPTED ARTHROSCOPY      knee ? left    COLONOSCOPY  01/29/2014 1/29/2014,Tubular " adenoma of rectum - follow up 5 years    COLONOSCOPY  02/01/2019    normal with diverticula, follow up 10 years, 2/1/29    COLONOSCOPY N/A 2/1/2019    normal, follow up 2/1/29    JOINT REPLACEMENT Left 11/2018    RELEASE CARPAL TUNNEL Right 11/22/2017    RELEASE CARPAL TUNNEL Left     12/21/2017,[id number],OTHER,Left    RELEASE TRIGGER FINGER Right 9/7/2018    RELEASE ULNAR NERVE (ELBOW) Left 12/21/2017     Social History     Tobacco Use    Smoking status: Every Day     Current packs/day: 0.70     Average packs/day: 0.7 packs/day for 37.0 years (25.9 ttl pk-yrs)     Types: Cigarettes     Start date: 1999    Smokeless tobacco: Never    Tobacco comments:     Tried Chantix, didn't tolerate it.   Substance Use Topics    Alcohol use: Yes     Comment: occasional - 5 a week     Current Outpatient Medications   Medication Sig Dispense Refill    acetaminophen (TYLENOL) 650 MG CR tablet Take 2 tablets (1,300 mg) by mouth 2 times daily 360 tablet 4    amoxicillin-clavulanate (AUGMENTIN) 875-125 MG tablet Take 1 tablet by mouth 2 times daily for 10 days 20 tablet 0    ciprofloxacin-dexAMETHasone (CIPRODEX) 0.3-0.1 % otic suspension Place 4 drops into the right ear 2 times daily for 10 days 7.5 mL 0    fexofenadine (ALLEGRA) 180 MG tablet Take 1 tablet (180 mg) by mouth every evening - for ears / congestion (Patient taking differently: Take 180 mg by mouth as needed - for ears / congestion) 90 tablet 4    furosemide (LASIX) 20 MG tablet Take 2 tablets (40 mg) by mouth daily 180 tablet 4    hydroCHLOROthiazide (HYDRODIURIL) 12.5 MG tablet Take 2 tablets (25 mg) by mouth daily 180 tablet 4    hydroxychloroquine (PLAQUENIL) 200 MG tablet Take 400 mg by mouth      metoprolol succinate ER (TOPROL XL) 25 MG 24 hr tablet Take 1-2 tablets (25-50 mg) by mouth daily before breakfast AND 2 tablets (50 mg) every evening. -- adjust dose as needed for heart palpitations 360 tablet 4    naproxen sodium (ANAPROX) 220 MG tablet Take 2 tablets (440  "mg) by mouth 2 times daily (with meals) -- Take with food -- Limit use as able 360 tablet 4    omeprazole (PRILOSEC) 40 MG DR capsule Take 1 capsule (40 mg) by mouth daily - Take 30 to 60 minutes prior to a meal 90 capsule 4    sildenafil (VIAGRA) 100 MG tablet Take 1/4 to 1 tablet 30 min to 4 hours prior to sex for ED 30 tablet 11     Allergies   Allergen Reactions    Chantix [Varenicline] Other (See Comments)     Didn't tolerate         Past medical history, past surgical history, current medications and allergies reviewed and accurate to the best of my knowledge.          OBJECTIVE:     BP (!) 140/86   Pulse 73   Temp 98.8  F (37.1  C) (Temporal)   Resp 19   Ht 1.803 m (5' 11\")   Wt 135.4 kg (298 lb 8 oz)   SpO2 98%   BMI 41.63 kg/m    Body mass index is 41.63 kg/m .      General Appearance: Well appearing male,  No acute distress  Eyes: no injection, no tearing or drainage, eye lids normal  Ears: Right ear canal: Completely swollen closed, erythematous, tender to palpation, drainage.  TM not visualized.  Left ear canal: Clear, TM mild clear effusion    Respiratory: normal respiration, no increased work of breathing, no stridor/retractions/nasal flaring. Lungs normal respiration    "

## 2024-08-19 NOTE — PATIENT INSTRUCTIONS
Right ear otitis externa  Ear wick applied in clinic, this should fall out on it's own  Can reapply as needed  Ciprodex 4 drops 2 x daily x 7-10 days  Ceftriaxone 1 gm given in clinic today  Tylenol and ibuprofen as needed  Return to clinic if symptoms persist/worsen or no improvement after 3-4 days

## 2024-08-19 NOTE — NURSING NOTE
"Chief Complaint   Patient presents with    Otalgia     Right ear x1 week     Patient here for right ear pain. He recently finished a round of Augmentin for ear infection. Feels the infection has not went away. Hard to hear out of.    Initial BP (!) 140/86   Pulse 73   Temp 98.8  F (37.1  C) (Temporal)   Resp 19   Ht 1.803 m (5' 11\")   Wt 135.4 kg (298 lb 8 oz)   SpO2 98%   BMI 41.63 kg/m   Estimated body mass index is 41.63 kg/m  as calculated from the following:    Height as of this encounter: 1.803 m (5' 11\").    Weight as of this encounter: 135.4 kg (298 lb 8 oz).  Medication Review: complete    The next two questions are to help us understand your food security.  If you are feeling you need any assistance in this area, we have resources available to support you today.          8/19/2024   SDOH- Food Insecurity   Within the past 12 months, did you worry that your food would run out before you got money to buy more? N   Within the past 12 months, did the food you bought just not last and you didn t have money to get more? N            Health Care Directive:  Patient does not have a Health Care Directive or Living Will: Discussed advance care planning with patient; however, patient declined at this time.    Kailee Blanchard LPN      "

## 2024-09-05 ENCOUNTER — OFFICE VISIT (OUTPATIENT)
Dept: INTERNAL MEDICINE | Facility: OTHER | Age: 59
End: 2024-09-05
Payer: COMMERCIAL

## 2024-09-05 VITALS
HEIGHT: 71 IN | SYSTOLIC BLOOD PRESSURE: 132 MMHG | HEART RATE: 60 BPM | RESPIRATION RATE: 16 BRPM | BODY MASS INDEX: 41.86 KG/M2 | WEIGHT: 299 LBS | TEMPERATURE: 97.4 F | OXYGEN SATURATION: 100 % | DIASTOLIC BLOOD PRESSURE: 81 MMHG

## 2024-09-05 DIAGNOSIS — I10 BENIGN ESSENTIAL HYPERTENSION: ICD-10-CM

## 2024-09-05 DIAGNOSIS — Z01.818 PREOP GENERAL PHYSICAL EXAM: Primary | ICD-10-CM

## 2024-09-05 DIAGNOSIS — G47.33 OSA ON CPAP: ICD-10-CM

## 2024-09-05 DIAGNOSIS — M65.342 TRIGGER FINGER, LEFT RING FINGER: ICD-10-CM

## 2024-09-05 DIAGNOSIS — I77.810 ASCENDING AORTA DILATATION (H): ICD-10-CM

## 2024-09-05 DIAGNOSIS — E66.01 CLASS 3 SEVERE OBESITY DUE TO EXCESS CALORIES WITH SERIOUS COMORBIDITY AND BODY MASS INDEX (BMI) OF 40.0 TO 44.9 IN ADULT (H): ICD-10-CM

## 2024-09-05 DIAGNOSIS — M06.09 RHEUMATOID ARTHRITIS OF MULTIPLE SITES WITH NEGATIVE RHEUMATOID FACTOR (H): ICD-10-CM

## 2024-09-05 DIAGNOSIS — M65.352 TRIGGER FINGER, LEFT LITTLE FINGER: ICD-10-CM

## 2024-09-05 DIAGNOSIS — M35.3 POLYMYALGIA RHEUMATICA (H): ICD-10-CM

## 2024-09-05 DIAGNOSIS — E66.813 CLASS 3 SEVERE OBESITY DUE TO EXCESS CALORIES WITH SERIOUS COMORBIDITY AND BODY MASS INDEX (BMI) OF 40.0 TO 44.9 IN ADULT (H): ICD-10-CM

## 2024-09-05 DIAGNOSIS — Z72.0 TOBACCO ABUSE: ICD-10-CM

## 2024-09-05 PROCEDURE — 93000 ELECTROCARDIOGRAM COMPLETE: CPT | Performed by: STUDENT IN AN ORGANIZED HEALTH CARE EDUCATION/TRAINING PROGRAM

## 2024-09-05 PROCEDURE — 99214 OFFICE O/P EST MOD 30 MIN: CPT

## 2024-09-05 PROCEDURE — G2211 COMPLEX E/M VISIT ADD ON: HCPCS

## 2024-09-05 ASSESSMENT — PAIN SCALES - GENERAL: PAINLEVEL: NO PAIN (0)

## 2024-09-05 NOTE — NURSING NOTE
"Chief Complaint   Patient presents with    Pre-Op Exam     Left hand trigger finger       Initial /81 (BP Location: Right arm, Patient Position: Sitting, Cuff Size: Adult Large)   Pulse 60   Temp 97.4  F (36.3  C) (Temporal)   Resp 16   Ht 1.803 m (5' 11\")   Wt 135.6 kg (299 lb)   SpO2 100%   BMI 41.70 kg/m   Estimated body mass index is 41.7 kg/m  as calculated from the following:    Height as of this encounter: 1.803 m (5' 11\").    Weight as of this encounter: 135.6 kg (299 lb).  Medication Review: complete    The next two questions are to help us understand your food security.  If you are feeling you need any assistance in this area, we have resources available to support you today.          8/19/2024   SDOH- Food Insecurity   Within the past 12 months, did you worry that your food would run out before you got money to buy more? N   Within the past 12 months, did the food you bought just not last and you didn t have money to get more? N            Health Care Directive:  Patient does not have a Health Care Directive or Living Will: Discussed advance care planning with patient; however, patient declined at this time.    Brunilda Albarran CNA      "

## 2024-09-05 NOTE — PATIENT INSTRUCTIONS

## 2024-09-05 NOTE — PROGRESS NOTES
Preoperative Evaluation  [facility] AND [facility]  [address]  [place] [address]  Phone: [phone]  Fax: [phone]  Primary Provider: Reinier Ritchie MD  Pre-op Performing Provider: TREV Sigala CNP  Sep 5, 2024             9/5/2024   Surgical Information   What procedure is being done? Left index and thumb trgger finger release   Facility or Hospital where procedure/surgery will be performed: [facility]   Who is doing the procedure / surgery? Dr. Grissom [facility]   Date of surgery / procedure: sept 13   Time of surgery / procedure: tbd   Where do you plan to recover after surgery? at home with family        Fax number for surgical facility: Note does not need to be faxed, will be available electronically in Epic.    Assessment & Plan     The proposed surgical procedure is considered LOW risk.      ICD-10-CM    1. Preop general physical exam  Z01.818 EKG 12-lead ([facility] and [facility] Only)     CANCELED: Comprehensive Metabolic Panel     CANCELED: CBC with Platelets & Differential ([facility] Only)      2. Trigger finger, left thumb  M65.352       3. Trigger finger, left ring finger  M65.342       4. Ascending aorta dilatation (H24)  I77.810       5. Benign essential hypertension  I10       6. Class 3 severe obesity due to excess calories with serious comorbidity and body mass index (BMI) of 40.0 to 44.9 in adult (H)  E66.01     Z68.41       7. AILEEN on CPAP - uses nightly, finds very helpful  G47.33       8. Polymyalgia rheumatica (H24)  M35.3       9. Tobacco abuse  Z72.0       10. Rheumatoid arthritis of multiple sites with negative rheumatoid factor (H)  M06.09              Risks and Recommendations  The patient has the following additional risks and recommendations for perioperative complications:   - Morbid obesity (BMI >40)  -Tobacco Abuse  Obstructive Sleep Apnea:   C-pap dependent    Antiplatelet or Anticoagulation Medication Instructions   - Patient is on no  antiplatelet or anticoagulation medications.    Additional Medication Instructions  Take all scheduled medications on the day of surgery    Recommendation  Approval given to proceed with proposed procedure, without further diagnostic evaluation.    Osei An is a 58 year old, presenting for the following:  Pre-Op Exam (Left hand trigger finger)        HPI related to upcoming procedure: Patient presents to clinic for preoperative examination for trigger finger release of left thumb and index finger scheduled on 9/13/2024 by Dr. Grissom. Patient denies having any difficulties with anesthesia in the past, and is able to meet > 4 METS.          9/5/2024   Pre-Op Questionnaire   Have you ever had a heart attack or stroke? No   Have you ever had surgery on your heart or blood vessels, such as a stent placement, a coronary artery bypass, or surgery on an artery in your head, neck, heart, or legs? No   Do you have chest pain with activity? No   Do you have a history of heart failure? No   Do you currently have a cold, bronchitis or symptoms of other infection? No   Do you have a cough, shortness of breath, or wheezing? No   Do you or anyone in your family have previous history of blood clots? No   Do you or does anyone in your family have a serious bleeding problem such as prolonged bleeding following surgeries or cuts? No   Have you ever had problems with anemia or been told to take iron pills? No   Have you had any abnormal blood loss such as black, tarry or bloody stools? No   Have you ever had a blood transfusion? No   Are you willing to have a blood transfusion if it is medically needed before, during, or after your surgery? Yes   Have you or any of your relatives ever had problems with anesthesia? No   Do you have sleep apnea, excessive snoring or daytime drowsiness? (!) YES   Do you have a CPAP machine? Yes   Do you have any artifical heart valves or other implanted medical devices like a pacemaker,  defibrillator, or continuous glucose monitor? No   Do you have artificial joints? (!) YES   Are you allergic to latex? No        Health Care Directive  Patient does not have a Health Care Directive or Living Will: Discussed advance care planning with patient; however, patient declined at this time.    Preoperative Review of    reviewed - no record of controlled substances prescribed.      Status of Chronic Conditions:  See problem list for active medical problems.  Problems all longstanding and stable, except as noted/documented.  See ROS for pertinent symptoms related to these conditions.    Patient Active Problem List    Diagnosis Date Noted    Mixed hyperlipidemia 02/09/2024     Priority: Medium    Ascending aorta dilatation (H24) 12/29/2023     Priority: Medium    Trigger finger, left middle finger 12/29/2023     Priority: Medium    Trigger finger, left ring finger 12/29/2023     Priority: Medium    Sacroiliac joint pain 12/29/2023     Priority: Medium    Chronic right shoulder pain 10/05/2023     Priority: Medium    Chronic right hip pain 10/05/2023     Priority: Medium    Rheumatoid arthritis of multiple sites with negative rheumatoid factor (H) 09/19/2023     Priority: Medium    Chronic left shoulder pain 10/04/2022     Priority: Medium    Lumbar back pain with radiculopathy affecting left lower extremity 10/04/2022     Priority: Medium    Benign essential hypertension 10/14/2021     Priority: Medium    Change in hearing of right ear 09/14/2021     Priority: Medium    Immunosuppression (H24) 09/14/2021     Priority: Medium    Class 3 severe obesity due to excess calories with serious comorbidity and body mass index (BMI) of 40.0 to 44.9 in adult (H) 12/04/2020     Priority: Medium    Crepitus of joint of right knee 09/11/2020     Priority: Medium    S/P lumbar discectomy 05/18/2020     Priority: Medium    Symptomatic premature ventricular contractions 04/15/2019     Priority: Medium    Trigger finger,  left thumb 08/30/2018     Priority: Medium    Heartburn 07/25/2018     Priority: Medium    Psychosexual dysfunction with inhibited sexual excitement 01/29/2018     Priority: Medium    Esophageal reflux 01/29/2018     Priority: Medium    Tobacco abuse 01/29/2018     Priority: Medium    Left carpal tunnel syndrome 12/21/2017     Priority: Medium    AILEEN on CPAP - uses nightly, finds very helpful 11/20/2017     Priority: Medium    Dermatomyositis (H) 10/12/2017     Priority: Medium    Bilateral leg edema 07/05/2017     Priority: Medium    Polymyalgia rheumatica (H24) 11/11/2016     Priority: Medium    Arthralgia of multiple joints 10/26/2016     Priority: Medium    Contact dermatitis and eczema 09/20/2011     Priority: Medium      Past Medical History:   Diagnosis Date    Cigarette smoker     Erectile dysfunction     GERD (gastroesophageal reflux disease)     Lipoma of torso 9/14/2021    AILEEN on CPAP     PMR (polymyalgia rheumatica) (H24)      Past Surgical History:   Procedure Laterality Date    APPENDECTOMY OPEN  2005    ARTHROPLASTY KNEE Left     ARTHROSCOPY KNEE Left 3/19/2018    Procedure: ARTHROSCOPY KNEE;  Left Knee Arthroscopy, Partial Meniscectomy, Chondroplasty;  Surgeon: Kwabena Patricia DO;  Location: [facility] OR    ARTHROSCOPY KNEE WITH MENISCAL REPAIR Right 12/11/2020    Procedure: Rt Knee Arthroscopy, partial Medial Meniscectomy Debridement.;  Surgeon: Monster Grissom MD;  Location: [facility] OR    ATTEMPTED ARTHROSCOPY      knee ? left    COLONOSCOPY  01/29/2014 1/29/2014,Tubular adenoma of rectum - follow up 5 years    COLONOSCOPY  02/01/2019    normal with diverticula, follow up 10 years, 2/1/29    COLONOSCOPY N/A 2/1/2019    normal, follow up 2/1/29    JOINT REPLACEMENT Left 11/2018    RELEASE CARPAL TUNNEL Right 11/22/2017    RELEASE CARPAL TUNNEL Left     12/21/2017,[id number],OTHER,Left    RELEASE TRIGGER FINGER Right 9/7/2018    RELEASE ULNAR NERVE (ELBOW) Left 12/21/2017     Current Outpatient Medications    Medication Sig Dispense Refill    acetaminophen (TYLENOL) 650 MG CR tablet Take 2 tablets (1,300 mg) by mouth 2 times daily 360 tablet 4    fexofenadine (ALLEGRA) 180 MG tablet Take 1 tablet (180 mg) by mouth every evening - for ears / congestion (Patient taking differently: Take 180 mg by mouth as needed. - for ears / congestion) 90 tablet 4    furosemide (LASIX) 20 MG tablet Take 2 tablets (40 mg) by mouth daily 180 tablet 4    hydroCHLOROthiazide (HYDRODIURIL) 12.5 MG tablet Take 2 tablets (25 mg) by mouth daily 180 tablet 4    hydroxychloroquine (PLAQUENIL) 200 MG tablet Take 400 mg by mouth      metoprolol succinate ER (TOPROL XL) 25 MG 24 hr tablet Take 1-2 tablets (25-50 mg) by mouth daily before breakfast AND 2 tablets (50 mg) every evening. -- adjust dose as needed for heart palpitations 360 tablet 4    naproxen sodium (ANAPROX) 220 MG tablet Take 2 tablets (440 mg) by mouth 2 times daily (with meals) -- Take with food -- Limit use as able 360 tablet 4    omeprazole (PRILOSEC) 40 MG DR capsule Take 1 capsule (40 mg) by mouth daily - Take 30 to 60 minutes prior to a meal 90 capsule 4    sildenafil (VIAGRA) 100 MG tablet Take 1/4 to 1 tablet 30 min to 4 hours prior to sex for ED 30 tablet 11       Allergies   Allergen Reactions    Chantix [Varenicline] Other (See Comments)     Didn't tolerate        Social History     Tobacco Use    Smoking status: Every Day     Current packs/day: 0.70     Average packs/day: 0.7 packs/day for 37.0 years (25.9 ttl pk-yrs)     Types: Cigarettes     Start date: 1999    Smokeless tobacco: Never    Tobacco comments:     Tried Chantix, didn't tolerate it.   Substance Use Topics    Alcohol use: Yes     Comment: occasional - 5 a week       History   Drug Use No             Review of Systems  CONSTITUTIONAL: NEGATIVE for fever, chills, change in weight  ENT/MOUTH: NEGATIVE for ear, mouth and throat problems  RESP: NEGATIVE for significant cough or SOB  CV: NEGATIVE for chest pain,  "palpitations or peripheral edema    Objective    /81 (BP Location: Right arm, Patient Position: Sitting, Cuff Size: Adult Large)   Pulse 60   Temp 97.4  F (36.3  C) (Temporal)   Resp 16   Ht 1.803 m (5' 11\")   Wt 135.6 kg (299 lb)   SpO2 100%   BMI 41.70 kg/m     Estimated body mass index is 41.7 kg/m  as calculated from the following:    Height as of this encounter: 1.803 m (5' 11\").    Weight as of this encounter: 135.6 kg (299 lb).  Physical Exam  GENERAL: alert and no distress  EYES: Eyes grossly normal to inspection, PERRL and conjunctivae and sclerae normal  RESP: lungs clear to auscultation - no rales, rhonchi or wheezes  CV: regular rate and rhythm, normal S1 S2, no S3 or S4, no murmur, click or rub, no peripheral edema  MS: no gross musculoskeletal defects noted, no edema  SKIN: no suspicious lesions or rashes  NEURO: Normal strength and tone, mentation intact and speech normal  PSYCH: mentation appears normal, affect normal/bright    Recent Labs   Lab Test 02/09/24  0824   HGB 17.6         POTASSIUM 4.7   CR 0.79        Diagnostics  Recent Results (from the past 24 hour(s))   EKG 12-lead ([facility] Only)    Collection Time: 09/05/24  9:32 AM   Result Value Ref Range    Systolic Blood Pressure  mmHg    Diastolic Blood Pressure  mmHg    Ventricular Rate 54 BPM    Atrial Rate 54 BPM    MD Interval 196 ms    QRS Duration 102 ms     ms    QTc 403 ms    P Axis 67 degrees    R AXIS 89 degrees    T Axis 33 degrees    Interpretation ECG       Sinus bradycardia with sinus arrhythmia  Otherwise normal ECG  When compared with ECG of 09-Feb-2024 08:25,  No significant change was found          Revised Cardiac Risk Index (RCRI)  The patient has the following serious cardiovascular risks for perioperative complications:   - No serious cardiac risks = 0 points     RCRI Interpretation: 0 points: Class I (very low risk - 0.4% complication rate)         Signed Electronically by: " Shobha Graham, TREV CNP  A copy of this evaluation report is provided to the requesting physician.    The longitudinal plan of care for the diagnosis(es)/condition(s) as documented were addressed during this visit. Due to the added complexity in care, I will continue to support Juve in the subsequent management and with ongoing continuity of care.

## 2024-09-05 NOTE — H&P (VIEW-ONLY)
Preoperative Evaluation  [facility]  [address]  [place] [address]  Phone: [phone]  Fax: [phone]  Primary Provider: Reinier Ritchie MD  Pre-op Performing Provider: TREV Sigala CNP  Sep 5, 2024             9/5/2024   Surgical Information   What procedure is being done? Left index and thumb trgger finger release   Facility or Hospital where procedure/surgery will be performed: [facility]   Who is doing the procedure / surgery? Dr. Grissom [facility]   Date of surgery / procedure: sept 13   Time of surgery / procedure: tbd   Where do you plan to recover after surgery? at home with family        Fax number for surgical facility: Note does not need to be faxed, will be available electronically in Epic.    Assessment & Plan     The proposed surgical procedure is considered LOW risk.      ICD-10-CM    1. Preop general physical exam  Z01.818 EKG 12-lead ([facility] and [facility] Only)     CANCELED: Comprehensive Metabolic Panel     CANCELED: CBC with Platelets & Differential ([facility] Only)      2. Trigger finger, left thumb  M65.352       3. Trigger finger, left ring finger  M65.342       4. Ascending aorta dilatation (H24)  I77.810       5. Benign essential hypertension  I10       6. Class 3 severe obesity due to excess calories with serious comorbidity and body mass index (BMI) of 40.0 to 44.9 in adult (H)  E66.01     Z68.41       7. AILEEN on CPAP - uses nightly, finds very helpful  G47.33       8. Polymyalgia rheumatica (H24)  M35.3       9. Tobacco abuse  Z72.0       10. Rheumatoid arthritis of multiple sites with negative rheumatoid factor (H)  M06.09              Risks and Recommendations  The patient has the following additional risks and recommendations for perioperative complications:   - Morbid obesity (BMI >40)  -Tobacco Abuse  Obstructive Sleep Apnea:   C-pap dependent    Antiplatelet or Anticoagulation Medication Instructions   - Patient is on no  antiplatelet or anticoagulation medications.    Additional Medication Instructions  Take all scheduled medications on the day of surgery    Recommendation  Approval given to proceed with proposed procedure, without further diagnostic evaluation.    Osei An is a 58 year old, presenting for the following:  Pre-Op Exam (Left hand trigger finger)        HPI related to upcoming procedure: Patient presents to clinic for preoperative examination for trigger finger release of left thumb and index finger scheduled on 9/13/2024 by Dr. Grissom. Patient denies having any difficulties with anesthesia in the past, and is able to meet > 4 METS.          9/5/2024   Pre-Op Questionnaire   Have you ever had a heart attack or stroke? No   Have you ever had surgery on your heart or blood vessels, such as a stent placement, a coronary artery bypass, or surgery on an artery in your head, neck, heart, or legs? No   Do you have chest pain with activity? No   Do you have a history of heart failure? No   Do you currently have a cold, bronchitis or symptoms of other infection? No   Do you have a cough, shortness of breath, or wheezing? No   Do you or anyone in your family have previous history of blood clots? No   Do you or does anyone in your family have a serious bleeding problem such as prolonged bleeding following surgeries or cuts? No   Have you ever had problems with anemia or been told to take iron pills? No   Have you had any abnormal blood loss such as black, tarry or bloody stools? No   Have you ever had a blood transfusion? No   Are you willing to have a blood transfusion if it is medically needed before, during, or after your surgery? Yes   Have you or any of your relatives ever had problems with anesthesia? No   Do you have sleep apnea, excessive snoring or daytime drowsiness? (!) YES   Do you have a CPAP machine? Yes   Do you have any artifical heart valves or other implanted medical devices like a pacemaker,  defibrillator, or continuous glucose monitor? No   Do you have artificial joints? (!) YES   Are you allergic to latex? No        Health Care Directive  Patient does not have a Health Care Directive or Living Will: Discussed advance care planning with patient; however, patient declined at this time.    Preoperative Review of    reviewed - no record of controlled substances prescribed.      Status of Chronic Conditions:  See problem list for active medical problems.  Problems all longstanding and stable, except as noted/documented.  See ROS for pertinent symptoms related to these conditions.    Patient Active Problem List    Diagnosis Date Noted    Mixed hyperlipidemia 02/09/2024     Priority: Medium    Ascending aorta dilatation (H24) 12/29/2023     Priority: Medium    Trigger finger, left middle finger 12/29/2023     Priority: Medium    Trigger finger, left ring finger 12/29/2023     Priority: Medium    Sacroiliac joint pain 12/29/2023     Priority: Medium    Chronic right shoulder pain 10/05/2023     Priority: Medium    Chronic right hip pain 10/05/2023     Priority: Medium    Rheumatoid arthritis of multiple sites with negative rheumatoid factor (H) 09/19/2023     Priority: Medium    Chronic left shoulder pain 10/04/2022     Priority: Medium    Lumbar back pain with radiculopathy affecting left lower extremity 10/04/2022     Priority: Medium    Benign essential hypertension 10/14/2021     Priority: Medium    Change in hearing of right ear 09/14/2021     Priority: Medium    Immunosuppression (H24) 09/14/2021     Priority: Medium    Class 3 severe obesity due to excess calories with serious comorbidity and body mass index (BMI) of 40.0 to 44.9 in adult (H) 12/04/2020     Priority: Medium    Crepitus of joint of right knee 09/11/2020     Priority: Medium    S/P lumbar discectomy 05/18/2020     Priority: Medium    Symptomatic premature ventricular contractions 04/15/2019     Priority: Medium    Trigger finger,  left thumb 08/30/2018     Priority: Medium    Heartburn 07/25/2018     Priority: Medium    Psychosexual dysfunction with inhibited sexual excitement 01/29/2018     Priority: Medium    Esophageal reflux 01/29/2018     Priority: Medium    Tobacco abuse 01/29/2018     Priority: Medium    Left carpal tunnel syndrome 12/21/2017     Priority: Medium    AILEEN on CPAP - uses nightly, finds very helpful 11/20/2017     Priority: Medium    Dermatomyositis (H) 10/12/2017     Priority: Medium    Bilateral leg edema 07/05/2017     Priority: Medium    Polymyalgia rheumatica (H24) 11/11/2016     Priority: Medium    Arthralgia of multiple joints 10/26/2016     Priority: Medium    Contact dermatitis and eczema 09/20/2011     Priority: Medium      Past Medical History:   Diagnosis Date    Cigarette smoker     Erectile dysfunction     GERD (gastroesophageal reflux disease)     Lipoma of torso 9/14/2021    AILEEN on CPAP     PMR (polymyalgia rheumatica) (H24)      Past Surgical History:   Procedure Laterality Date    APPENDECTOMY OPEN  2005    ARTHROPLASTY KNEE Left     ARTHROSCOPY KNEE Left 3/19/2018    Procedure: ARTHROSCOPY KNEE;  Left Knee Arthroscopy, Partial Meniscectomy, Chondroplasty;  Surgeon: Kwabena Patricia DO;  Location: [facility] OR    ARTHROSCOPY KNEE WITH MENISCAL REPAIR Right 12/11/2020    Procedure: Rt Knee Arthroscopy, partial Medial Meniscectomy Debridement.;  Surgeon: Monster Grissom MD;  Location: [facility] OR    ATTEMPTED ARTHROSCOPY      knee ? left    COLONOSCOPY  01/29/2014 1/29/2014,Tubular adenoma of rectum - follow up 5 years    COLONOSCOPY  02/01/2019    normal with diverticula, follow up 10 years, 2/1/29    COLONOSCOPY N/A 2/1/2019    normal, follow up 2/1/29    JOINT REPLACEMENT Left 11/2018    RELEASE CARPAL TUNNEL Right 11/22/2017    RELEASE CARPAL TUNNEL Left     12/21/2017,[id number],OTHER,Left    RELEASE TRIGGER FINGER Right 9/7/2018    RELEASE ULNAR NERVE (ELBOW) Left 12/21/2017     Current Outpatient Medications    Medication Sig Dispense Refill    acetaminophen (TYLENOL) 650 MG CR tablet Take 2 tablets (1,300 mg) by mouth 2 times daily 360 tablet 4    fexofenadine (ALLEGRA) 180 MG tablet Take 1 tablet (180 mg) by mouth every evening - for ears / congestion (Patient taking differently: Take 180 mg by mouth as needed. - for ears / congestion) 90 tablet 4    furosemide (LASIX) 20 MG tablet Take 2 tablets (40 mg) by mouth daily 180 tablet 4    hydroCHLOROthiazide (HYDRODIURIL) 12.5 MG tablet Take 2 tablets (25 mg) by mouth daily 180 tablet 4    hydroxychloroquine (PLAQUENIL) 200 MG tablet Take 400 mg by mouth      metoprolol succinate ER (TOPROL XL) 25 MG 24 hr tablet Take 1-2 tablets (25-50 mg) by mouth daily before breakfast AND 2 tablets (50 mg) every evening. -- adjust dose as needed for heart palpitations 360 tablet 4    naproxen sodium (ANAPROX) 220 MG tablet Take 2 tablets (440 mg) by mouth 2 times daily (with meals) -- Take with food -- Limit use as able 360 tablet 4    omeprazole (PRILOSEC) 40 MG DR capsule Take 1 capsule (40 mg) by mouth daily - Take 30 to 60 minutes prior to a meal 90 capsule 4    sildenafil (VIAGRA) 100 MG tablet Take 1/4 to 1 tablet 30 min to 4 hours prior to sex for ED 30 tablet 11       Allergies   Allergen Reactions    Chantix [Varenicline] Other (See Comments)     Didn't tolerate        Social History     Tobacco Use    Smoking status: Every Day     Current packs/day: 0.70     Average packs/day: 0.7 packs/day for 37.0 years (25.9 ttl pk-yrs)     Types: Cigarettes     Start date: 1999    Smokeless tobacco: Never    Tobacco comments:     Tried Chantix, didn't tolerate it.   Substance Use Topics    Alcohol use: Yes     Comment: occasional - 5 a week       History   Drug Use No             Review of Systems  CONSTITUTIONAL: NEGATIVE for fever, chills, change in weight  ENT/MOUTH: NEGATIVE for ear, mouth and throat problems  RESP: NEGATIVE for significant cough or SOB  CV: NEGATIVE for chest pain,  "palpitations or peripheral edema    Objective    /81 (BP Location: Right arm, Patient Position: Sitting, Cuff Size: Adult Large)   Pulse 60   Temp 97.4  F (36.3  C) (Temporal)   Resp 16   Ht 1.803 m (5' 11\")   Wt 135.6 kg (299 lb)   SpO2 100%   BMI 41.70 kg/m     Estimated body mass index is 41.7 kg/m  as calculated from the following:    Height as of this encounter: 1.803 m (5' 11\").    Weight as of this encounter: 135.6 kg (299 lb).  Physical Exam  GENERAL: alert and no distress  EYES: Eyes grossly normal to inspection, PERRL and conjunctivae and sclerae normal  RESP: lungs clear to auscultation - no rales, rhonchi or wheezes  CV: regular rate and rhythm, normal S1 S2, no S3 or S4, no murmur, click or rub, no peripheral edema  MS: no gross musculoskeletal defects noted, no edema  SKIN: no suspicious lesions or rashes  NEURO: Normal strength and tone, mentation intact and speech normal  PSYCH: mentation appears normal, affect normal/bright    Recent Labs   Lab Test 02/09/24  0824   HGB 17.6         POTASSIUM 4.7   CR 0.79        Diagnostics  Recent Results (from the past 24 hour(s))   EKG 12-lead ([facility] Only)    Collection Time: 09/05/24  9:32 AM   Result Value Ref Range    Systolic Blood Pressure  mmHg    Diastolic Blood Pressure  mmHg    Ventricular Rate 54 BPM    Atrial Rate 54 BPM    NC Interval 196 ms    QRS Duration 102 ms     ms    QTc 403 ms    P Axis 67 degrees    R AXIS 89 degrees    T Axis 33 degrees    Interpretation ECG       Sinus bradycardia with sinus arrhythmia  Otherwise normal ECG  When compared with ECG of 09-Feb-2024 08:25,  No significant change was found          Revised Cardiac Risk Index (RCRI)  The patient has the following serious cardiovascular risks for perioperative complications:   - No serious cardiac risks = 0 points     RCRI Interpretation: 0 points: Class I (very low risk - 0.4% complication rate)         Signed Electronically by: " Shobha Graham, TREV CNP  A copy of this evaluation report is provided to the requesting physician.    The longitudinal plan of care for the diagnosis(es)/condition(s) as documented were addressed during this visit. Due to the added complexity in care, I will continue to support Juve in the subsequent management and with ongoing continuity of care.

## 2024-09-07 LAB
ATRIAL RATE - MUSE: 54 BPM
DIASTOLIC BLOOD PRESSURE - MUSE: NORMAL MMHG
INTERPRETATION ECG - MUSE: NORMAL
P AXIS - MUSE: 67 DEGREES
PR INTERVAL - MUSE: 196 MS
QRS DURATION - MUSE: 102 MS
QT - MUSE: 426 MS
QTC - MUSE: 403 MS
R AXIS - MUSE: 89 DEGREES
SYSTOLIC BLOOD PRESSURE - MUSE: NORMAL MMHG
T AXIS - MUSE: 33 DEGREES
VENTRICULAR RATE- MUSE: 54 BPM

## 2024-09-12 ENCOUNTER — ANESTHESIA EVENT (OUTPATIENT)
Dept: SURGERY | Facility: OTHER | Age: 59
End: 2024-09-12
Payer: COMMERCIAL

## 2024-09-13 ENCOUNTER — HOSPITAL ENCOUNTER (OUTPATIENT)
Facility: OTHER | Age: 59
Discharge: HOME | End: 2024-09-13
Attending: SPECIALIST | Admitting: SPECIALIST
Payer: COMMERCIAL

## 2024-09-13 ENCOUNTER — ANESTHESIA (OUTPATIENT)
Dept: SURGERY | Facility: OTHER | Age: 59
End: 2024-09-13
Payer: COMMERCIAL

## 2024-09-13 VITALS
OXYGEN SATURATION: 100 % | TEMPERATURE: 96.8 F | HEART RATE: 46 BPM | DIASTOLIC BLOOD PRESSURE: 84 MMHG | RESPIRATION RATE: 15 BRPM | BODY MASS INDEX: 41.7 KG/M2 | SYSTOLIC BLOOD PRESSURE: 124 MMHG | WEIGHT: 299 LBS

## 2024-09-13 DIAGNOSIS — M65.352 TRIGGER FINGER, LEFT LITTLE FINGER: ICD-10-CM

## 2024-09-13 DIAGNOSIS — I49.3 SYMPTOMATIC PREMATURE VENTRICULAR CONTRACTIONS: Primary | ICD-10-CM

## 2024-09-13 PROCEDURE — 710N000012 HC RECOVERY PHASE 2, PER MINUTE: Performed by: SPECIALIST

## 2024-09-13 PROCEDURE — 370N000017 HC ANESTHESIA TECHNICAL FEE, PER MIN: Performed by: SPECIALIST

## 2024-09-13 PROCEDURE — 250N000011 HC RX IP 250 OP 636: Performed by: NURSE ANESTHETIST, CERTIFIED REGISTERED

## 2024-09-13 PROCEDURE — 999N000141 HC STATISTIC PRE-PROCEDURE NURSING ASSESSMENT: Performed by: SPECIALIST

## 2024-09-13 PROCEDURE — 250N000009 HC RX 250: Performed by: SPECIALIST

## 2024-09-13 PROCEDURE — 26055 INCISE FINGER TENDON SHEATH: CPT | Performed by: NURSE ANESTHETIST, CERTIFIED REGISTERED

## 2024-09-13 PROCEDURE — 258N000003 HC RX IP 258 OP 636: Performed by: NURSE ANESTHETIST, CERTIFIED REGISTERED

## 2024-09-13 PROCEDURE — 360N000074 HC SURGERY LEVEL 1, PER MIN: Performed by: SPECIALIST

## 2024-09-13 PROCEDURE — 250N000011 HC RX IP 250 OP 636: Performed by: SPECIALIST

## 2024-09-13 PROCEDURE — 26055 INCISE FINGER TENDON SHEATH: CPT | Mod: F1 | Performed by: SPECIALIST

## 2024-09-13 PROCEDURE — 272N000001 HC OR GENERAL SUPPLY STERILE: Performed by: SPECIALIST

## 2024-09-13 PROCEDURE — 20600 DRAIN/INJ JOINT/BURSA W/O US: CPT | Mod: F3 | Performed by: SPECIALIST

## 2024-09-13 PROCEDURE — 250N000009 HC RX 250: Performed by: NURSE ANESTHETIST, CERTIFIED REGISTERED

## 2024-09-13 RX ORDER — SODIUM CHLORIDE, SODIUM LACTATE, POTASSIUM CHLORIDE, CALCIUM CHLORIDE 600; 310; 30; 20 MG/100ML; MG/100ML; MG/100ML; MG/100ML
INJECTION, SOLUTION INTRAVENOUS CONTINUOUS
Status: DISCONTINUED | OUTPATIENT
Start: 2024-09-13 | End: 2024-09-13 | Stop reason: HOSPADM

## 2024-09-13 RX ORDER — OXYCODONE HYDROCHLORIDE 5 MG/1
5 TABLET ORAL
Status: DISCONTINUED | OUTPATIENT
Start: 2024-09-13 | End: 2024-09-13 | Stop reason: HOSPADM

## 2024-09-13 RX ORDER — FENTANYL CITRATE 50 UG/ML
50 INJECTION, SOLUTION INTRAMUSCULAR; INTRAVENOUS EVERY 5 MIN PRN
Status: DISCONTINUED | OUTPATIENT
Start: 2024-09-13 | End: 2024-09-13 | Stop reason: HOSPADM

## 2024-09-13 RX ORDER — LIDOCAINE HYDROCHLORIDE 20 MG/ML
INJECTION, SOLUTION INFILTRATION; PERINEURAL PRN
Status: DISCONTINUED | OUTPATIENT
Start: 2024-09-13 | End: 2024-09-13

## 2024-09-13 RX ORDER — PROPOFOL 10 MG/ML
INJECTION, EMULSION INTRAVENOUS PRN
Status: DISCONTINUED | OUTPATIENT
Start: 2024-09-13 | End: 2024-09-13

## 2024-09-13 RX ORDER — ONDANSETRON 4 MG/1
4 TABLET, ORALLY DISINTEGRATING ORAL EVERY 30 MIN PRN
Status: DISCONTINUED | OUTPATIENT
Start: 2024-09-13 | End: 2024-09-13 | Stop reason: HOSPADM

## 2024-09-13 RX ORDER — FENTANYL CITRATE 50 UG/ML
25 INJECTION, SOLUTION INTRAMUSCULAR; INTRAVENOUS EVERY 5 MIN PRN
Status: DISCONTINUED | OUTPATIENT
Start: 2024-09-13 | End: 2024-09-13 | Stop reason: HOSPADM

## 2024-09-13 RX ORDER — LIDOCAINE 40 MG/G
CREAM TOPICAL
Status: DISCONTINUED | OUTPATIENT
Start: 2024-09-13 | End: 2024-09-13 | Stop reason: HOSPADM

## 2024-09-13 RX ORDER — NALOXONE HYDROCHLORIDE 0.4 MG/ML
0.1 INJECTION, SOLUTION INTRAMUSCULAR; INTRAVENOUS; SUBCUTANEOUS
Status: DISCONTINUED | OUTPATIENT
Start: 2024-09-13 | End: 2024-09-13 | Stop reason: HOSPADM

## 2024-09-13 RX ORDER — MAGNESIUM HYDROXIDE 1200 MG/15ML
LIQUID ORAL PRN
Status: DISCONTINUED | OUTPATIENT
Start: 2024-09-13 | End: 2024-09-13 | Stop reason: HOSPADM

## 2024-09-13 RX ORDER — BUPIVACAINE HYDROCHLORIDE 2.5 MG/ML
INJECTION, SOLUTION EPIDURAL; INFILTRATION; INTRACAUDAL PRN
Status: DISCONTINUED | OUTPATIENT
Start: 2024-09-13 | End: 2024-09-13 | Stop reason: HOSPADM

## 2024-09-13 RX ORDER — ONDANSETRON 2 MG/ML
4 INJECTION INTRAMUSCULAR; INTRAVENOUS EVERY 30 MIN PRN
Status: DISCONTINUED | OUTPATIENT
Start: 2024-09-13 | End: 2024-09-13 | Stop reason: HOSPADM

## 2024-09-13 RX ORDER — HYDROMORPHONE HYDROCHLORIDE 1 MG/ML
0.5 INJECTION, SOLUTION INTRAMUSCULAR; INTRAVENOUS; SUBCUTANEOUS EVERY 5 MIN PRN
Status: DISCONTINUED | OUTPATIENT
Start: 2024-09-13 | End: 2024-09-13 | Stop reason: HOSPADM

## 2024-09-13 RX ORDER — ONDANSETRON 2 MG/ML
INJECTION INTRAMUSCULAR; INTRAVENOUS PRN
Status: DISCONTINUED | OUTPATIENT
Start: 2024-09-13 | End: 2024-09-13

## 2024-09-13 RX ORDER — HYDROMORPHONE HCL IN WATER/PF 6 MG/30 ML
0.2 PATIENT CONTROLLED ANALGESIA SYRINGE INTRAVENOUS EVERY 5 MIN PRN
Status: DISCONTINUED | OUTPATIENT
Start: 2024-09-13 | End: 2024-09-13 | Stop reason: HOSPADM

## 2024-09-13 RX ORDER — PROPOFOL 10 MG/ML
INJECTION, EMULSION INTRAVENOUS CONTINUOUS PRN
Status: DISCONTINUED | OUTPATIENT
Start: 2024-09-13 | End: 2024-09-13

## 2024-09-13 RX ORDER — OXYCODONE HYDROCHLORIDE 5 MG/1
10 TABLET ORAL
Status: DISCONTINUED | OUTPATIENT
Start: 2024-09-13 | End: 2024-09-13 | Stop reason: HOSPADM

## 2024-09-13 RX ORDER — FENTANYL CITRATE 50 UG/ML
INJECTION, SOLUTION INTRAMUSCULAR; INTRAVENOUS PRN
Status: DISCONTINUED | OUTPATIENT
Start: 2024-09-13 | End: 2024-09-13

## 2024-09-13 RX ORDER — DEXAMETHASONE SODIUM PHOSPHATE 10 MG/ML
4 INJECTION, SOLUTION INTRAMUSCULAR; INTRAVENOUS
Status: DISCONTINUED | OUTPATIENT
Start: 2024-09-13 | End: 2024-09-13 | Stop reason: HOSPADM

## 2024-09-13 RX ADMIN — PROPOFOL 100 MG: 10 INJECTION, EMULSION INTRAVENOUS at 11:10

## 2024-09-13 RX ADMIN — LIDOCAINE HYDROCHLORIDE 40 MG: 20 INJECTION, SOLUTION INFILTRATION; PERINEURAL at 11:10

## 2024-09-13 RX ADMIN — PROPOFOL 150 MCG/KG/MIN: 10 INJECTION, EMULSION INTRAVENOUS at 11:10

## 2024-09-13 RX ADMIN — FENTANYL CITRATE 50 MCG: 50 INJECTION INTRAMUSCULAR; INTRAVENOUS at 11:25

## 2024-09-13 RX ADMIN — FENTANYL CITRATE 50 MCG: 50 INJECTION INTRAMUSCULAR; INTRAVENOUS at 11:09

## 2024-09-13 RX ADMIN — MIDAZOLAM HYDROCHLORIDE 2 MG: 1 INJECTION, SOLUTION INTRAMUSCULAR; INTRAVENOUS at 11:08

## 2024-09-13 RX ADMIN — ONDANSETRON HYDROCHLORIDE 4 MG: 2 SOLUTION INTRAMUSCULAR; INTRAVENOUS at 11:10

## 2024-09-13 RX ADMIN — SODIUM CHLORIDE, POTASSIUM CHLORIDE, SODIUM LACTATE AND CALCIUM CHLORIDE: 600; 310; 30; 20 INJECTION, SOLUTION INTRAVENOUS at 10:58

## 2024-09-13 ASSESSMENT — ACTIVITIES OF DAILY LIVING (ADL)
ADLS_ACUITY_SCORE: 35

## 2024-09-13 ASSESSMENT — LIFESTYLE VARIABLES: TOBACCO_USE: 1

## 2024-09-13 NOTE — ANESTHESIA CARE TRANSFER NOTE
Patient: Gregg Aguayo    Procedure: Procedure(s):  Release trigger finger, INDEX AND THUMB TRIGGER RELEASE       Diagnosis: Trigger index finger of left hand [M65.322]  Trigger finger of left thumb [M65.312]  Diagnosis Additional Information: No value filed.    Anesthesia Type:   MAC     Note:    Oropharynx: oropharynx clear of all foreign objects and spontaneously breathing  Level of Consciousness: drowsy  Oxygen Supplementation: room air    Independent Airway: airway patency satisfactory and stable  Dentition: dentition unchanged  Vital Signs Stable: post-procedure vital signs reviewed and stable  Report to RN Given: handoff report given  Patient transferred to: Phase II    Handoff Report: Identifed the Patient, Identified the Reponsible Provider, Reviewed the pertinent medical history, Discussed the surgical course, Reviewed Intra-OP anesthesia mangement and issues during anesthesia, Set expectations for post-procedure period and Allowed opportunity for questions and acknowledgement of understanding      Vitals:  Vitals Value Taken Time   BP     Temp     Pulse     Resp     SpO2 92 % 09/13/24 1156   Vitals shown include unfiled device data.    Electronically Signed By: TREV Lafleur CRNA  September 13, 2024  11:56 AM

## 2024-09-13 NOTE — ANESTHESIA PREPROCEDURE EVALUATION
Anesthesia Pre-Procedure Evaluation    Patient: Gregg Aguayo   MRN: [id number] : 1965        Procedure : Procedure(s):  Release trigger finger, INDEX AND THUMB TRIGGER RELEASE          Past Medical History:   Diagnosis Date    Cigarette smoker     Erectile dysfunction     GERD (gastroesophageal reflux disease)     Lipoma of torso 2021    AILEEN on CPAP     PMR (polymyalgia rheumatica) (H24)       Past Surgical History:   Procedure Laterality Date    APPENDECTOMY OPEN  2005    ARTHROPLASTY KNEE Left     ARTHROSCOPY KNEE Left 3/19/2018    Procedure: ARTHROSCOPY KNEE;  Left Knee Arthroscopy, Partial Meniscectomy, Chondroplasty;  Surgeon: Kwabena Patricia DO;  Location: [facility] OR    ARTHROSCOPY KNEE WITH MENISCAL REPAIR Right 2020    Procedure: Rt Knee Arthroscopy, partial Medial Meniscectomy Debridement.;  Surgeon: Monster Grissom MD;  Location: [facility] OR    ATTEMPTED ARTHROSCOPY      knee ? left    COLONOSCOPY  2014,Tubular adenoma of rectum - follow up 5 years    COLONOSCOPY  2019    normal with diverticula, follow up 10 years, 27    COLONOSCOPY N/A 2019    normal, follow up 27    JOINT REPLACEMENT Left 2018    RELEASE CARPAL TUNNEL Right 2017    RELEASE CARPAL TUNNEL Left     2017,[id number],OTHER,Left    RELEASE TRIGGER FINGER Right 2018    RELEASE ULNAR NERVE (ELBOW) Left 2017      Allergies   Allergen Reactions    Chantix [Varenicline] Other (See Comments)     Didn't tolerate      Social History     Tobacco Use    Smoking status: Every Day     Current packs/day: 0.70     Average packs/day: 0.7 packs/day for 37.0 years (25.9 ttl pk-yrs)     Types: Cigarettes     Start date:     Smokeless tobacco: Never    Tobacco comments:     Tried Chantix, didn't tolerate it.   Substance Use Topics    Alcohol use: Yes     Comment: occasional - 5 a week      Wt Readings from Last 1 Encounters:   24 135.6 kg (299 lb)        Anesthesia Evaluation             ROS/MED HX  ENT/Pulmonary:     (+) sleep apnea, mild, uses CPAP,              tobacco use,                        Neurologic:       Cardiovascular:     (+) Dyslipidemia hypertension- -   -  - -                          Irregular Heartbeat/Palpitations,       Previous cardiac testing   Echo: Date: Results:  Procedure  Echocardiogram with two-dimensional, color and spectral Doppler performed.  ______________________________________________________________________________  Interpretation Summary  Global and regional left ventricular function is normal with an EF of 55-60%.  Global right ventricular function is normal.  Pulmonary artery systolic pressure is normal.  No significant valvular abnormalities present.  Estimated mean right atrial pressure is normal.  No pericardial effusion is present.  ______________________________________________________________________________  Left Ventricle  Left ventricular size is normal. Global and regional left ventricular function  is normal with an EF of 55-60%. Mild concentric wall thickening consistent  with left ventricular hypertrophy is present.     Right Ventricle  The right ventricle is normal size. Global right ventricular function is  normal.     Atria  Both atria appear normal.     Mitral Valve  On Doppler interrogation, there is no significant stenosis or regurgitation.     Aortic Valve  Aortic valve is normal in structure and function.     Tricuspid Valve  Trace tricuspid insufficiency is present. The right ventricular systolic  pressure is approximated at 17.8 mmHg plus the right atrial pressure.  Pulmonary artery systolic pressure is normal.     Pulmonic Valve  On Doppler interrogation, there is no significant stenosis or regurgitation.     Vessels  The inferior vena cava was normal in size with preserved respiratory  variability. Estimated mean right atrial pressure is normal.     Pericardium  No pericardial effusion is present.     Miscellaneous  No  significant valvular abnormalities present.     Compared to Previous Study  This study was compared with the study from 9.2019 . No significant changes  noted.    Stress Test:  Date: Results:    ECG Reviewed:  Date: Results:  54   Sinus bradycardia with sinus arrhythmia  Otherwise normal ECG  When compared with ECG of 09-Feb-2024 08:25,  No significant change was found  Confirmed by MD HOPSON BRENT ([id number]) on 9/7/2024 9:20:35 PM      Cath:  Date: Results:      METS/Exercise Tolerance: >4 METS    Hematologic:  - neg hematologic  ROS     Musculoskeletal:   (+)  arthritis,             GI/Hepatic:     (+) GERD, Asymptomatic on medication,                  Renal/Genitourinary:  - neg Renal ROS     Endo:     (+)               Obesity,       Psychiatric/Substance Use:  - neg psychiatric ROS     Infectious Disease:  - neg infectious disease ROS     Malignancy:  - neg malignancy ROS     Other:  - neg other ROS          Physical Exam    Airway        Mallampati: III   TM distance: > 3 FB   Neck ROM: full   Mouth opening: > 3 cm    Respiratory Devices and Support         Dental       (+) Minor Abnormalities - some fillings, tiny chips      Cardiovascular   cardiovascular exam normal       Rhythm and rate: regular and normal     Pulmonary   pulmonary exam normal        breath sounds clear to auscultation           OUTSIDE LABS:  CBC:   Lab Results   Component Value Date    WBC 5.8 02/09/2024    WBC 6.3 01/04/2023    HGB 17.6 02/09/2024    HGB 15.7 01/04/2023    HCT 50.6 02/09/2024    HCT 43.7 01/04/2023     02/09/2024     01/04/2023     BMP:   Lab Results   Component Value Date     02/09/2024     01/04/2023    POTASSIUM 4.7 02/09/2024    POTASSIUM 4.5 01/04/2023    CHLORIDE 102 02/09/2024    CHLORIDE 102 01/04/2023    CO2 24 02/09/2024    CO2 26 01/04/2023    BUN 11.7 02/09/2024    BUN 11.7 01/04/2023    CR 0.79 02/09/2024    CR 0.82 01/04/2023     (H) 02/09/2024     (H) 01/04/2023  "    COAGS: No results found for: \"PTT\", \"INR\", \"FIBR\"  POC: No results found for: \"BGM\", \"HCG\", \"HCGS\"  HEPATIC:   Lab Results   Component Value Date    ALBUMIN 4.5 02/09/2024    PROTTOTAL 7.8 02/09/2024    ALT 34 02/09/2024    AST 40 02/09/2024    ALKPHOS 57 02/09/2024    BILITOTAL 0.6 02/09/2024     OTHER:   Lab Results   Component Value Date    ARCHIE 9.9 02/09/2024    CRP 0.0 07/25/2018    SED 12 (H) 11/06/2018       Anesthesia Plan    ASA Status:  3    NPO Status:  NPO Appropriate    Anesthesia Type: MAC.     - Reason for MAC: straight local not clinically adequate   Induction: Propofol.   Maintenance: N/A.        Consents    Anesthesia Plan(s) and associated risks, benefits, and realistic alternatives discussed. Questions answered and patient/representative(s) expressed understanding.     - Discussed: Risks, Benefits and Alternatives for BOTH SEDATION and the PROCEDURE were discussed     - Discussed with:  Patient      - Specific Concerns: Aspiration.     - Extended Intubation/Ventilatory Support Discussed: No.      - Patient is DNR/DNI Status: No     Use of blood products discussed: No .     Postoperative Care    Pain management: Multi-modal analgesia.   PONV prophylaxis: Ondansetron (or other 5HT-3), Dexamethasone or Solumedrol     Comments:               TREV Lepe CRNA    I have reviewed the pertinent notes and labs in the chart from the past 30 days and (re)examined the patient.  Any updates or changes from those notes are reflected in this note.              # Severe Obesity: Estimated body mass index is 41.7 kg/m  as calculated from the following:    Height as of 9/5/24: 1.803 m (5' 11\").    Weight as of this encounter: 135.6 kg (299 lb).      "

## 2024-09-13 NOTE — INTERVAL H&P NOTE
"The History and Physical has been reviewed, the patient has been examined and no changes have occurred in the patient's condition since the H & P was completed.      Clinical Conditions Present on Arrival:  Clinically Significant Risk Factors Present on Admission                      # Severe Obesity: Estimated body mass index is 41.7 kg/m  as calculated from the following:    Height as of 9/5/24: 1.803 m (5' 11\").    Weight as of 9/5/24: 135.6 kg (299 lb).       "

## 2024-09-13 NOTE — OR NURSING
Patient has been discharged to home at 1247 via ambulation accompanied by RN and spouse, Sandra.    Written discharge instructions were provided to spouse and patient.  Prescriptions were not ordered.  Patient states their pain is 0/10, and denies any nausea or dizziness upon discharge.    Patient and adult caring for them verbalize understanding of discharge instructions including no driving until tomorrow and no longer taking narcotic pain medications - no operating mechanical equipment and no making any important decisions.They understand reason for discharge, and necessary follow-up appointments.

## 2024-09-13 NOTE — BRIEF OP NOTE
[facility]    Brief Operative Note    Pre-operative diagnosis: Trigger index finger of left hand [M65.322]  Trigger finger of left thumb [M65.312] ring finger swelling  Post-operative diagnosis Same as pre-operative diagnosis    Procedure: Release trigger finger, INDEX AND THUMB TRIGGER RELEASE, Left - Wrist                        Ring finger injection  Surgeon: Surgeons and Role:     * Monster Grissom MD - Primary     * Nathanael Lloyd PA - Assisting  Anesthesia: MAC   Estimated Blood Loss: None    Drains: None  Specimens: * No specimens in log *  Findings:   None.  Complications: None.  Implants: * No implants in log *

## 2024-09-13 NOTE — ANESTHESIA POSTPROCEDURE EVALUATION
Patient: Gregg Aguayo    Procedure: Procedure(s):  Release trigger finger, INDEX AND THUMB TRIGGER RELEASE       Anesthesia Type:  MAC    Note:  Disposition: Outpatient   Postop Pain Control: Uneventful            Sign Out: Well controlled pain   PONV: No   Neuro/Psych: Uneventful            Sign Out: Acceptable/Baseline neuro status   Airway/Respiratory: Uneventful            Sign Out: Acceptable/Baseline resp. status   CV/Hemodynamics: Uneventful            Sign Out: Acceptable CV status; No obvious hypovolemia; No obvious fluid overload   Other NRE: NONE   DID A NON-ROUTINE EVENT OCCUR? No           Last vitals:  Vitals Value Taken Time   /84 09/13/24 1226   Temp 96.8  F (36  C) 09/13/24 1157   Pulse 46 09/13/24 1226   Resp 15 09/13/24 1225   SpO2 99 % 09/13/24 1228   Vitals shown include unfiled device data.    Electronically Signed By: TREV Lepe CRNA  September 13, 2024  1:00 PM

## 2024-09-13 NOTE — DISCHARGE INSTRUCTIONS
[facility]  Adult Discharge Orders & Instructions      For 24 hours after surgery:  Get plenty of rest.  A responsible adult must stay with you for at least 24 hours after you leave the hospital.   You may feel lightheaded.  IF so, sit for a few minutes before standing.  Have someone help you get up.   You may have a slight fever. Call the doctor if your fever is over 101 F (38.3 C) (taken under the tongue) or lasts longer than 24 hours.  You may have a dry mouth, a sore throat, muscle aches or trouble sleeping.  These should go away after 24 hours.  Do not make important or legal decisions.  6.   Do not drive or use heavy equipment.  If you have weakness or tingling, don't drive or use heavy equipment until this feeling goes away.                                                                                                                                                                         To contact a doctor, call    [phone]

## 2024-09-13 NOTE — OP NOTE
Preoperative diagnosis: Left thumb and index finger trigger digits #2 tenderness A1 pulley left ring finger.    Postoperative diagnosis: Same    Procedure performed: Left thumb and index finger trigger digit release number 2 injection left ring finger A1 pulley with Depo-Medrol    Surgeon: Monster Grissom MD    Assistant: Nathanael YE    Anesthesia: Local with IV sedation     Indications: This patient presented with multiple trigger digits as well as crepitus and tenderness over the A1 pulley of the previous left ring finger which had been released.  He was offered surgical treatment as outlined above and elected to proceed.    Procedure: Patient was brought the operating placed the operating table pneumatic tourniquet was placed both left upper from a left upper centimeters prepped draped sterile manner timeout procedure formed from surgical site patient needed procedure left upper extremities and elevated exsanguinated and the tourniquet was inflated to 250 mL of mercury.  Attention was first directed to the left ring finger which injected with 20 mg of Depo-Medrol 1/2 cc of 0.25% Marcaine with epinephrine there were no complications.  Following this a vertical incision was planned over the index finger and a modified Garima over the thumb A1 pulley.  Areas were infiltrated with local anesthetic.  Incisions were made carried sharply down through skin to subcu tissue careful spreading was performed to avoid injury to the digital neurovascular structures.  A1 pulleys were identified and released sequentially with a 15 blade.  Ragnell was placed about the tendon no further adherence.'s wounds were then irrigated tourniquet was deflated circulation returned prompt of the hand and fingers hemostasis was obtained with direct pressure skin was closed with interrupted 4 nylon suture sterile dressing was applied and patient brought through In stable condition

## 2024-09-20 ENCOUNTER — OFFICE VISIT (OUTPATIENT)
Dept: ORTHOPEDICS | Facility: OTHER | Age: 59
End: 2024-09-20
Attending: SPECIALIST
Payer: COMMERCIAL

## 2024-09-20 DIAGNOSIS — M65.30 TRIGGER FINGER, ACQUIRED: Primary | ICD-10-CM

## 2024-09-20 PROCEDURE — 99024 POSTOP FOLLOW-UP VISIT: CPT | Performed by: SPECIALIST

## 2024-09-20 NOTE — PROGRESS NOTES
Subjective:    Patient returns for follow-up status post left index and thumb trigger digit release with injection of the left ring finger A1 pulley 1 week ago doing well    Objective:    Incisions are healing nicely he has some mild stiffness but much improved  Imaging:     No new imaging  Assessment:    Status post multiple trigger thumb releases left thumb and index finger with injection of the left ring finger    Plan:    Sutures removed advanced activities follow-up as needed    Monster Grissom MD

## 2024-09-25 DIAGNOSIS — R12 HEARTBURN: ICD-10-CM

## 2024-09-26 RX ORDER — OMEPRAZOLE 40 MG/1
CAPSULE, DELAYED RELEASE ORAL
Qty: 90 CAPSULE | Refills: 3 | Status: SHIPPED | OUTPATIENT
Start: 2024-09-26

## 2024-09-26 NOTE — TELEPHONE ENCOUNTER
[id number] sent Rx request for the following:      Requested Prescriptions   Pending Prescriptions Disp Refills    omeprazole (PRILOSEC) 40 MG DR capsule [Pharmacy Med Name: OMEPRAZOLE 40MG DR CAPSULE] 90 capsule 4     Sig: TAKE 1 CAPSULE (40 MG) BY MOUTH DAILY - TAKE 30 TO 60MINUTES PRIOR TO A MEAL   Last Prescription Date:   10/5/23  Last Fill Qty/Refills:         90, R-4    Last Office Visit:              9/5/24 (Preop)   Future Office visit:           10/8/24    Prescription refilled per RN Medication Refill Policy.................... Karla Ovalles RN ....................  9/26/2024   3:27 PM

## 2024-10-02 ENCOUNTER — TRANSFERRED RECORDS (OUTPATIENT)
Dept: HEALTH INFORMATION MANAGEMENT | Facility: OTHER | Age: 59
End: 2024-10-02
Payer: COMMERCIAL

## 2024-10-08 ENCOUNTER — OFFICE VISIT (OUTPATIENT)
Dept: INTERNAL MEDICINE | Facility: OTHER | Age: 59
End: 2024-10-08
Attending: INTERNAL MEDICINE
Payer: COMMERCIAL

## 2024-10-08 VITALS
RESPIRATION RATE: 16 BRPM | DIASTOLIC BLOOD PRESSURE: 88 MMHG | WEIGHT: 299 LBS | SYSTOLIC BLOOD PRESSURE: 138 MMHG | BODY MASS INDEX: 41.86 KG/M2 | OXYGEN SATURATION: 100 % | HEART RATE: 68 BPM | HEIGHT: 71 IN

## 2024-10-08 DIAGNOSIS — Z71.85 VACCINE COUNSELING: ICD-10-CM

## 2024-10-08 DIAGNOSIS — Z12.5 ENCOUNTER FOR SCREENING FOR MALIGNANT NEOPLASM OF PROSTATE: ICD-10-CM

## 2024-10-08 DIAGNOSIS — R60.0 BILATERAL LEG EDEMA: ICD-10-CM

## 2024-10-08 DIAGNOSIS — I49.3 SYMPTOMATIC PREMATURE VENTRICULAR CONTRACTIONS: ICD-10-CM

## 2024-10-08 DIAGNOSIS — Z72.0 TOBACCO ABUSE: ICD-10-CM

## 2024-10-08 DIAGNOSIS — G47.33 OSA ON CPAP: ICD-10-CM

## 2024-10-08 DIAGNOSIS — E55.9 VITAMIN D DEFICIENCY: ICD-10-CM

## 2024-10-08 DIAGNOSIS — M54.2 NECK PAIN ON LEFT SIDE: ICD-10-CM

## 2024-10-08 DIAGNOSIS — D84.9 IMMUNOSUPPRESSION (H): ICD-10-CM

## 2024-10-08 DIAGNOSIS — R12 HEARTBURN: ICD-10-CM

## 2024-10-08 DIAGNOSIS — E78.2 MIXED HYPERLIPIDEMIA: ICD-10-CM

## 2024-10-08 DIAGNOSIS — E66.813 CLASS 3 SEVERE OBESITY DUE TO EXCESS CALORIES WITH SERIOUS COMORBIDITY AND BODY MASS INDEX (BMI) OF 40.0 TO 44.9 IN ADULT (H): ICD-10-CM

## 2024-10-08 DIAGNOSIS — E53.8 VITAMIN B12 DEFICIENCY: ICD-10-CM

## 2024-10-08 DIAGNOSIS — Z87.891 PERSONAL HISTORY OF TOBACCO USE: ICD-10-CM

## 2024-10-08 DIAGNOSIS — N52.9 ERECTILE DYSFUNCTION, UNSPECIFIED ERECTILE DYSFUNCTION TYPE: ICD-10-CM

## 2024-10-08 DIAGNOSIS — M53.3 SACROILIAC JOINT PAIN: ICD-10-CM

## 2024-10-08 DIAGNOSIS — E66.01 CLASS 3 SEVERE OBESITY DUE TO EXCESS CALORIES WITH SERIOUS COMORBIDITY AND BODY MASS INDEX (BMI) OF 40.0 TO 44.9 IN ADULT (H): ICD-10-CM

## 2024-10-08 DIAGNOSIS — Z23 NEEDS FLU SHOT: ICD-10-CM

## 2024-10-08 DIAGNOSIS — J30.2 SEASONAL ALLERGIES: ICD-10-CM

## 2024-10-08 DIAGNOSIS — I10 BENIGN ESSENTIAL HYPERTENSION: ICD-10-CM

## 2024-10-08 DIAGNOSIS — Z23 NEED FOR COVID-19 VACCINE: ICD-10-CM

## 2024-10-08 DIAGNOSIS — M06.09 RHEUMATOID ARTHRITIS OF MULTIPLE SITES WITH NEGATIVE RHEUMATOID FACTOR (H): ICD-10-CM

## 2024-10-08 DIAGNOSIS — Z00.00 MEDICARE ANNUAL WELLNESS VISIT, SUBSEQUENT: Primary | ICD-10-CM

## 2024-10-08 LAB
ALBUMIN SERPL BCG-MCNC: 4.6 G/DL (ref 3.5–5.2)
ALP SERPL-CCNC: 66 U/L (ref 40–150)
ALT SERPL W P-5'-P-CCNC: 30 U/L (ref 0–70)
ANION GAP SERPL CALCULATED.3IONS-SCNC: 10 MMOL/L (ref 7–15)
AST SERPL W P-5'-P-CCNC: 30 U/L (ref 0–45)
BASOPHILS # BLD AUTO: 0.1 10E3/UL (ref 0–0.2)
BASOPHILS NFR BLD AUTO: 1 %
BILIRUB SERPL-MCNC: 0.8 MG/DL
BUN SERPL-MCNC: 11.7 MG/DL (ref 8–23)
CALCIUM SERPL-MCNC: 9.9 MG/DL (ref 8.8–10.4)
CHLORIDE SERPL-SCNC: 100 MMOL/L (ref 98–107)
CHOLEST SERPL-MCNC: 186 MG/DL
CREAT SERPL-MCNC: 0.87 MG/DL (ref 0.67–1.17)
EGFRCR SERPLBLD CKD-EPI 2021: >90 ML/MIN/1.73M2
EOSINOPHIL # BLD AUTO: 0.4 10E3/UL (ref 0–0.7)
EOSINOPHIL NFR BLD AUTO: 6 %
ERYTHROCYTE [DISTWIDTH] IN BLOOD BY AUTOMATED COUNT: 12.4 % (ref 10–15)
FASTING STATUS PATIENT QL REPORTED: NO
FASTING STATUS PATIENT QL REPORTED: NO
GLUCOSE SERPL-MCNC: 121 MG/DL (ref 70–99)
HCO3 SERPL-SCNC: 32 MMOL/L (ref 22–29)
HCT VFR BLD AUTO: 46.3 % (ref 40–53)
HDLC SERPL-MCNC: 46 MG/DL
HGB BLD-MCNC: 16.1 G/DL (ref 13.3–17.7)
IMM GRANULOCYTES # BLD: 0 10E3/UL
IMM GRANULOCYTES NFR BLD: 0 %
LDLC SERPL CALC-MCNC: 92 MG/DL
LYMPHOCYTES # BLD AUTO: 1.8 10E3/UL (ref 0.8–5.3)
LYMPHOCYTES NFR BLD AUTO: 29 %
MCH RBC QN AUTO: 34 PG (ref 26.5–33)
MCHC RBC AUTO-ENTMCNC: 34.8 G/DL (ref 31.5–36.5)
MCV RBC AUTO: 98 FL (ref 78–100)
MONOCYTES # BLD AUTO: 0.6 10E3/UL (ref 0–1.3)
MONOCYTES NFR BLD AUTO: 10 %
NEUTROPHILS # BLD AUTO: 3.3 10E3/UL (ref 1.6–8.3)
NEUTROPHILS NFR BLD AUTO: 54 %
NONHDLC SERPL-MCNC: 140 MG/DL
NRBC # BLD AUTO: 0 10E3/UL
NRBC BLD AUTO-RTO: 0 /100
PLATELET # BLD AUTO: 164 10E3/UL (ref 150–450)
POTASSIUM SERPL-SCNC: 4.1 MMOL/L (ref 3.4–5.3)
PROT SERPL-MCNC: 7.5 G/DL (ref 6.4–8.3)
PSA SERPL DL<=0.01 NG/ML-MCNC: 0.17 NG/ML (ref 0–4.5)
RBC # BLD AUTO: 4.73 10E6/UL (ref 4.4–5.9)
SODIUM SERPL-SCNC: 142 MMOL/L (ref 135–145)
TRIGL SERPL-MCNC: 240 MG/DL
VIT B12 SERPL-MCNC: 385 PG/ML (ref 232–1245)
WBC # BLD AUTO: 6.1 10E3/UL (ref 4–11)

## 2024-10-08 PROCEDURE — 85004 AUTOMATED DIFF WBC COUNT: CPT | Mod: ZL | Performed by: INTERNAL MEDICINE

## 2024-10-08 PROCEDURE — 82607 VITAMIN B-12: CPT | Mod: ZL | Performed by: INTERNAL MEDICINE

## 2024-10-08 PROCEDURE — 99406 BEHAV CHNG SMOKING 3-10 MIN: CPT | Mod: 25 | Performed by: INTERNAL MEDICINE

## 2024-10-08 PROCEDURE — G0103 PSA SCREENING: HCPCS | Mod: ZL | Performed by: INTERNAL MEDICINE

## 2024-10-08 PROCEDURE — 90471 IMMUNIZATION ADMIN: CPT | Performed by: INTERNAL MEDICINE

## 2024-10-08 PROCEDURE — 36415 COLL VENOUS BLD VENIPUNCTURE: CPT | Mod: ZL | Performed by: INTERNAL MEDICINE

## 2024-10-08 PROCEDURE — 80061 LIPID PANEL: CPT | Mod: ZL | Performed by: INTERNAL MEDICINE

## 2024-10-08 PROCEDURE — G0439 PPPS, SUBSEQ VISIT: HCPCS | Mod: 25 | Performed by: INTERNAL MEDICINE

## 2024-10-08 PROCEDURE — 82306 VITAMIN D 25 HYDROXY: CPT | Mod: ZL | Performed by: INTERNAL MEDICINE

## 2024-10-08 PROCEDURE — 80053 COMPREHEN METABOLIC PANEL: CPT | Mod: ZL | Performed by: INTERNAL MEDICINE

## 2024-10-08 PROCEDURE — 91320 SARSCV2 VAC 30MCG TRS-SUC IM: CPT | Performed by: INTERNAL MEDICINE

## 2024-10-08 PROCEDURE — 85018 HEMOGLOBIN: CPT | Mod: ZL | Performed by: INTERNAL MEDICINE

## 2024-10-08 PROCEDURE — 99214 OFFICE O/P EST MOD 30 MIN: CPT | Mod: 25 | Performed by: INTERNAL MEDICINE

## 2024-10-08 PROCEDURE — 90480 ADMN SARSCOV2 VAC 1/ONLY CMP: CPT | Performed by: INTERNAL MEDICINE

## 2024-10-08 PROCEDURE — 90673 RIV3 VACCINE NO PRESERV IM: CPT | Performed by: INTERNAL MEDICINE

## 2024-10-08 PROCEDURE — G0296 VISIT TO DETERM LDCT ELIG: HCPCS | Performed by: INTERNAL MEDICINE

## 2024-10-08 RX ORDER — HYDROCHLOROTHIAZIDE 12.5 MG/1
25 TABLET ORAL DAILY
Qty: 180 TABLET | Refills: 4 | Status: SHIPPED | OUTPATIENT
Start: 2024-10-08

## 2024-10-08 RX ORDER — NAPROXEN SODIUM 220 MG/1
440 TABLET, FILM COATED ORAL 2 TIMES DAILY PRN
Qty: 360 TABLET | Refills: 4 | Status: SHIPPED | OUTPATIENT
Start: 2024-10-08

## 2024-10-08 RX ORDER — FEXOFENADINE HCL 180 MG/1
180 TABLET ORAL PRN
Qty: 90 TABLET | Refills: 4 | Status: SHIPPED | OUTPATIENT
Start: 2024-10-08

## 2024-10-08 RX ORDER — OMEPRAZOLE 40 MG/1
40 CAPSULE, DELAYED RELEASE ORAL DAILY
Qty: 90 CAPSULE | Refills: 4 | Status: SHIPPED | OUTPATIENT
Start: 2024-10-08

## 2024-10-08 RX ORDER — SENNOSIDES 8.6 MG
1300 CAPSULE ORAL 2 TIMES DAILY PRN
Qty: 360 TABLET | Refills: 4 | Status: SHIPPED | OUTPATIENT
Start: 2024-10-08

## 2024-10-08 RX ORDER — SILDENAFIL 100 MG/1
TABLET, FILM COATED ORAL
Qty: 30 TABLET | Refills: 11 | Status: SHIPPED | OUTPATIENT
Start: 2024-10-08

## 2024-10-08 RX ORDER — FUROSEMIDE 20 MG/1
40 TABLET ORAL DAILY PRN
Qty: 180 TABLET | Refills: 4 | Status: SHIPPED | OUTPATIENT
Start: 2024-10-08

## 2024-10-08 RX ORDER — METOPROLOL SUCCINATE 25 MG/1
50-75 TABLET, EXTENDED RELEASE ORAL EVERY EVENING
Qty: 270 TABLET | Refills: 4 | Status: SHIPPED | OUTPATIENT
Start: 2024-10-08

## 2024-10-08 RX ORDER — POLYETHYLENE GLYCOL 3350 17 G
2 POWDER IN PACKET (EA) ORAL
Qty: 168 LOZENGE | Refills: 11 | Status: SHIPPED | OUTPATIENT
Start: 2024-10-08

## 2024-10-08 SDOH — HEALTH STABILITY: PHYSICAL HEALTH: ON AVERAGE, HOW MANY DAYS PER WEEK DO YOU ENGAGE IN MODERATE TO STRENUOUS EXERCISE (LIKE A BRISK WALK)?: 2 DAYS

## 2024-10-08 ASSESSMENT — ENCOUNTER SYMPTOMS
HEADACHES: 0
CHEST TIGHTNESS: 0
WOUND: 0
DIZZINESS: 0
HEMATURIA: 0
BACK PAIN: 1
CONFUSION: 0
FEVER: 0
CHILLS: 0
BRUISES/BLEEDS EASILY: 0
JOINT SWELLING: 0
ARTHRALGIAS: 1
ABDOMINAL PAIN: 0
SHORTNESS OF BREATH: 0
MYALGIAS: 1
LIGHT-HEADEDNESS: 0
ADENOPATHY: 0
COLOR CHANGE: 0
PALPITATIONS: 1
SLEEP DISTURBANCE: 1

## 2024-10-08 ASSESSMENT — PATIENT HEALTH QUESTIONNAIRE - PHQ9
SUM OF ALL RESPONSES TO PHQ QUESTIONS 1-9: 1
10. IF YOU CHECKED OFF ANY PROBLEMS, HOW DIFFICULT HAVE THESE PROBLEMS MADE IT FOR YOU TO DO YOUR WORK, TAKE CARE OF THINGS AT HOME, OR GET ALONG WITH OTHER PEOPLE: NOT DIFFICULT AT ALL
SUM OF ALL RESPONSES TO PHQ QUESTIONS 1-9: 1

## 2024-10-08 ASSESSMENT — SOCIAL DETERMINANTS OF HEALTH (SDOH): HOW OFTEN DO YOU GET TOGETHER WITH FRIENDS OR RELATIVES?: MORE THAN THREE TIMES A WEEK

## 2024-10-08 ASSESSMENT — PAIN SCALES - GENERAL: PAINLEVEL: MODERATE PAIN (5)

## 2024-10-08 NOTE — PROGRESS NOTES
Assessment & Plan     ICD-10-CM    1. Medicare annual wellness visit, subsequent  Z00.00       2. Vaccine counseling  Z71.85       3. Benign essential hypertension  I10 furosemide (LASIX) 20 MG tablet     hydroCHLOROthiazide 12.5 MG tablet     CBC with Platelets & Differential     Comprehensive metabolic panel     CBC with Platelets & Differential     Comprehensive metabolic panel      4. Class 3 severe obesity due to excess calories with serious comorbidity and body mass index (BMI) of 40.0 to 44.9 in adult (H)  E66.813     E66.01     Z68.41       5. Rheumatoid arthritis of multiple sites with negative rheumatoid factor (H)  M06.09       6. Immunosuppression (H)  D84.9       7. Tobacco abuse  Z72.0 nicotine (COMMIT) 2 MG lozenge     nicotine (NICORETTE) 2 MG gum     SMOKING CESSATION COUNSELING 3-10 MIN      8. Mixed hyperlipidemia  E78.2 Lipid Profile     Lipid Profile      9. AILEEN on CPAP - uses nightly, finds very helpful  G47.33       10. Bilateral leg edema  R60.0 furosemide (LASIX) 20 MG tablet      11. Symptomatic premature ventricular contractions  I49.3 metoprolol succinate ER (TOPROL XL) 25 MG 24 hr tablet      12. Sacroiliac joint pain  M53.3 naproxen sodium (ANAPROX) 220 MG tablet     acetaminophen (TYLENOL) 650 MG CR tablet      13. Neck pain on left side  M54.2 naproxen sodium (ANAPROX) 220 MG tablet     acetaminophen (TYLENOL) 650 MG CR tablet      14. Heartburn  R12 omeprazole (PRILOSEC) 40 MG DR capsule      15. Seasonal allergies  J30.2 fexofenadine (ALLEGRA) 180 MG tablet      16. Erectile dysfunction, unspecified erectile dysfunction type  N52.9 sildenafil (VIAGRA) 100 MG tablet      17. Need for COVID-19 vaccine  Z23 COVID-19 12+ (PFIZER)      18. Needs flu shot  Z23 CANCELED: INFLUENZA HIGH DOSE, TRIVALENT, PF (FLUZONE)      19. Personal history of tobacco use  Z87.891 Prof fee: Shared Decision Making for Lung Cancer Screening     CT Chest Lung Cancer Scrn Low Dose wo      20. Encounter for  screening for malignant neoplasm of prostate  Z12.5 Prostate Specific Antigen Screen     Prostate Specific Antigen Screen      21. Vitamin D deficiency  E55.9 Vitamin D Deficiency     Vitamin D Deficiency      22. Vitamin B12 deficiency  E53.8 Vitamin B12     Vitamin B12     vitamin B complex with vitamin C (VITAMIN  B COMPLEX) tablet         Patient presents for Medicare annual wellness visit as well as follow-up multiple issues.    Rheumatoid arthritis, history of negative rheumatoid factor.  Continues to follow with rheumatology.  Taking hydroxychloroquine.  Chronically immunosuppressed.  Continues with routine lab work and follow-up with rheumatology.  No changes for now.    Tobacco abuse.  Ongoing.  Encourage smoking cessation.  Patient qualifies for lung cancer screening CT.  See separate documentation below.  Tobacco cessation counseling provided.  See below.  Orders placed for nicotine gum and lozenges.    Lower extremity edema, controlled with as needed torsemide.  Lasix.  Needs refills.    Symptomatic PVCs, rarely having heart palpitations at this time.  Generally taking 50 mg of Toprol-XL in the evening.  Advised that it would be fine to increase up to 75 mg daily if needed.  Updated prescription sent to pharmacy.    Sacroiliac joint pain, still using naproxen and Tylenol as needed for pain.  Has underlying left-sided as well as right-sided muscular neck pain as well.  Was supposed to get sacroiliac joint fusion with Dr. Holden but there ended up being some issues and somehow fell off the schedule.  Advised that he contact Dr. Holden department and find out if they are still doing this procedure and if he can get back onto the schedule.    Heartburn, ongoing.  Continues with omeprazole, takes 40 mg daily.  Needs refills.  Gets recurrent heartburn if he misses a dose.    Seasonal allergies, seems to be doing reasonably well with Allegra.  Takes seasonally more than an every day occurrence.  Needs  refills.    Erectile dysfunction, chronic.  Stable.  Does get some sinus congestion the day of using Viagra due to erectile dysfunction.  This generally resolves by the next day.  Otherwise no side effects.  Needs refills.    Vaccines today.    Vitamin D deficiency.  Check lab work.  Not currently taking oral replacement.    Recommend B12 deficiency.  B12 returned within low normal range.  He does have some fatigue.  Recommend starting B complex.  Prescription sent to pharmacy.    HYPERTENSION - Ongoing. Blood pressure is currently well controlled.  Medication side effects: None. Denies syncope or presyncope.  Continue current medications.   Medication list reviewed/updated. Refills completed as needed.      MIXED HYPERLIPIDEMIA.  Ongoing. LDL is at goal: No. Triglycerides are at goal: No.  Hopefully lifestyle modifications will improve cholesterol levels, otherwise will consider additional medication dose adjustments or medication changes.  Medication side effects reported: None.   Continue current medications for now. Medication list reviewed/updated. Refills completed as needed.  Recent Labs   Lab Test 03/31/21  1226   CHOL 197   HDL 38   *   TRIG 275*        OBESITY - Ongoing.  (See Encounter Diagnosis list above for obesity class / severity).  - Encourage continued maintenance / improvement in diet and exercise.   - Consider Nutrition / Dietician appointment.  - Weight loss would improve Hypertension, Cholesterol.      Sleep Apnea, chronic, ongoing.  Uses CPAP nightly.  Finds helpful and beneficial.  Encouraged continued use.     Vaccine counseling completed.  Encourage routine / annual vaccinations.    Counseled patient today on smoking cessation.    History   Smoking Status    Every Day    Packs/day: 0.70    Years: 37.00    Types: Cigarettes    Start date: 1999   Smokeless Tobacco    Never        reports that he has been smoking cigarettes. He started smoking about 25 years ago. He has a 25.9 pack-year  smoking history. He has never used smokeless tobacco.  Patient is ready to quit, open to education.    Provided information on quit partner website, discussed impact and health consequences of smoking.    Time spent: 3 minutes.      Results for orders placed or performed in visit on 10/08/24   Comprehensive metabolic panel     Status: Abnormal   Result Value Ref Range    Sodium 142 135 - 145 mmol/L    Potassium 4.1 3.4 - 5.3 mmol/L    Carbon Dioxide (CO2) 32 (H) 22 - 29 mmol/L    Anion Gap 10 7 - 15 mmol/L    Urea Nitrogen 11.7 8.0 - 23.0 mg/dL    Creatinine 0.87 0.67 - 1.17 mg/dL    GFR Estimate >90 >60 mL/min/1.73m2    Calcium 9.9 8.8 - 10.4 mg/dL    Chloride 100 98 - 107 mmol/L    Glucose 121 (H) 70 - 99 mg/dL    Alkaline Phosphatase 66 40 - 150 U/L    AST 30 0 - 45 U/L    ALT 30 0 - 70 U/L    Protein Total 7.5 6.4 - 8.3 g/dL    Albumin 4.6 3.5 - 5.2 g/dL    Bilirubin Total 0.8 <=1.2 mg/dL    Patient Fasting > 8hrs? No    Lipid Profile     Status: Abnormal   Result Value Ref Range    Cholesterol 186 <200 mg/dL    Triglycerides 240 (H) <150 mg/dL    Direct Measure HDL 46 >=40 mg/dL    LDL Cholesterol Calculated 92 <100 mg/dL    Non HDL Cholesterol 140 (H) <130 mg/dL    Patient Fasting > 8hrs? No     Narrative    Cholesterol  Desirable: < 200 mg/dL  Borderline High: 200 - 239 mg/dL  High: >= 240 mg/dL    Triglycerides  Normal: < 150 mg/dL  Borderline High: 150 - 199 mg/dL  High: 200-499 mg/dL  Very High: >= 500 mg/dL    Direct Measure HDL  Female: >= 50 mg/dL   Male: >= 40 mg/dL    LDL Cholesterol  Desirable: < 100 mg/dL  Above Desirable: 100 - 129 mg/dL   Borderline High: 130 - 159 mg/dL   High:  160 - 189 mg/dL   Very High: >= 190 mg/dL    Non HDL Cholesterol  Desirable: < 130 mg/dL  Above Desirable: 130 - 159 mg/dL  Borderline High: 160 - 189 mg/dL  High: 190 - 219 mg/dL  Very High: >= 220 mg/dL   Prostate Specific Antigen Screen     Status: Normal   Result Value Ref Range    Prostate Specific Antigen Screen  0.17 0.00 - 4.50 ng/mL    Narrative    This result is obtained using the Roche Elecsys total PSA method on the cielo e601 immunoassay analyzer, which is an ultrasensitive method. Results obtained with different assay methods or kits cannot be used interchangeably.  This test is intended for initial prostate cancer screening. PSA values exceeding the age-specific limits are suspicious for prostate disease, but additional testing, such as prostate biopsy, is needed to diagnose prostate pathology. The American Cancer Society recommends annual examination with digital rectal examination and serum PSA beginning at age 50 and for men with a life expectancy of at least 10 years after detection of prostate cancer. For men in high-risk groups, such as  Americans or men with a first-degree relative diagnosed at a younger age, testing should begin at a younger age. It is generally recommended that information be provided to patients about the benefits and limitations of testing and treatment so they can make informed decisions.   Vitamin B12     Status: Normal   Result Value Ref Range    Vitamin B12 385 232 - 1,245 pg/mL   CBC with platelets and differential     Status: Abnormal   Result Value Ref Range    WBC Count 6.1 4.0 - 11.0 10e3/uL    RBC Count 4.73 4.40 - 5.90 10e6/uL    Hemoglobin 16.1 13.3 - 17.7 g/dL    Hematocrit 46.3 40.0 - 53.0 %    MCV 98 78 - 100 fL    MCH 34.0 (H) 26.5 - 33.0 pg    MCHC 34.8 31.5 - 36.5 g/dL    RDW 12.4 10.0 - 15.0 %    Platelet Count 164 150 - 450 10e3/uL    % Neutrophils 54 %    % Lymphocytes 29 %    % Monocytes 10 %    % Eosinophils 6 %    % Basophils 1 %    % Immature Granulocytes 0 %    NRBCs per 100 WBC 0 <1 /100    Absolute Neutrophils 3.3 1.6 - 8.3 10e3/uL    Absolute Lymphocytes 1.8 0.8 - 5.3 10e3/uL    Absolute Monocytes 0.6 0.0 - 1.3 10e3/uL    Absolute Eosinophils 0.4 0.0 - 0.7 10e3/uL    Absolute Basophils 0.1 0.0 - 0.2 10e3/uL    Absolute Immature Granulocytes 0.0 <=0.4  10e3/uL    Absolute NRBCs 0.0 10e3/uL   CBC with Platelets & Differential     Status: Abnormal    Narrative    The following orders were created for panel order CBC with Platelets & Differential.  Procedure                               Abnormality         Status                     ---------                               -----------         ------                     CBC with platelets and d...[[id number]]  Abnormal            Final result                 Please view results for these tests on the individual orders.      CBC is normal.   B12 is low. -- Start B12 oral supplement.     PSA is normal.   Cholesterol levels are high.  Random glucose elevated.  Liver enzymes normal.  Creatinine is at baseline.             Nicotine/Tobacco Cessation  He reports that he has been smoking cigarettes. He started smoking about 25 years ago. He has a 25.9 pack-year smoking history. He has never used smokeless tobacco.  Nicotine/Tobacco Cessation Plan  Information offered: Patient not interested at this time      Counseling  Appropriate preventive services were addressed with this patient via screening, questionnaire, or discussion as appropriate for fall prevention, nutrition, physical activity, Tobacco-use cessation, social engagement, weight loss and cognition.  Checklist reviewing preventive services available has been given to the patient.  Reviewed patient's diet, addressing concerns and/or questions.   He is at risk for lack of exercise and has been provided with information to increase physical activity for the benefit of his well-being.   He is at risk for psychosocial distress and has been provided with information to reduce risk.   Discussed possible causes of fatigue. The patient reports drinking more than 3 alcoholic drinks per day and/or more than 7 drhnks per week. The patient was counseled and given information about possible harmful effects of excessive alcohol intake.The patient was provided with written  information regarding signs of hearing loss.         Return in about 1 year (around 10/8/2025) for Annual Medicare Wellness Visit.      Reinier Ritchie MD  [facility]    Review of Systems   Constitutional:  Negative for chills and fever.   HENT:  Negative for congestion, hearing loss and tinnitus.    Eyes:  Negative for visual disturbance.   Respiratory:  Negative for chest tightness and shortness of breath.    Cardiovascular:  Positive for palpitations (infrequent) and peripheral edema (chronic). Negative for chest pain.   Gastrointestinal:  Negative for abdominal pain.   Genitourinary:  Negative for hematuria.   Musculoskeletal:  Positive for arthralgias (trigger fingers - left - 3,4,5th fingers), back pain, gait problem and myalgias (right shoulder). Negative for joint swelling.   Skin:  Negative for color change, rash and wound.   Allergic/Immunologic: Positive for environmental allergies and immunocompromised state.   Neurological:  Negative for dizziness, syncope, light-headedness and headaches.   Hematological:  Negative for adenopathy. Does not bruise/bleed easily.   Psychiatric/Behavioral:  Positive for sleep disturbance. Negative for confusion.    All other systems reviewed and are negative.        Preventive Care Visit  [facility]  Reinier Ritchie MD, Internal Medicine  Oct 8, 2024      Osei An is a 58 year old, presenting for the following:  Medicare Annul Exam        10/8/2024     8:29 AM   Additional Questions   Roomed by Nash Orr LPN         Health Care Directive  Patient does not have a Health Care Directive or Living Will: Discussed advance care planning with patient; however, patient declined at this time.    HPI            10/8/2024   General Health   How would you rate your overall physical health? Good   Feel stress (tense, anxious, or unable to sleep) Only a little            10/8/2024   Nutrition   Diet: Regular (no restrictions)   At  least 4 servings of fruits and vegetables/day No            10/8/2024   Exercise   Days per week of moderate/strenous exercise 2 days   Frequency of exercise: 2-3 days/week            10/8/2024   Social Factors   Frequency of gathering with friends or relatives More than three times a week             No data to display                  10/8/2024   Dental   Dentist two times every year? Yes             No data to display                     No data to display                   Today's PHQ-9 Score:       10/8/2024     8:26 AM   PHQ-9 SCORE   PHQ-9 Total Score MyChart 1 (Minimal depression)   PHQ-9 Total Score 1         10/8/2024   Substance Use   Alcohol more than 3/day or more than 7/wk Yes   How often do you have a drink containing alcohol 2 to 4 times a month   How many alcohol drinks on typical day 3 or 4   How often do you have 5+ drinks at one occasion Weekly   Audit 2/3 Score 4   How often not able to stop drinking once started Never   How often failed to do what normally expected Never   How often needed first drink in am after a heavy drinking session Never   How often feeling of guilt or remorse after drinking Never   How often unable to remember what happened the night before Never   Have you or someone else been injured because of your drinking No   Has anyone been concerned or suggested you cut down on drinking No   TOTAL SCORE - AUDIT 6        Social History     Tobacco Use    Smoking status: Every Day     Current packs/day: 0.70     Average packs/day: 0.7 packs/day for 37.0 years (25.9 ttl pk-yrs)     Types: Cigarettes     Start date: 1999    Smokeless tobacco: Never    Tobacco comments:     Tried Chantix, didn't tolerate it.   Vaping Use    Vaping status: Never Used   Substance Use Topics    Alcohol use: Yes     Comment: occasional - 5 a week    Drug use: No       Last PSA:   Prostate Specific Antigen Screen   Date Value Ref Range Status   10/08/2024 0.17 0.00 - 4.50 ng/mL Final     ASCVD Risk  "  The 10-year ASCVD risk score (Jcarlos CAVAZOS, et al., 2019) is: 17.8%    Values used to calculate the score:      Age: 58 years      Sex: Male      Is Non- : No      Diabetic: No      Tobacco smoker: Yes      Systolic Blood Pressure: 138 mmHg      Is BP treated: Yes      HDL Cholesterol: 46 mg/dL      Total Cholesterol: 186 mg/dL            Reviewed and updated as needed this visit by Provider   Tobacco  Allergies  Meds  Problems  Med Hx  Surg Hx  Fam Hx     Sexual Activity            Current providers sharing in care for this patient include:  Patient Care Team:  Reinier Ritchie MD as PCP - General (Internal Medicine)  Reinier Ritchie MD as Assigned PCP  Monster Grissom MD as Assigned Musculoskeletal Provider    The following health maintenance items are reviewed in Epic and correct as of today:  Health Maintenance   Topic Date Due    URINE DRUG SCREEN  11/06/2019    RSV VACCINE (1 - Risk 60-74 years 1-dose series) Never done    Pneumococcal Vaccine: Pediatrics (0 to 5 Years) and At-Risk Patients (6 to 64 Years) (2 of 2 - PCV) 09/06/2024    LUNG CANCER SCREENING  11/01/2024    NICOTINE/TOBACCO CESSATION COUNSELING Q 1 YR  10/08/2025    MEDICARE ANNUAL WELLNESS VISIT  10/08/2025    BMP  10/08/2025    LIPID  10/08/2025    GLUCOSE  10/08/2027    COLORECTAL CANCER SCREENING  02/01/2029    ADVANCE CARE PLANNING  10/08/2029    DTAP/TDAP/TD IMMUNIZATION (3 - Td or Tdap) 07/04/2030    PHQ-2 (once per calendar year)  Completed    INFLUENZA VACCINE  Completed    ZOSTER IMMUNIZATION  Completed    COVID-19 Vaccine  Completed    HEPATITIS C SCREENING  Addressed    HIV SCREENING  Addressed    HPV IMMUNIZATION  Aged Out    MENINGITIS IMMUNIZATION  Aged Out    RSV MONOCLONAL ANTIBODY  Aged Out            Objective    Exam  /88   Pulse 68   Resp 16   Ht 1.803 m (5' 11\")   Wt 135.6 kg (299 lb)   SpO2 100%   BMI 41.70 kg/m     Estimated body mass index is 41.7 kg/m  as calculated " "from the following:    Height as of this encounter: 1.803 m (5' 11\").    Weight as of this encounter: 135.6 kg (299 lb).    Physical Exam  Vitals reviewed.   Constitutional:       General: He is not in acute distress.     Appearance: Normal appearance. He is well-developed. He is obese. He is not toxic-appearing or diaphoretic.   HENT:      Head: Normocephalic and atraumatic.   Eyes:      General: No scleral icterus.     Conjunctiva/sclera: Conjunctivae normal.   Neck:      Vascular: No carotid bruit.   Cardiovascular:      Rate and Rhythm: Normal rate and regular rhythm.      Pulses: Normal pulses.      Heart sounds: Normal heart sounds. No murmur heard.  Pulmonary:      Effort: Pulmonary effort is normal.      Breath sounds: Normal breath sounds.   Abdominal:      Palpations: Abdomen is soft.      Tenderness: There is no abdominal tenderness.   Musculoskeletal:         General: Tenderness (neck muscle tenderness) and deformity (s/p surgery of trigger finger x3 on left hand) present.      Right shoulder: Tenderness present. No swelling or deformity. Decreased range of motion. Decreased strength.      Left shoulder: Normal.      Cervical back: Neck supple.      Right hip: Normal. No tenderness or bony tenderness. Normal range of motion. Normal strength.      Right lower leg: Edema ( trace) present.      Left lower leg: Edema ( trace) present.      Comments: Right shoulder pain and ROM limitation.   Lymphadenopathy:      Cervical: No cervical adenopathy.   Skin:     General: Skin is warm and dry.      Findings: No rash.   Neurological:      Mental Status: He is alert. Mental status is at baseline.   Psychiatric:         Mood and Affect: Mood normal.         Behavior: Behavior normal.               10/8/2024   Mini Cog   Clock Draw Score 2 Normal   3 Item Recall 3 objects recalled   Mini Cog Total Score 5                 Signed Electronically by: Reinier Ritchie MD    Answers submitted by the patient for this " visit:  Patient Health Questionnaire (Submitted on 10/8/2024)  If you checked off any problems, how difficult have these problems made it for you to do your work, take care of things at home, or get along with other people?: Not difficult at all  PHQ9 TOTAL SCORE: 1    Lung Cancer Screening Shared Decision Making Visit     Gregg Aguayo, a 58 year old male, is eligible for lung cancer screening    History   Smoking Status    Every Day    Packs/day: 0.70    Years: 37.00    Types: Cigarettes    Start date: 1999   Smokeless Tobacco    Never       I have discussed with patient the risks and benefits of screening for lung cancer with low-dose CT.     The risks include:    radiation exposure: one low dose chest CT has as much ionizing radiation as about 15 chest x-rays, or 6 months of background radiation living in [place]      false positives: most findings/nodules are NOT cancer, but some might still require additional diagnostic evaluation, including biopsy    over-diagnosis: some slow growing cancers that might never have been clinically significant will be detected and treated unnecessarily     The benefit of early detection of lung cancer is contingent upon adherence to annual screening or more frequent follow up if indicated.     Furthermore, to benefit from screening, Gregg must be willing and able to undergo diagnostic procedures, if indicated. Although no specific guide is available for determining severity of comorbidities, it is reasonable to withhold screening in patients who have greater mortality risk from other diseases.     We did discuss that the best way to prevent lung cancer is to not smoke.    Some patients may value a numeric estimation of lung cancer risk when evaluating if lung cancer screening is right for them, here is one calculator:    ShouldIScreen

## 2024-10-08 NOTE — NURSING NOTE
"Chief Complaint   Patient presents with    Medicare Ann Exam       Initial BP (!) 142/88   Pulse 68   Resp 16   Ht 1.803 m (5' 11\")   Wt 135.6 kg (299 lb)   SpO2 100%   BMI 41.70 kg/m   Estimated body mass index is 41.7 kg/m  as calculated from the following:    Height as of this encounter: 1.803 m (5' 11\").    Weight as of this encounter: 135.6 kg (299 lb).  Medication Review: complete    The next two questions are to help us understand your food security.  If you are feeling you need any assistance in this area, we have resources available to support you today.          9/5/2024   SDOH- Food Insecurity   Within the past 12 months, did you worry that your food would run out before you got money to buy more? N   Within the past 12 months, did the food you bought just not last and you didn t have money to get more? N            Health Care Directive:  Patient does not have a Health Care Directive or Living Will: Discussed advance care planning with patient; however, patient declined at this time.    Rosanna Orr LPN      "

## 2024-10-08 NOTE — LETTER
October 8, 2024      Juve Aguayo  [address]   [place] [address]        Dear ,    We are writing to inform you of your test results.    CBC is normal.   B12 is low. -- Start B12 oral supplement.      PSA is normal.   Cholesterol levels are high.  Random glucose elevated.  Liver enzymes normal.  Creatinine is at baseline.    Resulted Orders   Comprehensive metabolic panel   Result Value Ref Range    Sodium 142 135 - 145 mmol/L    Potassium 4.1 3.4 - 5.3 mmol/L    Carbon Dioxide (CO2) 32 (H) 22 - 29 mmol/L    Anion Gap 10 7 - 15 mmol/L    Urea Nitrogen 11.7 8.0 - 23.0 mg/dL    Creatinine 0.87 0.67 - 1.17 mg/dL    GFR Estimate >90 >60 mL/min/1.73m2      Comment:      eGFR calculated using 2021 CKD-EPI equation.    Calcium 9.9 8.8 - 10.4 mg/dL      Comment:      Reference intervals for this test were updated on 7/16/2024 to reflect our healthy population more accurately. There may be differences in the flagging of prior results with similar values performed with this method. Those prior results can be interpreted in the context of the updated reference intervals.    Chloride 100 98 - 107 mmol/L    Glucose 121 (H) 70 - 99 mg/dL    Alkaline Phosphatase 66 40 - 150 U/L    AST 30 0 - 45 U/L    ALT 30 0 - 70 U/L    Protein Total 7.5 6.4 - 8.3 g/dL    Albumin 4.6 3.5 - 5.2 g/dL    Bilirubin Total 0.8 <=1.2 mg/dL    Patient Fasting > 8hrs? No    Lipid Profile   Result Value Ref Range    Cholesterol 186 <200 mg/dL    Triglycerides 240 (H) <150 mg/dL    Direct Measure HDL 46 >=40 mg/dL    LDL Cholesterol Calculated 92 <100 mg/dL    Non HDL Cholesterol 140 (H) <130 mg/dL    Patient Fasting > 8hrs? No     Narrative    Cholesterol  Desirable: < 200 mg/dL  Borderline High: 200 - 239 mg/dL  High: >= 240 mg/dL    Triglycerides  Normal: < 150 mg/dL  Borderline High: 150 - 199 mg/dL  High: 200-499 mg/dL  Very High: >= 500 mg/dL    Direct Measure HDL  Female: >= 50 mg/dL   Male: >= 40 mg/dL    LDL  Cholesterol  Desirable: < 100 mg/dL  Above Desirable: 100 - 129 mg/dL   Borderline High: 130 - 159 mg/dL   High:  160 - 189 mg/dL   Very High: >= 190 mg/dL    Non HDL Cholesterol  Desirable: < 130 mg/dL  Above Desirable: 130 - 159 mg/dL  Borderline High: 160 - 189 mg/dL  High: 190 - 219 mg/dL  Very High: >= 220 mg/dL   Prostate Specific Antigen Screen   Result Value Ref Range    Prostate Specific Antigen Screen 0.17 0.00 - 4.50 ng/mL    Narrative    This result is obtained using the Roche Elecsys total PSA method on the cielo e601 immunoassay analyzer, which is an ultrasensitive method. Results obtained with different assay methods or kits cannot be used interchangeably.  This test is intended for initial prostate cancer screening. PSA values exceeding the age-specific limits are suspicious for prostate disease, but additional testing, such as prostate biopsy, is needed to diagnose prostate pathology. The American Cancer Society recommends annual examination with digital rectal examination and serum PSA beginning at age 50 and for men with a life expectancy of at least 10 years after detection of prostate cancer. For men in high-risk groups, such as  Americans or men with a first-degree relative diagnosed at a younger age, testing should begin at a younger age. It is generally recommended that information be provided to patients about the benefits and limitations of testing and treatment so they can make informed decisions.   Vitamin B12   Result Value Ref Range    Vitamin B12 385 232 - 1,245 pg/mL   CBC with platelets and differential   Result Value Ref Range    WBC Count 6.1 4.0 - 11.0 10e3/uL    RBC Count 4.73 4.40 - 5.90 10e6/uL    Hemoglobin 16.1 13.3 - 17.7 g/dL    Hematocrit 46.3 40.0 - 53.0 %    MCV 98 78 - 100 fL    MCH 34.0 (H) 26.5 - 33.0 pg    MCHC 34.8 31.5 - 36.5 g/dL    RDW 12.4 10.0 - 15.0 %    Platelet Count 164 150 - 450 10e3/uL    % Neutrophils 54 %    % Lymphocytes 29 %    % Monocytes 10 %     % Eosinophils 6 %    % Basophils 1 %    % Immature Granulocytes 0 %    NRBCs per 100 WBC 0 <1 /100    Absolute Neutrophils 3.3 1.6 - 8.3 10e3/uL    Absolute Lymphocytes 1.8 0.8 - 5.3 10e3/uL    Absolute Monocytes 0.6 0.0 - 1.3 10e3/uL    Absolute Eosinophils 0.4 0.0 - 0.7 10e3/uL    Absolute Basophils 0.1 0.0 - 0.2 10e3/uL    Absolute Immature Granulocytes 0.0 <=0.4 10e3/uL    Absolute NRBCs 0.0 10e3/uL       If you have any questions or concerns, please call the clinic at the number listed above.       Sincerely,      Reinier Ritchie MD

## 2024-10-08 NOTE — PATIENT INSTRUCTIONS
Labs today.     QUIT SMOKING!!     -- Choose a quit date (within 1 month). Quitting smoking abruptly is more successful than gradually cutting back.   -- Tell everyone about it (friends, family, coworkers)   -- Think about when you smoke the most, and what you'll do during those times (eg when in the car, work breaks, etc)     Consider:   -- Start varenicline (Chantix) 1 week before your quit date   -- Start bupropion (Wellbutrin/Zyban) 1 week before your quit date -- Welbutrin 1 pill daily for 1 week then 1 pill twice daily      -- Stop smoking on quit date   -- Starting with quit date, use nicotine lozenges/gum as needed for cravings     -- Quit Plan - Call them in the next 1-2 weeks to help you quit smoking.                        [phone])                        http://[email or web address].TOK.tv   -- http://smokefree.gov/     Return in approximately 1 year, or sooner as needed for follow-up with Dr. Ritchie.  - Annual Follow-up / Physical - Medicare Annual Wellness Visit     Clinic : [phone]  Appointment line: [phone]     Lung Cancer Screening   Frequently Asked Questions  If you are at high-risk for lung cancer, getting screened with low-dose computed tomography (LDCT) every year can help save your life. This handout offers answers to some of the most common questions about lung cancer screening. If you have other questions, please call [phone] ([phone]).     What is it?  Lung cancer screening uses special X-ray technology to create an image of your lung tissue. The exam is quick and easy and takes less than 10 seconds. We don t give you any medicine or use any needles. You can eat before and after the exam. You don t need to change your clothes as long as the clothing on your chest doesn t contain metal. But, you do need to be able to hold your breath for at least 6 seconds during the exam.    What is the goal of lung cancer screening?  The goal of lung cancer screening is  to save lives. Many times, lung cancer is not found until a person starts having physical symptoms. Lung cancer screening can help detect lung cancer in the earliest stages when it may be easier to treat.    Who should be screened for lung cancer?  We suggest lung cancer screening for anyone who is at high-risk for lung cancer. You are in the high-risk group if you:     are between the ages of 55 and 79, and   have smoked at least 1 pack of cigarettes a day for 20 or more years, and   still smoke or have quit within the past 15 years.    However, if you have a new cough or shortness of breath, you should talk to your doctor before being screened.    Why does it matter if I have symptoms?  Certain symptoms can be a sign that you have a condition in your lungs that should be checked and treated by your doctor. These symptoms include fever, chest pain, a new or changing cough, shortness of breath that you have never felt before, coughing up blood or unexplained weight loss. Having any of these symptoms can greatly affect the results of lung cancer screening.       Should all smokers get an LDCT lung cancer screening exam?  It depends. Lung cancer screening is for a very specific group of men and women who have a history of heavy smoking over a long period of time (see  Who should be screened for lung cancer  above).  I am in the high-risk group, but have been diagnosed with cancer in the past. Is LDCT lung cancer screening right for me?  In some cases, you should not have LDCT lung screening, such as when your doctor is already following your cancer with CT scan studies. Your doctor will help you decide if LDCT lung screening is right for you.  Do I need to have a screening exam every year?  Yes. If you are in the high-risk group described earlier, you should get an LDCT lung cancer screening exam every year until you are 79, or are no longer willing or able to undergo screening and possible procedures to diagnose and  treat lung cancer.  How effective is LDCT at preventing death from lung cancer?  Studies have shown that LDCT lung cancer screening can lower the risk of death from lung cancer by 20 percent in people who are at high-risk.  What are the risks?  There are some risks and limitations of LDCT lung cancer screening. We want to make sure you understand the risks and benefits, so please let us know if you have any questions. Your doctor may want to talk with you more about these risks.   Radiation exposure: As with any exam that uses radiation, there is a very small increased risk of cancer. The amount of radiation in LDCT is small--about the same amount a person would get from a mammogram. Your doctor orders the exam when he or she feels the potential benefits outweigh the risks.   False negatives: No test is perfect, including LDCT. It is possible that you may have a medical condition, including lung cancer, that is not found during your exam. This is called a false negative result.   False positives and more testing: LDCT very often finds something in the lung that could be cancer, but in fact is not. This is called a false positive result. False positive tests often cause anxiety. To make sure these findings are not cancer, you may need to have more tests. These tests will be done only if you give us permission. Sometimes patients need a treatment that can have side effects, such as a biopsy. For more information on false positives, see  What can I expect from the results?    Findings not related to lung cancer: Your LDCT exam also takes pictures of areas of your body next to your lungs. In a very small number of cases, the CT scan will show an abnormal finding in one of these areas, such as your kidneys, adrenal glands, liver or thyroid. This finding may not be serious, but you may need more tests. Your doctor can help you decide what other tests you may need, if any.  What can I expect from the results?  About 1 out  of 4 LDCT exams will find something that may need more tests. Most of the time, these findings are lung nodules. Lung nodules are very small collections of tissue in the lung. These nodules are very common, and the vast majority--more than 97 percent--are not cancer (benign). Most are normal lymph nodes or small areas of scarring from past infections.  But, if a small lung nodule is found to be cancer, the cancer can be cured more than 90 percent of the time. To know if the nodule is cancer, we may need to get more images before your next yearly screening exam. If the nodule has suspicious features (for example, it is large, has an odd shape or grows over time), we will refer you to a specialist for further testing.  Will my doctor also get the results?  Yes. Your doctor will get a copy of your results.  Is it okay to keep smoking now that there s a cancer screening exam?  No. Tobacco is one of the strongest cancer-causing agents. It causes not only lung cancer, but other cancers and cardiovascular (heart) diseases as well. The damage caused by smoking builds over time. This means that the longer you smoke, the higher your risk of disease. While it is never too late to quit, the sooner you quit, the better.  Where can I find help to quit smoking?  The best way to prevent lung cancer is to stop smoking. If you have already quit smoking, congratulations and keep it up! For help on quitting smoking, please call [facility] at 1-332-QUITNOW (1-282.594.7301) or the American Cancer Society at 1-564.739.3356 to find local resources near you.  One-on-one health coaching:  If you d prefer to work individually with a health care provider on tobacco cessation, we offer:     Medication Therapy Management:  Our specially trained pharmacists work closely with you and your doctor to help you quit smoking.  Call [phone] or [phone] (toll free).

## 2024-10-09 LAB — VIT D+METAB SERPL-MCNC: 33 NG/ML (ref 20–50)

## 2024-10-15 ENCOUNTER — ALLIED HEALTH/NURSE VISIT (OUTPATIENT)
Dept: FAMILY MEDICINE | Facility: OTHER | Age: 59
End: 2024-10-15
Attending: INTERNAL MEDICINE
Payer: COMMERCIAL

## 2024-10-15 VITALS — TEMPERATURE: 96.4 F

## 2024-10-15 DIAGNOSIS — Z23 ENCOUNTER FOR IMMUNIZATION: Primary | ICD-10-CM

## 2024-10-15 PROCEDURE — 90677 PCV20 VACCINE IM: CPT

## 2024-10-15 PROCEDURE — 90471 IMMUNIZATION ADMIN: CPT

## 2024-10-15 NOTE — PROGRESS NOTES
Prior to immunization administration, verified patients identity using patient s name and date of birth. Please see Immunization Activity for additional information.     Is the patient's temperature normal (100.5 or less)? Yes     Patient MEETS CRITERIA. PROCEED with vaccine administration.      Patient instructed to remain in clinic for 15 minutes afterwards, and to report any adverse reactions.      Educated patient to stay 15 minute for any reaction, Patient left AMA.  Link to Ancillary Visit Immunization Standing Orders SmartSet     Screening performed by Jose Chapa RN on 10/15/2024 at 7:30 AM.

## 2024-11-05 ENCOUNTER — HOSPITAL ENCOUNTER (OUTPATIENT)
Dept: CT IMAGING | Facility: OTHER | Age: 59
Discharge: HOME | End: 2024-11-05
Attending: INTERNAL MEDICINE | Admitting: INTERNAL MEDICINE
Payer: COMMERCIAL

## 2024-11-05 DIAGNOSIS — Z87.891 PERSONAL HISTORY OF TOBACCO USE: ICD-10-CM

## 2024-11-05 PROCEDURE — 71271 CT THORAX LUNG CANCER SCR C-: CPT

## 2024-11-08 ENCOUNTER — TRANSFERRED RECORDS (OUTPATIENT)
Dept: HEALTH INFORMATION MANAGEMENT | Facility: OTHER | Age: 59
End: 2024-11-08
Payer: COMMERCIAL

## 2024-11-27 ENCOUNTER — OFFICE VISIT (OUTPATIENT)
Dept: INTERNAL MEDICINE | Facility: OTHER | Age: 59
End: 2024-11-27
Attending: INTERNAL MEDICINE
Payer: COMMERCIAL

## 2024-11-27 VITALS
TEMPERATURE: 97.6 F | OXYGEN SATURATION: 97 % | BODY MASS INDEX: 41.58 KG/M2 | HEIGHT: 71 IN | DIASTOLIC BLOOD PRESSURE: 86 MMHG | WEIGHT: 297 LBS | SYSTOLIC BLOOD PRESSURE: 138 MMHG | HEART RATE: 60 BPM | RESPIRATION RATE: 18 BRPM

## 2024-11-27 DIAGNOSIS — M33.13 DERMATOMYOSITIS (H): ICD-10-CM

## 2024-11-27 DIAGNOSIS — D84.9 IMMUNOSUPPRESSION (H): ICD-10-CM

## 2024-11-27 DIAGNOSIS — H01.9 INFECTION OF EYELID: Primary | ICD-10-CM

## 2024-11-27 RX ORDER — DOXYCYCLINE 100 MG/1
100 CAPSULE ORAL 2 TIMES DAILY
Qty: 14 CAPSULE | Refills: 0 | Status: SHIPPED | OUTPATIENT
Start: 2024-11-27 | End: 2024-12-04

## 2024-11-27 RX ORDER — LIDOCAINE 40 MG/G
CREAM TOPICAL
COMMUNITY
Start: 2024-11-11

## 2024-11-27 ASSESSMENT — ENCOUNTER SYMPTOMS
EYE PAIN: 0
CONFUSION: 0
PALPITATIONS: 1
HEADACHES: 0
EYE REDNESS: 0
DIZZINESS: 0
CHILLS: 0
CHEST TIGHTNESS: 0
MYALGIAS: 1
ADENOPATHY: 0
FEVER: 0
COLOR CHANGE: 1
SHORTNESS OF BREATH: 0
HEMATURIA: 0
WOUND: 0
SLEEP DISTURBANCE: 1
ABDOMINAL PAIN: 0
BACK PAIN: 1
BRUISES/BLEEDS EASILY: 0
EYE ITCHING: 0
JOINT SWELLING: 0
LIGHT-HEADEDNESS: 0
ARTHRALGIAS: 1

## 2024-11-27 ASSESSMENT — PAIN SCALES - GENERAL: PAINLEVEL_OUTOF10: MODERATE PAIN (5)

## 2024-11-27 NOTE — PROGRESS NOTES
Assessment & Plan     ICD-10-CM    1. Infection of eyelid  H01.9 doxycycline hyclate (VIBRAMYCIN) 100 MG capsule      2. Dermatomyositis (H)  M33.13       3. Immunosuppression (H)  D84.9          Patient presents for left eyelid issues.     Upper eyelid stye last week, popped it.    Yesterday upper eyelid was a little swelling, more swelling today with some mild tenderness.     Immunosuppressed, on plaquenil for dermatomyositis.     -- start doxycycline. Use warm or cold packs as tolerated.                Return for follow-up as needed for new or worsening symptoms, follow-up as needed with Dr. Ritchie..      Reinier Ritchie MD  [facility] AND [facility]    Review of Systems   Constitutional:  Negative for chills and fever.   HENT:  Negative for congestion, hearing loss and tinnitus.    Eyes:  Negative for pain, redness, itching and visual disturbance.   Respiratory:  Negative for chest tightness and shortness of breath.    Cardiovascular:  Positive for palpitations (infrequent) and peripheral edema (chronic). Negative for chest pain.   Gastrointestinal:  Negative for abdominal pain.   Genitourinary:  Negative for hematuria.   Musculoskeletal:  Positive for arthralgias (trigger fingers - left - 3,4,5th fingers), back pain, gait problem and myalgias (right shoulder). Negative for joint swelling.   Skin:  Positive for color change (Left upper Eyelid) and rash. Negative for wound.   Allergic/Immunologic: Positive for environmental allergies and immunocompromised state.   Neurological:  Negative for dizziness, syncope, light-headedness and headaches.   Hematological:  Negative for adenopathy. Does not bruise/bleed easily.   Psychiatric/Behavioral:  Positive for sleep disturbance. Negative for confusion.    All other systems reviewed and are negative.       Osei An is a 59 year old, presenting for the following health issues:  Eye Lid Swelling (Left swollen eyelid )        11/27/2024     9:54 AM  "  Additional Questions   Roomed by Soni Burgess LPN   Accompanied by self         11/27/2024     9:54 AM   Patient Reported Additional Medications   Patient reports taking the following new medications n/a     History of Present Illness       Reason for visit:  Imflmaion in left eye  Symptom onset:  1-3 days ago   He is taking medications regularly.                     Objective    /86   Pulse 60   Temp 97.6  F (36.4  C) (Temporal)   Resp 18   Ht 1.791 m (5' 10.5\")   Wt 134.7 kg (297 lb)   SpO2 97%   BMI 42.01 kg/m    Body mass index is 42.01 kg/m .  Physical Exam  Constitutional:       Appearance: Normal appearance. He is obese.   Skin:     Comments: Left upper eyelid swelling / infection   Neurological:      Mental Status: He is alert.                          Signed Electronically by: Reinier Ritchie MD    "

## 2024-11-27 NOTE — PATIENT INSTRUCTIONS
Infection of eyelid    START:   - doxycycline hyclate (VIBRAMYCIN) 100 MG capsule; Take 1 capsule (100 mg) by mouth 2 times daily for 7 days. -- use cheap, generic doxycycline --      Apply some cold / warm packs to help with eye swelling.       Return as needed for follow-up for new / worsening symptoms.    Clinic : [phone]  Appointment line: [phone]

## 2025-02-26 ENCOUNTER — HOSPITAL ENCOUNTER (OUTPATIENT)
Dept: GENERAL RADIOLOGY | Facility: OTHER | Age: 60
Discharge: HOME | End: 2025-02-26
Attending: INTERNAL MEDICINE
Payer: COMMERCIAL

## 2025-02-26 ENCOUNTER — OFFICE VISIT (OUTPATIENT)
Dept: INTERNAL MEDICINE | Facility: OTHER | Age: 60
End: 2025-02-26
Attending: INTERNAL MEDICINE
Payer: COMMERCIAL

## 2025-02-26 VITALS
RESPIRATION RATE: 20 BRPM | WEIGHT: 301 LBS | DIASTOLIC BLOOD PRESSURE: 78 MMHG | HEIGHT: 71 IN | OXYGEN SATURATION: 98 % | HEART RATE: 57 BPM | SYSTOLIC BLOOD PRESSURE: 124 MMHG | TEMPERATURE: 97.1 F | BODY MASS INDEX: 42.14 KG/M2

## 2025-02-26 DIAGNOSIS — M54.2 NECK PAIN ON RIGHT SIDE: ICD-10-CM

## 2025-02-26 DIAGNOSIS — Z72.0 TOBACCO USE: ICD-10-CM

## 2025-02-26 DIAGNOSIS — M50.30 DDD (DEGENERATIVE DISC DISEASE), CERVICAL: ICD-10-CM

## 2025-02-26 DIAGNOSIS — M54.16 LUMBAR BACK PAIN WITH RADICULOPATHY AFFECTING LOWER EXTREMITY: Primary | ICD-10-CM

## 2025-02-26 DIAGNOSIS — E66.01 CLASS 3 SEVERE OBESITY DUE TO EXCESS CALORIES WITH SERIOUS COMORBIDITY AND BODY MASS INDEX (BMI) OF 40.0 TO 44.9 IN ADULT (H): ICD-10-CM

## 2025-02-26 DIAGNOSIS — R73.9 ELEVATED RANDOM BLOOD GLUCOSE LEVEL: ICD-10-CM

## 2025-02-26 DIAGNOSIS — G89.29 CHRONIC RIGHT HIP PAIN: ICD-10-CM

## 2025-02-26 DIAGNOSIS — M54.17 LUMBOSACRAL RADICULOPATHY AT L4: ICD-10-CM

## 2025-02-26 DIAGNOSIS — F40.240 CLAUSTROPHOBIA: ICD-10-CM

## 2025-02-26 DIAGNOSIS — M54.16 LUMBAR BACK PAIN WITH RADICULOPATHY AFFECTING LOWER EXTREMITY: ICD-10-CM

## 2025-02-26 DIAGNOSIS — E66.813 CLASS 3 SEVERE OBESITY DUE TO EXCESS CALORIES WITH SERIOUS COMORBIDITY AND BODY MASS INDEX (BMI) OF 40.0 TO 44.9 IN ADULT (H): ICD-10-CM

## 2025-02-26 DIAGNOSIS — M25.551 CHRONIC RIGHT HIP PAIN: ICD-10-CM

## 2025-02-26 PROCEDURE — 72040 X-RAY EXAM NECK SPINE 2-3 VW: CPT

## 2025-02-26 PROCEDURE — 72110 X-RAY EXAM L-2 SPINE 4/>VWS: CPT

## 2025-02-26 RX ORDER — ALPRAZOLAM 2 MG/1
2 TABLET ORAL ONCE
Qty: 3 TABLET | Refills: 0 | Status: SHIPPED | OUTPATIENT
Start: 2025-02-26 | End: 2025-02-26

## 2025-02-26 ASSESSMENT — ENCOUNTER SYMPTOMS
BACK PAIN: 1
FEVER: 0
HEMATURIA: 0
COUGH: 0
AGITATION: 0
UNEXPECTED WEIGHT CHANGE: 1
WOUND: 0
WHEEZING: 0
DIARRHEA: 0
VOMITING: 0
NECK PAIN: 1
ABDOMINAL PAIN: 0
ARTHRALGIAS: 1
NECK STIFFNESS: 1
BRUISES/BLEEDS EASILY: 0
CHILLS: 0
DIZZINESS: 0
SHORTNESS OF BREATH: 0
LIGHT-HEADEDNESS: 0
CONFUSION: 0
NAUSEA: 0
MYALGIAS: 0
DYSURIA: 0

## 2025-02-26 ASSESSMENT — PAIN SCALES - GENERAL: PAINLEVEL_OUTOF10: SEVERE PAIN (8)

## 2025-02-26 NOTE — PROGRESS NOTES
Assessment & Plan     ICD-10-CM    1. Lumbar back pain with radiculopathy affecting lower extremity  M54.16 Spine  Referral     MR Lumbar Spine w/o & w Contrast     CANCELED: XR Lumbar Spine 2/3 Views     CANCELED: XR Lumbar Bending Only 2/3 Views      2. Lumbosacral radiculopathy at L4 - Right  M54.17 Spine  Referral     MR Lumbar Spine w/o & w Contrast     CANCELED: XR Lumbar Spine 2/3 Views     CANCELED: XR Lumbar Bending Only 2/3 Views      3. DDD (degenerative disc disease), cervical  M50.30 Spine  Referral     XR Cervical Spine 2/3 Views     MR Cervical Spine w/o Contrast      4. Neck pain on right side  M54.2 Spine  Referral     XR Cervical Spine 2/3 Views     MR Cervical Spine w/o Contrast      5. Claustrophobia  F40.240 ALPRAZolam (XANAX) 2 MG tablet      6. Chronic right hip pain  M25.551     G89.29       7. Elevated random blood glucose level  R73.9 semaglutide-weight management (WEGOVY) 0.25 MG/0.5ML pen     Semaglutide-Weight Management (WEGOVY) 0.5 MG/0.5ML pen     Semaglutide-Weight Management (WEGOVY) 1 MG/0.5ML pen     Semaglutide-Weight Management (WEGOVY) 1.7 MG/0.75ML pen      8. Class 3 severe obesity due to excess calories with serious comorbidity and body mass index (BMI) of 40.0 to 44.9 in adult (H)  E66.813 semaglutide-weight management (WEGOVY) 0.25 MG/0.5ML pen    E66.01 Semaglutide-Weight Management (WEGOVY) 0.5 MG/0.5ML pen    Z68.41 Semaglutide-Weight Management (WEGOVY) 1 MG/0.5ML pen     Semaglutide-Weight Management (WEGOVY) 1.7 MG/0.75ML pen      9. Tobacco use  Z72.0          Patient presents for follow-up multiple issues.    His back pain has been much worse recently.  A few years ago had MRI showing L4 nerve root impingement.  Having substantially worsening right-sided L4 radiculopathy.  States that his leg gets so weak and numb and painful that it almost causes him to fall.  Lumbar x-rays obtained today.  MRI ordered.  Spine surgery referral  placed.    Patient has claustrophobia, needs a tablet of Xanax to take prior to MRI.  Order sent to pharmacy.    Given his right sided neck pain, with some radicular symptoms especially in the right side of his neck, x-rays obtained.  Has some degenerative changes as noted.  Recommend MRI.  Orders placed.  Spine surgery referral placed.    Has been having some right groin/right hip pain.  Suspect this is probably related to his ball-and-socket joint.  Certain movements or activities cause pain.  At this time he simply wants this noted and will plan for follow-up at some point in the future.  Declines additional workup at this time.    Given his elevated glucose and obesity, would like to work on weight reduction.  Plans to continue to work on dietary modifications, calorie restriction, carbohydrate restriction.  Prescription for Wegovy sent to pharmacy.  BMI is now greater than 42.    Tobacco use.  Ongoing.  Advised that he needs to quit smoking prior to any type of back surgery.  He continues to work on this.    OBESITY - Ongoing.  (See Encounter Diagnosis list above for obesity class / severity).  - Encourage continued maintenance / improvement in diet and exercise.   - Consider Nutrition / Dietician appointment.  - Weight loss would improve Cholesterol, Elevated Glucose.      Vaccine counseling completed.  Encourage routine / annual vaccinations.    Counseled patient today on smoking cessation.    History   Smoking Status    Every Day    Types: Cigarettes   Smokeless Tobacco    Never        reports that he has been smoking cigarettes. He started smoking about 26 years ago. He has a 23.8 pack-year smoking history. He has never used smokeless tobacco.  Patient is ready to quit, open to education.    Provided information on quit partner website, discussed impact and health consequences of smoking.        Results for orders placed or performed during the hospital encounter of 02/26/25   XR Lumbar Spine G/E 4 Views      Status: None    Narrative    Exam: XR LUMBAR SPINE G/E 4 VIEWS    Exam Reason: Right L4 Radiculopathy; Lumbar back pain with  radiculopathy affecting lower extremity; Lumbosacral radiculopathy at  L4    Comparison: None.    Findings:   AP, lateral, flexion, extension, and lateral coned down to the  lumbosacral junction radiographic views were obtained.     No acute fracture or subluxation. There is mild rightward curvature of  the lumbar spine centered at L2.    The vertebrae are normal in alignment. No change in alignment with  flexion or extension.    There are multilevel degenerative changes of the lumbar spine.    The paravertebral soft tissues are normal.      Impression    IMPRESSION:      No acute fracture. Multilevel degenerative changes of the lumbar  spine.    CIARAN BEE MD         SYSTEM ID:  [id number]   Results for orders placed or performed during the hospital encounter of 02/26/25   XR Cervical Spine 2/3 Views     Status: None    Narrative    Exam: XR CERVICAL SPINE 2/3 VIEWS     Exam reason: DDD (degenerative disc disease), cervical; Neck pain on  right side    Comparison: None.    Findings:    Cervical spine alignment is normal.    No acute fracture or subluxation.    Scattered degenerative changes of the cervical spine.    Prevertebral soft tissues are normal in caliber and contour.      Impression    Impression:   No acute fracture or subluxation.    CIARAN BEE MD         SYSTEM ID:  [id number]      Multiple level degenerative changes of the lumbar spine noted.  Mild rightward curvature of the lumbar spine.  Cervical x-rays with scattered degenerative changes.    The longitudinal plan of care for the diagnosis(es)/condition(s) as documented were addressed during this visit. Due to the added complexity in care, I will continue to support Juve in the subsequent management and with ongoing continuity of care.            BMI  Estimated body mass index is 42.58 kg/m  as calculated from the  "following:    Height as of this encounter: 1.791 m (5' 10.5\").    Weight as of this encounter: 136.5 kg (301 lb).         Return for follow-up as needed with Dr. Ritchie., Appointments: [phone].      Reinier Ritchie MD  [facility]    Review of Systems   Constitutional:  Positive for unexpected weight change (+ Weight gain). Negative for chills and fever.   HENT:  Negative for congestion and hearing loss.    Eyes:  Negative for visual disturbance.   Respiratory:  Negative for cough, shortness of breath and wheezing.    Cardiovascular:  Negative for chest pain.   Gastrointestinal:  Negative for abdominal pain, diarrhea, nausea and vomiting.   Endocrine: Negative for cold intolerance and heat intolerance.   Genitourinary:  Negative for dysuria and hematuria.   Musculoskeletal:  Positive for arthralgias, back pain, gait problem (At times, right leg goes out... too much pain), neck pain and neck stiffness. Negative for myalgias.   Skin:  Negative for rash and wound.   Allergic/Immunologic: Positive for immunocompromised state.   Neurological:  Negative for dizziness and light-headedness.   Hematological:  Does not bruise/bleed easily.   Psychiatric/Behavioral:  Negative for agitation and confusion.         Osei An is a 59 year old, presenting for the following health issues:  Back Pain and Weight Problem        2/26/2025     8:00 AM   Additional Questions   Roomed by RAMAN Olivia   Accompanied by self     History of Present Illness       Back Pain:  He presents for follow up of back pain. Patient's back pain is a chronic problem.  Location of back pain:  Right lower back, left lower back, right side of neck, right buttock, right hip, right side of waist and left side of waist  Description of back pain: shooting and stabbing  Back pain spreads: right buttocks, left buttocks, right foot and right side of neck    Since patient first noticed back pain, pain is: always present, but gets " "better and worse  Does back pain interfere with his job:  Yes       He eats 0-1 servings of fruits and vegetables daily.He consumes 1 sweetened beverage(s) daily.He exercises with enough effort to increase his heart rate 20 to 29 minutes per day.  He exercises with enough effort to increase his heart rate 6 days per week.   He is taking medications regularly.                   Objective    /78   Pulse 57   Temp 97.1  F (36.2  C) (Tympanic)   Resp 20   Ht 1.791 m (5' 10.5\")   Wt (!) 136.5 kg (301 lb)   SpO2 98%   BMI 42.58 kg/m    Body mass index is 42.58 kg/m .  Physical Exam  Constitutional:       Appearance: Normal appearance. He is obese.   Eyes:      Conjunctiva/sclera: Conjunctivae normal.   Cardiovascular:      Rate and Rhythm: Normal rate and regular rhythm.      Pulses: Normal pulses.   Pulmonary:      Effort: Pulmonary effort is normal.   Abdominal:      Palpations: Abdomen is soft.      Tenderness: There is no abdominal tenderness.   Musculoskeletal:         General: Tenderness present.   Skin:     General: Skin is warm and dry.      Findings: Lesion (Right upper chest wall - dermatofibromas) present.   Neurological:      Mental Status: He is alert. Mental status is at baseline.   Psychiatric:         Mood and Affect: Mood normal.                    Signed Electronically by: Reinier Ritchie MD    "

## 2025-02-26 NOTE — PATIENT INSTRUCTIONS
Lumbar back pain with radiculopathy affecting lower extremity  Lumbosacral radiculopathy at L4 - Right    Xrays today.   MRI Ordered  - they will call with date/time of appointment.    Spine referral sent  - they will call with date/time of appointment.      - XR Lumbar Spine 2/3 Views; Future  - XR Lumbar Bending Only 2/3 Views; Future  - Spine  Referral; Future  - MR Lumbar Spine w/o & w Contrast; Future    DDD (degenerative disc disease), cervical  Neck pain on right side    Xrays today.   MRI Ordered  - they will call with date/time of appointment.    Spine referral sent  - they will call with date/time of appointment.      - Spine  Referral; Future  - XR Cervical Spine 2/3 Views; Future  - MR Cervical Spine w/o Contrast; Future    Claustrophobia    - ALPRAZolam (XANAX) 2 MG tablet; Take 1 tablet (2 mg) by mouth once for 1 dose. -- 30 to 60 minutes prior to each MRI, can repeat dose x1 if needed    Chronic right hip pain    May need further attention at some point... consider xrays, ortho consult.       Elevated random blood glucose level  Class 3 severe obesity due to excess calories with serious comorbidity and body mass index (BMI) of 40.0 to 44.9 in adult (H)    Check on cost/coverage.....   START:     - semaglutide-weight management (WEGOVY) 0.25 MG/0.5ML pen; Inject 0.5 mLs (0.25 mg) subcutaneously every 7 days. x4 doses - for Elevated Glucose and Obesity - Then increase to 0.5 mg weekly  - Semaglutide-Weight Management (WEGOVY) 0.5 MG/0.5ML pen; Inject 0.5 mg subcutaneously every 7 days. x4 doses - for Elevated Glucose and Obesity - Then increase to 1 mg weekly  - Semaglutide-Weight Management (WEGOVY) 1 MG/0.5ML pen; Inject 1 mg subcutaneously every 7 days. x4 doses - for Elevated Glucose and Obesity- Then increase to 1.7 mg weekly  - Semaglutide-Weight Management (WEGOVY) 1.7 MG/0.75ML pen; Inject 1.7 mg subcutaneously every 7 days. for Elevated Glucose and Obesity        Return as  needed for follow-up for new / worsening symptoms.    Clinic : [phone]  Appointment line: [phone]

## 2025-03-05 ENCOUNTER — TELEPHONE (OUTPATIENT)
Dept: INTERNAL MEDICINE | Facility: OTHER | Age: 60
End: 2025-03-05
Payer: COMMERCIAL

## 2025-03-05 NOTE — TELEPHONE ENCOUNTER
After proper verification,patient was notified, and verbalized understanding. No further questions at this time. He will contact insurance and will get back to us.   Brittany Myles LPN on 3/5/2025 at 3:10 PM

## 2025-03-05 NOTE — TELEPHONE ENCOUNTER
I submitted RX PA for Wegovy 0.25MG/0.5ML. This is not covered by patient's plan.  Irma Ybarra on 3/5/2025 at 2:29 PM

## 2025-03-10 ENCOUNTER — OFFICE VISIT (OUTPATIENT)
Dept: ORTHOPEDICS | Facility: OTHER | Age: 60
End: 2025-03-10
Attending: INTERNAL MEDICINE
Payer: COMMERCIAL

## 2025-03-10 VITALS — HEART RATE: 60 BPM | OXYGEN SATURATION: 98 %

## 2025-03-10 DIAGNOSIS — M54.17 LUMBOSACRAL RADICULOPATHY AT L4: ICD-10-CM

## 2025-03-10 DIAGNOSIS — M54.16 LUMBAR BACK PAIN WITH RADICULOPATHY AFFECTING LOWER EXTREMITY: ICD-10-CM

## 2025-03-10 DIAGNOSIS — M50.30 DDD (DEGENERATIVE DISC DISEASE), CERVICAL: Primary | ICD-10-CM

## 2025-03-10 DIAGNOSIS — M54.2 NECK PAIN ON RIGHT SIDE: ICD-10-CM

## 2025-03-10 ASSESSMENT — PAIN SCALES - GENERAL: PAINLEVEL_OUTOF10: SEVERE PAIN (7)

## 2025-03-10 NOTE — PROGRESS NOTES
HPI    Ple[facility] seeing [facility] in the [facility] today.    As you know he is a pleasant 59-year-old male presenting with severe and function debilitating back and right lower extremity pain that unfortunately has progressing over the past several years spite extensive alternative therapy in the form of a prior lumbar decompression back in 2020, multiple injections, chiropractic care, anti-inflammatory medications, Tylenol and significant lifestyle modifications to the point that he really has no quality of life.  He rates his current level of function is 50%.  He endorses leg pain is being worse than back pain.  He endorses numbness and what seems to be a right L4-5 distribution but states that with prolonged standing he actually gets diffuse claudicatory numbness throughout bilateral lower extremity in a circumferential fashion.  He endorses a claudicatory sense of weakness in his bilateral lower extremities or his right leg will give out at times.    Patient has a relevant past medical history associated with polymyalgia rheumatica, current half pack per day smoker and obesity.    Physical exam    Patient is 5 foot 10, with a weight of 300.  Patient stands in about 5 cm of positive sagittal alignment secondary to what seems to be splinting.  Currently he is aligned.  Hip and shoulder exams largely unremarkable although he does have a little bit of groin discomfort with internal rotation of the right hip.  He has no long track signs.  Full strength in upper and lower extremity myotomes with the exception bilateral ankle dorsiflexion is 4 out of 5.  He can stand for about 5 minutes before he needs to sit down secondary to the heaviness he starts in his legs.  Cervical range of motion is normal age-appropriate.  Thoracolumbar range of motion is severely limited mainly about 20 degrees of flexion 10 degrees of extension.  He is hamstrings are exceedingly tight and he can only get to about long term  from his knees to his toes.  Reflexes are 1+ and symmetric in upper and lower extremities.  Sensory exam elicits some patchy dysesthesias and what seems to be a right-sided L4-5 distribution while he is sitting in the chair.    Imaging    4 views lumbar spine ordered and reviewed in the office.  These radiographs delineate multilevel lumbar spondylosis and associated degenerative scoliosis stemming from an associated asymmetric to space collapse at the L4-5 level on the right causing severe osseous encroachment of the associated right L4-5 foramen.    Assessment    Juve is a pleasant 59-year-old male presenting with severe and functional debilitating back and right lower extremity pain secondary to what seems to be degenerative spondylosis associated radiculopathy and claudication.  We an extensive discussion very natural history of associate condition and response to progress value with conservative alternative therapy.  Given the significant progression of his symptoms despite extensive conservative therapy at this point recommended and obtained advanced imaging of form of an MRI and a CT scan of the lumbar spine to further evaluate his associated pathology.  Will see him back in 2 weeks following the assessment after his imaging to go from there.  In the interim we discussed the importance of smoking cessation and weight loss given there is significant impact on any surgical intervention that may be required in the future.    Plan    Follow-up in 2 weeks.  MRI L.  CTL.

## 2025-03-14 ENCOUNTER — HOSPITAL ENCOUNTER (OUTPATIENT)
Dept: MRI IMAGING | Facility: OTHER | Age: 60
Discharge: HOME | End: 2025-03-14
Attending: INTERNAL MEDICINE
Payer: COMMERCIAL

## 2025-03-14 DIAGNOSIS — M50.30 DDD (DEGENERATIVE DISC DISEASE), CERVICAL: ICD-10-CM

## 2025-03-14 DIAGNOSIS — M54.2 NECK PAIN ON RIGHT SIDE: ICD-10-CM

## 2025-03-14 DIAGNOSIS — M54.16 LUMBAR BACK PAIN WITH RADICULOPATHY AFFECTING LOWER EXTREMITY: ICD-10-CM

## 2025-03-14 DIAGNOSIS — M54.17 LUMBOSACRAL RADICULOPATHY AT L4: ICD-10-CM

## 2025-03-14 PROCEDURE — 72141 MRI NECK SPINE W/O DYE: CPT

## 2025-03-14 PROCEDURE — 72148 MRI LUMBAR SPINE W/O DYE: CPT

## 2025-03-24 ENCOUNTER — OFFICE VISIT (OUTPATIENT)
Dept: ORTHOPEDICS | Facility: OTHER | Age: 60
End: 2025-03-24
Attending: STUDENT IN AN ORGANIZED HEALTH CARE EDUCATION/TRAINING PROGRAM
Payer: COMMERCIAL

## 2025-03-24 DIAGNOSIS — R29.818 NEUROGENIC CLAUDICATION: Primary | ICD-10-CM

## 2025-03-24 PROCEDURE — 99214 OFFICE O/P EST MOD 30 MIN: CPT | Performed by: STUDENT IN AN ORGANIZED HEALTH CARE EDUCATION/TRAINING PROGRAM

## 2025-03-24 NOTE — PROGRESS NOTES
HPI    Pleasure seeing Juve and his wife in the [facility] today.    As you know he is a pleasant 59-year-old male presenting with severe and function debilitating back and right lower extremity pain in addition to functional debilitating neurogenic claudication that unfortunately has been progressing over the past several years despite extensive alternative therapy which even involve the prior L4-5 decompression back in 2020 to the point that he really has no quality of life.  He rates his current level of function is between 20 and 30% which varies and can be up to 50% in his low 0%.  He rates his current back and leg pain is about a 6 out of 10 but states that he easily gets up out of 10 out of 10.  He states he can really only walk in all quarter block before he needs to sit down secondary to the associated heaviness and claudicatory symptoms in his bilateral lower extremities.  He states the pain in his right leg is and is in what seems to be an L4-5 distribution.  He endorses numbness in the same distribution.  He endorses weakness primarily in a claudicatory fashion with standing and states that the associated numbness in the right leg actually gets to involve both legs when he is up standing and again in a claudicatory type fashion.  We did see him previously on 3/10/2025 at which point we send her for an MRI of his lumbar spine and cervical spine.  He is here to discuss results of that in the next steps in his treatment algorithm.    Physical exam is unchanged.  5 foot 10, 200 pounds, about 3 to 4 cm of positive sagittal alignment.  He has coronally he seems to be aligned.  Hip and shoulder exams unremarkable.  No long track signs.  Essentially full strength in upper and lower extremity myotomes although he does have subtle weakness in his right ankle dorsiflexor and EHL at about 4 out of 5.  From a claudicatory standpoint he was only able to stand for about 3 or 4 minutes during the exam  today secondary to associated heaviness in the legs.  Thoracolumbar range of motion is limited to about a 30 degree flexion extension arc.  Reflexes are 1+ and symmetric.  He is exceedingly tight in his hamstrings.  Sensory exam elicits patchy dysesthesias in her right side L4-5 distribution but again with prolonged standing he gets that claudicatory type of numbness in a nondermatomal fashion.    Imaging    4 views of the lumbar spine were reviewed in the office.  These radiographs multilevel lumbar spondylosis with subtle degenerative scoliosis stemming from associated asymmetric to space collapse on the right at L4-5.    MRI    An MRI of the lumbar spine is available for review.  This MRI delineates moderate bilateral lateral recess at the L2-3 level with 1 to 2 mm of bilateral facet fluid.  At L3-4 he has severe central and bilateral lateral recess stenosis.  At L4-5 he has severe bilateral lateral recess and central stenosis and end-stage right foraminal stenosis secondary to asymmetric disc space collapse on the side.  There is a prior laminotomy defect on the right at the site as well.  At L5-S1 he has severe tricompartmental stenosis secondary to epidural lipomatosis.    MRI of the cervical spine is also available for review.  This MRI delineates focal C6-7 spondylosis causing associated cord abutment and flattening secondary to ventral draping with moderate to severe bilateral foraminal stenosis.    Assessment    Juve is a pleasant 59-year-old male presenting with severe and function debilitating back and bilateral lower extremity pains and associated neurogenic claudication secondary concordant compressive pathology and degenerative changes in the lumbar spine.  We an extensive discussion guarding natural history associate condition there is benefits prognostic value so this conservative and alternative therapy.  We did discuss the setting in context of his cervical spine and given that he has really no  balance or fine motor skill issues at this time is elected proceed with the lumbar spine first.  We discussed the performing an associated posterior lumbar decompression instrumented fusion.    The patient is presenting with severe and functional debilitating back and leg pain/weakness/heaviness due to nerve root compression and spinal instability at the L2-S1 level  that has progressed despite extensive conservative therapy and objective concordant pathology on imaging delineating a source of the patient s life altering symptoms.  Therefore, given the patient's persistent and debilitating symptoms, surgery was offered as an alternative to more conservative therapy and the associated risks, benefits, and alternatives of a posterior lumbar decompression and fusion was discussed at length.  We talked about the time of surgery duration and the likely destination of the patient postoperatively in the hospital but potentially home.  We talked about the possible need for an ICU stay.  We talked about possibly needing a drain postoperatively. We talked about the high likelihood of relief of leg pain and possible return to normal activities; we also discussed that while surgery may help relieve back pain, the surgery is designed to treat leg pain not back pain. Additionally, we discussed that when the nerve root is decompressed, the patient will have the ability to regain strength, but that it may takes six months or more to regain strength, and furthermore, the amount of strength regained is predicated based on the severity of the weakness pre-operatively, the length of time the weakness has been present, and the effort put forth by the patient in postoperative rehabilitation. I also discussed with the patient that resolution of numbness is unpredictable, and this should not be a major reason why the patient elects to have surgery. We talked about the small risks of neurologic injury, including a risk of injury to the  nerve root or roots. We talked about the small risks of neurologic injury, including a risk of injury to the nerve root, or even possibly an injury to multiple nerve roots.  We discussed that the level of this surgery is below the spinal cord.   We discussed at length the risk of a possible foot drop, which means temporary or permanent weakness in the muscles required to dorsiflex the foot.  We also discussed the risk of failure of fusion and the fact that some patients do not develop symptoms from this, but (the possibility of requiring a subsequent surgery if the fusion does not heal and symptoms recur). We talked about the potential for developing adjacent segment disease at other levels as well as the likelihood of soreness and difficulty with swallowing following surgery and the risks of anesthesia. We talked about the other risks of the procedure beside neurologic injury which include spinal fluid leak, infection and or meningitis, wound breakdown, excessive bleeding, blindness, sexual dysfunction, injury to neighboring organs, need for further surgery, bowel and bladder dysfunction, instability, and autonomic nervous system dysfunction. The patient was also counseled further as to the possible adverse consequences of or relating to posterior lumbar surgery including the above mentioned swallowing difficulties secondary to the breathing tube with the possible need for tube feeding, voice changes and hoarseness, postoperative hematoma, vascular injury, and the possibility of medical complications from surgery including but not limited to a stroke, a heart attack, blood clot in the leg or lungs, pneumonia, aspiration, and even death. We discussed the use of posterior lumbar screws, rods, and occasionally interbody cages and the FDA status of instrumentation.      BONE GRAFT  We also discussed the different graft options. We discussed the use of both allograft bone and local bone for the fusion. We discussed that  the fusion rate is slightly higher with the use of iliac crest autograft, but due to the increase in morbidity we often elect  to proceed with allograft and local autograph but there is the possibility of using autograft from a separate fascial incision including the iliac crest. We discussed the risk of disease transmission from using allograft bone. We discussed the complications of iliac crest bone graft harvest including significant initial pain, and possible persistent pain at the graft harvest site, as well as infection, hematoma, pelvis fracture, damage to the surrounding nerves and vessels, and the need for revision surgery at the graft harvest site    ANTI-INFLAMMATORIES   The patient was instructed to stop all NSAIDS, or anti-inflammatory medications, 5 days prior to surgery, and remain off of them for 8 weeks after surgery unless specifically discussed with your surgeon and prescribing physician.  These medications include Aleve, Motrin, Advil, Celebrex, Naprosyn, Mobic, )    ANTICOAGULATION  The patient was instructed that if they were to start or continue taking any  blood thinning medications prescribed by another physician, it is the expectation that this physician will continue to manage this medication before and after surgery within the following guidelines.  Discontinue all blood thinning medications like Xarelto/Rivaroxaban, Eliquis/Apixaban, Coumadin/warfarin, Lovenox, Heparin, Clopidogrel/Plavix, Aspirin, Pradaxa/Dabigatran, or Arixtra/ Fondaparinux 5 days prior to surgery and resume them 5 days after surgery unless otherwise discussed directly with your surgeon or managing physician.    At this point the patient would like to move forward with the surgery offered above.  A packet clearly outlining the pre and postoperative pathway associated with surgery was provided to the patient that also contained extensive information pertaining to the patient s disease process, surgical intervention, and  the associated risks involved.  All their questions were answered.    Given that this is a revision surgery we discussed the inherent increased risk of associated complications including durotomy, infection, neurological injury.    Plan    Revision L2-S1 decompression.  L4-5 right-sided TLIF.    Preoperative CT L-spine for preoperative planning.

## 2025-04-07 ENCOUNTER — OFFICE VISIT (OUTPATIENT)
Dept: FAMILY MEDICINE | Facility: OTHER | Age: 60
End: 2025-04-07
Attending: FAMILY MEDICINE
Payer: COMMERCIAL

## 2025-04-07 VITALS
SYSTOLIC BLOOD PRESSURE: 145 MMHG | HEIGHT: 71 IN | OXYGEN SATURATION: 97 % | DIASTOLIC BLOOD PRESSURE: 87 MMHG | RESPIRATION RATE: 20 BRPM | HEART RATE: 73 BPM | WEIGHT: 305.6 LBS | TEMPERATURE: 98.1 F | BODY MASS INDEX: 42.78 KG/M2

## 2025-04-07 DIAGNOSIS — Z98.890 S/P LUMBAR DISCECTOMY: ICD-10-CM

## 2025-04-07 DIAGNOSIS — M50.30 DDD (DEGENERATIVE DISC DISEASE), CERVICAL: ICD-10-CM

## 2025-04-07 DIAGNOSIS — E78.2 MIXED HYPERLIPIDEMIA: ICD-10-CM

## 2025-04-07 DIAGNOSIS — Z01.818 PREOPERATIVE EXAMINATION: Primary | ICD-10-CM

## 2025-04-07 DIAGNOSIS — M35.3 POLYMYALGIA RHEUMATICA: ICD-10-CM

## 2025-04-07 DIAGNOSIS — I10 BENIGN ESSENTIAL HYPERTENSION: ICD-10-CM

## 2025-04-07 DIAGNOSIS — G47.33 OSA ON CPAP: ICD-10-CM

## 2025-04-07 LAB
ALBUMIN UR-MCNC: NEGATIVE MG/DL
ANION GAP SERPL CALCULATED.3IONS-SCNC: 12 MMOL/L (ref 7–15)
APPEARANCE UR: CLEAR
BILIRUB UR QL STRIP: NEGATIVE
BUN SERPL-MCNC: 11.9 MG/DL (ref 8–23)
CALCIUM SERPL-MCNC: 9.8 MG/DL (ref 8.8–10.4)
CHLORIDE SERPL-SCNC: 99 MMOL/L (ref 98–107)
COLOR UR AUTO: NORMAL
CREAT SERPL-MCNC: 0.86 MG/DL (ref 0.67–1.17)
EGFRCR SERPLBLD CKD-EPI 2021: >90 ML/MIN/1.73M2
GLUCOSE SERPL-MCNC: 100 MG/DL (ref 70–99)
GLUCOSE UR STRIP-MCNC: NEGATIVE MG/DL
HCO3 SERPL-SCNC: 29 MMOL/L (ref 22–29)
HGB BLD-MCNC: 16.2 G/DL (ref 13.3–17.7)
HGB UR QL STRIP: NEGATIVE
KETONES UR STRIP-MCNC: NEGATIVE MG/DL
LEUKOCYTE ESTERASE UR QL STRIP: NEGATIVE
NITRATE UR QL: NEGATIVE
PH UR STRIP: 7 [PH] (ref 5–9)
POTASSIUM SERPL-SCNC: 3.7 MMOL/L (ref 3.4–5.3)
SODIUM SERPL-SCNC: 140 MMOL/L (ref 135–145)
SP GR UR STRIP: 1.02 (ref 1–1.03)
UROBILINOGEN UR STRIP-MCNC: NORMAL MG/DL

## 2025-04-07 PROCEDURE — 1125F AMNT PAIN NOTED PAIN PRSNT: CPT | Performed by: FAMILY MEDICINE

## 2025-04-07 PROCEDURE — 81003 URINALYSIS AUTO W/O SCOPE: CPT | Mod: ZL | Performed by: FAMILY MEDICINE

## 2025-04-07 PROCEDURE — 36415 COLL VENOUS BLD VENIPUNCTURE: CPT | Mod: ZL | Performed by: FAMILY MEDICINE

## 2025-04-07 PROCEDURE — 82310 ASSAY OF CALCIUM: CPT | Mod: ZL | Performed by: FAMILY MEDICINE

## 2025-04-07 PROCEDURE — 85018 HEMOGLOBIN: CPT | Mod: ZL | Performed by: FAMILY MEDICINE

## 2025-04-07 PROCEDURE — 80048 BASIC METABOLIC PNL TOTAL CA: CPT | Mod: ZL | Performed by: FAMILY MEDICINE

## 2025-04-07 PROCEDURE — 3077F SYST BP >= 140 MM HG: CPT | Performed by: FAMILY MEDICINE

## 2025-04-07 PROCEDURE — 99214 OFFICE O/P EST MOD 30 MIN: CPT | Performed by: FAMILY MEDICINE

## 2025-04-07 PROCEDURE — 3079F DIAST BP 80-89 MM HG: CPT | Performed by: FAMILY MEDICINE

## 2025-04-07 ASSESSMENT — PAIN SCALES - PAIN ENJOYMENT GENERAL ACTIVITY SCALE (PEG)
INTERFERED_ENJOYMENT_LIFE: 8
INTERFERED_GENERAL_ACTIVITY: 8
AVG_PAIN_PASTWEEK: 9
INTERFERED_ENJOYMENT_LIFE: 8
PEG_TOTALSCORE: 8.33
PEG_TOTALSCORE: 8.33
INTERFERED_GENERAL_ACTIVITY: 8
AVG_PAIN_PASTWEEK: 9

## 2025-04-07 ASSESSMENT — ENCOUNTER SYMPTOMS
ARTHRALGIAS: 1
DYSURIA: 0
HEMATURIA: 0
WOUND: 0
NEUROLOGICAL NEGATIVE: 1
SORE THROAT: 0
GASTROINTESTINAL NEGATIVE: 1
DYSPHORIC MOOD: 0
RESPIRATORY NEGATIVE: 1
NERVOUS/ANXIOUS: 0
COUGH: 0
HALLUCINATIONS: 0
EYES NEGATIVE: 1
CONSTITUTIONAL NEGATIVE: 1
VOMITING: 0
BACK PAIN: 1
COLOR CHANGE: 0
SEIZURES: 0
NECK PAIN: 1
SHORTNESS OF BREATH: 0
EYE PAIN: 0
PALPITATIONS: 1
BRUISES/BLEEDS EASILY: 0
CHILLS: 0
ABDOMINAL PAIN: 0
POLYPHAGIA: 0
FEVER: 0

## 2025-04-07 ASSESSMENT — PAIN SCALES - GENERAL: PAINLEVEL_OUTOF10: SEVERE PAIN (8)

## 2025-04-07 NOTE — PROGRESS NOTES
Preoperative Evaluation  [facility]  [address]  [place] [address]  Phone: [phone]  Fax: [phone]  Primary Provider: Reinier Ritchie MD  Pre-op Performing Provider: Monster Long DO  Apr 7, 2025 4/7/2025   Surgical Information   What procedure is being done? spine surgery   Facility or Hospital where procedure/surgery [facility] be performed[facility]   Who is doing the procedure / surgery? dr hyde   Date of surgery / procedure: may 6th   Time of surgery / procedure: na   Where do you plan to recover after surgery? at home with family     Fax number for surgical facility: [phone]    Assessment & Plan     The proposed surgical procedure is considered INTERMEDIATE risk.    Preoperative examination        AILEEN on CPAP - uses nightly, finds very helpful      Polymyalgia rheumatica      S/P lumbar discectomy      Benign essential hypertension      Mixed hyperlipidemia      DDD (degenerative disc disease), cervical              - No identified additional risk factors other than previously addressed    Antiplatelet or Anticoagulation Medication Instructions   - aspirin: Discontinue aspirin 7 days prior to procedure to reduce bleeding risk. It should be resumed postoperatively.   Stop Anaprox or naprosyn.  Stop hydroxychloroquine now.      Recommendation  Approval given to proceed with proposed procedure pending review of diagnostic evaluation.    Osei An is a 59 year old, presenting for the following:  Pre-Op Exam (DOS: 5/6/25, @ [facility], Dr. Hyde, Spinal Surgery.)          4/7/2025     2:30 PM   Additional Questions   Roomed by Shayla CARDENAS     HPI: Patient presents for preop physical.  He is going to have surgery May 6, 2025.  He states his blood pressure runs high when he is in the dentist office and when he sees doctors.          4/7/2025   Pre-Op Questionnaire   Have you ever had a heart attack or stroke? No   Have you  ever had surgery on your heart or blood vessels, such as a stent placement, a coronary artery bypass, or surgery on an artery in your head, neck, heart, or legs? No   Do you have chest pain with activity? No   Do you have a history of heart failure? No   Do you currently have a cold, bronchitis or symptoms of other infection? No   Do you have a cough, shortness of breath, or wheezing? No   Do you or anyone in your family have previous history of blood clots? No   Do you or does anyone in your family have a serious bleeding problem such as prolonged bleeding following surgeries or cuts? No   Have you ever had problems with anemia or been told to take iron pills? No   Have you had any abnormal blood loss such as black, tarry or bloody stools? No   Have you ever had a blood transfusion? No   Are you willing to have a blood transfusion if it is medically needed before, during, or after your surgery? Yes   Have you or any of your relatives ever had problems with anesthesia? No   Do you have sleep apnea, excessive snoring or daytime drowsiness? (!) YES   Do you have a CPAP machine? Yes   Do you have any artifical heart valves or other implanted medical devices like a pacemaker, defibrillator, or continuous glucose monitor? No   Do you have artificial joints? (!) YES   Are you allergic to latex? No     Health Care Directive  Patient does not have a Health Care Directive: Discussed advance care planning with patient; however, patient declined at this time.    Preoperative Review of    reviewed - controlled substances reflected in medication list.          Patient Active Problem List    Diagnosis Date Noted    Lumbosacral radiculopathy at L4 - Right 02/26/2025     Priority: Medium    Claustrophobia 02/26/2025     Priority: Medium    Neck pain on right side 02/26/2025     Priority: Medium    DDD (degenerative disc disease), cervical 02/26/2025     Priority: Medium    Erectile dysfunction, unspecified erectile  dysfunction type 10/08/2024     Priority: Medium    Vitamin B12 deficiency 10/08/2024     Priority: Medium    Mixed hyperlipidemia 02/09/2024     Priority: Medium    Ascending aorta dilatation 12/29/2023     Priority: Medium    Trigger finger, left middle finger 12/29/2023     Priority: Medium    Trigger finger, left ring finger 12/29/2023     Priority: Medium    Sacroiliac joint pain 12/29/2023     Priority: Medium    Chronic right shoulder pain 10/05/2023     Priority: Medium    Chronic right hip pain 10/05/2023     Priority: Medium    Rheumatoid arthritis of multiple sites with negative rheumatoid factor (H) 09/19/2023     Priority: Medium    Chronic left shoulder pain 10/04/2022     Priority: Medium    Lumbar back pain with radiculopathy affecting lower extremity 10/04/2022     Priority: Medium    Benign essential hypertension 10/14/2021     Priority: Medium    Change in hearing of right ear 09/14/2021     Priority: Medium    Immunosuppression 09/14/2021     Priority: Medium    Class 3 severe obesity due to excess calories with serious comorbidity and body mass index (BMI) of 40.0 to 44.9 in adult (H) 12/04/2020     Priority: Medium    Crepitus of joint of right knee 09/11/2020     Priority: Medium    S/P lumbar discectomy 05/18/2020     Priority: Medium    Symptomatic premature ventricular contractions 04/15/2019     Priority: Medium    Trigger finger, left thumb 08/30/2018     Priority: Medium    Heartburn 07/25/2018     Priority: Medium    Elevated random blood glucose level 04/10/2018     Priority: Medium    Psychosexual dysfunction with inhibited sexual excitement 01/29/2018     Priority: Medium    Esophageal reflux 01/29/2018     Priority: Medium    Tobacco use 01/29/2018     Priority: Medium    Left carpal tunnel syndrome 12/21/2017     Priority: Medium    AILEEN on CPAP - uses nightly, finds very helpful 11/20/2017     Priority: Medium    Dermatomyositis (H) 10/12/2017     Priority: Medium    Bilateral leg  edema 07/05/2017     Priority: Medium    Polymyalgia rheumatica 11/11/2016     Priority: Medium    Arthralgia of multiple joints 10/26/2016     Priority: Medium    Contact dermatitis and eczema 09/20/2011     Priority: Medium      Past Medical History:   Diagnosis Date    Cigarette smoker     Erectile dysfunction     GERD (gastroesophageal reflux disease)     Lipoma of torso 9/14/2021    AILEEN on CPAP     PMR (polymyalgia rheumatica)      Past Surgical History:   Procedure Laterality Date    APPENDECTOMY OPEN  2005    ARTHROPLASTY KNEE Left     ARTHROSCOPY KNEE Left 3/19/2018    Procedure: ARTHROSCOPY KNEE;  Left Knee Arthroscopy, Partial Meniscectomy, Chondroplasty;  Surgeon: Kwabena Patricia DO;  Location: [facility] OR    ARTHROSCOPY KNEE WITH MENISCAL REPAIR Right 12/11/2020    Procedure: Rt Knee Arthroscopy, partial Medial Meniscectomy Debridement.;  Surgeon: Monster Grissom MD;  Location: [facility] OR    ATTEMPTED ARTHROSCOPY      knee ? left    COLONOSCOPY  01/29/2014 1/29/2014,Tubular adenoma of rectum - follow up 5 years    COLONOSCOPY  02/01/2019    normal with diverticula, follow up 10 years, 2/1/29    COLONOSCOPY N/A 2/1/2019    normal, follow up 2/1/29    JOINT REPLACEMENT Left 11/2018    RELEASE CARPAL TUNNEL Right 11/22/2017    RELEASE CARPAL TUNNEL Left     12/21/2017,288691,OTHER,Left    RELEASE TRIGGER FINGER Right 9/7/2018    RELEASE TRIGGER FINGER Left 9/13/2024    Procedure: Release trigger finger, INDEX AND THUMB TRIGGER RELEASE;  Surgeon: Monster Grissom MD;  Location: [facility] OR    RELEASE ULNAR NERVE (ELBOW) Left 12/21/2017     Current Outpatient Medications   Medication Sig Dispense Refill    acetaminophen (TYLENOL) 650 MG CR tablet Take 2 tablets (1,300 mg) by mouth 2 times daily as needed for pain or fever. 360 tablet 4    fexofenadine (ALLEGRA) 180 MG tablet Take 1 tablet (180 mg) by mouth as needed. - for ears / congestion 90 tablet 4    furosemide (LASIX) 20 MG tablet Take 2 tablets (40 mg) by mouth  daily as needed (leg swelling). 180 tablet 4    hydroCHLOROthiazide 12.5 MG tablet Take 2 tablets (25 mg) by mouth daily. 180 tablet 4    hydroxychloroquine (PLAQUENIL) 200 MG tablet Take 400 mg by mouth      lidocaine (LMX4) 4 % external cream APPLY THIN LAYER TO AFFECTED AREAS 20-30 MIN PRIOR TO ANTICIPATED PROCEDURE.      metoprolol succinate ER (TOPROL XL) 25 MG 24 hr tablet Take 2-3 tablets (50-75 mg) by mouth every evening. -- adjust dose as needed for heart palpitations 270 tablet 4    naproxen sodium (ANAPROX) 220 MG tablet Take 2 tablets (440 mg) by mouth 2 times daily as needed for moderate pain. -- Take with food -- Limit use as able 360 tablet 4    nicotine (COMMIT) 2 MG lozenge Place 1 lozenge (2 mg) inside cheek every hour as needed for nicotine withdrawal symptoms. 168 lozenge 11    nicotine (NICORETTE) 2 MG gum Place 1 each (2 mg) inside cheek as needed for nicotine withdrawal symptoms. 240 each 11    omeprazole (PRILOSEC) 40 MG DR capsule Take 1 capsule (40 mg) by mouth daily. - Take 30 to 60 minutes prior to a meal 90 capsule 4    semaglutide-weight management (WEGOVY) 0.25 MG/0.5ML pen Inject 0.5 mLs (0.25 mg) subcutaneously every 7 days. x4 doses - for Elevated Glucose and Obesity - Then increase to 0.5 mg weekly 2 mL 0    Semaglutide-Weight Management (WEGOVY) 0.5 MG/0.5ML pen Inject 0.5 mg subcutaneously every 7 days. x4 doses - for Elevated Glucose and Obesity - Then increase to 1 mg weekly 2 mL 0    [START ON 4/23/2025] Semaglutide-Weight Management (WEGOVY) 1 MG/0.5ML pen Inject 1 mg subcutaneously every 7 days. x4 doses - for Elevated Glucose and Obesity- Then increase to 1.7 mg weekly 2 mL 0    [START ON 5/21/2025] Semaglutide-Weight Management (WEGOVY) 1.7 MG/0.75ML pen Inject 1.7 mg subcutaneously every 7 days. for Elevated Glucose and Obesity 9 mL 1    sildenafil (VIAGRA) 100 MG tablet Take 1/4 to 1 tablet 30 min to 4 hours prior to sex for ED 30 tablet 11    vitamin B complex with  "vitamin C (VITAMIN  B COMPLEX) tablet Take 1 tablet by mouth daily. -- make sure it has B12 in tablet 100 tablet 4       Allergies   Allergen Reactions    Chantix [Varenicline] Other (See Comments)     Didn't tolerate        Social History     Tobacco Use    Smoking status: Some Days     Current packs/day: 0.70     Average packs/day: 0.6 packs/day for 37.2 years (23.8 ttl pk-yrs)     Types: Cigarettes     Start date: 1999    Smokeless tobacco: Never    Tobacco comments:     Tried Chantix, didn't tolerate it.   Substance Use Topics    Alcohol use: Yes     Comment: occasional - 5 a week     Family History   Problem Relation Age of Onset    Genetic Disorder Maternal Grandfather         Genetic,Polymyalgia Rheumatica    Heart Murmur Mother     Polymyalgia rheumatica Mother      History   Drug Use No           Review of Systems   Constitutional: Negative.  Negative for chills and fever.   HENT: Negative.  Negative for ear pain and sore throat.    Eyes: Negative.  Negative for pain and visual disturbance.   Respiratory: Negative.  Negative for cough and shortness of breath.    Cardiovascular:  Positive for palpitations. Negative for chest pain.   Gastrointestinal: Negative.  Negative for abdominal pain and vomiting.   Endocrine: Negative for polyphagia.   Genitourinary:  Negative for dysuria and hematuria.   Musculoskeletal:  Positive for arthralgias, back pain and neck pain.   Skin: Negative.  Negative for color change, rash and wound.   Neurological: Negative.  Negative for seizures and syncope.   Hematological:  Does not bruise/bleed easily.   Psychiatric/Behavioral:  Negative for dysphoric mood, hallucinations and suicidal ideas. The patient is not nervous/anxious.    All other systems reviewed and are negative.       Objective    BP (!) 145/87 (BP Location: Right arm, Patient Position: Sitting, Cuff Size: Adult Large)   Pulse 73   Temp 98.1  F (36.7  C) (Temporal)   Resp 20   Ht 1.791 m (5' 10.5\")   Wt (!) 138.6 " "kg (305 lb 9.6 oz)   SpO2 97%   BMI 43.23 kg/m     Estimated body mass index is 43.23 kg/m  as calculated from the following:    Height as of this encounter: 1.791 m (5' 10.5\").    Weight as of this encounter: 138.6 kg (305 lb 9.6 oz).  Physical Exam  Constitutional:       Appearance: Normal appearance.   HENT:      Head: Normocephalic.      Right Ear: Tympanic membrane and ear canal normal.      Left Ear: Tympanic membrane and ear canal normal.      Nose: Nose normal.      Mouth/Throat:      Mouth: Mucous membranes are moist.      Pharynx: Oropharynx is clear.   Eyes:      Conjunctiva/sclera: Conjunctivae normal.   Cardiovascular:      Rate and Rhythm: Normal rate and regular rhythm.      Heart sounds: No murmur heard.  Pulmonary:      Effort: Pulmonary effort is normal.      Breath sounds: Normal breath sounds.   Abdominal:      General: Abdomen is flat.      Palpations: Abdomen is soft.   Musculoskeletal:         General: Normal range of motion.      Cervical back: Neck supple.   Skin:     General: Skin is warm and dry.   Neurological:      General: No focal deficit present.      Mental Status: He is alert and oriented to person, place, and time. Mental status is at baseline.   Psychiatric:         Mood and Affect: Mood normal.         Behavior: Behavior normal.           Recent Labs   Lab Test 10/08/24  0934   HGB 16.1         POTASSIUM 4.1   CR 0.87        Diagnostics  Labs pending at this time.  Results will be reviewed when available.   EKG: appears normal, NSR, normal axis, normal intervals, no acute ST/T changes c/w ischemia, no LVH by voltage criteria, unchanged from previous tracings    Revised Cardiac Risk Index (RCRI)  The patient has the following serious cardiovascular risks for perioperative complications:   - No serious cardiac risks = 0 points     RCRI Interpretation: 0 points: Class I (very low risk - 0.4% complication rate)         Signed Electronically by: Monster Long, " DO  A copy of this evaluation report is provided to the requesting physician.         Answers submitted by the patient for this visit:  Hypertension Visit (Submitted on 4/7/2025)  Chief Complaint: Chronic problems general questions HPI Form  Do you check your blood pressure regularly outside of the clinic?: No  Are your blood pressures ever more than 140 on the top number (systolic) OR more than 90 on the bottom number (diastolic)? (For example, greater than 140/90): No  Are you following a low salt diet?: Yes  Questionnaire about: Chronic problems general questions HPI Form (Submitted on 4/7/2025)  Chief Complaint: Chronic problems general questions HPI Form

## 2025-04-07 NOTE — NURSING NOTE
"Chief Complaint   Patient presents with    Pre-Op Exam     [facility]       Initial BP (!) 145/87 (BP Location: Right arm, Patient Position: Sitting, Cuff Size: Adult Large)   Pulse 73   Temp 98.1  F (36.7  C) (Temporal)   Resp 20   Ht 1.791 m (5' 10.5\")   Wt (!) 138.6 kg (305 lb 9.6 oz)   SpO2 97%   BMI 43.23 kg/m   Estimated body mass index is 43.23 kg/m  as calculated from the following:    Height as of this encounter: 1.791 m (5' 10.5\").    Weight as of this encounter: 138.6 kg (305 lb 9.6 oz).  Medication Review: complete    The next two questions are to help us understand your food security.  If you are feeling you need any assistance in this area, we have resources available to support you today.          10/8/2024   SDOH- Food Insecurity   Within the past 12 months, did you worry that your food would run out before you got money to buy more? N   Within the past 12 months, did the food you bought just not last and you didn t have money to get more? N         Health Care Directive:  Patient does not have a Health Care Directive: Discussed advance care planning with patient; however, patient declined at this time.    Shayla Jenkins      "

## 2025-04-08 ENCOUNTER — HOSPITAL ENCOUNTER (OUTPATIENT)
Dept: CT IMAGING | Facility: OTHER | Age: 60
Discharge: HOME | End: 2025-04-08
Attending: STUDENT IN AN ORGANIZED HEALTH CARE EDUCATION/TRAINING PROGRAM | Admitting: STUDENT IN AN ORGANIZED HEALTH CARE EDUCATION/TRAINING PROGRAM
Payer: COMMERCIAL

## 2025-04-08 DIAGNOSIS — M50.30 DDD (DEGENERATIVE DISC DISEASE), CERVICAL: ICD-10-CM

## 2025-04-08 LAB
ATRIAL RATE - MUSE: 71 BPM
DIASTOLIC BLOOD PRESSURE - MUSE: NORMAL MMHG
INTERPRETATION ECG - MUSE: NORMAL
P AXIS - MUSE: 58 DEGREES
PR INTERVAL - MUSE: 198 MS
QRS DURATION - MUSE: 98 MS
QT - MUSE: 408 MS
QTC - MUSE: 443 MS
R AXIS - MUSE: 23 DEGREES
SYSTOLIC BLOOD PRESSURE - MUSE: NORMAL MMHG
T AXIS - MUSE: 12 DEGREES
VENTRICULAR RATE- MUSE: 71 BPM

## 2025-04-08 PROCEDURE — 72131 CT LUMBAR SPINE W/O DYE: CPT

## 2025-05-12 ENCOUNTER — OFFICE VISIT (OUTPATIENT)
Dept: ORTHOPEDICS | Facility: OTHER | Age: 60
End: 2025-05-12
Attending: STUDENT IN AN ORGANIZED HEALTH CARE EDUCATION/TRAINING PROGRAM
Payer: COMMERCIAL

## 2025-05-12 ENCOUNTER — HOSPITAL ENCOUNTER (OUTPATIENT)
Dept: GENERAL RADIOLOGY | Facility: OTHER | Age: 60
Discharge: HOME | End: 2025-05-12
Attending: STUDENT IN AN ORGANIZED HEALTH CARE EDUCATION/TRAINING PROGRAM
Payer: COMMERCIAL

## 2025-05-12 DIAGNOSIS — Z98.1 S/P LUMBAR SPINAL FUSION: Primary | ICD-10-CM

## 2025-05-12 DIAGNOSIS — Z98.1 S/P LUMBAR FUSION: ICD-10-CM

## 2025-05-12 PROCEDURE — 72100 X-RAY EXAM L-S SPINE 2/3 VWS: CPT | Mod: 26 | Performed by: RADIOLOGY

## 2025-05-12 PROCEDURE — 72100 X-RAY EXAM L-S SPINE 2/3 VWS: CPT

## 2025-05-12 PROCEDURE — 99024 POSTOP FOLLOW-UP VISIT: CPT

## 2025-05-12 NOTE — PROGRESS NOTES
Surgery: L2-S1 revision posterior lumbar decompression with right-sided L4-5 TLIF on 2025    HPI: Juve Is now approximately 2 weeks postoperatively from procedure above.  He reports doing well with improvement of his preoperative symptoms.  He notes some intermittent pain down the lateral side of his right leg and buttock.  Overall his symptoms have improved since surgery.  He states that he can feel his foot for the first time in a long time.  His incision has been healing well without any drainage.    Physical exam:  General: Alert and oriented, no distress  Back: Incision is CDI without drainage or dehiscence.  Motor: 5/5 bilateral lower extremities all myotomes  Neurological: Sensation intact in lower extremities         Imagin view x-ray of the lumbar He has had spine was taken and reviewed today.  The x-ray is stable with no concerns for hardware malfunction including fracture, lucency, migration.  Spinal alignment has been maintained since surgery.    Assessment/plan: presents doing well almost 2 weeks postoperative from the procedure above.  He notes Improvement in the sensation of his lower extremities including his feet.  We discussed his activity restrictions and progressing activity very slowly.  He will follow-up in 1 month for reevaluation of his symptoms.    This patient was seen in conjunction with Dr. Omayra El, who is in agreement with plan of care.

## 2025-06-09 ENCOUNTER — OFFICE VISIT (OUTPATIENT)
Dept: ORTHOPEDICS | Facility: OTHER | Age: 60
End: 2025-06-09
Attending: STUDENT IN AN ORGANIZED HEALTH CARE EDUCATION/TRAINING PROGRAM
Payer: COMMERCIAL

## 2025-06-09 DIAGNOSIS — Z98.1 S/P LUMBAR SPINAL FUSION: Primary | ICD-10-CM

## 2025-06-09 PROCEDURE — 99024 POSTOP FOLLOW-UP VISIT: CPT | Performed by: STUDENT IN AN ORGANIZED HEALTH CARE EDUCATION/TRAINING PROGRAM

## 2025-06-09 NOTE — PROGRESS NOTES
HPI    [facility] and [facility].    As you know he is a pleasant 59-year-old male presenting approximately 6 weeks status post L2-1 decompression with revision 4 5 TLIF.  He is doing extremely well in the postoperative setting.  All his leg pain is gone.  He has occasional back pain but has been able to travel to [place] and really has not been limited in his capacity to do what he wants and any any sort of way.  His primary complaint today is actually neck and arm pain with seems to be a C7 distribution.  This was present preoperatively and we did discuss treating that after his back.  He really has no complaints of back or leg pain at this time.    Physical exam    Incisions well-healed without signs erythema breakdown or infection.  Full strength in upper and lower extremity myotomes with exception of the right sided triceps at 4-5.  He has a positive Romberg and a positive Spurling on the right.  He is a positive Chaparrita on the right but not the left.  Sensory exam elicits fine intact sensation in upper and lower extremity dermatomes exception of the right C7 down to the long finger.    Assessment    Sedrick is a pleasant 59-year-old male presenting 6 weeks status post revision posterior lumbar decompression instrumented fusion.  He is doing exceedingly well in the postoperative setting from his back and leg standpoint.  He is interested in talking about his neck.  He does have significant subaxial spondylosis at the 6 7 level with associated cord flattening and abutment in addition is moderate to severe bilateral foraminal stenosis that is symptomatic in nature.  We discussed treating that once we get him through the and back issues.  Will plan to see him back in 6 weeks.  He understands all his restrictions from bending lifting and twisting are still in place.  All his questions were answered.    Plan    Follow-up in 6 weeks to discuss C6-7 ACDF.

## 2025-07-14 ENCOUNTER — OFFICE VISIT (OUTPATIENT)
Dept: ORTHOPEDICS | Facility: OTHER | Age: 60
End: 2025-07-14
Attending: STUDENT IN AN ORGANIZED HEALTH CARE EDUCATION/TRAINING PROGRAM
Payer: COMMERCIAL

## 2025-07-14 DIAGNOSIS — G95.9 MYELOPATHY (H): Primary | ICD-10-CM

## 2025-07-14 PROCEDURE — 99214 OFFICE O/P EST MOD 30 MIN: CPT | Performed by: STUDENT IN AN ORGANIZED HEALTH CARE EDUCATION/TRAINING PROGRAM

## 2025-07-14 NOTE — PROGRESS NOTES
HPI    Pleasure seeing Sedrick's wife in the [facility].    As you know he is a wonderful 59-year-old male presenting approximately 1/2 months status post L2-1 decompression with revision TLIF.  He is doing exceedingly well from a back and bilateral lower extremity standpoint.  He really has 0 complaints with regards to his preoperative leg pain.  He rates his current level of function a 75% but he states the associated deficiency is secondary to the restrictions and he has associated neck and bilateral upper extremity pain numbness, and tingling.  We had a lengthy discussion regarding his neck and his bilateral upper extremities and its associated etiological .  We did discuss it at his last visit and the fact that he has some degenerative disc disease and associated foraminal stenosis inducing these issues.  He endorses some intrinsic hand weakness.  He endorses some baseline balance issues.  He endorses numbness and what seems to be C7 distribution but it could be nondermatomal specifically with prolonged active extension.  The symptoms have been progressively getting worse over the past several years despite relatively extensive alternative care in the form of PT, anti-inflammatories, Tylenol, heat, ice, narcotic pain medication, muscle relaxers and host of other over-the-counter remedies to the point that he relates minimal quality of life secondary to these associated symptoms.  He is extremely happy with his back but unfortunately just the neck is not holding him back.    Physical exam    Unchanged from prior.  Neutral sagittal coronal alignment.  Positive Chaparrita bilaterally.  Positive Romberg.  No clonus or Lhermitte.  Unremarkable hip and shoulder exam.  Full strength in upper and lower extremity myotomes though he does have some subtle deficiencies of the hand intrinsics bilaterally.  He has some nondermatomal numbness extending down to the long finger and bilateral upper  extremities.  Cervical range of motion is relative limited to about 45 degree flexion extension or prolonged extension and rotation exacerbates his symptomatology.  He does have a positive Spurling on the right but not the left.    Imaging    Radiographs and MRI of the cervical spine reveal for review.  These radiographs really multilevel subaxial spondylosis with focal kyphosis at the 6 7 level with associated end-stage kyphotic to space collapse.  In the MRI.  He really again has multilevel subaxial spondylosis but the only area of significant neural compression is at the 6 level level where there is cord flattening and associated abutment secondary to kyphotic deformity and associated moderate-severe bilateral foraminal stenosis.    Assessment    Juve is a wonderful 59-year-old male presenting approximately 2 and half months status post lumbar decompression instrumented fusion.  He is doing exceedingly well from that standpoint but unfortunately his neck and bilateral upper extremities are still bother him to a significant degree.  We had a lengthy discussion regarding the natural history of associated condition respecters approximately with consider alternative therapy.  At this point the patient elected proceed with an anterior cervical decompression instrumented fusion.    The patient is presenting with severe and debilitating cervical radiculopathy at the 6-7 level despite extensive conservative therapy and objective concordant pathology on imaging delineating a source of the patient s discomfort.  Therefore, given the patients persistent and debilitating symptoms, surgery was offered as an alternative to more conservative therapy and the associated risks, benefits, and alternatives of an anterior cervical decompression and fusion was discussed at length.  We talked about the time of surgery time and the likely destination of the patient postoperatively, whether it be home or in the hospital.  We talked  about the high likelihood of relief of arm pain and return to normal activities; we also discussed that while surgery may help relieve neck pain, the surgery is designed to treat arm pain not neck pain. Additionally, we discussed that when the nerve root is decompressed, the patient will have the ability to regain strength, but that it may takes six months or more to regain strength, and furthermore, the amount of strength regained is predicated based on the severity of the weakness pre-operatively, the length of time the weakness has been present, and the effort put forth by the patient in postoperative rehabilitation. I also discussed with the patient that resolution of numbness is unpredictable, and this should not be a major reason why the patient elects to have surgery. We talked about the small risks of neurologic injury, including a risk of injury to the nerve root, or even possibly an injury to the spinal cord leading to paralysis. We discussed at length the risk of a possible C5 palsy, which means temporary or permanent weakness in the deltoid or bicep in one or both arms.  We also discussed the risk of failure of fusion and the fact that the majority of such patients do not develop symptoms from this, but (the possibility of requiring a subsequent posterior procedure if the fusion does not heal and symptoms recur). We talked about the potential for developing adjacent segment disease at other levels as well as the likelihood of soreness and difficulty with swallowing following surgery and the risks of anesthesia. We talked about the other risks of the procedure beside neurologic injury which include spinal fluid leak, infection and or meningitis, excessive bleeding, blindness, sexual dysfunction, injury to neighboring organs including the esophagus and trachea, need for further surgery, bowel and bladder dysfunction, instability, and autonomic nervous system dysfunction, including the possibility of  developing Ivan s syndrome. The patient was also counseled further as to the possible adverse consequences of or relating to anterior cervical surgery including the above mentioned swallowing difficulties with the possible need for tube feeding, recurrent and/or superior laryngeal nerve root injury, esophageal injury, voice changes and hoarseness, postoperative hematoma, vascular injury, and the possibility of medical complications from surgery including but not limited to a stroke, a heart attack, blood clot in the leg or lungs, pneumonia, aspiration, and even death. We discussed using an anterior cervical plate and screws and the FDA status of instrumentation.      BONE GRAFT  We also discussed the different graft options. We discussed the use of both allograft bone and local bone for the fusion. We discussed that the fusion rate is slightly higher with the use of iliac crest autograft, but due to the increase in morbidity we often elect  to proceed with allograft and local autograph but there is the possibility of using autograft from a separate fascial incision including the iliac crest. We discussed the risk of disease transmission from using allograft bone. We discussed the complications of iliac crest bone graft harvest including significant initial pain, and possible persistent pain at the graft harvest site, as well as infection, hematoma, pelvis fracture, damage to the surrounding nerves and vessels, and the need for revision surgery at the graft harvest site    ANTI-INFLAMMATORIES   The patient was instructed to stop all NSAIDS, or anti-inflammatory medications, 5 days prior to surgery, and remain off of them for 8 weeks after surgery unless specifically discussed with your surgeon and prescribing physician.  These medications include Aleve, Motrin, Advil, Celebrex, Naprosyn, Mobic, )    ANTICOAGULATION  The patient was instructed that if they were to start or continue taking any  blood thinning  medications prescribed by another physician, it is the expectation that this physician will continue to manage this medication before and after surgery within the following guidelines.  Discontinue all blood thinning medications like Xarelto/Rivaroxaban, Eliquis/Apixaban, Coumadin/warfarin, Lovenox, Heparin, Clopidogrel/Plavix, Aspirin, Pradaxa/Dabigatran, or Arixtra/ Fondaparinux 5 days prior to surgery and resume them 5 days after surgery unless otherwise discussed directly with your surgeon or managing physician.    At this point the patient would like to move forward with the surgery offered above.  A packet clearly outlining the pre and postoperative pathway associated with surgery was provided to the patient that also contained extensive information pertaining to the patient s disease process, surgical intervention, and the associated risks involved.  All their questions were answered.     PLAn   6-7 ACDF

## 2025-08-19 ENCOUNTER — TELEPHONE (OUTPATIENT)
Dept: INTERNAL MEDICINE | Facility: OTHER | Age: 60
End: 2025-08-19

## 2025-08-19 ENCOUNTER — TELEPHONE (OUTPATIENT)
Dept: INTERNAL MEDICINE | Facility: OTHER | Age: 60
End: 2025-08-19
Payer: MEDICARE

## 2025-08-19 ENCOUNTER — OFFICE VISIT (OUTPATIENT)
Dept: INTERNAL MEDICINE | Facility: OTHER | Age: 60
End: 2025-08-19
Payer: COMMERCIAL

## 2025-08-19 VITALS
WEIGHT: 305.6 LBS | RESPIRATION RATE: 16 BRPM | TEMPERATURE: 96.7 F | BODY MASS INDEX: 43.75 KG/M2 | OXYGEN SATURATION: 98 % | SYSTOLIC BLOOD PRESSURE: 147 MMHG | HEIGHT: 70 IN | HEART RATE: 72 BPM | DIASTOLIC BLOOD PRESSURE: 93 MMHG

## 2025-08-19 DIAGNOSIS — E66.813 CLASS 3 SEVERE OBESITY DUE TO EXCESS CALORIES WITH SERIOUS COMORBIDITY AND BODY MASS INDEX (BMI) OF 40.0 TO 44.9 IN ADULT (H): ICD-10-CM

## 2025-08-19 DIAGNOSIS — M06.09 RHEUMATOID ARTHRITIS OF MULTIPLE SITES WITH NEGATIVE RHEUMATOID FACTOR (H): ICD-10-CM

## 2025-08-19 DIAGNOSIS — Z72.0 TOBACCO USE: ICD-10-CM

## 2025-08-19 DIAGNOSIS — E78.2 MIXED HYPERLIPIDEMIA: ICD-10-CM

## 2025-08-19 DIAGNOSIS — M50.30 DDD (DEGENERATIVE DISC DISEASE), CERVICAL: ICD-10-CM

## 2025-08-19 DIAGNOSIS — Z01.818 PREOP GENERAL PHYSICAL EXAM: Primary | ICD-10-CM

## 2025-08-19 DIAGNOSIS — I10 BENIGN ESSENTIAL HYPERTENSION: ICD-10-CM

## 2025-08-19 DIAGNOSIS — M35.3 POLYMYALGIA RHEUMATICA: ICD-10-CM

## 2025-08-19 DIAGNOSIS — G47.33 OSA ON CPAP: ICD-10-CM

## 2025-08-19 RX ORDER — AMLODIPINE BESYLATE 2.5 MG/1
2.5 TABLET ORAL DAILY
Qty: 90 TABLET | Refills: 0 | Status: SHIPPED | OUTPATIENT
Start: 2025-08-19

## 2025-08-19 ASSESSMENT — PAIN SCALES - GENERAL: PAINLEVEL_OUTOF10: MODERATE PAIN (5)

## 2025-08-20 ENCOUNTER — LAB (OUTPATIENT)
Dept: LAB | Facility: OTHER | Age: 60
End: 2025-08-20
Payer: COMMERCIAL

## 2025-08-20 ENCOUNTER — RESULTS FOLLOW-UP (OUTPATIENT)
Dept: INTERNAL MEDICINE | Facility: OTHER | Age: 60
End: 2025-08-20

## 2025-08-20 DIAGNOSIS — Z01.818 PREOP GENERAL PHYSICAL EXAM: ICD-10-CM

## 2025-08-20 LAB
ALBUMIN SERPL BCG-MCNC: 4.4 G/DL (ref 3.5–5.2)
ALP SERPL-CCNC: 75 U/L (ref 40–150)
ALT SERPL W P-5'-P-CCNC: 37 U/L (ref 0–70)
ANION GAP SERPL CALCULATED.3IONS-SCNC: 12 MMOL/L (ref 7–15)
AST SERPL W P-5'-P-CCNC: 43 U/L (ref 0–45)
ATRIAL RATE - MUSE: 48 BPM
BASOPHILS # BLD AUTO: 0.09 10E3/UL (ref 0–0.2)
BASOPHILS NFR BLD AUTO: 1.6 %
BILIRUB SERPL-MCNC: 0.5 MG/DL
BUN SERPL-MCNC: 12.4 MG/DL (ref 8–23)
CALCIUM SERPL-MCNC: 9.8 MG/DL (ref 8.8–10.4)
CHLORIDE SERPL-SCNC: 100 MMOL/L (ref 98–107)
CREAT SERPL-MCNC: 0.8 MG/DL (ref 0.67–1.17)
DIASTOLIC BLOOD PRESSURE - MUSE: NORMAL MMHG
EGFRCR SERPLBLD CKD-EPI 2021: >90 ML/MIN/1.73M2
EOSINOPHIL # BLD AUTO: 0.31 10E3/UL (ref 0–0.7)
EOSINOPHIL NFR BLD AUTO: 5.4 %
ERYTHROCYTE [DISTWIDTH] IN BLOOD BY AUTOMATED COUNT: 13.4 % (ref 10–15)
EST. AVERAGE GLUCOSE BLD GHB EST-MCNC: 123 MG/DL
GLUCOSE SERPL-MCNC: 144 MG/DL (ref 70–99)
HBA1C MFR BLD: 5.9 %
HCO3 SERPL-SCNC: 26 MMOL/L (ref 22–29)
HCT VFR BLD AUTO: 46.7 % (ref 40–53)
HGB BLD-MCNC: 16.4 G/DL (ref 13.3–17.7)
IMM GRANULOCYTES # BLD: <0.03 10E3/UL
IMM GRANULOCYTES NFR BLD: 0.2 %
INTERPRETATION ECG - MUSE: NORMAL
LYMPHOCYTES # BLD AUTO: 1.52 10E3/UL (ref 0.8–5.3)
LYMPHOCYTES NFR BLD AUTO: 26.6 %
MCH RBC QN AUTO: 32.1 PG (ref 26.5–33)
MCHC RBC AUTO-ENTMCNC: 35.1 G/DL (ref 31.5–36.5)
MCV RBC AUTO: 91.4 FL (ref 78–100)
MONOCYTES # BLD AUTO: 0.48 10E3/UL (ref 0–1.3)
MONOCYTES NFR BLD AUTO: 8.4 %
NEUTROPHILS # BLD AUTO: 3.31 10E3/UL (ref 1.6–8.3)
NEUTROPHILS NFR BLD AUTO: 57.8 %
NRBC # BLD AUTO: <0.03 10E3/UL
NRBC BLD AUTO-RTO: 0 /100
P AXIS - MUSE: 51 DEGREES
PLATELET # BLD AUTO: 200 10E3/UL (ref 150–450)
POTASSIUM SERPL-SCNC: 4.1 MMOL/L (ref 3.4–5.3)
PR INTERVAL - MUSE: 180 MS
PROT SERPL-MCNC: 7.3 G/DL (ref 6.4–8.3)
QRS DURATION - MUSE: 104 MS
QT - MUSE: 456 MS
QTC - MUSE: 407 MS
R AXIS - MUSE: 26 DEGREES
RBC # BLD AUTO: 5.11 10E6/UL (ref 4.4–5.9)
SODIUM SERPL-SCNC: 138 MMOL/L (ref 135–145)
SYSTOLIC BLOOD PRESSURE - MUSE: NORMAL MMHG
T AXIS - MUSE: 12 DEGREES
VENTRICULAR RATE- MUSE: 48 BPM
WBC # BLD AUTO: 5.72 10E3/UL (ref 4–11)

## 2025-08-20 PROCEDURE — 84155 ASSAY OF PROTEIN SERUM: CPT | Mod: ZL

## 2025-08-20 PROCEDURE — 85004 AUTOMATED DIFF WBC COUNT: CPT | Mod: ZL

## 2025-08-20 PROCEDURE — 36415 COLL VENOUS BLD VENIPUNCTURE: CPT | Mod: ZL

## 2025-08-20 PROCEDURE — 83036 HEMOGLOBIN GLYCOSYLATED A1C: CPT | Mod: ZL

## 2025-08-28 ENCOUNTER — TRANSFERRED RECORDS (OUTPATIENT)
Dept: HEALTH INFORMATION MANAGEMENT | Facility: OTHER | Age: 60
End: 2025-08-28
Payer: MEDICARE

## 2025-08-29 ENCOUNTER — TRANSFERRED RECORDS (OUTPATIENT)
Dept: HEALTH INFORMATION MANAGEMENT | Facility: OTHER | Age: 60
End: 2025-08-29
Payer: MEDICARE

## (undated) DEVICE — SU ETHILON 4-0 FS-2 18" 662H

## (undated) DEVICE — COVER LIGHT HANDLE LT-F02

## (undated) DEVICE — SOL WATER 1500ML

## (undated) DEVICE — ESU GROUND PAD ADULT W/CORD E7507

## (undated) DEVICE — PAD FLOOR SURGISAFE 46X40" 84610

## (undated) DEVICE — BLADE KNIFE SURG 15 371115

## (undated) DEVICE — DRSG GAUZE 4X4" 3033

## (undated) DEVICE — TUBING ARTHROSCOPY PUMP ARTHREX AR-6410

## (undated) DEVICE — GLOVE BIOGEL PI INDICATOR 8.0 LF 41680

## (undated) DEVICE — PREP CHLORAPREP 26ML TINTED ORANGE  260815

## (undated) DEVICE — DRAPE STERI TOWEL LG 1010

## (undated) DEVICE — TOURNIQUET SGL BLADDER 18"X4" RED 5921-218-135

## (undated) DEVICE — SYR 50ML LL W/O NDL 309653

## (undated) DEVICE — BUR ARTHREX COOLCUT TORPEDO 4.0MMX13CM AR-8400TD

## (undated) DEVICE — SYR 30ML LL W/O NDL 302832

## (undated) DEVICE — TUBING SUCTION 10'X3/16" N510

## (undated) DEVICE — BNDG KLING 3" 2232

## (undated) DEVICE — DRSG ABDOMINAL PAD UNSTERILE 5X9" 9190

## (undated) DEVICE — DRSG ADAPTIC 3X3" 2012

## (undated) DEVICE — DECANTER VIAL 2006S

## (undated) DEVICE — DRSG GAUZE 4X4" TRAY 6939

## (undated) DEVICE — BNDG ELASTIC 6"X5YDS UNSTERILE 6611-60

## (undated) DEVICE — LIGHT HANDLES PLASTIC

## (undated) DEVICE — NDL 18GA 1.5" 305196

## (undated) DEVICE — TOURNIQUET SGL  BLADDER 30"X4" BLUE 5921030135

## (undated) DEVICE — ENDO KIT COMPLIANCE DYKENDOCMPLY

## (undated) DEVICE — BUR ARTHREX TORPEDO DISSECTOR 4.0MM CVD AR-8400CTD

## (undated) DEVICE — GLOVE SENSICARE 8.5 MSG1085 LATEX FREE

## (undated) DEVICE — DRSG XEROFORM 1X8"

## (undated) DEVICE — BNDG ELASTIC 2"X5YDS UNSTERILE 6611-20

## (undated) DEVICE — CAST PADDING 6" WEBRIL II UNSTERILE 4519

## (undated) DEVICE — TOWEL SURG BLUE STERILE DISP MDT2168204

## (undated) DEVICE — GLOVE PROTEXIS POWDER FREE SMT 8.5 2D72PT85X

## (undated) DEVICE — SU ETHILON 4-0 FS-2 18" 662G

## (undated) DEVICE — SU ETHILON 3-0 FS-1 18" 669H

## (undated) DEVICE — DRSG KERLIX 2 1/4"X3YDS ROLL 6720

## (undated) DEVICE — Device

## (undated) DEVICE — SUCTION MANIFOLD NEPTUNE 2 SYS 4 PORT 0702-020-000

## (undated) DEVICE — DRAPE STERI U 1015

## (undated) DEVICE — SU ETHILON 3-0 PS-2 18" 1669H

## (undated) DEVICE — PACK MAJOR EXTREMITY SOP15MEFCA

## (undated) DEVICE — GLOVE BIOGEL PI SZ 8.5 40885

## (undated) DEVICE — SLEEVE COMPRESSION SCD KNEE MED 74022

## (undated) DEVICE — ENDO BRUSH CHANNEL MASTER CLEANING 2-4.2MM BW-412T

## (undated) DEVICE — GLOVE PROTEXIS POWDER FREE SMT 8.0  2D72PT80X

## (undated) DEVICE — CAST PADDING 2" COTTON WEBRIL UNSTERILE 9082

## (undated) DEVICE — PACK KNEE ARTHROSCOPY CUSTOM SOP15KAFCA

## (undated) RX ORDER — BUPIVACAINE HYDROCHLORIDE 2.5 MG/ML
INJECTION, SOLUTION EPIDURAL; INFILTRATION; INTRACAUDAL
Status: DISPENSED
Start: 2024-09-13

## (undated) RX ORDER — DIPHENHYDRAMINE HYDROCHLORIDE 50 MG/ML
INJECTION INTRAMUSCULAR; INTRAVENOUS
Status: DISPENSED
Start: 2019-12-16

## (undated) RX ORDER — LIDOCAINE HYDROCHLORIDE 10 MG/ML
INJECTION, SOLUTION EPIDURAL; INFILTRATION; INTRACAUDAL; PERINEURAL
Status: DISPENSED
Start: 2024-08-19

## (undated) RX ORDER — CEFTRIAXONE SODIUM 1 G
VIAL (EA) INJECTION
Status: DISPENSED
Start: 2024-08-19

## (undated) RX ORDER — GLYCOPYRROLATE 0.2 MG/ML
INJECTION, SOLUTION INTRAMUSCULAR; INTRAVENOUS
Status: DISPENSED
Start: 2020-12-11

## (undated) RX ORDER — DEXAMETHASONE SODIUM PHOSPHATE 4 MG/ML
INJECTION, SOLUTION INTRA-ARTICULAR; INTRALESIONAL; INTRAMUSCULAR; INTRAVENOUS; SOFT TISSUE
Status: DISPENSED
Start: 2018-09-07

## (undated) RX ORDER — SODIUM CHLORIDE, SODIUM LACTATE, POTASSIUM CHLORIDE, CALCIUM CHLORIDE 600; 310; 30; 20 MG/100ML; MG/100ML; MG/100ML; MG/100ML
INJECTION, SOLUTION INTRAVENOUS
Status: DISPENSED
Start: 2020-12-11

## (undated) RX ORDER — KETOROLAC TROMETHAMINE 30 MG/ML
INJECTION, SOLUTION INTRAMUSCULAR; INTRAVENOUS
Status: DISPENSED
Start: 2018-03-19

## (undated) RX ORDER — ONDANSETRON 2 MG/ML
INJECTION INTRAMUSCULAR; INTRAVENOUS
Status: DISPENSED
Start: 2018-03-19

## (undated) RX ORDER — KETOROLAC TROMETHAMINE 30 MG/ML
INJECTION, SOLUTION INTRAMUSCULAR; INTRAVENOUS
Status: DISPENSED
Start: 2020-11-27

## (undated) RX ORDER — PROPOFOL 10 MG/ML
INJECTION, EMULSION INTRAVENOUS
Status: DISPENSED
Start: 2020-12-11

## (undated) RX ORDER — FENTANYL CITRATE 50 UG/ML
INJECTION, SOLUTION INTRAMUSCULAR; INTRAVENOUS
Status: DISPENSED
Start: 2018-03-19

## (undated) RX ORDER — LIDOCAINE HYDROCHLORIDE 20 MG/ML
INJECTION, SOLUTION EPIDURAL; INFILTRATION; INTRACAUDAL; PERINEURAL
Status: DISPENSED
Start: 2020-12-11

## (undated) RX ORDER — METHYLPREDNISOLONE ACETATE 40 MG/ML
INJECTION, SUSPENSION INTRA-ARTICULAR; INTRALESIONAL; INTRAMUSCULAR; SOFT TISSUE
Status: DISPENSED
Start: 2024-09-13

## (undated) RX ORDER — ONDANSETRON 2 MG/ML
INJECTION INTRAMUSCULAR; INTRAVENOUS
Status: DISPENSED
Start: 2020-12-11

## (undated) RX ORDER — SODIUM CHLORIDE 9 MG/ML
INJECTION, SOLUTION INTRAVENOUS
Status: DISPENSED
Start: 2019-12-16

## (undated) RX ORDER — BUPIVACAINE HYDROCHLORIDE 2.5 MG/ML
INJECTION, SOLUTION EPIDURAL; INFILTRATION; INTRACAUDAL
Status: DISPENSED
Start: 2020-12-11

## (undated) RX ORDER — ONDANSETRON 2 MG/ML
INJECTION INTRAMUSCULAR; INTRAVENOUS
Status: DISPENSED
Start: 2024-09-13

## (undated) RX ORDER — KETOROLAC TROMETHAMINE 30 MG/ML
INJECTION, SOLUTION INTRAMUSCULAR; INTRAVENOUS
Status: DISPENSED
Start: 2019-12-16

## (undated) RX ORDER — CEFAZOLIN SODIUM IN 0.9 % NACL 3 G/100 ML
INTRAVENOUS SOLUTION, PIGGYBACK (ML) INTRAVENOUS
Status: DISPENSED
Start: 2020-12-11

## (undated) RX ORDER — PROPOFOL 10 MG/ML
INJECTION, EMULSION INTRAVENOUS
Status: DISPENSED
Start: 2018-03-19

## (undated) RX ORDER — LIDOCAINE HYDROCHLORIDE 10 MG/ML
INJECTION, SOLUTION INFILTRATION; PERINEURAL
Status: DISPENSED
Start: 2018-09-07

## (undated) RX ORDER — LIDOCAINE HYDROCHLORIDE 10 MG/ML
INJECTION, SOLUTION EPIDURAL; INFILTRATION; INTRACAUDAL; PERINEURAL
Status: DISPENSED
Start: 2018-03-19

## (undated) RX ORDER — EPINEPHRINE 1 MG/ML(1)
AMPUL (ML) INJECTION
Status: DISPENSED
Start: 2020-12-11

## (undated) RX ORDER — DEXAMETHASONE SODIUM PHOSPHATE 4 MG/ML
INJECTION, SOLUTION INTRA-ARTICULAR; INTRALESIONAL; INTRAMUSCULAR; INTRAVENOUS; SOFT TISSUE
Status: DISPENSED
Start: 2020-12-11

## (undated) RX ORDER — BUPIVACAINE HYDROCHLORIDE 2.5 MG/ML
INJECTION, SOLUTION INFILTRATION; PERINEURAL
Status: DISPENSED
Start: 2018-09-07

## (undated) RX ORDER — LIDOCAINE HYDROCHLORIDE 20 MG/ML
INJECTION, SOLUTION EPIDURAL; INFILTRATION; INTRACAUDAL; PERINEURAL
Status: DISPENSED
Start: 2018-03-19

## (undated) RX ORDER — LIDOCAINE HYDROCHLORIDE 10 MG/ML
INJECTION, SOLUTION EPIDURAL; INFILTRATION; INTRACAUDAL; PERINEURAL
Status: DISPENSED
Start: 2021-02-19

## (undated) RX ORDER — LIDOCAINE HYDROCHLORIDE 10 MG/ML
INJECTION, SOLUTION EPIDURAL; INFILTRATION; INTRACAUDAL; PERINEURAL
Status: DISPENSED
Start: 2018-04-10

## (undated) RX ORDER — ACETAMINOPHEN 10 MG/ML
INJECTION, SOLUTION INTRAVENOUS
Status: DISPENSED
Start: 2018-03-19

## (undated) RX ORDER — DIPHENHYDRAMINE HYDROCHLORIDE 50 MG/ML
INJECTION INTRAMUSCULAR; INTRAVENOUS
Status: DISPENSED
Start: 2020-11-27

## (undated) RX ORDER — FENTANYL CITRATE 50 UG/ML
INJECTION, SOLUTION INTRAMUSCULAR; INTRAVENOUS
Status: DISPENSED
Start: 2020-12-11

## (undated) RX ORDER — SODIUM CHLORIDE, SODIUM LACTATE, POTASSIUM CHLORIDE, CALCIUM CHLORIDE 600; 310; 30; 20 MG/100ML; MG/100ML; MG/100ML; MG/100ML
INJECTION, SOLUTION INTRAVENOUS
Status: DISPENSED
Start: 2018-03-19

## (undated) RX ORDER — DEXAMETHASONE SODIUM PHOSPHATE 4 MG/ML
INJECTION, SOLUTION INTRA-ARTICULAR; INTRALESIONAL; INTRAMUSCULAR; INTRAVENOUS; SOFT TISSUE
Status: DISPENSED
Start: 2018-03-19

## (undated) RX ORDER — PROPOFOL 10 MG/ML
INJECTION, EMULSION INTRAVENOUS
Status: DISPENSED
Start: 2018-09-07

## (undated) RX ORDER — ONDANSETRON 2 MG/ML
INJECTION INTRAMUSCULAR; INTRAVENOUS
Status: DISPENSED
Start: 2018-09-07

## (undated) RX ORDER — CEFAZOLIN SODIUM 1 G/50ML
SOLUTION INTRAVENOUS
Status: DISPENSED
Start: 2018-09-07

## (undated) RX ORDER — FENTANYL CITRATE 50 UG/ML
INJECTION, SOLUTION INTRAMUSCULAR; INTRAVENOUS
Status: DISPENSED
Start: 2024-09-13

## (undated) RX ORDER — KETOROLAC TROMETHAMINE 30 MG/ML
INJECTION, SOLUTION INTRAMUSCULAR; INTRAVENOUS
Status: DISPENSED
Start: 2020-12-11

## (undated) RX ORDER — BETAMETHASONE SODIUM PHOSPHATE AND BETAMETHASONE ACETATE 3; 3 MG/ML; MG/ML
INJECTION, SUSPENSION INTRA-ARTICULAR; INTRALESIONAL; INTRAMUSCULAR; SOFT TISSUE
Status: DISPENSED
Start: 2018-04-10

## (undated) RX ORDER — SODIUM CHLORIDE 9 MG/ML
INJECTION, SOLUTION INTRAVENOUS
Status: DISPENSED
Start: 2020-11-27

## (undated) RX ORDER — TRIAMCINOLONE ACETONIDE 40 MG/ML
INJECTION, SUSPENSION INTRA-ARTICULAR; INTRAMUSCULAR
Status: DISPENSED
Start: 2021-02-19

## (undated) RX ORDER — PROPOFOL 10 MG/ML
INJECTION, EMULSION INTRAVENOUS
Status: DISPENSED
Start: 2024-09-13

## (undated) RX ORDER — HYDROCODONE BITARTRATE AND ACETAMINOPHEN 5; 325 MG/1; MG/1
TABLET ORAL
Status: DISPENSED
Start: 2018-03-19

## (undated) RX ORDER — LIDOCAINE HYDROCHLORIDE 10 MG/ML
INJECTION, SOLUTION INFILTRATION; PERINEURAL
Status: DISPENSED
Start: 2024-09-13

## (undated) RX ORDER — LIDOCAINE HYDROCHLORIDE 20 MG/ML
INJECTION, SOLUTION EPIDURAL; INFILTRATION; INTRACAUDAL; PERINEURAL
Status: DISPENSED
Start: 2018-09-07

## (undated) RX ORDER — LIDOCAINE HYDROCHLORIDE 10 MG/ML
INJECTION, SOLUTION EPIDURAL; INFILTRATION; INTRACAUDAL; PERINEURAL
Status: DISPENSED
Start: 2020-12-11

## (undated) RX ORDER — BUPIVACAINE HYDROCHLORIDE 2.5 MG/ML
INJECTION, SOLUTION EPIDURAL; INFILTRATION; INTRACAUDAL
Status: DISPENSED
Start: 2018-03-19

## (undated) RX ORDER — CEFAZOLIN SODIUM 2 G/100ML
INJECTION, SOLUTION INTRAVENOUS
Status: DISPENSED
Start: 2018-03-19

## (undated) RX ORDER — PROPOFOL 10 MG/ML
INJECTION, EMULSION INTRAVENOUS
Status: DISPENSED
Start: 2019-02-01

## (undated) RX ORDER — KETOROLAC TROMETHAMINE 30 MG/ML
INJECTION, SOLUTION INTRAMUSCULAR; INTRAVENOUS
Status: DISPENSED
Start: 2018-09-07

## (undated) RX ORDER — FENTANYL CITRATE 50 UG/ML
INJECTION, SOLUTION INTRAMUSCULAR; INTRAVENOUS
Status: DISPENSED
Start: 2018-09-07